# Patient Record
Sex: FEMALE | Race: BLACK OR AFRICAN AMERICAN | NOT HISPANIC OR LATINO | URBAN - METROPOLITAN AREA
[De-identification: names, ages, dates, MRNs, and addresses within clinical notes are randomized per-mention and may not be internally consistent; named-entity substitution may affect disease eponyms.]

---

## 2024-07-22 DIAGNOSIS — N63.10 BREAST MASS, RIGHT: Primary | ICD-10-CM

## 2024-08-08 ENCOUNTER — OFFICE VISIT (OUTPATIENT)
Dept: SURGERY | Facility: CLINIC | Age: 34
End: 2024-08-08
Payer: MEDICAID

## 2024-08-08 VITALS
WEIGHT: 242 LBS | HEART RATE: 86 BPM | HEIGHT: 65 IN | DIASTOLIC BLOOD PRESSURE: 86 MMHG | RESPIRATION RATE: 18 BRPM | BODY MASS INDEX: 40.32 KG/M2 | OXYGEN SATURATION: 98 % | SYSTOLIC BLOOD PRESSURE: 122 MMHG

## 2024-08-08 DIAGNOSIS — N63.10 BREAST MASS, RIGHT: Primary | ICD-10-CM

## 2024-08-08 DIAGNOSIS — C50.811 MALIGNANT NEOPLASM OF OVERLAPPING SITES OF RIGHT BREAST IN FEMALE, ESTROGEN RECEPTOR POSITIVE: ICD-10-CM

## 2024-08-08 DIAGNOSIS — Z17.0 MALIGNANT NEOPLASM OF OVERLAPPING SITES OF RIGHT BREAST IN FEMALE, ESTROGEN RECEPTOR POSITIVE: ICD-10-CM

## 2024-08-08 DIAGNOSIS — C77.3 MALIGNANT NEOPLASM METASTATIC TO LYMPH NODE OF AXILLA: ICD-10-CM

## 2024-08-08 DIAGNOSIS — C50.812 MALIGNANT NEOPLASM OF OVERLAPPING SITES OF LEFT BREAST IN FEMALE, ESTROGEN RECEPTOR POSITIVE: ICD-10-CM

## 2024-08-08 DIAGNOSIS — Z17.0 MALIGNANT NEOPLASM OF OVERLAPPING SITES OF LEFT BREAST IN FEMALE, ESTROGEN RECEPTOR POSITIVE: ICD-10-CM

## 2024-08-08 PROBLEM — C77.9 METASTASIS TO LYMPH NODES: Status: ACTIVE | Noted: 2024-08-08

## 2024-08-08 PROCEDURE — 99215 OFFICE O/P EST HI 40 MIN: CPT | Mod: PBBFAC

## 2024-08-08 RX ORDER — LOSARTAN POTASSIUM 100 MG/1
1 TABLET ORAL EVERY MORNING
COMMUNITY

## 2024-08-08 RX ORDER — CEFAZOLIN SODIUM 2 G/50ML
2 SOLUTION INTRAVENOUS
OUTPATIENT
Start: 2024-08-08

## 2024-08-08 RX ORDER — SODIUM CHLORIDE 9 MG/ML
INJECTION, SOLUTION INTRAVENOUS CONTINUOUS
OUTPATIENT
Start: 2024-08-08

## 2024-08-08 RX ORDER — HEPARIN SODIUM 5000 [USP'U]/ML
5000 INJECTION, SOLUTION INTRAVENOUS; SUBCUTANEOUS EVERY 8 HOURS
OUTPATIENT
Start: 2024-08-08

## 2024-08-09 NOTE — H&P (VIEW-ONLY)
[facility] BRE[facility]   Clinic Note       CC: b/l breast IDC       HPI: Jeannie Greene is a 34 y.o. female with PMHx of HTN, R breast ICD Grade 2, and L breast/axillary ICD Grade 3 presents to clinic today with L breast and arm pain. She first noticed the pain and swelling in L breast in May where she presented to the ED and received antibiotics. Pain did not resolve so Pt followed up with her OB/Gyn who ordered the imaging and biopsy and was subsequently referred to our clinic. Today, patient reports swelling and pain in left breast, axilla, and down her arm. No pain in right breast, but has had some milky discharge in the past from R nipple. She states her pain as a 6/10 that was at its worse in July and has improved some since. She takes ibuprofen 4x daily without relief.     Patient had first menarche at 11. One pregnancy with child and not gone through menopause. She was on OCP at 15yo, received Depo shot from [age]. No contraception from [age] when she got pregnant. Got Murina IUD after pregnancy at 30  that is still in place.  Pt has an extensive family history of breast and cervical cancer. Her mother and sister had cervical cancer. Her mother had a hysterectomy. She also believes her maternal aunt had breast cancer. She only takes losartan for HTN. No prior surgeries.    PMH:  Past Medical History:   Diagnosis Date    Hypertension          PSH:  No past surgical history on file.      Medications:  Current Outpatient Medications on File Prior to Visit   Medication Sig Dispense Refill    losartan (COZAAR) 100 MG tablet Take 1 tablet by mouth every morning.       No current facility-administered medications on file prior to visit.        Allergies:  Review of patient's allergies indicates:  No Known Allergies      Social Hx:  Social History     Tobacco Use   Smoking Status Never   Smokeless Tobacco Never      Social History     Substance and Sexual Activity   Alcohol Use None         Relevant Family Hx:  Pt  has an extensive family history of breast and cervical cancer. Her mother and sister had cervical cancer. Her mother had a hysterectomy. She also believes her maternal aunt had breast cancer.       Physical Exam:   Vitals:    24 1216   BP: 122/86   Pulse: 86   Resp: 18      Gen: NAD, AAOx3   Eye: EOMI   CV: RR  Pulm: NWOB on RA  Abd: soft, non-tender, non-distended  Ext:  Move all 4 extremities.   R Breast: 3cm mass 12 o'clock about 4 in above nipple. no skin changes, no nipple retraction or discharge. No palpable LN in R axilla.  L breast: swollen and hard. Large mass appreciated in 2 o'clock right above breast. 1 hard immobile LN in L axilla. Breast and axilla TTP.            Interval Imagin/17/24 US biopsy  Technically successful ultrasound-guided biopsy of     -Right breast mass at 12:00, 9 cm from the nipple     24 MMG  RIGHT BREAST:   Mass seen near the 12:00 position of the right breast at middle depth.     A mildly prominent right axillary lymph node is seen.     SONOGRAPHIC FINDINGS:   LEFT BREAST:     Left breast ultrasound performed.     -Irregular hypoechoic left breast mass is seen at 2:00, 10 cm nipple measuring 2 x 1.5 x 2.0 cm.     -Irregular hypoechoic mass with indistinct margins within the left breast mass at 1:00 is seen measuring approximately 1.9 x 1.6 cm.     -Irregular hypoechoic breast mass seen within the left breast at 1:00, 12 cm from the nipple measuring 2.3 x 1.6 cm.     -Otherwise, there is irregular appearing masslike tissue seen throughout the left breast, most prominent within the upper outer quadrant.     -Multiple enlarged irregular left axillary lymph nodes are seen including lymph node with spiculated margins within the axilla measuring 16 x 15 x 12 mm.       Interval Pathology:    24 US Biopsy  1. RIGHT BREAST, 12 O'CLOCK, 9 CM FN:   - INVASIVE DUCTAL CARCINOMA, GRADE 2.   Comment: Invasive carcinoma is present on 3 of the core biopsies,    comprising 60%  of the tissue and measures at least 0.7 cm.   2. RIGHT AXILLA:   - LYMPH NODE, NEGATIVE FOR METASTATIC CARCINOMA.     ER: POSITIVE, ME: POSITIVE, HER2**: NEGATIVE       1. LEFT BREAST, 2 O'CLOCK, 10 CM FN:   - INVASIVE DUCTAL CARCINOMA, GRADE 3.   Comment #1: Carcinoma is present on all three core biopsies, comprising    95% of the tissue and measuring at least 1.2 cm.   2. LEFT AXILLA:   - INVASIVE DUCTAL CARCINOMA, GRADE 3.   Comment #2: No lymph node is present. However, this could represent    metastatic carcinoma completely replacing a lymph node.   ER: POSITIVE, ME: NEGATIVE, HER2**: POSITIVE ____________________________________________________________       A/P: Jeannie Greene is a 34 y.o.  female with PMHx of HTN, R breast ICD Grade 2, and L breast/axillary ICD Grade 3 presents to clinic today with L breast and arm pain. Due to LN involvement and inflammatory nature, Pt will requires chemotherapy prior to surgical intervention.      - Urgent referral to Heme/Onc to establish care and treatment  - MRI ordered for better localization of mass  - Mediport placement scheduled for 8/14. Consent was signed after r/b/a to surgery were explained to the patinet and she expressed understanding    Guerrero Bowman  [facility] General Surgery, MS-3      PGY5 addendum  Patient seen and examined with student. Agree with history and physical as written. Plan for mediport placement next week with anticipation of neoadjuvant chemotherapy.  Katie Wilson

## 2024-08-13 ENCOUNTER — PATIENT MESSAGE (OUTPATIENT)
Dept: SURGERY | Facility: HOSPITAL | Age: 34
End: 2024-08-13
Payer: MEDICAID

## 2024-08-13 ENCOUNTER — ANESTHESIA EVENT (OUTPATIENT)
Dept: SURGERY | Facility: HOSPITAL | Age: 34
End: 2024-08-13
Payer: MEDICAID

## 2024-08-13 NOTE — ANESTHESIA PREPROCEDURE EVALUATION
"Jeannie Greene is a 34 y.o. female presenting for XIDIPMIDO-CSUP-U-CATH (Chest) with a history of   -R breast ICD Grade 2, and L breast/axillary ICD Grade 3     -CELLULITIS L BREAST 5/2024    Vitals:    08/14/24 0756 08/14/24 0804 08/14/24 0900   BP:  (!) 144/98 138/87   BP Location:   Left arm   Patient Position:   Lying   Pulse:  84 83   Resp:   20   Temp:  36.7 °C (98.1 °F) 36.4 °C (97.5 °F)   TempSrc:  Oral Tympanic   SpO2:  97% 98%   Weight: 108.1 kg (238 lb 6.4 oz)         -HTN  -obesity, BMI 40    BETA-BLOCKER: NONE    New Orders for Anesthesia: UPT NEG DOS;  CMP    Patient Active Problem List   Diagnosis    Malignant neoplasm of overlapping sites of left breast in female, estrogen receptor positive    Malignant neoplasm of overlapping sites of right breast in female, estrogen receptor positive    Metastasis to lymph nodes - Left axilla       History reviewed. No pertinent surgical history.    No results found for: "WBC", "HGB", "HCT", "PLT"    CMP  Creatinine   Date Value Ref Range Status   06/16/2020 0.65 0.57 - 1.25 mg/dL Final       No results found for: "PROTIME", "INR"    No results found for: "APTT"    No results found for this or any previous visit.      Current Outpatient Medications   Medication Instructions    losartan (COZAAR) 100 MG tablet 1 tablet, Oral, Every morning         Lab Results   Component Value Date    ALT 31 08/14/2024    AST 21 08/14/2024     08/14/2024    K 3.3 (L) 08/14/2024     08/14/2024    CREATININE 0.88 08/14/2024    BUN 18.1 08/14/2024    CO2 28 08/14/2024       Pre-op Assessment    I have reviewed the Patient Summary Reports.     I have reviewed the Nursing Notes. I have reviewed the NPO Status.   I have reviewed the Medications.     Review of Systems  Anesthesia Hx:  No problems with previous Anesthesia   History of prior surgery of interest to airway management or planning:          Denies Family Hx of Anesthesia complications.    Denies Personal Hx of " Anesthesia complications.                    Hematology/Oncology:  Hematology Normal   Oncology Normal                                   EENT/Dental:  EENT/Dental Normal           Cardiovascular:  Cardiovascular Normal                                            Pulmonary:  Pulmonary Normal                       Renal/:  Renal/ Normal                 Hepatic/GI:  Hepatic/GI Normal                 Musculoskeletal:  Musculoskeletal Normal                Neurological:  Neurology Normal                                      Endocrine:  Endocrine Normal            Dermatological:  Skin Normal    Psych:  Psychiatric Normal                    Physical Exam  General: Well nourished, Cooperative, Alert and Oriented    Airway:  Mallampati: I / I  Mouth Opening: Normal  TM Distance: Normal  Tongue: Normal  Neck ROM: Normal ROM    Dental:  Intact        Anesthesia Plan  Type of Anesthesia, risks & benefits discussed:    Anesthesia Type: Gen Supraglottic Airway  Intra-op Monitoring Plan: Standard ASA Monitors  Post Op Pain Control Plan: multimodal analgesia and IV/PO Opioids PRN  Induction:  IV  Airway Plan: Direct  Informed Consent: Informed consent signed with the Patient and all parties understand the risks and agree with anesthesia plan.  All questions answered. Patient consented to blood products? No  ASA Score: 2  Day of Surgery Review of History & Physical: H&P Update referred to the surgeon/provider.    Ready For Surgery From Anesthesia Perspective.     .

## 2024-08-14 ENCOUNTER — HOSPITAL ENCOUNTER (OUTPATIENT)
Facility: HOSPITAL | Age: 34
Discharge: HOME | End: 2024-08-14
Attending: SURGERY | Admitting: SURGERY
Payer: MEDICAID

## 2024-08-14 ENCOUNTER — ANESTHESIA (OUTPATIENT)
Dept: SURGERY | Facility: HOSPITAL | Age: 34
End: 2024-08-14
Payer: MEDICAID

## 2024-08-14 DIAGNOSIS — Z17.0 MALIGNANT NEOPLASM OF OVERLAPPING SITES OF LEFT BREAST IN FEMALE, ESTROGEN RECEPTOR POSITIVE: Primary | ICD-10-CM

## 2024-08-14 DIAGNOSIS — C50.812 MALIGNANT NEOPLASM OF OVERLAPPING SITES OF LEFT BREAST IN FEMALE, ESTROGEN RECEPTOR POSITIVE: Primary | ICD-10-CM

## 2024-08-14 DIAGNOSIS — N63.10 BREAST MASS, RIGHT: ICD-10-CM

## 2024-08-14 LAB
ALBUMIN SERPL-MCNC: 4 G/DL (ref 3.5–5)
ALBUMIN/GLOB SERPL: 1 RATIO (ref 1.1–2)
ALP SERPL-CCNC: 56 UNIT/L (ref 40–150)
ALT SERPL-CCNC: 31 UNIT/L (ref 0–55)
ANION GAP SERPL CALC-SCNC: 9 MEQ/L
AST SERPL-CCNC: 21 UNIT/L (ref 5–34)
BILIRUB SERPL-MCNC: 0.5 MG/DL
BUN SERPL-MCNC: 18.1 MG/DL (ref 7–18.7)
CALCIUM SERPL-MCNC: 10.2 MG/DL (ref 8.4–10.2)
CHLORIDE SERPL-SCNC: 103 MMOL/L (ref 98–107)
CO2 SERPL-SCNC: 28 MMOL/L (ref 22–29)
CREAT SERPL-MCNC: 0.88 MG/DL (ref 0.55–1.02)
CREAT/UREA NIT SERPL: 21
GFR SERPLBLD CREATININE-BSD FMLA CKD-EPI: >60 ML/MIN/1.73/M2
GLOBULIN SER-MCNC: 4.1 GM/DL (ref 2.4–3.5)
GLUCOSE SERPL-MCNC: 153 MG/DL (ref 74–100)
POTASSIUM SERPL-SCNC: 3.3 MMOL/L (ref 3.5–5.1)
PROT SERPL-MCNC: 8.1 GM/DL (ref 6.4–8.3)
SODIUM SERPL-SCNC: 140 MMOL/L (ref 136–145)

## 2024-08-14 PROCEDURE — 36000706: Performed by: SURGERY

## 2024-08-14 PROCEDURE — 63600175 PHARM REV CODE 636 W HCPCS: Mod: JZ,JG | Performed by: SURGERY

## 2024-08-14 PROCEDURE — C1788 PORT, INDWELLING, IMP: HCPCS | Performed by: SURGERY

## 2024-08-14 PROCEDURE — 71000016 HC POSTOP RECOV ADDL HR: Performed by: SURGERY

## 2024-08-14 PROCEDURE — 36000707: Performed by: SURGERY

## 2024-08-14 PROCEDURE — 25000003 PHARM REV CODE 250: Performed by: NURSE ANESTHETIST, CERTIFIED REGISTERED

## 2024-08-14 PROCEDURE — 37000008 HC ANESTHESIA 1ST 15 MINUTES: Performed by: SURGERY

## 2024-08-14 PROCEDURE — 63600175 PHARM REV CODE 636 W HCPCS: Performed by: SPECIALIST

## 2024-08-14 PROCEDURE — 71000015 HC POSTOP RECOV 1ST HR: Performed by: SURGERY

## 2024-08-14 PROCEDURE — 37000009 HC ANESTHESIA EA ADD 15 MINS: Performed by: SURGERY

## 2024-08-14 PROCEDURE — 80053 COMPREHEN METABOLIC PANEL: CPT | Performed by: NURSE PRACTITIONER

## 2024-08-14 PROCEDURE — 63600175 PHARM REV CODE 636 W HCPCS: Performed by: STUDENT IN AN ORGANIZED HEALTH CARE EDUCATION/TRAINING PROGRAM

## 2024-08-14 PROCEDURE — 63600175 PHARM REV CODE 636 W HCPCS: Performed by: NURSE ANESTHETIST, CERTIFIED REGISTERED

## 2024-08-14 PROCEDURE — 71000033 HC RECOVERY, INTIAL HOUR: Performed by: SURGERY

## 2024-08-14 DEVICE — PORT POWER CLEAR VIEW: Type: IMPLANTABLE DEVICE | Site: CHEST  WALL | Status: FUNCTIONAL

## 2024-08-14 RX ORDER — HEPARIN SODIUM 5000 [USP'U]/ML
5000 INJECTION, SOLUTION INTRAVENOUS; SUBCUTANEOUS ONCE
Status: COMPLETED | OUTPATIENT
Start: 2024-08-14 | End: 2024-08-14

## 2024-08-14 RX ORDER — DEXAMETHASONE SODIUM PHOSPHATE 4 MG/ML
INJECTION, SOLUTION INTRA-ARTICULAR; INTRALESIONAL; INTRAMUSCULAR; INTRAVENOUS; SOFT TISSUE
Status: DISCONTINUED | OUTPATIENT
Start: 2024-08-14 | End: 2024-08-14

## 2024-08-14 RX ORDER — HEPARIN SODIUM 5000 [USP'U]/ML
5000 INJECTION, SOLUTION INTRAVENOUS; SUBCUTANEOUS EVERY 8 HOURS
Status: DISCONTINUED | OUTPATIENT
Start: 2024-08-14 | End: 2024-08-14

## 2024-08-14 RX ORDER — SODIUM CHLORIDE 9 MG/ML
INJECTION, SOLUTION INTRAVENOUS CONTINUOUS
Status: DISCONTINUED | OUTPATIENT
Start: 2024-08-14 | End: 2024-08-14 | Stop reason: HOSPADM

## 2024-08-14 RX ORDER — HYDROCODONE BITARTRATE AND ACETAMINOPHEN 5; 325 MG/1; MG/1
1 TABLET ORAL EVERY 6 HOURS PRN
Qty: 5 TABLET | Refills: 0 | Status: SHIPPED | OUTPATIENT
Start: 2024-08-14

## 2024-08-14 RX ORDER — SODIUM CHLORIDE, SODIUM LACTATE, POTASSIUM CHLORIDE, CALCIUM CHLORIDE 600; 310; 30; 20 MG/100ML; MG/100ML; MG/100ML; MG/100ML
INJECTION, SOLUTION INTRAVENOUS CONTINUOUS
OUTPATIENT
Start: 2024-08-14

## 2024-08-14 RX ORDER — PROCHLORPERAZINE EDISYLATE 5 MG/ML
5 INJECTION INTRAMUSCULAR; INTRAVENOUS ONCE AS NEEDED
Status: COMPLETED | OUTPATIENT
Start: 2024-08-14 | End: 2024-08-14

## 2024-08-14 RX ORDER — GLUCAGON 1 MG
1 KIT INJECTION
Status: DISCONTINUED | OUTPATIENT
Start: 2024-08-14 | End: 2024-08-14 | Stop reason: HOSPADM

## 2024-08-14 RX ORDER — PROPOFOL 10 MG/ML
INJECTION, EMULSION INTRAVENOUS
Status: DISCONTINUED | OUTPATIENT
Start: 2024-08-14 | End: 2024-08-14

## 2024-08-14 RX ORDER — ONDANSETRON HYDROCHLORIDE 2 MG/ML
INJECTION, SOLUTION INTRAVENOUS
Status: DISCONTINUED | OUTPATIENT
Start: 2024-08-14 | End: 2024-08-14

## 2024-08-14 RX ORDER — PHENYLEPHRINE HYDROCHLORIDE 10 MG/ML
INJECTION INTRAVENOUS
Status: DISCONTINUED | OUTPATIENT
Start: 2024-08-14 | End: 2024-08-14

## 2024-08-14 RX ORDER — MEPERIDINE HYDROCHLORIDE 25 MG/ML
12.5 INJECTION INTRAMUSCULAR; INTRAVENOUS; SUBCUTANEOUS ONCE
Status: DISCONTINUED | OUTPATIENT
Start: 2024-08-14 | End: 2024-08-14 | Stop reason: HOSPADM

## 2024-08-14 RX ORDER — MIDAZOLAM HYDROCHLORIDE 1 MG/ML
2 INJECTION INTRAMUSCULAR; INTRAVENOUS ONCE
Status: COMPLETED | OUTPATIENT
Start: 2024-08-14 | End: 2024-08-14

## 2024-08-14 RX ORDER — LIDOCAINE HYDROCHLORIDE 20 MG/ML
INJECTION INTRAVENOUS
Status: DISCONTINUED | OUTPATIENT
Start: 2024-08-14 | End: 2024-08-14

## 2024-08-14 RX ORDER — IPRATROPIUM BROMIDE AND ALBUTEROL SULFATE 2.5; .5 MG/3ML; MG/3ML
3 SOLUTION RESPIRATORY (INHALATION) ONCE AS NEEDED
Status: DISCONTINUED | OUTPATIENT
Start: 2024-08-14 | End: 2024-08-14 | Stop reason: HOSPADM

## 2024-08-14 RX ORDER — CEFAZOLIN SODIUM 1 G/3ML
2 INJECTION, POWDER, FOR SOLUTION INTRAMUSCULAR; INTRAVENOUS
Status: COMPLETED | OUTPATIENT
Start: 2024-08-14 | End: 2024-08-14

## 2024-08-14 RX ORDER — HYDROMORPHONE HYDROCHLORIDE 1 MG/ML
0.2 INJECTION, SOLUTION INTRAMUSCULAR; INTRAVENOUS; SUBCUTANEOUS EVERY 5 MIN PRN
Status: DISCONTINUED | OUTPATIENT
Start: 2024-08-14 | End: 2024-08-14 | Stop reason: HOSPADM

## 2024-08-14 RX ORDER — ONDANSETRON 4 MG/1
4 TABLET, FILM COATED ORAL 2 TIMES DAILY
Qty: 10 TABLET | Refills: 1 | Status: SHIPPED | OUTPATIENT
Start: 2024-08-14

## 2024-08-14 RX ORDER — OXYCODONE AND ACETAMINOPHEN 5; 325 MG/1; MG/1
2 TABLET ORAL ONCE
Status: DISCONTINUED | OUTPATIENT
Start: 2024-08-14 | End: 2024-08-14 | Stop reason: HOSPADM

## 2024-08-14 RX ORDER — FENTANYL CITRATE 50 UG/ML
INJECTION, SOLUTION INTRAMUSCULAR; INTRAVENOUS
Status: DISCONTINUED | OUTPATIENT
Start: 2024-08-14 | End: 2024-08-14

## 2024-08-14 RX ORDER — BUPIVACAINE HYDROCHLORIDE 2.5 MG/ML
INJECTION, SOLUTION EPIDURAL; INFILTRATION; INTRACAUDAL
Status: DISCONTINUED | OUTPATIENT
Start: 2024-08-14 | End: 2024-08-14 | Stop reason: HOSPADM

## 2024-08-14 RX ORDER — KETOROLAC TROMETHAMINE 30 MG/ML
INJECTION, SOLUTION INTRAMUSCULAR; INTRAVENOUS
Status: DISCONTINUED | OUTPATIENT
Start: 2024-08-14 | End: 2024-08-14

## 2024-08-14 RX ORDER — ONDANSETRON HYDROCHLORIDE 2 MG/ML
4 INJECTION, SOLUTION INTRAVENOUS ONCE
Status: COMPLETED | OUTPATIENT
Start: 2024-08-14 | End: 2024-08-14

## 2024-08-14 RX ORDER — HYDROMORPHONE HYDROCHLORIDE 1 MG/ML
0.5 INJECTION, SOLUTION INTRAMUSCULAR; INTRAVENOUS; SUBCUTANEOUS EVERY 5 MIN PRN
Status: DISCONTINUED | OUTPATIENT
Start: 2024-08-14 | End: 2024-08-14 | Stop reason: HOSPADM

## 2024-08-14 RX ORDER — DIPHENHYDRAMINE HYDROCHLORIDE 50 MG/ML
25 INJECTION INTRAMUSCULAR; INTRAVENOUS ONCE AS NEEDED
Status: DISCONTINUED | OUTPATIENT
Start: 2024-08-14 | End: 2024-08-14 | Stop reason: HOSPADM

## 2024-08-14 RX ORDER — HEPARIN SOD,PORCINE/0.9 % NACL 1000/500ML
INTRAVENOUS SOLUTION INTRAVENOUS
Status: DISCONTINUED | OUTPATIENT
Start: 2024-08-14 | End: 2024-08-14 | Stop reason: HOSPADM

## 2024-08-14 RX ORDER — SODIUM CHLORIDE, SODIUM LACTATE, POTASSIUM CHLORIDE, CALCIUM CHLORIDE 600; 310; 30; 20 MG/100ML; MG/100ML; MG/100ML; MG/100ML
INJECTION, SOLUTION INTRAVENOUS CONTINUOUS
Status: DISCONTINUED | OUTPATIENT
Start: 2024-08-14 | End: 2024-08-14 | Stop reason: HOSPADM

## 2024-08-14 RX ORDER — MIDAZOLAM HYDROCHLORIDE 2 MG/2ML
2 INJECTION, SOLUTION INTRAMUSCULAR; INTRAVENOUS ONCE AS NEEDED
OUTPATIENT
Start: 2024-08-14 | End: 2036-01-11

## 2024-08-14 RX ORDER — HEPARIN 100 UNIT/ML
SYRINGE INTRAVENOUS
Status: DISCONTINUED | OUTPATIENT
Start: 2024-08-14 | End: 2024-08-14 | Stop reason: HOSPADM

## 2024-08-14 RX ADMIN — PROCHLORPERAZINE EDISYLATE 5 MG: 5 INJECTION INTRAMUSCULAR; INTRAVENOUS at 12:08

## 2024-08-14 RX ADMIN — HEPARIN SODIUM 5000 UNITS: 5000 INJECTION, SOLUTION INTRAVENOUS; SUBCUTANEOUS at 08:08

## 2024-08-14 RX ADMIN — PROPOFOL 150 MG: 10 INJECTION, EMULSION INTRAVENOUS at 10:08

## 2024-08-14 RX ADMIN — FENTANYL CITRATE 50 MCG: 50 INJECTION INTRAMUSCULAR; INTRAVENOUS at 10:08

## 2024-08-14 RX ADMIN — MIDAZOLAM HYDROCHLORIDE 2 MG: 1 INJECTION, SOLUTION INTRAMUSCULAR; INTRAVENOUS at 09:08

## 2024-08-14 RX ADMIN — DEXAMETHASONE SODIUM PHOSPHATE 8 MG: 4 INJECTION, SOLUTION INTRA-ARTICULAR; INTRALESIONAL; INTRAMUSCULAR; INTRAVENOUS; SOFT TISSUE at 11:08

## 2024-08-14 RX ADMIN — PHENYLEPHRINE HYDROCHLORIDE 100 MCG: 10 INJECTION INTRAVENOUS at 11:08

## 2024-08-14 RX ADMIN — LIDOCAINE HYDROCHLORIDE 50 MG: 20 INJECTION INTRAVENOUS at 10:08

## 2024-08-14 RX ADMIN — CEFAZOLIN 2 G: 330 INJECTION, POWDER, FOR SOLUTION INTRAMUSCULAR; INTRAVENOUS at 10:08

## 2024-08-14 RX ADMIN — ONDANSETRON 4 MG: 2 INJECTION INTRAMUSCULAR; INTRAVENOUS at 11:08

## 2024-08-14 RX ADMIN — SODIUM CHLORIDE, POTASSIUM CHLORIDE, SODIUM LACTATE AND CALCIUM CHLORIDE: 600; 310; 30; 20 INJECTION, SOLUTION INTRAVENOUS at 09:08

## 2024-08-14 RX ADMIN — KETOROLAC TROMETHAMINE 30 MG: 30 INJECTION, SOLUTION INTRAMUSCULAR at 11:08

## 2024-08-14 NOTE — ANESTHESIA POSTPROCEDURE EVALUATION
Anesthesia Post Evaluation    Patient: Jeannie Greene    Procedure(s) Performed: Procedure(s) (LRB):  [id number] (N/A)    Final Anesthesia Type: general      Patient location during evaluation: PACU  Patient participation: Yes- Able to Participate  Level of consciousness: awake and responds to stimulation  Post-procedure vital signs: reviewed and stable  Pain management: adequate  Airway patency: patent    PONV status at discharge: No PONV  Anesthetic complications: no      Cardiovascular status: blood pressure returned to baseline  Respiratory status: unassisted  Hydration status: euvolemic  Follow-up not needed.              Vitals Value Taken Time   BP 93/63 08/14/24 1431   Temp 36.4 °C (97.5 °F) 08/14/24 1415   Pulse 67 08/14/24 1440   Resp 18 08/14/24 1400   SpO2 97 % 08/14/24 1440   Vitals shown include unfiled device data.      Event Time   Out of Recovery 12:23:00         Pain/Génesis Score: Génesis Score: 7 (8/14/2024 12:26 PM)

## 2024-08-14 NOTE — TRANSFER OF CARE
Anesthesia Transfer of Care Note    Patient: Jeannie Greene    Procedure(s) Performed: Procedure(s) (LRB):  [id number] (N/A)    Patient location: PACU    Anesthesia Type: general    Transport from OR: Transported from OR on room air with adequate spontaneous ventilation    Post pain: adequate analgesia    Post assessment: no apparent anesthetic complications and tolerated procedure well    Post vital signs: stable    Level of consciousness: sedated    Nausea/Vomiting: no nausea/vomiting    Complications: none    Transfer of care protocol was followed      Last vitals: Visit Vitals  /87 (BP Location: Left arm, Patient Position: Lying)   Pulse 83   Temp 36.4 °C (97.5 °F) (Tympanic)   Resp 20   Wt 108.1 kg (238 lb 6.4 oz)   SpO2 98%   Breastfeeding No   BMI 39.67 kg/m²

## 2024-08-14 NOTE — ANESTHESIA PROCEDURE NOTES
Intubation    Date/Time: 8/14/2024 10:42 AM    Performed by: Alize Roth CRNA  Authorized by: Juany Hernandez MD    Intubation:     Induction:  Intravenous    Intubated:  Postinduction    Mask Ventilation:  Not attempted    Attempts:  1    Attempted By:  CRNA    Difficult Airway Encountered?: No      Complications:  None    Airway Device:  Supraglottic airway/LMA    Airway Device Size:  4.0    Style/Cuff Inflation:  Uncuffed    Placement Verified By:  Capnometry    Complicating Factors:  None    Findings Post-Intubation:  BS equal bilateral and atraumatic/condition of teeth unchanged

## 2024-08-14 NOTE — DISCHARGE INSTRUCTIONS
MEDIPORT:    · Keep all scheduled appointments. Your port may be used immediately. If you have any problems with the site (redness, swelling, drainage, increasing pain, site hot to touch, port not working) CALL THE SURGERY CLINIC at [phone].    · No heavy lifting or straining for 2 weeks.    · You may take a shower tomorrow. Wash gently at incision sites- do not scrub or peel skin glue.    · Do not soak your wound in water for two weeks. Do not take baths, swim, or use a hot tub until your doctor says it is okay.    · Alternate Tylenol and Ibuprofen as needed for pain.     · You may use an ice pack as needed for 20 minutes at a time over your incision site to minimize swelling and help relieve pain.    · Do not drink alcohol or drive today, or as long as you are on pain medication.    · Notify MD of any moderate to severe pain unrelieved by pain medicine, if your incision opens and/or bleeds, or for any signs of infection including fever above 100.4, excessive redness or swelling, yellow/green foul- smelling drainage, nausea or vomiting. Clinics number is [phone]. If it is after business hours or emergency call [phone] and state Im having post op complications and need to speak to the surgeon on call.    · Thanks for choosing [facility]! Have a smooth recovery!

## 2024-08-14 NOTE — DISCHARGE SUMMARY
[facility] - Periop Services  Short Stay  Discharge Summary    Admit Date: 8/14/2024    Discharge Date and Time: 8/14/2024  3:05 PM      Discharge Attending Physician: Jc Chauhan Jr.,*     Hospital Course (synopsis of major diagnoses, care, treatment, and services provided during the course of the hospital stay): 34 year old female with L IDC ER+, AK-, HER-2 + presenting for mediport placement. Taken to the OR and mediport was placed under fluoroscopy. Patient tolerated the procedure well without complications.     Discharge criteria had been met and patient was deemed stable enough for discharge.    Please see hospital and op notes for further detail regarding patient's admission.    Discharged Condition: stable    Disposition: Home or Self Care    Follow up/Patient Instructions:    Medications:  Reconciled Home Medications:      Medication List        START taking these medications      HYDROcodone-acetaminophen 5-325 mg per tablet  Commonly known as: NORCO  Take 1 tablet by mouth every 6 (six) hours as needed for Pain.     ondansetron 4 MG tablet  Commonly known as: ZOFRAN  Take 1 tablet (4 mg total) by mouth 2 (two) times daily.            CONTINUE taking these medications      losartan 100 MG tablet  Commonly known as: COZAAR  Take 1 tablet by mouth every morning.            Discharge Procedure Orders   Change dressing (specify)   Order Comments: Can shower when you get home. Do not submerge your incisions in water (not sitting in a bath tub or swimming) for two weeks. Surgical glue will come off on its own over time. If it has not come off in 10 days you can peel it off.     Notify your health care provider if you experience any of the following:  temperature >100.4     Notify your health care provider if you experience any of the following:  severe uncontrolled pain     Notify your health care provider if you experience any of the following:  redness, tenderness, or signs of infection (pain,  swelling, redness, odor or green/yellow discharge around incision site)     Evette Escobar MD   [employer] General Surgery PGY-1

## 2024-08-14 NOTE — INTERVAL H&P NOTE
The patient has been examined and the H&P has been reviewed:    I concur with the findings and no changes have occurred since H&P was written.    Surgery risks, benefits and alternative options discussed and understood by patient/family.    H&P Update    Patient seen and examined this morning. There are no changes to the documented H&P.    Patient has been NPO since midnight.     Patient has held home blood thinners for appropriate amount of time: N/A    Positioning: Supine    Pre-operative Heparin Ordered: Yes    Pre-operative Antibiotics Ordered: Yes: Ancef    Laterality Marked: N/A    All questions and concerns addressed. Patient confirms the procedure to be performed: [id number].     Eric Constantino MD  [facility] General Surgery, PGY-1  08/14/2024 8:27 AM        There are no hospital problems to display for this patient.

## 2024-08-14 NOTE — OP NOTE
[facility] - Periop Services  General Surgery  Operative Note    SUMMARY     Date of Procedure: 8/14/2024     Procedure: Procedure(s) (LRB):  [id number] (N/A)       Surgeons and Role:     * Jc Chauhan Jr., MD - Primary     * Julia Ruiz MD - Resident - Assisting     * Evette Escobar MD - Resident - Assisting    Pre-Operative Diagnosis: * No pre-op diagnosis entered *    Post-Operative Diagnosis: Post-Op Diagnosis Codes:     * Breast mass, right [N63.10]    Anesthesia: Choice    Operative Findings (including complications, if any): Mediport placement     Description of Technical Procedures:   After appropriate consents were obtained, the patient was taken to the operating room and placed in supine position and placed under general anesthesia. Patient's bilateral chest and neck was prepped and draped in sterile fashion. A proper timeout was performed confirming correct patient, site, procedure, and administration of preoperative antibiotic and anticoagulation. A large bore needle was used to penetrate the skin and venous access was obtained under ultrasound guidance.  A guidewire was inserted into the needle and confirmed to be in the superior vena cava by flouroscopy.  Next a 3 cm incision was made on the upper chest and deepened with electrocautery.  A 2 x 3 cm port pocket was created in the subcutaneous space overlying the pectoralis fascia inferior to the incision. The catheter was then tunneled through the subcutaneous fat to exit the skin at the wire.  A dilator and sheath were inserted over the guidewire into the central venous system under fluoroscopic guidance. The guidewire and dilator were removed from the sheath. The catheter was inserted into the sheath and then the sheath was torn away. The catheter was pulled back such that the tip was at the atriocaval junction. The catheter was attached to the port with a locking washer and the the port was fixed to the pectoralis  fascia with two 3-0 Vicryl stitches. A Williamson needle was used to aspirate then flush the port with heparinized saline.  The incision was then reapproximated with 3-0 Vicryl, 4-0 Monocryl, and Dermabond.  The patient tolerated the procedure without complications.  The needle and sponge count were accurate. The patient was transferred to recovery room in stable condition.     Dr. Gurrola was present for the critical portions of the case.     Findings: Properly placed catheter tip at the cavo-atrial junction visualized with fluoroscopy. Post-operative CXR confirmed placement.     Significant Surgical Tasks Conducted by the Assistant(s), if Applicable: N/A    Estimated Blood Loss (EBL): * No values recorded between 8/14/2024 10:55 AM and 8/14/2024 11:45 AM *           Implants:   Implant Name Type Inv. Item Serial No.  Lot No. LRB No. Used Action   PORT POWER CLEAR VIEW - [id number]  PORT POWER CLEAR VIEW  [employer] [id number] N/A 1 Implanted       Specimens:   Specimen (24h ago, onward)      None                Condition: Stable    Disposition: PACU - hemodynamically stable.    Evette Escobar MD   [facility] General Surgery PGY-1

## 2024-08-15 VITALS
WEIGHT: 238.38 LBS | HEART RATE: 75 BPM | RESPIRATION RATE: 18 BRPM | TEMPERATURE: 98 F | OXYGEN SATURATION: 95 % | DIASTOLIC BLOOD PRESSURE: 78 MMHG | BODY MASS INDEX: 39.67 KG/M2 | SYSTOLIC BLOOD PRESSURE: 108 MMHG

## 2024-08-15 NOTE — PROGRESS NOTES
"  Cancer Genetics  Hereditary/High Risk Clinic  Department of Hematology and Oncology  [facility]        Date of Service:  2024  Provider:  Tye Núñez MS, [facility]  Collaborating physician: Qiana Heller MD    Patient Information  Name:  Jeannie Greene  :  1990  MRN:  [id number]        Referring Provider: Jc Chauhan Jr., MD     Visit type: in-person.   Face-to-face time with patient via video: approximately 35 minutes.  Approximately 95 minutes in total were spent on this encounter, which includes face-to-face time and non-face-to-face time preparing to see the patient (e.g., review of tests), obtaining and/or reviewing separately obtained history, documenting clinical information in the electronic or other health record, independently interpreting results (not   reported) and communicating results to the patient/family/caregiver, or care coordination (not separately reported).      SUBJECTIVE:      Reason for Referral: Breast mass, right    History of Present Illness (HPI):  Jeannie Greene ("Jeannie"), 34 y.o., assigned female sex at birth is established to the [facility] but new to me.  She was referred by  Breast Surgery  for genetic cancer risk assessment given her family history of breast cancer. 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    Focused Medical History:   Germline cancer-genetic testing:  No  Cancer:  Yes  Malignant neoplasm of overlapping sites of left breast in female, estrogen receptor positive, metastasis to lymph nodes - Left axilla   Malignant neoplasm of overlapping sites of right breast in female, estrogen receptor positive  Colonoscopy: No  Mammogram: Yes (done at [facility])  Most recent mammo: 2024   Breast MRI:  No  Other benign " tumor/findings:  No  Pancreatitis:  No  Pancreatic cyst:  No   Reproductive organs intact     Focused Surgical History  N/A    Ancestry:  Ashkenazi Oriental orthodox ancestry:  No  Paternal:   Maternal:     Focused Family History:  Consanguinity in ancestors:  No  Germline cancer-genetic testing in blood relatives:  No  Cancer in family:  Yes; there are no known blood relatives affected with cancer other than those mentioned in the pedigree below    Family Cancer Pedigree: For clearer picture, please see pedigree in History tab.                      Review of Systems:  See HPI.      SUBJECTIVE:   Records Reviewed  Medical History  Problem List  Any pertinent, available Procedures and Pathology reports in both [facility] and [facility]    COUNSELING   Causes of cancer  Germline cancer genetic testing is the testing of genes associated with cancer, known as cancer susceptibility genes.  Just as these genes are inherited from parents, mutations in these genes can be inherited, as well.  A mutation in a cancer susceptibility gene adversely affects the gene's ability to prevent cancer; therefore, carriers of cancer susceptibility gene mutations may be at increased risk for certain cancers.     Only 5-10% cancers are caused by a cancer susceptibility gene mutation, meaning the cancer is genetic/hereditary; rather, most cancers are sporadic.  Causes of sporadic cancers may include environmental risk factors, lifestyle risk factors, and non-modifiable risk factors.  It is important to note that members of a family often share not only their genetics but also risk factors including environmental and lifestyle risk factors, so cancers can be familial.    Mutations in BRCA1 and BRCA2 are associated with an increased risk for breast and ovarian cancer, in addition to male breast cancer, pancreatic, melanoma, and prostate cancer. Mutations in other genes may increase the risk of breast and  prostate cancer, in addition to other cancers.    Potential results of genetic testing, and their implications  Potential results of genetic testing include positive, negative, and variant of unknown significance (VUS).    A positive result indicates the presence of at least one clinically significant mutation, and the patient's associated cancer risks vary depending upon the cancer susceptibility gene(s) in which there is/are a mutation(s).  With a positive result, in some cases, depending upon the specific result and the patient's clinical history, modified risk management may be recommended, including measures for risk reduction and/or surveillance; however, modified management is not always an option.    A negative result indicates that no clinically significant mutations were identified in the gene(s) tested.    A VUS indicates that there is not presently enough data for the laboratory to make a determination as to whether the variant is clinically significant.  VUSs are not typically acted upon clinically.       The ability to interpret the meaning of a negative genetic testing result in genes associated with cancer with which the patient has not personally been affected, when done prior to testing the appropriate affected relative(s), is significantly limited.  A negative result in the patient does not indicate that she cannot develop cancer, and, in fact, the patient may even be at increased risk for cancer based on shared risk factors with affected relatives.  The most informative candidates for initial genetic testing in a family are those who have been affected with cancer.     Modified management may also be recommended, even with a result of no or unknown significance, based upon risk assessment that incorporates the family history.  Sometimes, depending upon the genetic testing result and the cancer diagnosis, additional/modified treatments may be an option, though this is not guaranteed.     Genetic  mutation inheritance  If Jeannie tests positive for a mutation, her first-degree relatives would each have a 50% chance of having the same mutation, and other, more distantly related blood-relatives would also be at risk of having the same mutation.       Genetic discrimination  The Genetic Information Nondiscrimination Act (ROD) is [place] federal legislation that provides some protections against use of an individual's genetic information by their health insurer and by their employer.  Title I of ROD prohibits most health insurers from utilizing an individual's genetic information to make decisions regarding insurance eligibility or premium charges.  Title II of ROD prohibits covered entities, including employers, from requesting the genetic information of employees and applicants.  ROD does not protect individuals from genetic discrimination toward health insurance obtained through a job with the  or through the Federal Employees Health Benefits Plan; from genetic discrimination by employers with fewer than 15 employees or if employed by the ; or from genetic discrimination by any other type of policies/entities, including but not limited to life insurance, disability insurance, long-term care insurance,  benefits, and  Health Services benefits.     Genetic testing logistics  An outside laboratory would perform the testing after a blood sample is collected or a saliva sample is collected by the patient at home.  With genetic testing, there is a potential for the patient to incur out-of-pocket costs.  Results can take 2-3 weeks.  Post-test genetic counseling can be conducted once the genetic testing results are available.     Assessment/Plan  Based on the information provided by  Jeannie, she meets current criteria for genetic testing of hereditary breast cancer according to current clinical guidelines. Jeannie's clinical history, including personal history and/or family history, is  strongly suggestive of a hereditary cancer syndrome in the family given the personal history of bilateral breast cancer diagnosed before age 50.     Offered germline cancer-genetic testing at this time versus deferring testing at this time or declining testing altogether.  Various test panel options were discussed. Jeannie agreed to proceed with genetic testing through the [facility] BRCA1/BRCA2 Core Panel and the Multi Cancer Panel (AIP, ALK, APC, IMKE, AXIN2, BAP1, BARD1, BLM, BMPR1A, BRCA1, BRCA2, BRIP1, CDC73, CDH1, CDK4, CDKN1B, CDKN2A, CHEK2, CTNNA1, DICER1, EGFR, EPCAM, FH, FLCN, GREM1, HOXB13, KIT, LZTR1, MAX, MBD4, MEN1, MET, MITF, MLH1, MSH2, MSH3, MSH6, MUTYH, NF1, NF2, NTHL1, PALB2, PDGFRA, PMS2, POLD1, POLE, POT1, HXKWJ2I, PTCH1, PTEN, RAD51C, RAD51D, RB1, RET, SDHA, SDHAF2, SDHB, SDHC, SDHD, SMAD4, SMARCA4, SMARCB1, SMARCE1, STK11, SUFU, VKTE410, TP53, TSC1, TSC2, VHL).  Jeannie has provided her informed consent to proceed. A blood sample was collected in clinic today.     Questions were encouraged and answered to the patient's satisfaction, and she verbalized understanding of information and agreement with the plan.         Signed,    Tye Harden MS, [facility]  Licensed - Certified Genetic Counselor  [facility]    08/22/2024

## 2024-08-22 ENCOUNTER — OFFICE VISIT (OUTPATIENT)
Dept: SURGERY | Facility: CLINIC | Age: 34
End: 2024-08-22
Payer: MEDICAID

## 2024-08-22 DIAGNOSIS — Z80.3 FAMILY HISTORY OF BREAST CANCER: ICD-10-CM

## 2024-08-22 DIAGNOSIS — C77.3 MALIGNANT NEOPLASM METASTATIC TO LYMPH NODE OF AXILLA: ICD-10-CM

## 2024-08-22 DIAGNOSIS — Z17.0 MALIGNANT NEOPLASM OF OVERLAPPING SITES OF RIGHT BREAST IN FEMALE, ESTROGEN RECEPTOR POSITIVE: ICD-10-CM

## 2024-08-22 DIAGNOSIS — C50.811 MALIGNANT NEOPLASM OF OVERLAPPING SITES OF RIGHT BREAST IN FEMALE, ESTROGEN RECEPTOR POSITIVE: ICD-10-CM

## 2024-08-22 DIAGNOSIS — Z17.0 MALIGNANT NEOPLASM OF OVERLAPPING SITES OF LEFT BREAST IN FEMALE, ESTROGEN RECEPTOR POSITIVE: Primary | ICD-10-CM

## 2024-08-22 DIAGNOSIS — C50.812 MALIGNANT NEOPLASM OF OVERLAPPING SITES OF LEFT BREAST IN FEMALE, ESTROGEN RECEPTOR POSITIVE: Primary | ICD-10-CM

## 2024-08-22 DIAGNOSIS — N63.10 BREAST MASS, RIGHT: ICD-10-CM

## 2024-08-22 PROCEDURE — 99211 OFF/OP EST MAY X REQ PHY/QHP: CPT | Mod: PBBFAC

## 2024-08-22 PROCEDURE — 99999 PR PBB SHADOW E&M-EST. PATIENT-LVL I: CPT | Mod: PBBFAC,,,

## 2024-08-25 NOTE — PROGRESS NOTES
History:  Past Medical History:   Diagnosis Date    Hypertension      Past Surgical History:   Procedure Laterality Date    INSERTION OF TUNNELED CENTRAL VENOUS CATHETER (CVC) WITH SUBCUTANEOUS PORT N/A 08/14/2024    Procedure: [id number];  Surgeon: Jc Chauhan Jr., MD;  Location: [facility];  Service: General;  Laterality: N/A;      Social History     Socioeconomic History    Marital status: Other   Tobacco Use    Smoking status: Never     Passive exposure: Never    Smokeless tobacco: Never   Substance and Sexual Activity    Alcohol use: Never    Drug use: Never    Sexual activity: Yes     Partners: Male     Birth control/protection: I.U.D.     Social Determinants of Health     Financial Resource Strain: Low Risk  (11/2/2020)    [facility] Affili[facility]    Overall Financial Resource Strain (CARDIA)     Difficulty of Paying Living Expenses: Not hard at all   Food Insecurity: No Food Insecurity (11/2/2020)    [facility]    Hunger Vital Sign     Worried About Running Out of Food in the Last Year: Never true     Ran Out of Food in the Last Year: Never true   Transportation Needs: No Transportation Needs (11/2/2020)    [facility]    PRAPARE - Transportation     Lack of Transportation (Medical): No     Lack of Transportation (Non-Medical): No   Physical Activity: Inactive (11/2/2020)    Received from [facility]    Exercise Vital Sign     Days of Exercise per Week: 0 days     Minutes of Exercise per Session: 0 min   Stress: No Stress Concern Present (11/2/2020)    Received from [facility]    [facility]  of Occupational Health - Occupational Stress Questionnaire     Feeling of Stress : Not at all      Family History   Problem Relation Name Age of Onset    Cervical cancer Mother Pao Zacarias 50    Hypertension Mother Pao Zacarias     Diabetes Father Robinson Bland     Hypertension Father Robinson Bland     Colon cancer Maternal Grandfather Robert Chang Jr 70    Cancer Maternal Grandfather Robert Chang Jr     Hypertension Paternal Grandmother      Autism spectrum disorder Son Gilmar 3    Cervical cancer Other Conxavia 25    Kidney failure Other Robinson Bills     Cervical cancer Maternal Aunt Pao 50    Cervical cancer Maternal Aunt Yaima 44    Kidney failure Maternal Aunt Ghada 45    Cervical cancer Other      Stomach cancer Other          recurrence    Liver cancer Other Pedro Pablo 54    Hypertension Other [place]     Heart attack Paternal Uncle      Breast cancer Paternal Cousin  40    Breast cancer Paternal Cousin  42        BL mastect    Breast cancer Other      Hypertension Maternal Aunt Yaima Brown     Hypertension Maternal Aunt Mirta Brown     Hypertension Sister Evelio Brink       Reason for Follow-up:  Reason for consultation:   -IDC right breast:  IDC, grade 2, lymph node biopsy negative, ER positive, VT positive, HER2 negative; S/P core biopsy 07/17/2024  cT1c cN0 MX, AJCC anatomic stage at least IA, clinical prognostic stage IA  -inflammatory carcinoma left breast, grade 3, lymph node biopsy positive, ER positive, VT negative, HER2 positive; cT4d cN2 MX, AJCC anatomic stage at least IIIB, clinical prognostic stage IIIB; S/P core biopsy 07/16/2024  -premenopausal   -family history of breast cancer and cervical cancer     History of Present Illness:   Infiltrating ductal carcinoma, grade 2      Oncologic/Hematologic History:  Oncology History   Malignant neoplasm of overlapping sites of left breast in female, estrogen receptor positive   8/8/2024 Initial Diagnosis    Malignant neoplasm of overlapping sites  of left breast in female, estrogen receptor positive     8/8/2024 Cancer Staged    Staging form: Breast, AJCC 8th Edition  - Clinical stage from 8/8/2024: Stage IIIB (cT4d, cN1(f), cM0, G3, ER+, NH-, HER2+)     Malignant neoplasm of overlapping sites of right breast in female, estrogen receptor positive   8/8/2024 Initial Diagnosis    Malignant neoplasm of overlapping sites of right breast in female, estrogen receptor positive     8/8/2024 Cancer Staged    Staging form: Breast, AJCC 8th Edition  - Clinical stage from 8/8/2024: Stage IIA (cT2, cN0(f), cM0, G2, ER+, NH-, HER2-)     Invasive ductal carcinoma of breast, right   7/17/2024 Cancer Staged    Staging form: Breast, AJCC 8th Edition  - Clinical stage from 7/17/2024: Stage IA (cT1c, cN0, cM0, G2, ER+, NH+, HER2-)     8/29/2024 Initial Diagnosis    Invasive ductal carcinoma of breast, right     Invasive ductal carcinoma of left breast   7/16/2024 Cancer Staged    Staging form: Breast, AJCC 8th Edition  - Clinical stage from 7/16/2024: Stage IIIB (cT4d, cN2, cM0, G3, ER+, NH-, HER2+)     8/29/2024 Initial Diagnosis    Invasive ductal carcinoma of left breast     Past medical history: Hypertension  Surgical/procedure history:  MediPort placement 08/14/2024    Social history: .  Lives in [place].  Has a 3-year-old son.  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  Denies history of tobacco, alcohol, or illicit drug abuse.  Family history:   -Maternal great aunt # 1: Breast cancer, in her 50s   -sister: Ovarian cancer, age 50  -sister: Cervical cancer, age 25  -mother: Ovarian cancer, age 50  -Maternal aunt: Cervical cancer, age 50  -maternal grandfather: Stomach cancer, age 70 (smoker)  -maternal great aunt # 2: Colon cancer, age 60  Health maintenance: PCP [facility].  No screening colonoscopy ever.  Menstrual/Ob gyn history: Menarche, age 11; 1 pregnancy, 1 child; gave birth to her child at age 30; no abortions or miscarriages  premenopausal; OCP at age 16, received Depo shots from age [age]; no contraception from age [age] when she became pregnant; Mirena IUD after pregnancy at age 30; experienced hot flashes.  No menstrual cycles after Mirena was placed.  ================================      34-year-old lady, referred from [facility] surgery, with invasive ductal carcinoma of breast.      08/09/2024:  [facility] surgery Office note:   CC: b/l breast IDC     HPI: Jeannie Greene is a 34 y.o. female with PMHx of HTN, R breast IDC Grade 2, and L breast/axillary ICD Grade 3 presents to clinic today with L breast and arm pain.   She first noticed the pain and swelling in L breast in May where she presented to the ED and received antibiotics. Pain did not resolve so patient followed up with her OB/Gyn who ordered the imaging and biopsy and was subsequently referred to our clinic. Today, patient reports swelling and pain in left breast, axilla, and down her arm. No pain in right breast, but has had some milky discharge in the past from R nipple. She states her pain as a 6/10 that was at its worse in July and has improved some since. She takes ibuprofen 4x daily without relief.    Patient had first menarche at 11. One pregnancy with child and not gone through menopause. She was on OCP at 17yo, received Depo shot from [age]. No contraception from [age] when she got pregnant. Got Murina IUD after pregnancy at 30  that is still in place.  Pt has an extensive family history of breast and cervical cancer. Her mother and sister had cervical cancer. Her mother had a hysterectomy. She also believes her maternal aunt had breast cancer. She only takes losartan for HTN. No prior surgeries.  Physical exam:   # right breast: 3 cm mass 12 o'clock, 4 in above nipple, no skin changes, no nipple retraction or discharge, no palpable right axillary lymphadenopathy  # left breast: Swollen and hard; large mass appreciated two o'clock position right above breast, 1 hard  immobile lymph node in left axilla, breast and axilla tender to palpation (inflammatory carcinoma left breast)      Investigations/clinical events reviewed:  -presentation:  Pain and swelling left breast 05/2024, did not respond to antibiotics; swelling and pain in left breast, axilla, and down left arm by 08/2024; some milky discharge from right nipple in the past  -05/06/2024: Ultrasound chest/mediastinum (left breast redness, tenderness):   1.  Ill-defined irregular hypoechoic breast tissue 1.88 x 1.2 x 0.9 cm, at the area of concern in the left breast 12:00 position concerning for infection with developing phlegmon given provided history of left breast erythema and pain. No discrete organized drainable fluid collection is identified.   2.  Overlying skin thickening and regional edema is likely secondary to to infectious process.   -07/01/2024:  Bilateral diagnostic mammogram and diagnostic bilateral breast ultrasound (comparison: Left breast mammogram 05/28/2024, breast ultrasound 05/28/2024):  # findings concerning for left breast inflammatory cancer with metastatic disease to left axillary lymph nodes; multiple irregular masses left breast (2 x 1.5 x 2.0 cm; 1.9 x 1.6 cm; 2.3 x 1.6 cm; irregular-appearing masslike tissue seen throughout the left breast, most prominent within the upper outer quadrant; multiple enlarged irregular left axillary lymph node seen including lymph node with spiculated margins within the axilla 16 x 15 x 12 mm), encompassed by a large focal asymmetry predominantly involving the upper outer quadrant of the left breast extending towards the nipple; left breast skin thickening present; irregular left axillary lymph node present: BI-RADS category 5, highly suspicious  # right breast mass (16 x 11 x 13 mm) at 12 o'clock, 9 cm from nipple: BI-RADS category 4, suspicious   # mildly prominent right axillary lymph node with cortex measurement of 4 mm:  BI-RADS category 4,  suspicious  -07/16/2024:    1. Left breast, 2 o'clock, 10 cm from nipple: IDC, grade 3; at least 1.2 cm   2. Left axilla:  IDC, grade 3 (no lymph node is present; however, this could represent metastatic carcinoma completely replacing lymph node)  -07/17/2024:  1. Right breast, 12 o'clock, 9 cm from nipple:  IDC, grade 2, at least 0.7 cm   2. Right axilla: Lymph node, negative for metastatic carcinoma  -ER 97.8%; PA 9.6%; HER2 negative (0); Ki-67 unfavorable (69.7%); HER2 by FISH, group 5 (negative/nonamplified  -ER 59%; PA 0% (negative); HER2 positive (3+); Ki-67 unfavorable ) 66.4%); HER2 by FISH analysis, group 1 (HER2 gene amplification)  -08/14/2024: MediPort placed  -08/22/2024:  Genetic testing    08/29/2024:   Pleasant lady who presents for initial medical oncology consultation.  In no acute discomfort.  Hot flashes since May 2024.  Left breast is painful; gets sharp pains intermittently, 9/10 severity; also, pain in left arm but no swelling.  Pain is intermittent.  Left breast tumor is getting bigger.  No ulceration.  Since yesterday, 08/28/2024, has a experienced painless diarrhea with blood on toilet wipes as well as in toilet bowl; no weakness or dizziness; 5 loose stools yesterday, 2 loose stools today; no nausea, vomiting, hematemesis, or melena.  Never experienced GI bleeding before.  No unusual headaches, focal neurological symptoms, chest pain, cough, dyspnea, abdominal pain, nausea or vomiting.  No urinary problems.  No bone pains.  Appetite is okay.  No unintentional weight loss.      Interval History:  [No matching plan found]   [No matching plan found]       Medications:  Current Outpatient Medications on File Prior to Visit   Medication Sig Dispense Refill    losartan (COZAAR) 100 MG tablet Take 1 tablet by mouth every morning.      [DISCONTINUED] HYDROcodone-acetaminophen (NORCO) 5-325 mg per tablet Take 1 tablet by mouth every 6 (six) hours as needed for Pain. 5 tablet 0    ondansetron  (ZOFRAN) 4 MG tablet Take 1 tablet (4 mg total) by mouth 2 (two) times daily. (Patient not taking: Reported on 8/29/2024) 10 tablet 1     No current facility-administered medications on file prior to visit.       Review of Systems:   All systems reviewed and found to be negative except for the symptoms detailed above    Physical Examination:   VITAL SIGNS:   Vitals:    08/29/24 1017   BP: 127/89   Pulse: 90   Resp: 18   Temp: 98.1 °F (36.7 °C)     GENERAL:  In no apparent distress.    HEAD:  No signs of head trauma.  EYES:  Pupils are equal.  Extraocular motions intact.    EARS:  Hearing grossly intact.  MOUTH:  Oropharynx is normal.   NECK:  No adenopathy, no JVD.     CHEST:  Chest with clear breath sounds bilaterally.  No wheezes, rales, rhonchi.    CARDIAC:  Regular rate and rhythm.  S1 and S2, without murmurs, gallops, rubs.  VASCULAR:  No Edema.  Peripheral pulses normal and equal in all extremities.  ABDOMEN:  Soft, without detectable tenderness.  No sign of distention.  No   rebound or guarding, and no masses palpated.   Bowel Sounds normal.  MUSCULOSKELETAL:  Good range of motion of all major joints. Extremities without clubbing, cyanosis or edema.    NEUROLOGIC EXAM:  Alert and oriented x 3.  No focal sensory or strength deficits.   Speech normal.  Follows commands.  PSYCHIATRIC:  Mood normal.  08/29/2024:  Breast examination was performed with a verbal consent, in the presence of Zulma Woo LPN:  # right breast:  About 4 cm nontender mobile tumor palpable at 11:00 a.m. position in the right breast, several cm from nipple, without overlying skin ulceration/satellite nodules/fixation; no nipple discharge; no palpable regional lymphadenopathy   # left breast: Entire left breast is involved with the hard, nontender, freely mobile tumor; orange peel appearance of skin is noted with faint erythema; hard, immobile lymph node is palpable in the left axilla.  No swelling of left upper extremity.    No results  found for this or any previous visit.  Results for orders placed or performed during the hospital encounter of 08/14/24   Comprehensive Metabolic Panel   Result Value Ref Range    Sodium 140 136 - 145 mmol/L    Potassium 3.3 (L) 3.5 - 5.1 mmol/L    Chloride 103 98 - 107 mmol/L    CO2 28 22 - 29 mmol/L    Glucose 153 (H) 74 - 100 mg/dL    Blood Urea Nitrogen 18.1 7.0 - 18.7 mg/dL    Creatinine 0.88 0.55 - 1.02 mg/dL    Calcium 10.2 8.4 - 10.2 mg/dL    Protein Total 8.1 6.4 - 8.3 gm/dL    Albumin 4.0 3.5 - 5.0 g/dL    Globulin 4.1 (H) 2.4 - 3.5 gm/dL    Albumin/Globulin Ratio 1.0 (L) 1.1 - 2.0 ratio    Bilirubin Total 0.5 <=1.5 mg/dL    ALP 56 40 - 150 unit/L    ALT 31 0 - 55 unit/L    AST 21 5 - 34 unit/L    eGFR >60 mL/min/1.73/m2    Anion Gap 9.0 mEq/L    BUN/Creatinine Ratio 21        Assessment:  Problem List Items Addressed This Visit          Renal/    Premenopausal patient       Oncology    Invasive ductal carcinoma of breast, right - Primary    Relevant Orders    Ambulatory Referral/Consult to Oncology Social Work    Ambulatory Referral/Consult to Hematology Oncology Behavioral Health    Invasive ductal carcinoma of left breast    Secondary malignant neoplasm of axillary lymph nodes    Family history of breast cancer    Family history of cervical cancer    Family history of colon cancer    Family history of ovarian cancer    Family history of stomach cancer       GI    Hematochezia     Other Visit Diagnoses       Breast mass, right              IDC both breasts; left breast inflammatory breast carcinoma:   -presentation:  Pain and swelling left breast 05/2024  -Mirena IUD in place  -extensive family history of breast cancer and cervical cancer  -physical exam: Right breast: 3 cm mass 12 o'clock position, 4 in above nipple  -physical exam: Left breast: Swollen, hard, large mass to o'clock position, 1 hard immobile lymph node in left axilla, breast and axilla tender to palpation (inflammatory carcinoma of  left breast)  -mammogram and ultrasound 07/01/2024  # right breast:  16 x 11 x 13 mm mass, BI-RADS 4; right axillary lymph node, mildly prominent, BI-RADS 4  # left breast: Inflammatory breast carcinoma; metastasis to left axillary lymph nodes; multiple irregular left breast masses, BI-RADS 5;  -core biopsy left breast 07/16/2024:  IDC, grade 3; lymph node biopsy positive as well; ER 59%; SC 0%; HER2 positive (3+); Ki-67 unfavorable (66.4%); HER2 by FISH analysis, group 1 (HER2 gene amplification)  -right breast core biopsy 07/17/2024:  IDC, grade 2; lymph node biopsy negative; ER 97.8%; SC 9.6%; HER2 negative (score 0); Ki-67 unfavorable (69.7%); HER2 by FISH negative/nonamplified  -MediPort placed 08/14/2024   -genetic testing 08/22/2024  To summarize:  -IDC right breast:  IDC, grade 2, lymph node biopsy negative, ER positive, SC positive, HER2 negative;   cT1c cN0 MX, AJCC anatomic stage at least IA, clinical prognostic stage IA  -inflammatory carcinoma left breast, grade 3, lymph node biopsy positive, ER positive, SC negative, HER2 positive; cT4d cN2 MX, AJCC anatomic stage at least IIIB, clinical prognostic stage IIIB  (By virtue of palpable, fixed lymph node in the left axilla, cN2)      Hematochezia:   Since yesterday, 08/28/2024, has a experienced painless diarrhea with blood on toilet wipes as well as in toilet bowl; no weakness or dizziness; 5 loose stools yesterday, 2 loose stools today; no nausea, vomiting, hematemesis, or melena.  Never experienced GI bleeding before.      Family history of breast cancer, ovarian cancer, cervical cancer, stomach cancer, colon cancer:  -Maternal great aunt # 1: Breast cancer, in her 50s   -sister: Ovarian cancer, age 50  -sister: Cervical cancer, age 25  -mother: Ovarian cancer, age 50  -Maternal aunt: Cervical cancer, age 50  -maternal grandfather: Stomach cancer, age 70 (smoker)  -maternal great aunt # 2: Colon cancer, age 60      Plan:  Check CBC and CMP   Bilateral  breast MRI with contrast ASAP   Genetic testing ASAP   Stage with contrast-enhanced CT scans of C/A/P, whole-body nuclear medicine bone scan,  FDG PET-CT asap  Echocardiogram ASAP  Refer to gyn for consultation regarding nonhormonal methods of contraception   She has Mirena IUD in place; it needs to be removed  No hormonal therapy or contraception  DEXA scan  -08/29/2024: Left breast pain; start Norco 5 mg every 6 hours prn  -check serum FSH and estradiol level  -urgent referral to GI for hematochezia which she has been experiencing since 08/28/2024  Follow-up visit with me in 2 weeks  -------------------------------------------------      -check CBC and CMP  -bilateral breast MRI with and without contrast  -breast cancer in this lady at age <50; needs genetic testing and counseling  -premenopausal patient; fertility and birth control issues (see below)  -contrast-enhanced CT scans of C/A/P and whole-body nuclear medicine bone scan  -FDG PET-CT as well    -inflammatory carcinoma left breast; ER positive, CO negative, HER2 positive; needs neoadjuvant chemotherapy  -response assessment: Serial physical examination +imaging studies (diagnostic mammogram and/or breast ultrasound and/or breast MRI) (MRI is more accurate than mammography for assessing tumor response to preoperative therapy)    -Assess response with imaging studies after 3 cycles of neoadjuvant chemotherapy (2 months after starting chemotherapy), then after completion of neoadjuvant chemotherapy     -if response to preoperative systemic therapy, then:  Total mastectomy +level 1/2 axillary dissection + radiotherapy to chest wall and comprehensive RNI with inclusion of any portion of the undetected axilla at risk +/- delayed breast reconstruction    Hematochezia since 08/28/2024, with loose stools  >>>   -08/29/2024: We will send an urgent referral to GI for evaluation    Neoadjuvant chemotherapy regimen:  TCHP q21 days x6 cycles  Cycle 1: A cycle is 21  days:  Pertuzumab   (Perjeta)  840 mg flat dose IV once on day 1 of cycle 1  Trastuzumab-xxxx  8 mg/kg IV once on day 1 of cycle 1  Docetaxel   (Taxotere)  75 mg/m² IV once on day 1 of cycle 1  Carboplatin  AUC=6 IV once on day 1 of cycle 1  >>>  Cycles 2 through 6: A cycle is every 21 days:  Pertuzumab   (Perjeta)  420 mg flat dose IV once on day 1 of cycles 2 through 6  Trastuzumab-xxxx  6 mg/kg IV once on day 1 of cycles 2 through 6  Docetaxel   (Taxotere)  75 mg/m² IV once on day 1 of cycles 2 through 6  Carboplatin  AUC=6 IV once on day 1 of cycles 2 through 6     TCHP:  -all cycles should be administered with myeloid growth factor support  -CBC prior to each cycle; CMP prior to each cycle  -Docetaxel dose reduction to 60 mg/m2 may be needed for patients who develop significant alterations in transaminases and alkaline phosphatase during therapy  -Hold both trastuzumab and pertuzumab during the neoadjuvant portion of this protocol if the LVEF drops to <50% with a 10% or greater absolute decrease below pretreatment baseline  -For severe or cumulative cutaneous reactions (erythema and desquamation), grade 3 or 4 stomatitis, or moderate neurosensory signs and/or symptoms, reduce docetaxel dose to 60 mg/m2.[2] Discontinue if toxicity persists.    Evaluate LVEF prior to and during treatment, every 3 months  Assess response with imaging studies after 3 cycles of neoadjuvant chemotherapy (2 months after starting chemotherapy), then after completion of neoadjuvant chemotherapy     Adjuvant systemic therapy:  -will become postmenopausal by virtue of ovarian function suppression; therefore, we will benefit from adjuvant bisphosphonate therapy for risk reduction of distant metastases x3-5 years by virtue of high-risk node positive disease; we will need dental evaluation and preventive Dentistry prior, in order to prevent/minimize the likelihood of osteonecrosis of the jaw with bisphosphonate  -if no residual disease,  then:  Complete up to 1 year of HER2 targeted therapy with the trastuzumab +pertuzumab  -if residual disease, then: Ado-trastuzumab emtansine alone for 14 cycles  -adjuvant endocrine therapy: See below    Fertility and birth control issues:  Discussion about fertility and contraception:  -Informed her about the potential impact of chemotherapy on fertility  -Made her aware of the option of referral to fertility specialist before chemotherapy and/or endocrine therapy to discuss options in case she desires future pregnancies.  Fertility preservation options include oocyte and embryo cryopreservation, etc.  -Amenorrhea frequently occurs during or after chemotherapy.  The majority of women younger than 35 years to resume menses within 2 years of finishing adjuvant chemotherapy  -Informed that menses and fertility are not necessarily linked. Absence of regular menses does not necessarily imply lack of fertility.  Conversely, the presence of menses does not guarantee fertility.  -There are limited data regarding continued fertility after chemotherapy.  -Cautioned her to avoid becoming pregnant during treatment with radiation therapy, chemotherapy, or endocrine therapy  -Hormone-based birth control is discouraged regardless of the harmless of the hormone receptor status of the patient's cancer  -Alternative methods of birth control, including IUD, barrier method, tubal ligation, vasectomy for the partner, etc.   >>>  -she has Mirena IUD in place; this needs to be removed  -fertility specialist consultation if she so desires  -cautioned her not to become pregnant during treatment  -refer to gyn for consultation regarding nonhormonal methods of contraception    Adjuvant endocrine therapy:  -premenopausal patient; poorly-differentiated, G3 IDC; inflammatory breast cancer; lymph node positive; thus, high-risk  -will benefit from ovarian suppression therapy in adjuvant setting  -we will order DEXA scan to establish BMD as  baseline  -safety data support administration of GnRH agonist before or with chemotherapy; they can also be initiated after chemotherapy in patients who remain premenopausal  -sequential evaluation of hormonal status is recommended to consider an alternative endocrine agent (prior to next dose of GnRH agonist)  -adjuvant endocrine therapy options in this patient:  1. Tamoxifen for 5 years (category 1) + ovarian suppression; if becomes postmenopausal, then, aromatase inhibitor for 5 years (category 1) or consider tamoxifen for an additional 5 years to complete 10 years; if remains premenopausal, then consider tamoxifen for additional 5 years to complete 10 years or no further endocrine therapy  2. Aromatase inhibitor for 5 years +ovarian suppression; consider aromatase inhibitor for an additional 3-5 years for extended aromatase inhibitor therapy because of lymph node involvement  >>>  -will order DEXA scan with the time to establish BMD as a baseline  -in due course, will treat with Lupron every 3 months +tamoxifen 20 mg daily for 5 years  -assess ovarian function with serum FSH and estradiol level every 3 months (empirically) prior to next dose of Lupron  -duration of OFS:  5 years optimal according to SOFT and TEXT trial  -if remains premenopausal after 5 years of Lupron/tamoxifen, then, OFS should be stopped and tamoxifen continued for an additional 5 years to complete 10 years  -if becomes postmenopausal, then, aromatase inhibitor for 5 years (category 1) or consider tamoxifen for additional 5 years to complete 10 years  -start Lupron concurrent with neoadjuvant chemotherapy (per [facility] guidelines)    Follow-up visit with me in 2 weeks    Above discussed at length with the patient.  All questions answered.    Discussed labs, scans, pathology report, and breast cancer staging, and gave her copies of relevant records.    Plan of management discussed in detail.  Guarded prognosis discussed.  She understands and  agrees with this plan.    Follow-up:  No follow-ups on file.      Answers submitted by the patient for this visit:  Review of Systems Questionnaire (Submitted on 8/22/2024)  appetite change : No  unexpected weight change: No  mouth sores: No  visual disturbance: No  cough: No  shortness of breath: No  chest pain: No  abdominal pain: No  diarrhea: Yes  frequency: No  back pain: Yes  rash: No  headaches: No  adenopathy: Yes  nervous/ anxious: No

## 2024-08-28 ENCOUNTER — TELEPHONE (OUTPATIENT)
Dept: HEMATOLOGY/ONCOLOGY | Facility: CLINIC | Age: 34
End: 2024-08-28
Payer: MEDICAID

## 2024-08-29 ENCOUNTER — OFFICE VISIT (OUTPATIENT)
Dept: HEMATOLOGY/ONCOLOGY | Facility: CLINIC | Age: 34
End: 2024-08-29
Attending: INTERNAL MEDICINE
Payer: MEDICAID

## 2024-08-29 VITALS
OXYGEN SATURATION: 96 % | SYSTOLIC BLOOD PRESSURE: 127 MMHG | DIASTOLIC BLOOD PRESSURE: 89 MMHG | TEMPERATURE: 98 F | RESPIRATION RATE: 18 BRPM | HEART RATE: 90 BPM | WEIGHT: 240.63 LBS | BODY MASS INDEX: 40.09 KG/M2 | HEIGHT: 65 IN

## 2024-08-29 DIAGNOSIS — C50.911 INVASIVE DUCTAL CARCINOMA OF BREAST, RIGHT: Primary | ICD-10-CM

## 2024-08-29 DIAGNOSIS — Z80.0 FAMILY HISTORY OF STOMACH CANCER: ICD-10-CM

## 2024-08-29 DIAGNOSIS — Z80.41 FAMILY HISTORY OF OVARIAN CANCER: ICD-10-CM

## 2024-08-29 DIAGNOSIS — Z80.49 FAMILY HISTORY OF CERVICAL CANCER: ICD-10-CM

## 2024-08-29 DIAGNOSIS — Z80.0 FAMILY HISTORY OF COLON CANCER: ICD-10-CM

## 2024-08-29 DIAGNOSIS — K92.1 HEMATOCHEZIA: ICD-10-CM

## 2024-08-29 DIAGNOSIS — N95.9 PREMENOPAUSAL PATIENT: ICD-10-CM

## 2024-08-29 DIAGNOSIS — C50.912 INVASIVE DUCTAL CARCINOMA OF LEFT BREAST: ICD-10-CM

## 2024-08-29 DIAGNOSIS — Z80.3 FAMILY HISTORY OF BREAST CANCER: ICD-10-CM

## 2024-08-29 DIAGNOSIS — N63.10 BREAST MASS, RIGHT: ICD-10-CM

## 2024-08-29 DIAGNOSIS — C77.3 SECONDARY MALIGNANT NEOPLASM OF AXILLARY LYMPH NODES: ICD-10-CM

## 2024-08-29 LAB
ALBUMIN SERPL-MCNC: 3.9 G/DL (ref 3.5–5)
ALBUMIN/GLOB SERPL: 0.9 RATIO (ref 1.1–2)
ALP SERPL-CCNC: 57 UNIT/L (ref 40–150)
ALT SERPL-CCNC: 29 UNIT/L (ref 0–55)
ANION GAP SERPL CALC-SCNC: 9 MEQ/L
AST SERPL-CCNC: 22 UNIT/L (ref 5–34)
BASOPHILS # BLD AUTO: 0.05 X10(3)/MCL
BASOPHILS NFR BLD AUTO: 0.6 %
BILIRUB SERPL-MCNC: 0.6 MG/DL
BUN SERPL-MCNC: 19.8 MG/DL (ref 7–18.7)
CALCIUM SERPL-MCNC: 10.4 MG/DL (ref 8.4–10.2)
CHLORIDE SERPL-SCNC: 103 MMOL/L (ref 98–107)
CO2 SERPL-SCNC: 24 MMOL/L (ref 22–29)
CREAT SERPL-MCNC: 0.78 MG/DL (ref 0.55–1.02)
CREAT/UREA NIT SERPL: 25
EOSINOPHIL # BLD AUTO: 0.98 X10(3)/MCL (ref 0–0.9)
EOSINOPHIL NFR BLD AUTO: 10.9 %
ERYTHROCYTE [DISTWIDTH] IN BLOOD BY AUTOMATED COUNT: 12.2 % (ref 11.5–17)
ESTRADIOL SERPL HS-MCNC: 40 PG/ML
FSH SERPL-ACNC: 7.25 MIU/ML
GFR SERPLBLD CREATININE-BSD FMLA CKD-EPI: >60 ML/MIN/1.73/M2
GLOBULIN SER-MCNC: 4.2 GM/DL (ref 2.4–3.5)
GLUCOSE SERPL-MCNC: 142 MG/DL (ref 74–100)
HCT VFR BLD AUTO: 38.9 % (ref 37–47)
HGB BLD-MCNC: 12.9 G/DL (ref 12–16)
IMM GRANULOCYTES # BLD AUTO: 0.04 X10(3)/MCL (ref 0–0.04)
IMM GRANULOCYTES NFR BLD AUTO: 0.4 %
LYMPHOCYTES # BLD AUTO: 2.78 X10(3)/MCL (ref 0.6–4.6)
LYMPHOCYTES NFR BLD AUTO: 31 %
MCH RBC QN AUTO: 31.4 PG (ref 27–31)
MCHC RBC AUTO-ENTMCNC: 33.2 G/DL (ref 33–36)
MCV RBC AUTO: 94.6 FL (ref 80–94)
MONOCYTES # BLD AUTO: 0.68 X10(3)/MCL (ref 0.1–1.3)
MONOCYTES NFR BLD AUTO: 7.6 %
NEUTROPHILS # BLD AUTO: 4.45 X10(3)/MCL (ref 2.1–9.2)
NEUTROPHILS NFR BLD AUTO: 49.5 %
NRBC BLD AUTO-RTO: 0 %
PLATELET # BLD AUTO: 323 X10(3)/MCL (ref 130–400)
PMV BLD AUTO: 10 FL (ref 7.4–10.4)
POTASSIUM SERPL-SCNC: 3.5 MMOL/L (ref 3.5–5.1)
PROT SERPL-MCNC: 8.1 GM/DL (ref 6.4–8.3)
RBC # BLD AUTO: 4.11 X10(6)/MCL (ref 4.2–5.4)
SODIUM SERPL-SCNC: 136 MMOL/L (ref 136–145)
WBC # BLD AUTO: 8.98 X10(3)/MCL (ref 4.5–11.5)

## 2024-08-29 PROCEDURE — 82670 ASSAY OF TOTAL ESTRADIOL: CPT | Performed by: INTERNAL MEDICINE

## 2024-08-29 PROCEDURE — 36415 COLL VENOUS BLD VENIPUNCTURE: CPT | Performed by: INTERNAL MEDICINE

## 2024-08-29 PROCEDURE — 3079F DIAST BP 80-89 MM HG: CPT | Mod: CPTII,,, | Performed by: INTERNAL MEDICINE

## 2024-08-29 PROCEDURE — 99205 OFFICE O/P NEW HI 60 MIN: CPT | Mod: S$PBB,,, | Performed by: INTERNAL MEDICINE

## 2024-08-29 PROCEDURE — 4010F ACE/ARB THERAPY RXD/TAKEN: CPT | Mod: CPTII,,, | Performed by: INTERNAL MEDICINE

## 2024-08-29 PROCEDURE — 80053 COMPREHEN METABOLIC PANEL: CPT | Performed by: INTERNAL MEDICINE

## 2024-08-29 PROCEDURE — 83001 ASSAY OF GONADOTROPIN (FSH): CPT | Performed by: INTERNAL MEDICINE

## 2024-08-29 PROCEDURE — 85025 COMPLETE CBC W/AUTO DIFF WBC: CPT | Performed by: INTERNAL MEDICINE

## 2024-08-29 PROCEDURE — 99215 OFFICE O/P EST HI 40 MIN: CPT | Mod: PBBFAC | Performed by: INTERNAL MEDICINE

## 2024-08-29 PROCEDURE — 1160F RVW MEDS BY RX/DR IN RCRD: CPT | Mod: CPTII,,, | Performed by: INTERNAL MEDICINE

## 2024-08-29 PROCEDURE — 3008F BODY MASS INDEX DOCD: CPT | Mod: CPTII,,, | Performed by: INTERNAL MEDICINE

## 2024-08-29 PROCEDURE — 1159F MED LIST DOCD IN RCRD: CPT | Mod: CPTII,,, | Performed by: INTERNAL MEDICINE

## 2024-08-29 PROCEDURE — 3074F SYST BP LT 130 MM HG: CPT | Mod: CPTII,,, | Performed by: INTERNAL MEDICINE

## 2024-08-29 RX ORDER — HYDROCODONE BITARTRATE AND ACETAMINOPHEN 5; 325 MG/1; MG/1
1 TABLET ORAL EVERY 6 HOURS PRN
Qty: 56 TABLET | Refills: 0 | Status: SHIPPED | OUTPATIENT
Start: 2024-08-29

## 2024-08-29 NOTE — Clinical Note
Check CBC and CMP  Bilateral breast MRI with contrast ASAP  Genetic testing ASAP  Stage with contrast-enhanced CT scans of C/A/P, whole-body nuclear medicine bone scan, FDG PET-CT asap Echocardiogram ASAP Refer to gyn for consultation regarding nonhormonal methods of contraception  She has Mirena IUD in place; it needs to be removed No hormonal therapy or contraception DEXA scan Follow-up visit with me in 2 weeks

## 2024-08-29 NOTE — Clinical Note
-08/29/2024: Left breast pain; start Norco 5 mg every 6 hours prn -check serum FSH and estradiol level

## 2024-09-05 ENCOUNTER — HOSPITAL ENCOUNTER (OUTPATIENT)
Dept: CARDIOLOGY | Facility: HOSPITAL | Age: 34
Discharge: HOME | End: 2024-09-05
Attending: INTERNAL MEDICINE
Payer: MEDICAID

## 2024-09-05 DIAGNOSIS — C50.912 INVASIVE DUCTAL CARCINOMA OF LEFT BREAST: ICD-10-CM

## 2024-09-05 DIAGNOSIS — C50.911 INVASIVE DUCTAL CARCINOMA OF BREAST, RIGHT: ICD-10-CM

## 2024-09-05 LAB
APICAL FOUR CHAMBER EJECTION FRACTION: 69 %
APICAL TWO CHAMBER EJECTION FRACTION: 65 %
AV INDEX (PROSTH): 0.78
AV MEAN GRADIENT: 5 MMHG
AV PEAK GRADIENT: 9 MMHG
AV VELOCITY RATIO: 0.76
CV ECHO LV RWT: 0.76 CM
DOP CALC AO PEAK VEL: 1.53 M/S
DOP CALC AO VTI: 26.8 CM
DOP CALC LVOT PEAK VEL: 1.17 M/S
DOP CALCLVOT PEAK VEL VTI: 21 CM
E WAVE DECELERATION TIME: 217 MSEC
E/A RATIO: 0.9
E/E' RATIO: 8.14 M/S
ECHO LV POSTERIOR WALL: 1.2 CM (ref 0.6–1.1)
FRACTIONAL SHORTENING: 38 % (ref 28–44)
INTERVENTRICULAR SEPTUM: 1.48 CM (ref 0.6–1.1)
LEFT ATRIUM AREA SYSTOLIC (APICAL 2 CHAMBER): 10.2 CM2
LEFT ATRIUM AREA SYSTOLIC (APICAL 4 CHAMBER): 13.7 CM2
LEFT ATRIUM SIZE: 3.2 CM
LEFT ATRIUM VOLUME MOD: 27.4 CM3
LEFT INTERNAL DIMENSION IN SYSTOLE: 1.96 CM (ref 2.1–4)
LEFT VENTRICLE DIASTOLIC VOLUME: 39.7 ML
LEFT VENTRICLE END DIASTOLIC VOLUME APICAL 2 CHAMBER: 65.2 ML
LEFT VENTRICLE END DIASTOLIC VOLUME APICAL 4 CHAMBER: 53.5 ML
LEFT VENTRICLE END SYSTOLIC VOLUME APICAL 2 CHAMBER: 20.7 ML
LEFT VENTRICLE END SYSTOLIC VOLUME APICAL 4 CHAMBER: 34.6 ML
LEFT VENTRICLE SYSTOLIC VOLUME: 12.1 ML
LEFT VENTRICULAR INTERNAL DIMENSION IN DIASTOLE: 3.16 CM (ref 3.5–6)
LEFT VENTRICULAR MASS: 140.06 G
LV LATERAL E/E' RATIO: 6.33 M/S
LV SEPTAL E/E' RATIO: 11.4 M/S
LVED V (TEICH): 39.7 ML
LVES V (TEICH): 12.1 ML
LVOT MG: 3 MMHG
LVOT MV: 0.79 CM/S
MV PEAK A VEL: 0.63 M/S
MV PEAK E VEL: 0.57 M/S
OHS LV EJECTION FRACTION SIMPSONS BIPLANE MOD: 67 %
PV PEAK GRADIENT: 5 MMHG
PV PEAK VELOCITY: 1.12 M/S
RA PRESSURE ESTIMATED: 8 MMHG
TDI LATERAL: 0.09 M/S
TDI SEPTAL: 0.05 M/S
TDI: 0.07 M/S
TRICUSPID ANNULAR PLANE SYSTOLIC EXCURSION: 1.92 CM

## 2024-09-05 PROCEDURE — 93306 TTE W/DOPPLER COMPLETE: CPT

## 2024-09-05 PROCEDURE — 93306 TTE W/DOPPLER COMPLETE: CPT | Mod: 26,,, | Performed by: INTERNAL MEDICINE

## 2024-09-13 ENCOUNTER — TELEPHONE (OUTPATIENT)
Dept: SURGERY | Facility: CLINIC | Age: 34
End: 2024-09-13
Payer: MEDICAID

## 2024-09-13 NOTE — TELEPHONE ENCOUNTER
Contacted Ms. Greene to discuss her positive genetic test results: BRCA1 gene mutation. We briefly discussed the meaning of her results, the associated cancer risk, and the recommended screenings/surgeries. We also discussed the risk for her son and other relatives. A follow-up appointment will be scheduled on 09/25/2024 per her request.     Questions were encouraged and answered. She was encouraged to also contact us if she has questions or need to reschedule.

## 2024-09-16 ENCOUNTER — HOSPITAL ENCOUNTER (OUTPATIENT)
Dept: RADIOLOGY | Facility: HOSPITAL | Age: 34
Discharge: HOME | End: 2024-09-16
Attending: INTERNAL MEDICINE
Payer: MEDICAID

## 2024-09-16 DIAGNOSIS — C50.911 INVASIVE DUCTAL CARCINOMA OF BREAST, RIGHT: ICD-10-CM

## 2024-09-16 DIAGNOSIS — C77.3 SECONDARY MALIGNANT NEOPLASM OF AXILLARY LYMPH NODES: ICD-10-CM

## 2024-09-16 DIAGNOSIS — C50.912 INVASIVE DUCTAL CARCINOMA OF LEFT BREAST: ICD-10-CM

## 2024-09-16 DIAGNOSIS — C50.911 INVASIVE DUCTAL CARCINOMA OF BREAST, RIGHT: Primary | ICD-10-CM

## 2024-09-16 DIAGNOSIS — C77.3 MALIGNANT NEOPLASM METASTATIC TO LYMPH NODE OF AXILLA: ICD-10-CM

## 2024-09-16 DIAGNOSIS — C77.3 MALIGNANT NEOPLASM METASTATIC TO LYMPH NODE OF AXILLA: Primary | ICD-10-CM

## 2024-09-16 PROCEDURE — 25500020 PHARM REV CODE 255

## 2024-09-16 PROCEDURE — 78306 BONE IMAGING WHOLE BODY: CPT | Mod: TC

## 2024-09-16 PROCEDURE — 71260 CT THORAX DX C+: CPT | Mod: TC

## 2024-09-16 PROCEDURE — A9503 TC99M MEDRONATE: HCPCS | Performed by: INTERNAL MEDICINE

## 2024-09-16 PROCEDURE — 77080 DXA BONE DENSITY AXIAL: CPT | Mod: TC

## 2024-09-16 PROCEDURE — 74177 CT ABD & PELVIS W/CONTRAST: CPT | Mod: TC

## 2024-09-16 RX ORDER — TC 99M MEDRONATE 20 MG/10ML
25 INJECTION, POWDER, LYOPHILIZED, FOR SOLUTION INTRAVENOUS
Status: COMPLETED | OUTPATIENT
Start: 2024-09-16 | End: 2024-09-16

## 2024-09-16 RX ADMIN — IOHEXOL 100 ML: 350 INJECTION, SOLUTION INTRAVENOUS at 08:09

## 2024-09-16 RX ADMIN — TECHNETIUM TC 99M MEDRONATE 25.6 MILLICURIE: 20 INJECTION, POWDER, LYOPHILIZED, FOR SOLUTION INTRAVENOUS at 07:09

## 2024-09-16 NOTE — PROGRESS NOTES
Scans reviewed.    Patient has metastatic disease.      FDG PET-CT ASAP.    Brain MRI with and without contrast ASAP.    I need to see the patient for follow-up early next week.

## 2024-09-18 ENCOUNTER — TELEPHONE (OUTPATIENT)
Dept: HEMATOLOGY/ONCOLOGY | Facility: CLINIC | Age: 34
End: 2024-09-18
Payer: MEDICAID

## 2024-09-18 PROBLEM — C79.51: Status: ACTIVE | Noted: 2024-09-18

## 2024-09-18 PROBLEM — Z15.09 MONOALLELIC MUTATION OF BRCA1 GENE: Status: ACTIVE | Noted: 2024-09-18

## 2024-09-18 PROBLEM — Z15.01 MONOALLELIC MUTATION OF BRCA1 GENE: Status: ACTIVE | Noted: 2024-09-18

## 2024-09-18 PROBLEM — C79.51 SECONDARY MALIGNANCY OF LUMBAR VERTEBRAL COLUMN: Status: ACTIVE | Noted: 2024-09-18

## 2024-09-18 PROBLEM — C79.51 SECONDARY MALIGNANCY OF THORACIC VERTEBRAL COLUMN: Status: ACTIVE | Noted: 2024-09-18

## 2024-09-18 PROBLEM — C50.912 INFLAMMATORY CARCINOMA OF LEFT BREAST: Status: ACTIVE | Noted: 2024-09-18

## 2024-09-19 ENCOUNTER — OFFICE VISIT (OUTPATIENT)
Dept: HEMATOLOGY/ONCOLOGY | Facility: CLINIC | Age: 34
End: 2024-09-19
Attending: INTERNAL MEDICINE
Payer: MEDICAID

## 2024-09-19 ENCOUNTER — DOCUMENTATION ONLY (OUTPATIENT)
Dept: HEMATOLOGY/ONCOLOGY | Facility: CLINIC | Age: 34
End: 2024-09-19
Payer: MEDICAID

## 2024-09-19 VITALS
BODY MASS INDEX: 39.99 KG/M2 | WEIGHT: 240 LBS | OXYGEN SATURATION: 98 % | HEIGHT: 65 IN | HEART RATE: 81 BPM | TEMPERATURE: 98 F | SYSTOLIC BLOOD PRESSURE: 132 MMHG | DIASTOLIC BLOOD PRESSURE: 89 MMHG | RESPIRATION RATE: 18 BRPM

## 2024-09-19 DIAGNOSIS — C50.912 INFLAMMATORY CARCINOMA OF LEFT BREAST: ICD-10-CM

## 2024-09-19 DIAGNOSIS — K92.1 HEMATOCHEZIA: Primary | ICD-10-CM

## 2024-09-19 DIAGNOSIS — M54.9 BACK PAIN, UNSPECIFIED BACK LOCATION, UNSPECIFIED BACK PAIN LATERALITY, UNSPECIFIED CHRONICITY: ICD-10-CM

## 2024-09-19 DIAGNOSIS — C50.911 INVASIVE DUCTAL CARCINOMA OF BREAST, RIGHT: ICD-10-CM

## 2024-09-19 DIAGNOSIS — Z80.41 FAMILY HISTORY OF OVARIAN CANCER: ICD-10-CM

## 2024-09-19 DIAGNOSIS — Z80.49 FAMILY HISTORY OF CERVICAL CANCER: ICD-10-CM

## 2024-09-19 DIAGNOSIS — Z80.0 FAMILY HISTORY OF STOMACH CANCER: ICD-10-CM

## 2024-09-19 DIAGNOSIS — C79.51 SECONDARY MALIGNANCY OF SACRUM: ICD-10-CM

## 2024-09-19 DIAGNOSIS — C79.51 SECONDARY MALIGNANCY OF THORACIC VERTEBRAL COLUMN: ICD-10-CM

## 2024-09-19 DIAGNOSIS — N95.9 PREMENOPAUSAL PATIENT: Primary | ICD-10-CM

## 2024-09-19 DIAGNOSIS — C77.3 MALIGNANT NEOPLASM METASTATIC TO LYMPH NODE OF AXILLA: ICD-10-CM

## 2024-09-19 DIAGNOSIS — Z15.01 MONOALLELIC MUTATION OF BRCA1 GENE: ICD-10-CM

## 2024-09-19 DIAGNOSIS — K92.1 HEMATOCHEZIA: ICD-10-CM

## 2024-09-19 DIAGNOSIS — Z80.3 FAMILY HISTORY OF BREAST CANCER: ICD-10-CM

## 2024-09-19 DIAGNOSIS — C79.51 SECONDARY MALIGNANCY OF LUMBAR VERTEBRAL COLUMN: ICD-10-CM

## 2024-09-19 DIAGNOSIS — Z15.09 MONOALLELIC MUTATION OF BRCA1 GENE: ICD-10-CM

## 2024-09-19 DIAGNOSIS — C50.912 INVASIVE DUCTAL CARCINOMA OF LEFT BREAST: ICD-10-CM

## 2024-09-19 LAB — CEA SERPL-MCNC: 19.74 NG/ML (ref 0–3)

## 2024-09-19 PROCEDURE — 3075F SYST BP GE 130 - 139MM HG: CPT | Mod: CPTII,,, | Performed by: INTERNAL MEDICINE

## 2024-09-19 PROCEDURE — 3008F BODY MASS INDEX DOCD: CPT | Mod: CPTII,,, | Performed by: INTERNAL MEDICINE

## 2024-09-19 PROCEDURE — 86300 IMMUNOASSAY TUMOR CA 15-3: CPT | Performed by: INTERNAL MEDICINE

## 2024-09-19 PROCEDURE — 1159F MED LIST DOCD IN RCRD: CPT | Mod: CPTII,,, | Performed by: INTERNAL MEDICINE

## 2024-09-19 PROCEDURE — 3079F DIAST BP 80-89 MM HG: CPT | Mod: CPTII,,, | Performed by: INTERNAL MEDICINE

## 2024-09-19 PROCEDURE — 99213 OFFICE O/P EST LOW 20 MIN: CPT | Mod: PBBFAC | Performed by: INTERNAL MEDICINE

## 2024-09-19 PROCEDURE — 4010F ACE/ARB THERAPY RXD/TAKEN: CPT | Mod: CPTII,,, | Performed by: INTERNAL MEDICINE

## 2024-09-19 PROCEDURE — 36415 COLL VENOUS BLD VENIPUNCTURE: CPT | Performed by: INTERNAL MEDICINE

## 2024-09-19 PROCEDURE — 1160F RVW MEDS BY RX/DR IN RCRD: CPT | Mod: CPTII,,, | Performed by: INTERNAL MEDICINE

## 2024-09-19 PROCEDURE — 82378 CARCINOEMBRYONIC ANTIGEN: CPT | Performed by: INTERNAL MEDICINE

## 2024-09-19 PROCEDURE — 99215 OFFICE O/P EST HI 40 MIN: CPT | Mod: S$PBB,,, | Performed by: INTERNAL MEDICINE

## 2024-09-19 RX ORDER — TAMOXIFEN CITRATE 20 MG/1
20 TABLET ORAL DAILY
Qty: 30 TABLET | Refills: 3 | Status: CANCELLED | OUTPATIENT
Start: 2024-09-19 | End: 2025-09-19

## 2024-09-19 NOTE — PROGRESS NOTES
History:  Past Medical History:   Diagnosis Date    BRCA1 gene mutation positive 09/09/2024    BRCA1 [id number] ([id number]) - [facility] BRCA1 and BRCA2 gene sequence and deletion/duplication and 85-gene CustomNext-Cancer Panel (85 genes), VUS in NF1    Hypertension      Past Surgical History:   Procedure Laterality Date    INSERTION OF TUNNELED CENTRAL VENOUS CATHETER (CVC) WITH SUBCUTANEOUS PORT N/A 08/14/2024    Procedure: [id number];  Surgeon: Jc Chauhan Jr., MD;  Location: [facility];  Service: General;  Laterality: N/A;      Social History     Socioeconomic History    Marital status: Other   Tobacco Use    Smoking status: Never     Passive exposure: Never    Smokeless tobacco: Never   Substance and Sexual Activity    Alcohol use: Never    Drug use: Never    Sexual activity: Yes     Partners: Male     Birth control/protection: I.U.D.     Social Determinants of Health     Financial Resource Strain: Low Risk  (11/2/2020)    Received from [facility] Subsidiaries and Affiliates    Overall Financial Resource Strain (CARDIA)     Difficulty of Paying Living Expenses: Not hard at all   Food Insecurity: No Food Insecurity (11/2/2020)    [facility] Subsidiaries and Affiliates    Hunger Vital Sign     Worried About Running Out of Food in the Last Year: Never true     Ran Out of Food in the Last Year: Never true   Transportation Needs: No Transportation Needs (11/2/2020)    Received from [facility] Subsidiaries and Affiliates    PRAPARE - Transportation     Lack of Transportation (Medical): No     Lack of Transportation (Non-Medical): No   Physical Activity: Inactive (11/2/2020)    Received from [facility] Subsidiaries and Affiliates    Exercise Vital Sign     Days of Exercise per Week: 0 days      Minutes of Exercise per Session: 0 min   Stress: No Stress Concern Present (11/2/2020)    Received from [facility]sidiaries and Affiliates    [employer] [facility] [facility] [employer] - Occupational Stress Questionnaire     Feeling of Stress : Not at all      Family History   Problem Relation Name Age of Onset    Cervical cancer Mother Pao Zacarias 50    Hypertension Mother Pao Zacarias     Diabetes Father Robinson Bland     Hypertension Father Robinson Bland     Colon cancer Maternal Grandfather Robert Chang Jr 70    Cancer Maternal Grandfather Robert Chang Jr     Hypertension Paternal Grandmother      Autism spectrum disorder Son Gilmar 3    Cervical cancer Other Conxavia 25    Kidney failure Other Robinson Jr     Cervical cancer Maternal Aunt Pao 50    Cervical cancer Maternal Aunt Yaima 44    Kidney failure Maternal Aunt Ghada 45    Cervical cancer Other      Stomach cancer Other          recurrence    Liver cancer Other Pedro Pablo 54    Hypertension Other [place]     Heart attack Paternal Uncle      Breast cancer Paternal Cousin  40    Breast cancer Paternal Cousin  42        BL mastect    Breast cancer Other      Hypertension Maternal Aunt Yaima Brown     Hypertension Maternal Aunt Mirta Brown     Hypertension Sister Evelio Brink       Reason for Follow-up:  -IDC right breast:  IDC, grade 2, lymph node biopsy negative, ER positive, HI positive, HER2 negative; S/P core biopsy 07/17/2024; cT1c cN0 MX, AJCC anatomic stage at least IA, clinical prognostic stage IA  -inflammatory carcinoma left breast, grade 3, lymph node biopsy positive, ER positive, HI negative, HER2 positive;   cT4d cN2 M1, stage IV; S/P core biopsy 07/16/2024  -Thoracolumbar vertebrae and sacrum metastatic involvement.   -Monoallelic mutation of BRCA1 gene   -premenopausal   -family history of breast cancer, ovarian cancer, cervical cancer, stomach cancer, colon cancer    History of Present  Illness:   Invasive ductal carcinoma of breast, right      Oncologic/Hematologic History:  Oncology History   Malignant neoplasm of overlapping sites of left breast in female, estrogen receptor positive   8/8/2024 Initial Diagnosis    Malignant neoplasm of overlapping sites of left breast in female, estrogen receptor positive     8/8/2024 Cancer Staged    Staging form: Breast, AJCC 8th Edition  - Clinical stage from 8/8/2024: Stage IIIB (cT4d, cN1(f), cM0, G3, ER+, MD-, HER2+)     Malignant neoplasm of overlapping sites of right breast in female, estrogen receptor positive   8/8/2024 Initial Diagnosis    Malignant neoplasm of overlapping sites of right breast in female, estrogen receptor positive     8/8/2024 Cancer Staged    Staging form: Breast, AJCC 8th Edition  - Clinical stage from 8/8/2024: Stage IIA (cT2, cN0(f), cM0, G2, ER+, MD-, HER2-)     Invasive ductal carcinoma of breast, right   7/17/2024 Cancer Staged    Staging form: Breast, AJCC 8th Edition  - Clinical stage from 7/17/2024: Stage IA (cT1c, cN0, cM0, G2, ER+, MD+, HER2-)     8/29/2024 Initial Diagnosis    Invasive ductal carcinoma of breast, right     Invasive ductal carcinoma of left breast   8/29/2024 Initial Diagnosis    Invasive ductal carcinoma of left breast     9/16/2024 Cancer Staged    Staging form: Breast, AJCC 8th Edition  - Clinical stage from 9/16/2024: Stage IV (cT4d, cN2, pM1, G3, ER+, MD-, HER2+)     9/30/2024 -  Chemotherapy    Treatment Summary   Plan Name: OP BREAST TRASTUZUMAB Q3W  Treatment Goal: Palliative  Status: Active  Start Date: 9/30/2024 (Planned)  End Date: 9/1/2025 (Planned)  Provider: Kevon Subramanian MD  Chemotherapy: TRASTUZUMAB-ANNS CHEMO INFUSION, 8 mg/kg, Intravenous, Clinic/HOD 1 time, 0 of 17 cycles     Past medical history: Hypertension  Surgical/procedure history:  MediPort placement 08/14/2024    Social history: .  Lives in [place].  Has a 3-year-old son.  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  Denies history of tobacco, alcohol, or illicit drug abuse.  Family history:   -Maternal great aunt # 1: Breast cancer, in her 50s   -sister: Ovarian cancer, age 50  -sister: Cervical cancer, age 25  -mother: Ovarian cancer, age 50  -Maternal aunt: Cervical cancer, age 50  -maternal grandfather: Stomach cancer, age 70 (smoker)  -maternal great aunt # 2: Colon cancer, age 60  Health maintenance: PCP at [facility].  No screening colonoscopy ever.  Menstrual/Ob gyn history: Menarche, age 11; 1 pregnancy, 1 child; gave birth to her child at age 30; no abortions or miscarriages premenopausal; OCP at age 16, received Depo shots from age [age]; no contraception from age [age] when she became pregnant; Mirena IUD after pregnancy at age 30; experienced hot flashes.  No menstrual cycles after Mirena was placed.  ================================      34-year-old lady, referred from [facility] surgery, with invasive ductal carcinoma of breast.      08/09/2024:  [facility] surgery Office note:   CC: b/l breast IDC     HPI: Jeannie Greene is a 34 y.o. female with PMHx of HTN, R breast IDC Grade 2, and L breast/axillary ICD Grade 3 presents to clinic today with L breast and arm pain.   She first noticed the pain and swelling in L breast in May where she presented to the ED and received antibiotics. Pain did not resolve so patient followed up with her OB/Gyn who ordered the imaging and biopsy and was subsequently referred to our clinic. Today, patient reports swelling and pain in left breast, axilla, and down her arm. No pain in right breast, but has had some milky discharge in the past from R nipple. She states her pain as a 6/10 that was at its worse in July and has improved some since. She takes ibuprofen 4x daily without relief.    Patient had first menarche at 11. One pregnancy with child and not gone through menopause. She was on OCP at 17yo, received Depo shot from [age]. No contraception from [age] when she got  pregnant. Got Murina IUD after pregnancy at 30  that is still in place.  Pt has an extensive family history of breast and cervical cancer. Her mother and sister had cervical cancer. Her mother had a hysterectomy. She also believes her maternal aunt had breast cancer. She only takes losartan for HTN. No prior surgeries.  Physical exam:   # right breast: 3 cm mass 12 o'clock, 4 in above nipple, no skin changes, no nipple retraction or discharge, no palpable right axillary lymphadenopathy  # left breast: Swollen and hard; large mass appreciated two o'clock position right above breast, 1 hard immobile lymph node in left axilla, breast and axilla tender to palpation (inflammatory carcinoma left breast)      Investigations/clinical events reviewed:  -presentation:  Pain and swelling left breast 05/2024, did not respond to antibiotics; swelling and pain in left breast, axilla, and down left arm by 08/2024; some milky discharge from right nipple in the past  -05/06/2024: Ultrasound chest/mediastinum (left breast redness, tenderness):   1.  Ill-defined irregular hypoechoic breast tissue 1.88 x 1.2 x 0.9 cm, at the area of concern in the left breast 12:00 position concerning for infection with developing phlegmon given provided history of left breast erythema and pain. No discrete organized drainable fluid collection is identified.   2.  Overlying skin thickening and regional edema is likely secondary to to infectious process.   -07/01/2024:  Bilateral diagnostic mammogram and diagnostic bilateral breast ultrasound (comparison: Left breast mammogram 05/28/2024, breast ultrasound 05/28/2024):  # findings concerning for left breast inflammatory cancer with metastatic disease to left axillary lymph nodes; multiple irregular masses left breast (2 x 1.5 x 2.0 cm; 1.9 x 1.6 cm; 2.3 x 1.6 cm; irregular-appearing masslike tissue seen throughout the left breast, most prominent within the upper outer quadrant; multiple enlarged  irregular left axillary lymph node seen including lymph node with spiculated margins within the axilla 16 x 15 x 12 mm), encompassed by a large focal asymmetry predominantly involving the upper outer quadrant of the left breast extending towards the nipple; left breast skin thickening present; irregular left axillary lymph node present: BI-RADS category 5, highly suspicious  # right breast mass (16 x 11 x 13 mm) at 12 o'clock, 9 cm from nipple: BI-RADS category 4, suspicious   # mildly prominent right axillary lymph node with cortex measurement of 4 mm:  BI-RADS category 4, suspicious  -07/16/2024:    1. Left breast, 2 o'clock, 10 cm from nipple: IDC, grade 3; at least 1.2 cm   2. Left axilla:  IDC, grade 3 (no lymph node is present; however, this could represent metastatic carcinoma completely replacing lymph node)  -07/17/2024:  1. Right breast, 12 o'clock, 9 cm from nipple:  IDC, grade 2, at least 0.7 cm   2. Right axilla: Lymph node, negative for metastatic carcinoma  -ER 97.8%; DC 9.6%; HER2 negative (0); Ki-67 unfavorable (69.7%); HER2 by FISH, group 5 (negative/nonamplified  -ER 59%; DC 0% (negative); HER2 positive (3+); Ki-67 unfavorable ) 66.4%); HER2 by FISH analysis, group 1 (HER2 gene amplification)  -08/14/2024: MediPort placed  -08/22/2024:  Genetic testing    08/29/2024:   Pleasant lady who presents for initial medical oncology consultation.  In no acute discomfort.  Hot flashes since May 2024.  Left breast is painful; gets sharp pains intermittently, 9/10 severity; also, pain in left arm but no swelling.  Pain is intermittent.  Left breast tumor is getting bigger.  No ulceration.  Since yesterday, 08/28/2024, has a experienced painless diarrhea with blood on toilet wipes as well as in toilet bowl; no weakness or dizziness; 5 loose stools yesterday, 2 loose stools today; no nausea, vomiting, hematemesis, or melena.  Never experienced GI bleeding before.  No unusual headaches, focal neurological  symptoms, chest pain, cough, dyspnea, abdominal pain, nausea or vomiting.  No urinary problems.  No bone pains.  Appetite is okay.  No unintentional weight loss.    Interval History:  [No matching plan found]   OP BREAST TRASTUZUMAB Q3W     2024:   -2024:  [facility] multi cancer panel and BRCA1/BRCA2 panel testin pathogenic variant identified in BRCA1, associated with autosomal dominant hereditary breast and ovarian cancer syndrome (heterozygous BRCA1); additional variants of uncertain significance identified as well (NF1, heterozygous)  -2024:  CBC unremarkable; calcium 10.4, albumin 3.9 (mild hypercalcemia)  -2024: FSH 7.25 mIU /ml (premenopausal); estradiol 40 pg/mL (premenopausal)  -2024:  TTE: LVEF 60-65%  -2024: DEXA scan: Normal BMD of lumbar vertebrae end of femoral necks  -2024: Staging CTs C/A/P with contrast:   1.  Left breast large irregular defined asymmetric density and skin thickening consistent with history of invasive ductal carcinoma of the breast.   2.  Right breast smaller asymmetric density again consistent with history of invasive ductal carcinoma of the breast.   3.  Left axillary several enlarged lymph nodes with heterogeneous and surrounding inflammations is concerning to be related to metastatic involvement.   4.  Multiple thoracolumbar vertebrae and the sacrum lytic lesions reflect metastatic involvement.  (lytic lesions; T1, T2, T3, T6, T7, T8, T10, T11, minimally L1 and midportion of sacrum; within thoracic spine, largest metastatic involvement is T6 vertebral body, 1.6 cm)  -2024:  Staging whole-body nuclear medicine bone scan: Thoracolumbar vertebrae and sacrum metastatic involvement.  -2024: Scans reviewed; patient has metastatic disease; FDG PET-CT ordered; brain MRI scan with and without contrast ordered  -2024: Patient referred to IR for biopsy of suspicious bone lesions  Presents for a follow-up.  We discussed that  scans show metastatic disease.  She complains of mid back pain for last 1 week; no radiation; at worse, pain is 9/10 severity; no weakness or numbness in lower extremities; no loss of bowel bladder control; pain is controlled with narcotics.  She is able to continue working.  She has to sit long periods of time. 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  Has a 3-year-old son.  Great appetite.  ECOG 0.  No unusual headaches, focal neurological symptoms, chest pain, cough, dyspnea, any new lumps or lymphadenopathy, abdominal pain, nausea, vomiting, GI bleeding, etc..  Some left-sided breast pain; controlled with narcotics.      Medications:  Current Outpatient Medications on File Prior to Visit   Medication Sig Dispense Refill    HYDROcodone-acetaminophen (NORCO) 5-325 mg per tablet Take 1 tablet by mouth every 6 (six) hours as needed for Pain. 56 tablet 0    losartan (COZAAR) 100 MG tablet Take 1 tablet by mouth every morning.      ondansetron (ZOFRAN) 4 MG tablet Take 1 tablet (4 mg total) by mouth 2 (two) times daily. (Patient not taking: Reported on 8/29/2024) 10 tablet 1     No current facility-administered medications on file prior to visit.       Review of Systems:   All systems reviewed and found to be negative except for the symptoms detailed above    Physical Examination:   VITAL SIGNS:   Vitals:    09/19/24 0938   BP: 132/89   Pulse: 81   Resp: 18   Temp: 98.3 °F (36.8 °C)       GENERAL:  In no apparent distress.    HEAD:  No signs of head trauma.  EYES:  Pupils are equal.  Extraocular motions intact.    EARS:  Hearing grossly intact.  MOUTH:  Oropharynx is normal.   NECK:  No adenopathy, no JVD.     CHEST:  Chest with clear breath sounds bilaterally.  No wheezes, rales, rhonchi.    CARDIAC:  Regular rate and rhythm.  S1 and S2, without murmurs, gallops, rubs.  VASCULAR:  No Edema.  Peripheral pulses normal and equal in all extremities.  ABDOMEN:  Soft, without detectable tenderness.  No sign of  distention.  No   rebound or guarding, and no masses palpated.   Bowel Sounds normal.  MUSCULOSKELETAL:  Good range of motion of all major joints. Extremities without clubbing, cyanosis or edema.    NEUROLOGIC EXAM:  Alert and oriented x 3.  No focal sensory or strength deficits.   Speech normal.  Follows commands.  PSYCHIATRIC:  Mood normal.  08/29/2024:  Breast examination was performed with a verbal consent, in the presence of Zulma Woo LPN:  # right breast:  About 4 cm nontender mobile tumor palpable at 11:00 a.m. position in the right breast, several cm from nipple, without overlying skin ulceration/satellite nodules/fixation; no nipple discharge; no palpable regional lymphadenopathy   # left breast: Entire left breast is involved with the hard, nontender, freely mobile tumor; orange peel appearance of skin is noted with faint erythema; hard, immobile lymph node is palpable in the left axilla.  No swelling of left upper extremity.    No results found for this or any previous visit.  Results for orders placed or performed during the hospital encounter of 08/14/24   Comprehensive Metabolic Panel   Result Value Ref Range    Sodium 140 136 - 145 mmol/L    Potassium 3.3 (L) 3.5 - 5.1 mmol/L    Chloride 103 98 - 107 mmol/L    CO2 28 22 - 29 mmol/L    Glucose 153 (H) 74 - 100 mg/dL    Blood Urea Nitrogen 18.1 7.0 - 18.7 mg/dL    Creatinine 0.88 0.55 - 1.02 mg/dL    Calcium 10.2 8.4 - 10.2 mg/dL    Protein Total 8.1 6.4 - 8.3 gm/dL    Albumin 4.0 3.5 - 5.0 g/dL    Globulin 4.1 (H) 2.4 - 3.5 gm/dL    Albumin/Globulin Ratio 1.0 (L) 1.1 - 2.0 ratio    Bilirubin Total 0.5 <=1.5 mg/dL    ALP 56 40 - 150 unit/L    ALT 31 0 - 55 unit/L    AST 21 5 - 34 unit/L    eGFR >60 mL/min/1.73/m2    Anion Gap 9.0 mEq/L    BUN/Creatinine Ratio 21        Assessment:  Problem List Items Addressed This Visit          Renal/    Premenopausal patient - Primary       Oncology    Metastasis to lymph nodes - Left axilla    Overview     1.  LEFT BREAST, 2 O'CLOCK, 10 CM FN:   - INVASIVE DUCTAL CARCINOMA, GRADE 3.   Comment #1: Carcinoma is present on all three core biopsies, comprising    95% of the tissue and measuring at least 1.2 cm.   2. LEFT AXILLA:   - INVASIVE DUCTAL CARCINOMA, GRADE 3.   Comment #2: No lymph node is present. However, this could represent    metastatic carcinoma completely replacing a lymph node.   ER: POSITIVE, ID: NEGATIVE, HER2**: POSITIVE          Invasive ductal carcinoma of breast, right    Invasive ductal carcinoma of left breast    [Secondary malignant neoplasm of axillary lymph nodes]    Secondary malignancy of lumbar vertebral column    Secondary malignancy of sacrum    Family history of breast cancer    Family history of cervical cancer    Family history of ovarian cancer    Family history of stomach cancer    Inflammatory carcinoma of left breast    Secondary malignancy of thoracic vertebral column    Monoallelic mutation of BRCA1 gene       GI    Hematochezia       Orthopedic    Back pain       09/19/2024:   FDG PET-CT asap   Refer to IR for biopsy of suspicious bone lesions for confirmation of bone metastases, ASAP  Refer to gyn for consultation regarding nonhormonal methods of contraception  Mirena IUD needs to be removed  Refer to GI for evaluation of hematochezia  Brain MRI with and without contrast rule out brain metastases   On biopsy of suspicious bone lesions, order NGS testing  Today, please order liquid testing as well   Dental evaluation and preventive Dentistry before we can start the patient on treatment for bone metastases   Treatment plan: Tamoxifen +trastuzumab   Chemo teaching with nursing staff within a week  Tamoxifen 20 mg daily   Echocardiogram every 3 months   Three months after starting treatment, re-stage with contrast-enhanced CT scans of C/A/P and whole-body nuclear medicine bone scan  Today, check baseline tumor markers including CEA, CA 27.29, CA 15-3 levels  Follow-up with NP for chemo  teaching within a week   Follow-up visit with me in 2 weeks  Do not start tamoxifen and Herceptin until okayed by me.    Above discussed with the patient.  All questions answered.    Discussed labs and scans and gave her copies of relevant results.  Guarded prognosis discussed.  Discussed the following: That she has stage IV, incurable disease; palliation with systemic therapy can be attempted but response can not be guaranteed; prognosis guarded.  Potential side effects of tamoxifen and Herceptin discussed;: Gave her educational materials from [employer].  She understands and agrees with this plan.  =================================        IDC both breasts; left breast inflammatory breast carcinoma:   -presentation:  Pain and swelling left breast 05/2024  -Mirena IUD in place  -extensive family history of breast cancer and cervical cancer  -physical exam: Right breast: 3 cm mass 12 o'clock position, 4 in above nipple  -physical exam: Left breast: Swollen, hard, large mass to o'clock position, 1 hard immobile lymph node in left axilla, breast and axilla tender to palpation (inflammatory carcinoma of left breast)  -mammogram and ultrasound 07/01/2024  # right breast:  16 x 11 x 13 mm mass, BI-RADS 4; right axillary lymph node, mildly prominent, BI-RADS 4  # left breast: Inflammatory breast carcinoma; metastasis to left axillary lymph nodes; multiple irregular left breast masses, BI-RADS 5;  -core biopsy left breast 07/16/2024:  IDC, grade 3; lymph node biopsy positive as well; ER 59%; SC 0%; HER2 positive (3+); Ki-67 unfavorable (66.4%); HER2 by FISH analysis, group 1 (HER2 gene amplification)  -right breast core biopsy 07/17/2024:  IDC, grade 2; lymph node biopsy negative; ER 97.8%; SC 9.6%; HER2 negative (score 0); Ki-67 unfavorable (69.7%); HER2 by FISH negative/nonamplified  -MediPort placed 08/14/2024   -genetic testing 08/22/2024  To summarize:  -IDC right breast:  IDC, grade 2, lymph node biopsy negative, ER  positive, FL positive, HER2 negative;   cT1c cN0 MX, AJCC anatomic stage at least IA, clinical prognostic stage IA  -07/17/2024: S/P needle biopsy right breast and right axilla  -inflammatory carcinoma left breast, grade 3, lymph node biopsy positive, ER positive, FL negative, HER2 positive; cT4d cN2 M1, stage IV  (By virtue of palpable, fixed lymph node in the left axilla, cN2)  -07/16/2024:  S/P needle biopsy left breast and left axilla  -genetic testing 08/22/2024:  BRCA1 mutation positive, heterozygous  -premenopausal per labs 08/29/2024  -baseline TTE 09/05/2024:  LVEF 60-65%   -DEXA scan 09/16/2024:  Normal BMD of lumbar vertebrae end of femoral necks  -baseline staging CTs C/A/P 0 09/16/2024:  Multiple thoracolumbar vertebrae and sacral lytic metastases   -baseline staging whole-body nuclear medicine bone scan 09/16/2024:  Thoracolumbar vertebrae and sacrum metastases  -09/16/2024: Scans reviewed; patient has metastatic disease; FDG PET-CT ordered; brain MRI scan with and without contrast ordered  -09/16/2024: Patient referred to IR for biopsy of suspicious bone lesions  >>>  Plan:  -unfortunately, scans show thoracolumbar vertebral and sacral lytic metastases; therefore, stage IV disease  -FDG PET-CT is pending  -have referred to IR for biopsy of suspicious bone lesions for confirmation  -premenopausal patient   -BRCA1 mutation positive, heterozygous  -baseline DEXA scan 09/16/2024: Normal BMD  -left breast tumor is ER positive, FL negative, HER2 positive  -have referred to gyn for consultation regarding nonhormonal methods of contraception  -has Mirena IUD in place; it needs to be removed  -no hormonal therapy or contraception, given the diagnosis of breast cancer  -08/29/2024:  Urgent referral to GI for evaluation of hematochezia which she has been experiencing since 08/28/2024  -08/29/2024: Left breast pain; started on Norco 5 mg every 6 hours prn  -because of metastatic disease, now, intent of treatment  is palliation  -we will order brain MRI with and without contrast to rule out brain metastases, given metastatic disease on imaging studies  -on biopsy of bone lesions, we will order comprehensive somatic profiling testing (NGS testing) to identify targets for palliative therapies  -we will also order liquid testing  -dental evaluation and preventive Dentistry before patient can be started on zoledronic acid or denosumab to prevent skeletal events from bone metastases  -routine surgical resection of primary breast tumor is not indicated in the management of de Joaquina stage IV disease; although there is no survival benefit, it may be considered for local control of the primary tumor (deferred to surgery)  -will treat with tamoxifen + trastuzumab  -monitor LVEF with TTE every 3 months during treatment  -re-stage with contrast-enhanced CT scans of C/A/P and whole-body nuclear medicine bone scan 3 months after starting tamoxifen +trastuzumab  -patient can be treated with up to 3 lines of endocrine therapy + HER2 targeted therapy  -BRCA1 mutation positive; therefore, a candidate for palliative systemic therapy with PARPi (olaparib, talazoparib), as well (lower level evidence for HER2 positive tumors, therefore, category 2A in this setting)  -09/19/2024:  Today, check baseline tumor markers including CEA, CA 27.29, CA 15-3 levels    If, at anytime, develops visceral crisis or (endocrine refractory, then, treatment options:  # first-line:  (maintenance trastuzumab/pertuzumab after response, with concurrent endocrine therapy if ER positive and HER2 positive metastatic breast cancer)   Pertuzumab +trastuzumab +docetaxel (category 1, Preferred);   pertuzumab +trastuzumab +paclitaxel (Preferred)  # second-line:  -Fam trastuzumab deruxtecan (category 1, Preferred)  -tucatinib +trastuzumab +capecitabine is preferred in patients with both systemic and CNS progression in the 3rd line setting and beyond; it may also be given in the  second-line setting, etc.    Palliative systemic therapy options:  -systemic therapy +HER2 targeted therapy; per   -endocrine therapy +/-HER2 targeted therapy +ovarian suppression in premenopausal patient  -for premenopausal patients, tamoxifen alone (without ovarian ablation/suppression) + HER2 targeted therapy is also an option  >>>  -patient has bone only metastases on CTs and bone scan; no visceral metastases  -will treat with tamoxifen + trastuzumab    Regimen:  -tamoxifen 20 mg daily  -trastuzumab 8 mg/kg IV day 1 cycle 1; followed by 6 mg/kg IV day 1 beginning with cycle 2    Herceptin:  -watch for cardiomyopathy, infusion reactions and pulmonary toxicity  -adverse reactions:> 10%:  Decreased LVEF, skin rash, abdominal pain, anorexia, infusion related reactions, asthenia, chills, back pain, dyspnea, etc.  -warnings/precautions:  Cardiomyopathy; infusion reactions; pulmonary toxicity; renal toxicity  -monitoring: Assess left ventricular ejection fraction (by ECG or MUGA scan) at baseline (immediately prior to trastuzumab initiation), every 3 months during trastuzumab therapy, every 4 weeks if trastuzumab is withheld for significant left ventricular cardiac dysfunction, and every 6 months for at least 2 years following completion of adjuvant trastuzumab therapy. Monitor vital signs during infusion; monitor for hypersensitivity or infusion reaction; if a reaction occurs, monitor carefully until symptoms resolve completely. Monitor for signs/symptoms of cardiac dysfunction or pulmonary toxicity. If pregnancy inadvertently occurs during treatment, monitor amniotic fluid volume.    Fertility and birth control issues:  Discussion about fertility and contraception:  -Informed her about the potential impact of chemotherapy on fertility  -Made her aware of the option of referral to fertility specialist before chemotherapy and/or endocrine therapy to discuss options in case she desires future pregnancies.  Fertility  preservation options include oocyte and embryo cryopreservation, etc.  -Amenorrhea frequently occurs during or after chemotherapy.  The majority of women younger than 35 years to resume menses within 2 years of finishing adjuvant chemotherapy  -Informed that menses and fertility are not necessarily linked. Absence of regular menses does not necessarily imply lack of fertility.  Conversely, the presence of menses does not guarantee fertility.  -There are limited data regarding continued fertility after chemotherapy.  -Cautioned her to avoid becoming pregnant during treatment with radiation therapy, chemotherapy, or endocrine therapy  -Hormone-based birth control is discouraged regardless of the harmless of the hormone receptor status of the patient's cancer  -Alternative methods of birth control, including IUD, barrier method, tubal ligation, vasectomy for the partner, etc.   >>>  -she has Mirena IUD in place; this needs to be removed  -fertility specialist consultation if she so desires  -cautioned her not to become pregnant during treatment  -referred to gyn for consultation regarding nonhormonal methods of contraception      Hematochezia:   Since yesterday, 08/28/2024, has a experienced painless diarrhea with blood on toilet wipes as well as in toilet bowl; no weakness or dizziness; 5 loose stools yesterday, 2 loose stools today; no nausea, vomiting, hematemesis, or melena.  Never experienced GI bleeding before.  >>>   -08/29/2024: sent an urgent referral to GI for evaluation      Family history of breast cancer, ovarian cancer, cervical cancer, stomach cancer, colon cancer:  -Maternal great aunt # 1: Breast cancer, in her 50s   -sister: Ovarian cancer, age 50  -sister: Cervical cancer, age 25  -mother: Ovarian cancer, age 50  -Maternal aunt: Cervical cancer, age 50  -maternal grandfather: Stomach cancer, age 70 (smoker)  -maternal great aunt # 2: Colon cancer, age 60  >>>  -genetic testing 08/22/2024:  BRCA1  mutation positive, heterozygous      Follow-up:  No follow-ups on file.    Answers submitted by the patient for this visit:  Review of Systems Questionnaire (Submitted on 9/12/2024)  appetite change : No  unexpected weight change: No  mouth sores: No  visual disturbance: No  cough: No  shortness of breath: No  chest pain: No  abdominal pain: No  diarrhea: No  frequency: Yes  back pain: Yes  rash: No  headaches: No  adenopathy: No  nervous/ anxious: No

## 2024-09-19 NOTE — Clinical Note
FDG PET-CT asap  Refer to IR for biopsy of suspicious bone lesions for confirmation of bone metastases, ASAP Refer to gyn for consultation regarding nonhormonal methods of contraception Mirena IUD needs to be removed Refer to GI for evaluation of hematochezia Brain MRI with and without contrast rule out brain metastases  On biopsy of suspicious bone lesions, order NGS testing Today, please order liquid testing as well  Dental evaluation and preventive Dentistry before we can start the patient on treatment for bone metastases  Treatment plan: Tamoxifen +trastuzumab  Chemo teaching with nursing staff within a week Tamoxifen 20 mg daily  Echocardiogram every 3 months  Three months after starting treatment, re-stage with contrast-enhanced CT scans of C/A/P and whole-body nuclear medicine bone scan Follow-up with NP for chemo teaching within a week  Follow-up visit with me in 2 weeks Do not start tamoxifen and Herceptin until okayed by me.

## 2024-09-19 NOTE — NURSING
"Met with patient today after her follow up appointment with Dr. Subramanian. Patient attended appointment alone. Provided patient with RN Lopez contact and explained role in patient's care. NCCN Distress Screen completed with a reported distress score of 2. Patient indicates her distress is related to being worried about treatment. Support provided. Social support reported as good. Provided information on in-house and community support/counseling services. Provided education on Pneumonia Vaccine.  Answered all patient questions regarding treatment and expectations. Provided cancer center packet with clinic team, contact numbers, and information on cancer treatment. Patient verbalized understanding and agrees to contact RASHAD Marroquin if any needs or concerns arise. Patient states she would like to receive the pneumonia vaccine. RASHAD Marroquin notified [facility]. Escorted patient to pharmacy. Received confirmation that patient received PCV 20.    Oncology Navigation   Intake  Cancer Type: Breast  Appointment Date: 24     Treatment  Current Status: Staging work-up       Medical Oncologist: Kevon Subramanian MD  Consult Date: 24  Chemotherapy: Planned  Chemotherapy Regimen: TCHP q21 days x6 cycles       Procedures: CT; MRI; PET scan; Echo  Echo Schedule Date: 24  MRI Schedule Date: 24  PET Scan Schedule Date: 24       ER: Positive  [place]: Negative  Her2: Postive       Support Systems: Spouse/significant other; Family members; Friends / neighbors  Barriers of Care: Barriers to Care "Assessment completed-no barriers noted"     Acuity  Stage: 2  Systemic Treatment - predicted or initiated: Chemotherapy Regimen with Multiple drugs (+1)  Treatment Tolerability: Has not started treatment yet/treatment fully completed and side effects resolved  ECO  Comorbidities in Medical History: 0  Hospitalization Within the Past Month: 0   Needed: 0  Support: 0  Verbalizes Financial Concerns: 0  Transportation: " 0  Mental Health: PHQ Score: 0  History of noncompliance/frequent no shows and cancellations: 0  Verbalizes the need for more education: 0  Navigation Acuity: 2     Follow Up  No follow-ups on file.

## 2024-09-20 LAB — CANCER AG15-3 SERPL IA-ACNC: 25 U/ML

## 2024-09-23 ENCOUNTER — HOSPITAL ENCOUNTER (OUTPATIENT)
Dept: RADIOLOGY | Facility: HOSPITAL | Age: 34
Discharge: HOME | End: 2024-09-23
Attending: INTERNAL MEDICINE
Payer: MEDICAID

## 2024-09-23 DIAGNOSIS — C50.912 INVASIVE DUCTAL CARCINOMA OF LEFT BREAST: ICD-10-CM

## 2024-09-23 DIAGNOSIS — C50.911 INVASIVE DUCTAL CARCINOMA OF BREAST, RIGHT: ICD-10-CM

## 2024-09-23 DIAGNOSIS — C77.3 MALIGNANT NEOPLASM METASTATIC TO LYMPH NODE OF AXILLA: ICD-10-CM

## 2024-09-23 LAB — CANCER AG27-29 SERPL-ACNC: 27 U/ML

## 2024-09-23 PROCEDURE — A9577 INJ MULTIHANCE: HCPCS

## 2024-09-23 PROCEDURE — 70553 MRI BRAIN STEM W/O & W/DYE: CPT | Mod: TC

## 2024-09-23 PROCEDURE — 25500020 PHARM REV CODE 255

## 2024-09-23 RX ADMIN — GADOBENATE DIMEGLUMINE 20 ML: 529 INJECTION, SOLUTION INTRAVENOUS at 11:09

## 2024-09-25 ENCOUNTER — OFFICE VISIT (OUTPATIENT)
Dept: SURGERY | Facility: CLINIC | Age: 34
End: 2024-09-25
Payer: MEDICAID

## 2024-09-25 DIAGNOSIS — C50.811 MALIGNANT NEOPLASM OF OVERLAPPING SITES OF RIGHT BREAST IN FEMALE, ESTROGEN RECEPTOR POSITIVE: ICD-10-CM

## 2024-09-25 DIAGNOSIS — Z15.01 MONOALLELIC MUTATION OF BRCA1 GENE: ICD-10-CM

## 2024-09-25 DIAGNOSIS — C50.911 INVASIVE DUCTAL CARCINOMA OF BREAST, RIGHT: ICD-10-CM

## 2024-09-25 DIAGNOSIS — Z17.0 MALIGNANT NEOPLASM OF OVERLAPPING SITES OF LEFT BREAST IN FEMALE, ESTROGEN RECEPTOR POSITIVE: Primary | ICD-10-CM

## 2024-09-25 DIAGNOSIS — K92.1 HEMATOCHEZIA: Primary | ICD-10-CM

## 2024-09-25 DIAGNOSIS — C50.812 MALIGNANT NEOPLASM OF OVERLAPPING SITES OF LEFT BREAST IN FEMALE, ESTROGEN RECEPTOR POSITIVE: Primary | ICD-10-CM

## 2024-09-25 DIAGNOSIS — Z17.0 MALIGNANT NEOPLASM OF OVERLAPPING SITES OF RIGHT BREAST IN FEMALE, ESTROGEN RECEPTOR POSITIVE: ICD-10-CM

## 2024-09-25 DIAGNOSIS — Z15.09 MONOALLELIC MUTATION OF BRCA1 GENE: ICD-10-CM

## 2024-09-25 DIAGNOSIS — C50.911 INVASIVE DUCTAL CARCINOMA OF BREAST, RIGHT: Primary | ICD-10-CM

## 2024-09-25 DIAGNOSIS — C77.3 MALIGNANT NEOPLASM METASTATIC TO LYMPH NODE OF AXILLA: ICD-10-CM

## 2024-09-25 DIAGNOSIS — C50.912 INFLAMMATORY CARCINOMA OF LEFT BREAST: ICD-10-CM

## 2024-09-25 DIAGNOSIS — C79.51 SECONDARY MALIGNANCY OF THORACIC VERTEBRAL COLUMN: ICD-10-CM

## 2024-09-25 DIAGNOSIS — C79.51 SECONDARY MALIGNANCY OF THORACIC VERTEBRAL COLUMN: Primary | ICD-10-CM

## 2024-09-25 NOTE — PROGRESS NOTES
"  Cancer Genetics  [facility] [place] - Breast Surgery  [facility]          Date of Service:  2024  Provider:  Tye Núñez MS, [facility]  Collaborating physician: Qiana Clement MD    Patient Information  Name:  Jeannie Greene  :  1990  MRN:  [id number]        Referring Provider: Jc Chauhan Jr., MD     Televisit Information  The patient location is: [place], [place].  The chief complaint leading to consultation is: as below.  Visit type: audiovisual.  Face-to-face time with patient: approximately 29 minutes.  Approximately 84 minutes in total were spent on this encounter, which includes face-to-face time and non-face-to-face time preparing to see the patient (e.g., review of tests), obtaining and/or reviewing separately obtained history, documenting clinical information in the electronic or other health record, independently interpreting results (not separately reported) and communicating results to the patient/family/caregiver, or care coordination (not separately reported).  Each patient to whom he or she provides medical services by telemedicine is:  (1) informed of the relationship between the physician and patient and the respective role of any other health care provider with respect to management of the patient; and (2) notified that he or she may decline to receive medical services by telemedicine and may withdraw from such care at any time.      SUBJECTIVE:      Reason for Appointment: Post-Test Genetic COunseling    History of Present Illness (HPI):  Jeannie Greene ("Jeannie"), 34 y.o., assigned female sex at birth is returning for post-test genetic counseling.  She initially presented on 2024 after being referred by Breast Surgery  for genetic cancer risk assessment given her family history of breast cancer. 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 Recent CT and whole body scans revealed thoracolumbar vertebrae and sacrum metastatic involvement. She recently underwent genetic testing, which revealed a pathogenic germline BRCA1 mutation.  We met today to discuss the results.    Focused Medical History:   Germline cancer-genetic testing:  Yes - [facility] BRCA1 and BRCA2 gene sequence and deletion/duplication and 85-gene CustomNext-Cancer Panel (85 genes)  BRCA1 [id number] (p.[id number]*14)  VUS: NF1 c.6868C>G (p.[id number])  Cancer:  Yes  Malignant neoplasm of overlapping sites of left breast in female, estrogen receptor positive, metastasis to lymph nodes - Left axilla   Malignant neoplasm of overlapping sites of right breast in female, estrogen receptor positive  Secondary malignancy of thoracic vertebral column  Secondary malignancy of lumbar vertebral column  Secondary malignancy of sacrum  Colonoscopy: No  Mammogram: Yes (done at [facility])  Most recent mammo: 07/01/2024   Breast MRI:  No  Other benign tumor/findings:  No  Pancreatitis:  No  Pancreatic cyst:  No   Reproductive organs intact     Focused Surgical History  N/A     Ancestry:  Ashkenazi Adventism ancestry:  No  Paternal:   Maternal:      Focused Family History:  Consanguinity in ancestors:  No  Germline cancer-genetic testing in blood relatives:  No  Cancer in family:  Yes; there are no known blood relatives affected with cancer other than those mentioned in the pedigree below    Family Cancer Pedigree: For clearer picture, see pedigree in Family history tab or Media.        Genetic Test Report:        Review of Systems:  See HPI.      SUBJECTIVE:   Records Reviewed  Medical History  Problem List  Any pertinent, available Procedures and Pathology reports in both [facility] and [facility]    COUNSELING   BRCA1  Background information  BRCA1 is a gene that, when functioning normally, helps protect  against cancer. However, variants (differences) in the gene can prevent it from working properly, leading to a higher risk of certain types of cancer.    Cancer Risks  An individual's risk may vary based on personal or family history of cancer as well as other personal risk factors.     Estimated lifetime risks:   Cancer Type General Population BRCA1+   Breast (Female)* 13% >60% and up to 40% chance in the other breast after an initial diagnosis   Breast (Male)* 0.1% 0.2-1.2%   Ovarian 1% 39-58%   Pancreatic 1% Up to 5%   Prostate 12% Up to 26%   *The terms male and female refer to sex assigned at birth.    There are limited data that there may be a slightly increased risk of serous uterine cancer. Further evaluation is ongoing. Studies are also ongoing about the possibility of an increased risk for other cancers (including gastric or biliary tract) for individuals with a mutation in BRCA1.    Management Recommendation  Individuals with a BRCA1 mutation who are diagnosed with a related cancer may be eligible for treatment specific to their mutation. Treatment options as well as benefits and limitations should be discussed with an oncologist.     Breast (Female)  Breast awareness beginning at age 18. Individuals should be familiar with their breasts and promptly report changes to their health care provider. Periodic, consistent breast self-examination (BSE) may facilitate breast awareness.  Clinical breast exams every 6-12 months starting at age 25.  Yearly mammogram starting at age 30.*  Yearly breast MRI with and without contrast starting at age 25 or earlier on an individual basis if there is family history of breast cancer before age 30.*   Consider risk reduction agents, including discussion of risks and benefits.  Consider risk reducing mastectomy (surgery to remove the breast tissue) after discussion of degree of protection, reconstruction options, risks, and psychological and quality-of-life  aspects.  *Screening should be individualized after age 75.    Breast (Male)  Breast self-exam training and education beginning at age 35.  Clinical breast exams every 6-12 months starting at age 35.  Consider yearly mammogram starting at age 50 or 10 years earlier than the youngest diagnosis of male breast cancer in the family.    Ovarian  Risk reducing salpingo-oophorectomy (RRSO, surgery to remove the ovaries and fallopian tubes) between age 35-40 after discussion of reproductive options, risk reduction, management of menopausal symptoms, bone health, cardiovascular health, neurologic health, sexual health, and psychosocial and quality-of-life aspects. CA-125 and pelvic ultrasound are recommended for preoperative planning.    Oophorectomy also likely reduces the risk of breast cancer, but it is currently unclear how much.   Hormone replacement therapy (HRT) is generally not contraindicated so should be discussed with individuals who are premenopausal without a personal history of breast cancer.  Risks and benefits of concurrent hysterectomy should be discussed, including risk of pelvic floor dysfunction or urinary incontinence and possibility of estrogen-alone HRT, which is associated with a lower risk of breast cancer compared to combined estrogen and progesterone.  Individuals who are not ready for oophorectomy may consider salpingectomy with delayed completion oophorectomy. Clinical trials of this option are ongoing so participation in a surgical choice study should be strongly considered.  Combination contraception to stop ovulation can also be considered until an individual is ready for surgical risk reduction.     Pancreatic  Individuals who have a first- or second- degree relative with pancreatic cancer:   Consider pancreatic cancer screening (yearly contrast-enhanced MRI/magnetic resonance cholangiopancreatography and/or endoscopic ultrasound) beginning at age 50 (or 10 years earlier than the youngest  diagnosis of pancreatic cancer). National Comprehensive Cancer Network Guidelines recommend that screening be performed in an experienced high-volume center only after an in-depth discussion about the potential limitations of screening.     Prostate  Consider shared decision-making about prostate cancer screening (PSA with or without digital rectal exam) at age 40 and consider yearly screening rather than every other year.    Risk to Relatives   Individuals typically have two copies of the BRCA1 gene - one copy from each biological parent. If a parent has a mutation in the BRCA1 gene, there is a 50% chance each child will inherit the mutation. It is likely that this mutation was inherited from one parent (although we cannot tell from testing which parent it was inherited from). As such, the siblings and children of someone with a BRCA1 mutation are each expected to have a 50% chance of having the same BRCA1 mutation. More distant relatives are also at risk of having the familial BRCA1 mutation.  Relatives of someone with a BRCA1 mutation should consider meeting with a genetic specialist to discuss testing for the familial mutation.     It is rare but possible that an individual has a new (de radha) mutation not inherited from either parent. If a mutation is de radha, the chance that siblings and more distant relatives would have the familial mutation is much lower.     Reproductive Information  Fanconi Anemia  Typically, having mutations in both copies of the BRCA1 gene is considered lethal. However, recent studies have identified that, rarely, individuals who have mutations in both copies of the BRCA1 gene may survive and have a condition called Fanconi Anemia (FA, specifically group S). This rare condition can happen if both parents have a mutation in one copy of the BRCA1 gene. If both parents do, then the chance to have a child with FA is 25%. If someone has a mutation in BRCA1, their partner can be tested to  determine if there is a chance their child could have FA. This testing is sometimes called carrier screening.    FA can cause problems with development before birth leading to abnormalities of the bones, heart, kidneys, lungs, or digestive tract. After birth, people with FA can also have developmental delay, short stature, and abnormal skin coloring. FA also causes a high risk for cancer.     Pre-Implantation Genetic Testing  Some people who have a mutation in BRCA1 want to reduce the chance of passing on that mutation to a child. If an individual uses in-vitro fertilization to get pregnant, genetic testing can be conducted on embryos to determine if they have the familial mutation(s). Embryos that do not can then be selected for use, reducing the chance of having a child who is affected. To get more information about this process, individuals with a BRCA1 mutation can speak with a reproductive genetic counselor.    Of note, a genetic change called a variant of uncertain significance (VUS) was identified in the NF1 gene c.6868C>G (p.[id number])  . Genetic changes, or variants, are termed VUS when there is insufficient information to know whether or not the change increases the risk of cancer. While pathogenic mutations in this gene may increase the risk of cancer, a VUS is not currently known to do so. We do not recommend changing medical management or cancer screening because of a VUS. The goal of genetic testing labs is to reclassify all VUS results as either a benign normal variant or a pathogenic mutation. Should the classification of this variant change, an updated report would be issued from the genetic testing lab (_[facility]). Over time, the majority of VUS results are downgraded to benign, normal genetic variants.      REFERENCES   National Comprehensive Cancer Network (NCCN). (2024). Genetic/familial high-risk assessment: Breast, ovarian, and pancreatic. NCCN Clinical Practice Guidelines in Oncology (NCCN  Guidelines), Version 3.2024.  National Comprehensive Cancer Network (NCCN). (2024). Prostate Cancer Early Detection. NCCN Clinical Practice Guidelines in Oncology (NCCN Guidelines), Version 2.2024.     Assessment/Plan  Begin treatment plan for palliative chemotherapy given metastatic disease.  Share genetic test result with relatives, including parents and siblings.  Free family testing via [facility] until February 6, 2025.  Compare genetic test results with those of her paternal first-cousins to confirm whether it is a shared familial mutation.     Questions were encouraged and answered to the patient's satisfaction, and she verbalized understanding of information and agreement with the plan.         Signed,    Tye Harden MS, [facility] [place]  Licensed - Certified Genetic Counselor  [facility] - Breast Surgery    09/25/2024

## 2024-09-30 ENCOUNTER — HOSPITAL ENCOUNTER (OUTPATIENT)
Dept: RADIOLOGY | Facility: HOSPITAL | Age: 34
Discharge: HOME | End: 2024-09-30
Attending: INTERNAL MEDICINE
Payer: MEDICAID

## 2024-09-30 DIAGNOSIS — C79.51 SECONDARY MALIGNANCY OF THORACIC VERTEBRAL COLUMN: ICD-10-CM

## 2024-09-30 DIAGNOSIS — C50.911 INVASIVE DUCTAL CARCINOMA OF BREAST, RIGHT: ICD-10-CM

## 2024-09-30 DIAGNOSIS — C77.3 MALIGNANT NEOPLASM METASTATIC TO LYMPH NODE OF AXILLA: ICD-10-CM

## 2024-09-30 DIAGNOSIS — K92.1 HEMATOCHEZIA: ICD-10-CM

## 2024-09-30 PROCEDURE — A9552 F18 FDG: HCPCS | Performed by: INTERNAL MEDICINE

## 2024-09-30 PROCEDURE — 78815 PET IMAGE W/CT SKULL-THIGH: CPT | Mod: TC

## 2024-09-30 RX ORDER — FLUDEOXYGLUCOSE F18 500 MCI/ML
10 INJECTION INTRAVENOUS
Status: COMPLETED | OUTPATIENT
Start: 2024-09-30 | End: 2024-09-30

## 2024-09-30 RX ADMIN — FLUDEOXYGLUCOSE F-18 11 MILLICURIE: 500 INJECTION INTRAVENOUS at 11:09

## 2024-10-03 ENCOUNTER — OFFICE VISIT (OUTPATIENT)
Dept: HEMATOLOGY/ONCOLOGY | Facility: CLINIC | Age: 34
End: 2024-10-03
Payer: MEDICAID

## 2024-10-03 DIAGNOSIS — C50.911 INVASIVE DUCTAL CARCINOMA OF BREAST, RIGHT: Primary | ICD-10-CM

## 2024-10-03 DIAGNOSIS — C50.912 INVASIVE DUCTAL CARCINOMA OF LEFT BREAST: ICD-10-CM

## 2024-10-03 PROCEDURE — 99215 OFFICE O/P EST HI 40 MIN: CPT | Mod: S$PBB,,,

## 2024-10-03 PROCEDURE — 4010F ACE/ARB THERAPY RXD/TAKEN: CPT | Mod: CPTII,,,

## 2024-10-03 PROCEDURE — 1159F MED LIST DOCD IN RCRD: CPT | Mod: CPTII,,,

## 2024-10-03 PROCEDURE — 99212 OFFICE O/P EST SF 10 MIN: CPT | Mod: PBBFAC

## 2024-10-03 RX ORDER — PROCHLORPERAZINE MALEATE 5 MG
10 TABLET ORAL EVERY 6 HOURS PRN
Qty: 40 TABLET | Refills: 11 | Status: SHIPPED | OUTPATIENT
Start: 2024-10-03

## 2024-10-03 NOTE — PROGRESS NOTES
THERAPY EDUCATION: TRASTUZUMAB     Chief Complaint; Therapy Education    Diagnosis: Invasive ductal carcinoma of breast, right     Current Treatment: Trastuzumab Q3W to start on 10/8/24.    Interval History     10/3/24: Mrs. Greene presents to the clinic by herself for therapy education. Patient reports no major complaints at today's visit. Denies fever, chills, SOB, N/V/D, constipation, recent infection, chest pain or unexplained bleeding or bruising.      Answers submitted by the patient for this visit:  Review of Systems Questionnaire (Submitted on 9/26/2024)  appetite change : No  unexpected weight change: No  mouth sores: No  visual disturbance: No  cough: No  shortness of breath: No  chest pain: No  abdominal pain: No  diarrhea: No  frequency: Yes  back pain: Yes  rash: No  headaches: No  adenopathy: No  nervous/ anxious: No      PLAN   -Baseline ECHO completed on 9/5/24. ECHO Q3M with next scheduled for 12/19/24  -Therapy education completed on 10/3/24.  -Plans to start Trastuzumab Q3W on 10/8/24.   -Mediport intact. Lidocaine cream given with instructions to apply on port 30 minutes before treatment.   -Compazine PRN prescribed for at home with instructions to be taken by mouth every 6 hours as needed for nausea.   -OTC Imodium AD recommended for diarrhea (4-5 BMs a day). Take 2 tablets after the first loose bowel movement, and 1 tablet after each loose bowel movement after the first dose has been taken. No more than 4 tablets should be taken in any 24-hour period. If not working, Lomotil can be prescribed as 2nd choice.    -Emphasized adequate hand-hygiene and limited contact with people who are sick.   -Monitor and notify any bleeding in urine, stool, or sputum.  As well as unusual bleeding or bruising and stomach pain.   - Emphasized hydration with 4 16 oz bottles of water a day.    -Call clinic if fever >100.4, shakes or chills, shortness of breath, chest pain, uncontrolled vomiting or diarrhea, pain  and tingling in the chest or arm, or just not feeling well.    - RTC on 10/16/24 for same day labs and toxicity check with NP.    DISCUSSION:    1.  A total of 60 minutes were spent in counseling today, in which 100% were face-to-face.  At today's therapy teaching session, we discussed the patient's cancer diagnosis as well as planned therapy regimen, protocol, side effects and toxicities.  A handout of each therapeutic agent in the regimen was provided and reviewed in detail.    2.  The following side effects were discussed but not limited to:    Trastuzumab Side Effects:  Allergic Reactions  Bone Pain  Breathing Problems  Chest Pain  Cough  Diarrhea  Dizziness  Feeling Faint  Fever  Heart Failure  Itching  Joint Pain  Nausea  Pain  Rash  Swelling  Vomiting  Weight Loss                a.  Discussed the risk of infection while on therapy related to pancytopenia, specifically a decrease in their white blood cell count.  Instructed to contact our office for temperature >100.4 F, chills, sudden onset cough or shortness of breath, symptoms of a urinary tract infection.                b.  Discussed the risk of anemia. Instructed to contact our office for dizziness, heart palpitations, or extreme or sudden changes in weakness.                c.  Discussed the risk of thrombocytopenia, which increases the risk of bruising or bleeding.  Instructed the patient to contact our office for spontaneous signs of bleeding, including nose bleeds, bleeding from the gums or mouth, blood in sputum, urine or stool and unusual or excessive bruising or rash.                d.  Discussed GI side effects including weight changes, changes in appetite, altered sense of taste, stomatitis, nausea, vomiting, diarrhea, constipation, and heartburn.                e.  Discussed  side effects including painful urination, changes in the amount of urination, possible urine color changes.  Discussed fertility issues and to prevent  pregnancy if of  child bearing age.                f.  Discussed neurological side effects including the risk of peripheral neuropathy, either temporary or permanent.                g.  Discussed the potential for skin, hair, and nail changes.       3.  Instructed to contact our office for discussion of medication changes, the addition of vitamin and/or herbal supplementation as they may interact with some chemotherapy agents.    4.  Discussed dietary modifications and the need to maintain adequate caloric intake and proper oral hydration.  Recommended 64 ounces of fluid per day.    5.  Discussed anti-emetic protocol and bowel regimen protocol.    6.  Office contact information given including after hours number.  Discussed there is an oncologist on call 24/7, 365 days including weekends.  Provided primary nurse's information .    7.  In summary, the patient is in agreement with the plan of care.  Questions appeared to be answered to their satisfaction. Consented the patient to the treatment plan and the patient was educated on the planned duration of the treatment and schedule of the treatment administration. Copy to be scanned into the chart.    All questions answered to the satisfaction of the patient and family.     Follow up appointments given to patient.     ANIYA DAWSON  PATIENT EDUCATOR  [facility] [place] [facility]

## 2024-10-04 ENCOUNTER — TELEPHONE (OUTPATIENT)
Dept: HEMATOLOGY/ONCOLOGY | Facility: CLINIC | Age: 34
End: 2024-10-04
Payer: MEDICAID

## 2024-10-06 PROBLEM — C50.919 BREAST CANCER METASTASIZED TO BONE: Status: ACTIVE | Noted: 2024-10-06

## 2024-10-06 PROBLEM — C78.7 ADENOCARCINOMA OF BREAST METASTATIC TO LIVER: Status: ACTIVE | Noted: 2024-10-06

## 2024-10-06 PROBLEM — C50.919 ADENOCARCINOMA OF BREAST METASTATIC TO LIVER: Status: ACTIVE | Noted: 2024-10-06

## 2024-10-06 PROBLEM — C79.51 BREAST CANCER METASTASIZED TO BONE: Status: ACTIVE | Noted: 2024-10-06

## 2024-10-06 RX ORDER — EPINEPHRINE 0.3 MG/.3ML
0.3 INJECTION SUBCUTANEOUS ONCE AS NEEDED
Status: CANCELLED | OUTPATIENT
Start: 2024-10-06

## 2024-10-06 RX ORDER — EPINEPHRINE 0.3 MG/.3ML
0.3 INJECTION SUBCUTANEOUS ONCE AS NEEDED
OUTPATIENT
Start: 2024-10-25

## 2024-10-06 RX ORDER — DIPHENHYDRAMINE HYDROCHLORIDE 50 MG/ML
50 INJECTION INTRAMUSCULAR; INTRAVENOUS ONCE AS NEEDED
OUTPATIENT
Start: 2024-10-25

## 2024-10-06 RX ORDER — HEPARIN 100 UNIT/ML
500 SYRINGE INTRAVENOUS
Status: CANCELLED | OUTPATIENT
Start: 2024-10-06

## 2024-10-06 RX ORDER — SODIUM CHLORIDE 0.9 % (FLUSH) 0.9 %
10 SYRINGE (ML) INJECTION
OUTPATIENT
Start: 2024-10-25

## 2024-10-06 RX ORDER — PROCHLORPERAZINE EDISYLATE 5 MG/ML
10 INJECTION INTRAMUSCULAR; INTRAVENOUS ONCE AS NEEDED
Start: 2024-10-25

## 2024-10-06 RX ORDER — SODIUM CHLORIDE 0.9 % (FLUSH) 0.9 %
10 SYRINGE (ML) INJECTION
Status: CANCELLED | OUTPATIENT
Start: 2024-10-06

## 2024-10-06 RX ORDER — PROCHLORPERAZINE EDISYLATE 5 MG/ML
10 INJECTION INTRAMUSCULAR; INTRAVENOUS ONCE AS NEEDED
Status: CANCELLED
Start: 2024-10-06

## 2024-10-06 RX ORDER — DIPHENHYDRAMINE HYDROCHLORIDE 50 MG/ML
50 INJECTION INTRAMUSCULAR; INTRAVENOUS ONCE AS NEEDED
Status: CANCELLED | OUTPATIENT
Start: 2024-10-06

## 2024-10-06 RX ORDER — HEPARIN 100 UNIT/ML
500 SYRINGE INTRAVENOUS
OUTPATIENT
Start: 2024-10-25

## 2024-10-07 ENCOUNTER — OFFICE VISIT (OUTPATIENT)
Dept: HEMATOLOGY/ONCOLOGY | Facility: CLINIC | Age: 34
End: 2024-10-07
Attending: INTERNAL MEDICINE
Payer: MEDICAID

## 2024-10-07 VITALS
HEART RATE: 82 BPM | HEIGHT: 65 IN | OXYGEN SATURATION: 100 % | SYSTOLIC BLOOD PRESSURE: 132 MMHG | BODY MASS INDEX: 39.42 KG/M2 | DIASTOLIC BLOOD PRESSURE: 92 MMHG | RESPIRATION RATE: 20 BRPM | WEIGHT: 236.63 LBS | TEMPERATURE: 98 F

## 2024-10-07 DIAGNOSIS — Z80.0 FAMILY HISTORY OF COLON CANCER: ICD-10-CM

## 2024-10-07 DIAGNOSIS — K92.1 HEMATOCHEZIA: ICD-10-CM

## 2024-10-07 DIAGNOSIS — C77.3 MALIGNANT NEOPLASM METASTATIC TO LYMPH NODE OF AXILLA: ICD-10-CM

## 2024-10-07 DIAGNOSIS — C79.51 SECONDARY MALIGNANCY OF THORACIC VERTEBRAL COLUMN: ICD-10-CM

## 2024-10-07 DIAGNOSIS — C50.919 ADENOCARCINOMA OF BREAST METASTATIC TO LIVER, UNSPECIFIED LATERALITY: ICD-10-CM

## 2024-10-07 DIAGNOSIS — C50.919 CARCINOMA OF BREAST METASTATIC TO BONE, UNSPECIFIED LATERALITY: ICD-10-CM

## 2024-10-07 DIAGNOSIS — C77.3 SECONDARY MALIGNANT NEOPLASM OF AXILLARY LYMPH NODES: ICD-10-CM

## 2024-10-07 DIAGNOSIS — C78.7 ADENOCARCINOMA OF BREAST METASTATIC TO LIVER, UNSPECIFIED LATERALITY: ICD-10-CM

## 2024-10-07 DIAGNOSIS — C50.912 INVASIVE DUCTAL CARCINOMA OF LEFT BREAST: ICD-10-CM

## 2024-10-07 DIAGNOSIS — Z15.09 MONOALLELIC MUTATION OF BRCA1 GENE: ICD-10-CM

## 2024-10-07 DIAGNOSIS — Z80.49 FAMILY HISTORY OF CERVICAL CANCER: ICD-10-CM

## 2024-10-07 DIAGNOSIS — Z80.3 FAMILY HISTORY OF BREAST CANCER: ICD-10-CM

## 2024-10-07 DIAGNOSIS — Z80.0 FAMILY HISTORY OF STOMACH CANCER: ICD-10-CM

## 2024-10-07 DIAGNOSIS — C50.912 INFLAMMATORY CARCINOMA OF LEFT BREAST: Primary | ICD-10-CM

## 2024-10-07 DIAGNOSIS — C50.912 INFLAMMATORY CARCINOMA OF LEFT BREAST: ICD-10-CM

## 2024-10-07 DIAGNOSIS — C79.51 SECONDARY MALIGNANCY OF LUMBAR VERTEBRAL COLUMN: ICD-10-CM

## 2024-10-07 DIAGNOSIS — C79.51 CARCINOMA OF BREAST METASTATIC TO BONE, UNSPECIFIED LATERALITY: ICD-10-CM

## 2024-10-07 DIAGNOSIS — C79.51 SECONDARY MALIGNANCY OF SACRUM: ICD-10-CM

## 2024-10-07 DIAGNOSIS — C50.911 INVASIVE DUCTAL CARCINOMA OF BREAST, RIGHT: ICD-10-CM

## 2024-10-07 DIAGNOSIS — Z15.01 MONOALLELIC MUTATION OF BRCA1 GENE: ICD-10-CM

## 2024-10-07 DIAGNOSIS — Z80.41 FAMILY HISTORY OF OVARIAN CANCER: ICD-10-CM

## 2024-10-07 DIAGNOSIS — N95.9 PREMENOPAUSAL PATIENT: Primary | ICD-10-CM

## 2024-10-07 PROCEDURE — 99215 OFFICE O/P EST HI 40 MIN: CPT | Mod: S$PBB,,, | Performed by: INTERNAL MEDICINE

## 2024-10-07 PROCEDURE — 3075F SYST BP GE 130 - 139MM HG: CPT | Mod: CPTII,,, | Performed by: INTERNAL MEDICINE

## 2024-10-07 PROCEDURE — 3080F DIAST BP >= 90 MM HG: CPT | Mod: CPTII,,, | Performed by: INTERNAL MEDICINE

## 2024-10-07 PROCEDURE — 4010F ACE/ARB THERAPY RXD/TAKEN: CPT | Mod: CPTII,,, | Performed by: INTERNAL MEDICINE

## 2024-10-07 PROCEDURE — 99214 OFFICE O/P EST MOD 30 MIN: CPT | Mod: PBBFAC | Performed by: INTERNAL MEDICINE

## 2024-10-07 PROCEDURE — 3008F BODY MASS INDEX DOCD: CPT | Mod: CPTII,,, | Performed by: INTERNAL MEDICINE

## 2024-10-07 RX ORDER — TAMOXIFEN CITRATE 20 MG/1
20 TABLET ORAL DAILY
Qty: 30 TABLET | Refills: 5 | Status: SHIPPED | OUTPATIENT
Start: 2024-10-07 | End: 2025-10-07

## 2024-10-07 NOTE — PROGRESS NOTES
History:  Past Medical History:   Diagnosis Date    BRCA1 gene mutation positive 09/09/2024    BRCA1 [id number] ([id number]) - [facility] BRCA1 and BRCA2 gene sequence and deletion/duplication and 85-gene CustomNext-Cancer Panel (85 genes), VUS in NF1    Hypertension      Past Surgical History:   Procedure Laterality Date    INSERTION OF TUNNELED CENTRAL VENOUS CATHETER (CVC) WITH SUBCUTANEOUS PORT N/A 08/14/2024    Procedure: [id number];  Surgeon: Jc Chauhan Jr., MD;  Location: [facility];  Service: General;  Laterality: N/A;      Social History     Socioeconomic History    Marital status: Other   Tobacco Use    Smoking status: Never     Passive exposure: Never    Smokeless tobacco: Never   Substance and Sexual Activity    Alcohol use: Never    Drug use: Never    Sexual activity: Yes     Partners: Male     Birth control/protection: I.U.D.     Social Drivers of Health     Financial Resource Strain: Low Risk  (11/2/2020)    Received from [facility] Subsidiaries and Affiliates    Overall Financial Resource Strain (CARDIA)     Difficulty of Paying Living Expenses: Not hard at all   Food Insecurity: No Food Insecurity (11/2/2020)    Received from [facility] and Its Subsidiaries and Affiliates    Hunger Vital Sign     Worried About Running Out of Food in the Last Year: Never true     Ran Out of Food in the Last Year: Never true   Transportation Needs: No Transportation Needs (11/2/2020)    Received from [facility] and [facility]    PRAPARE - Transportation     Lack of Transportation (Medical): No     Lack of Transportation (Non-Medical): No   Physical Activity: Inactive (11/2/2020)    Received from [facility]sidi[facility] and [facility]    Exercise Vital Sign     Days of Exercise per Week: 0 days     Minutes of  Exercise per Session: 0 min   Stress: No Stress Concern Present (11/2/2020)    Received from [facility]sidiaries and Affili[facility]    [employer] [place] - Occupational Stress Questionnaire     Feeling of Stress : Not at all      Family History   Problem Relation Name Age of Onset    Cervical cancer Mother Pao Zacarias 50    Hypertension Mother Pao Zacarias     Diabetes Father Robinson Bland     Hypertension Father Robinson Bland     Colon cancer Maternal Grandfather Robert Chang Jr 70    Cancer Maternal Grandfather Robert Chang Jr     Hypertension Paternal Grandmother      Autism spectrum disorder Son Gilmar 3    Cervical cancer Other Conxavia 25    Kidney failure Other Robinson Jr     Cervical cancer Maternal Aunt Pao 50    Cervical cancer Maternal Aunt Yaima 44    Kidney failure Maternal Aunt Ghada 45    Cervical cancer Other      Stomach cancer Other          recurrence    Liver cancer Other Pedro Pablo 54    Hypertension Other [place]     Heart attack Paternal Uncle      BRCA1 Positive Paternal Cousin      Breast cancer Paternal Cousin  40    BRCA1 Positive Paternal Cousin      Breast cancer Paternal Cousin  42        BL mastect    Breast cancer Other      Hypertension Maternal Aunt Yaima Brown     Hypertension Maternal Aunt Mirta Brown     Hypertension Sister Evelio Brink       Reason for Follow-up:  -IDC right breast:  IDC, grade 2, lymph node biopsy negative, ER positive, WA positive, HER2 negative; S/P core biopsy 07/17/2024; cT1c cN0 MX, AJCC anatomic stage at least IA, clinical prognostic stage IA  -inflammatory carcinoma left breast, grade 3, lymph node biopsy positive, ER positive, WA negative, HER2 positive;   cT4d cN2 M1, stage IV; S/P core biopsy 07/16/2024  -Thoracolumbar vertebrae and sacrum metastatic involvement.   -FDG PET-CT for staging 09/30/2024: Sites of disease:  Multifocal, left breast; left axillary and subpectoral  lymphadenopathy; small focus upper right breast; bilobar liver metastases; portal caval lymph node; multifocal bone metastases  -Monoallelic mutation of BRCA1 gene   -premenopausal   -family history of breast cancer, ovarian cancer, cervical cancer, stomach cancer, colon cancer    History of Present Illness:   Follow-up (Follow up)      Oncologic/Hematologic History:  Oncology History   Malignant neoplasm of overlapping sites of left breast in female, estrogen receptor positive   8/8/2024 Initial Diagnosis    Malignant neoplasm of overlapping sites of left breast in female, estrogen receptor positive     8/8/2024 Cancer Staged    Staging form: Breast, AJCC 8th Edition  - Clinical stage from 8/8/2024: Stage IIIB (cT4d, cN1(f), cM0, G3, ER+, IN-, HER2+)     Malignant neoplasm of overlapping sites of right breast in female, estrogen receptor positive   8/8/2024 Initial Diagnosis    Malignant neoplasm of overlapping sites of right breast in female, estrogen receptor positive     8/8/2024 Cancer Staged    Staging form: Breast, AJCC 8th Edition  - Clinical stage from 8/8/2024: Stage IIA (cT2, cN0(f), cM0, G2, ER+, IN-, HER2-)     Invasive ductal carcinoma of breast, right   7/17/2024 Cancer Staged    Staging form: Breast, AJCC 8th Edition  - Clinical stage from 7/17/2024: Stage IA (cT1c, cN0, cM0, G2, ER+, IN+, HER2-)     8/29/2024 Initial Diagnosis    Invasive ductal carcinoma of breast, right     Invasive ductal carcinoma of left breast   8/29/2024 Initial Diagnosis    Invasive ductal carcinoma of left breast     9/16/2024 Cancer Staged    Staging form: Breast, AJCC 8th Edition  - Clinical stage from 9/16/2024: Stage IV (cT4d, cN2, pM1, G3, ER+, IN-, HER2+)     10/4/2024 -  Chemotherapy    Treatment Summary   Plan Name: OP BREAST TRASTUZUMAB Q3W  Treatment Goal: Palliative  Status: Active  Start Date: 10/4/2024 (Planned)  End Date: 9/5/2025 (Planned)  Provider: Kevon Subramanian MD  Chemotherapy: trastuzumab-bo  (KANJINTI) 871 mg in 0.9% NaCl 250 mL chemo infusion, 8 mg/kg = 871 mg, Intravenous, Clinic/HOD 1 time, 0 of 17 cycles     Past medical history: Hypertension  Surgical/procedure history:  MediPort placement 08/14/2024    Social history: .  Lives in [place].  Has a 3-year-old son.  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  Denies history of tobacco, alcohol, or illicit drug abuse.  Family history:   -Maternal great aunt # 1: Breast cancer, in her 50s   -sister: Ovarian cancer, age 50  -sister: Cervical cancer, age 25  -mother: Ovarian cancer, age 50  -Maternal aunt: Cervical cancer, age 50  -maternal grandfather: Stomach cancer, age 70 (smoker)  -maternal great aunt # 2: Colon cancer, age 60  Health maintenance: PCP at [facility].  No screening colonoscopy ever.  Menstrual/Ob gyn history: Menarche, age 11; 1 pregnancy, 1 child; gave birth to her child at age 30; no abortions or miscarriages premenopausal; OCP at age 16, received Depo shots from age [age]; no contraception from age [age] when she became pregnant; Mirena IUD after pregnancy at age 30; experienced hot flashes.  No menstrual cycles after Mirena was placed.  ================================      34-year-old lady, referred from [facility] surgery, with invasive ductal carcinoma of breast.      08/09/2024:  [facility] surgery Office note:   CC: b/l breast IDC     HPI: Jeannie Greene is a 34 y.o. female with PMHx of HTN, R breast IDC Grade 2, and L breast/axillary ICD Grade 3 presents to clinic today with L breast and arm pain.   She first noticed the pain and swelling in L breast in May where she presented to the ED and received antibiotics. Pain did not resolve so patient followed up with her OB/Gyn who ordered the imaging and biopsy and was subsequently referred to our clinic. Today, patient reports swelling and pain in left breast, axilla, and down her arm. No pain in right breast, but has had some milky discharge in the past from R  nipple. She states her pain as a 6/10 that was at its worse in July and has improved some since. She takes ibuprofen 4x daily without relief.    Patient had first menarche at 11. One pregnancy with child and not gone through menopause. She was on OCP at 15yo, received Depo shot from [age]. No contraception from [age] when she got pregnant. Got Murina IUD after pregnancy at 30  that is still in place.  Pt has an extensive family history of breast and cervical cancer. Her mother and sister had cervical cancer. Her mother had a hysterectomy. She also believes her maternal aunt had breast cancer. She only takes losartan for HTN. No prior surgeries.  Physical exam:   # right breast: 3 cm mass 12 o'clock, 4 in above nipple, no skin changes, no nipple retraction or discharge, no palpable right axillary lymphadenopathy  # left breast: Swollen and hard; large mass appreciated two o'clock position right above breast, 1 hard immobile lymph node in left axilla, breast and axilla tender to palpation (inflammatory carcinoma left breast)      Investigations/clinical events reviewed:  -presentation:  Pain and swelling left breast 05/2024, did not respond to antibiotics; swelling and pain in left breast, axilla, and down left arm by 08/2024; some milky discharge from right nipple in the past  -05/06/2024: Ultrasound chest/mediastinum (left breast redness, tenderness):   1.  Ill-defined irregular hypoechoic breast tissue 1.88 x 1.2 x 0.9 cm, at the area of concern in the left breast 12:00 position concerning for infection with developing phlegmon given provided history of left breast erythema and pain. No discrete organized drainable fluid collection is identified.   2.  Overlying skin thickening and regional edema is likely secondary to to infectious process.   -07/01/2024:  Bilateral diagnostic mammogram and diagnostic bilateral breast ultrasound (comparison: Left breast mammogram 05/28/2024, breast ultrasound 05/28/2024):  #  findings concerning for left breast inflammatory cancer with metastatic disease to left axillary lymph nodes; multiple irregular masses left breast (2 x 1.5 x 2.0 cm; 1.9 x 1.6 cm; 2.3 x 1.6 cm; irregular-appearing masslike tissue seen throughout the left breast, most prominent within the upper outer quadrant; multiple enlarged irregular left axillary lymph node seen including lymph node with spiculated margins within the axilla 16 x 15 x 12 mm), encompassed by a large focal asymmetry predominantly involving the upper outer quadrant of the left breast extending towards the nipple; left breast skin thickening present; irregular left axillary lymph node present: BI-RADS category 5, highly suspicious  # right breast mass (16 x 11 x 13 mm) at 12 o'clock, 9 cm from nipple: BI-RADS category 4, suspicious   # mildly prominent right axillary lymph node with cortex measurement of 4 mm:  BI-RADS category 4, suspicious  -07/16/2024:    1. Left breast, 2 o'clock, 10 cm from nipple: IDC, grade 3; at least 1.2 cm   2. Left axilla:  IDC, grade 3 (no lymph node is present; however, this could represent metastatic carcinoma completely replacing lymph node)  -07/17/2024:  1. Right breast, 12 o'clock, 9 cm from nipple:  IDC, grade 2, at least 0.7 cm   2. Right axilla: Lymph node, negative for metastatic carcinoma  -ER 97.8%; ND 9.6%; HER2 negative (0); Ki-67 unfavorable (69.7%); HER2 by FISH, group 5 (negative/nonamplified  -ER 59%; ND 0% (negative); HER2 positive (3+); Ki-67 unfavorable ) 66.4%); HER2 by FISH analysis, group 1 (HER2 gene amplification)  -08/14/2024: MediPort placed  -08/22/2024:  Genetic testing    08/29/2024:   Pleasant lady who presents for initial medical oncology consultation.  In no acute discomfort.  Hot flashes since May 2024.  Left breast is painful; gets sharp pains intermittently, 9/10 severity; also, pain in left arm but no swelling.  Pain is intermittent.  Left breast tumor is getting bigger.  No  ulceration.  Since yesterday, 2024, has a experienced painless diarrhea with blood on toilet wipes as well as in toilet bowl; no weakness or dizziness; 5 loose stools yesterday, 2 loose stools today; no nausea, vomiting, hematemesis, or melena.  Never experienced GI bleeding before.  No unusual headaches, focal neurological symptoms, chest pain, cough, dyspnea, abdominal pain, nausea or vomiting.  No urinary problems.  No bone pains.  Appetite is okay.  No unintentional weight loss.    Interval History:  [No matching plan found]   OP BREAST TRASTUZUMAB Q3W     2024:   -2024:  [facility] multi cancer panel and BRCA1/BRCA2 panel testin pathogenic variant identified in BRCA1, associated with autosomal dominant hereditary breast and ovarian cancer syndrome (heterozygous BRCA1); additional variants of uncertain significance identified as well (NF1, heterozygous)  -2024:  CBC unremarkable; calcium 10.4, albumin 3.9 (mild hypercalcemia)  -2024: FSH 7.25 mIU /ml (premenopausal); estradiol 40 pg/mL (premenopausal)  -2024:  TTE: LVEF 60-65%  -2024: DEXA scan: Normal BMD of lumbar vertebrae end of femoral necks  -2024: Staging CTs C/A/P with contrast:   1.  Left breast large irregular defined asymmetric density and skin thickening consistent with history of invasive ductal carcinoma of the breast.   2.  Right breast smaller asymmetric density again consistent with history of invasive ductal carcinoma of the breast.   3.  Left axillary several enlarged lymph nodes with heterogeneous and surrounding inflammations is concerning to be related to metastatic involvement.   4.  Multiple thoracolumbar vertebrae and the sacrum lytic lesions reflect metastatic involvement.  (lytic lesions; T1, T2, T3, T6, T7, T8, T10, T11, minimally L1 and midportion of sacrum; within thoracic spine, largest metastatic involvement is T6 vertebral body, 1.6 cm)  -2024:  Staging whole-body nuclear  medicine bone scan: Thoracolumbar vertebrae and sacrum metastatic involvement.  -09/16/2024: Scans reviewed; patient has metastatic disease; FDG PET-CT ordered; brain MRI scan with and without contrast ordered  -09/16/2024: Patient referred to IR for biopsy of suspicious bone lesions  Presents for a follow-up.  We discussed that scans show metastatic disease.  She complains of mid back pain for last 1 week; no radiation; at worse, pain is 9/10 severity; no weakness or numbness in lower extremities; no loss of bowel bladder control; pain is controlled with narcotics.  She is able to continue working.  She has to sit long periods of time. 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  Has a 3-year-old son.  Great appetite.  ECOG 0.  No unusual headaches, focal neurological symptoms, chest pain, cough, dyspnea, any new lumps or lymphadenopathy, abdominal pain, nausea, vomiting, GI bleeding, etc..  Some left-sided breast pain; controlled with narcotics.    10/07/2024:  -liquid biopsy:  BRCA1 positive; HER2 positive;TMB could not be calculated due to insufficient circulating tumor DNA; MSI-high not detected  -09/19/2024:  Baseline tumor markers: CEA 19.74, elevated; CA 27.29 normal; CA 15-3 level normal  -09/23/2024: Staging brain MRI with and without contrast:  No metastatic disease  -09/30/2024: FDG PET-CT (comparison: MRI brain 09/23/2024, CT C/A/P 0 09/16/2024):  1. Multifocal hypermetabolic disease at the left breast with hypermetabolic left axillary and subpectoral lymphadenopathy.  2. Small focus of intense uptake at the upper right breast.  3. Bilobar hypermetabolic hepatic lesions (reference lesion left lobe maximum SUV 8.9; portal caval lymph node maximum SUV 4.6)  4. Multifocal hypermetabolic osseous lesions (lytic lesions axial and appendicular skeleton; reference lesion T2 maximum SUV 13; lesion at left sacral ala maximum SUV 11)  Presents for follow-up visit.  Overall, stable.  Beginning to experience some  low back pain.  Pain does not radiate.  No lower extremity weakness, numbness, radiation of pain, saddle anesthesia, or bowel or bladder incontinence.  Appetite is great.  Some hot flashes.  No unusual headaches, focal neurological symptoms, chest pain, cough, dyspnea, abdominal pain, nausea, vomiting, etc..  At this time, back pain is mild; there is no indication to start narcotics all palliative radiotherapy.  We will start tamoxifen and Herceptin ASAP.  We do not want to delay the treatment for biopsy of bone or liver lesions to happen.  She completely agrees with this.      Medications:  Current Outpatient Medications on File Prior to Visit   Medication Sig Dispense Refill    HYDROcodone-acetaminophen (NORCO) 5-325 mg per tablet Take 1 tablet by mouth every 6 (six) hours as needed for Pain. 56 tablet 0    losartan (COZAAR) 100 MG tablet Take 1 tablet by mouth every morning.      ondansetron (ZOFRAN) 4 MG tablet Take 1 tablet (4 mg total) by mouth 2 (two) times daily. 10 tablet 1    prochlorperazine (COMPAZINE) 5 MG tablet Take 2 tablets (10 mg total) by mouth every 6 (six) hours as needed (nausea). 40 tablet 11     No current facility-administered medications on file prior to visit.       Review of Systems:   All systems reviewed and found to be negative except for the symptoms detailed above    Physical Examination:   VITAL SIGNS:   Vitals:    10/07/24 0847   BP: (!) 132/92   Pulse: 82   Resp: 20   Temp: 98.1 °F (36.7 °C)         GENERAL:  In no apparent distress.    HEAD:  No signs of head trauma.  EYES:  Pupils are equal.  Extraocular motions intact.    EARS:  Hearing grossly intact.  MOUTH:  Oropharynx is normal.   NECK:  No adenopathy, no JVD.     CHEST:  Chest with clear breath sounds bilaterally.  No wheezes, rales, rhonchi.    CARDIAC:  Regular rate and rhythm.  S1 and S2, without murmurs, gallops, rubs.  VASCULAR:  No Edema.  Peripheral pulses normal and equal in all extremities.  ABDOMEN:  Soft,  without detectable tenderness.  No sign of distention.  No   rebound or guarding, and no masses palpated.   Bowel Sounds normal.  MUSCULOSKELETAL:  Good range of motion of all major joints. Extremities without clubbing, cyanosis or edema.    NEUROLOGIC EXAM:  Alert and oriented x 3.  No focal sensory or strength deficits.   Speech normal.  Follows commands.  PSYCHIATRIC:  Mood normal.  08/29/2024:  Breast examination was performed with a verbal consent, in the presence of Zulma Woo LPN:  # right breast:  About 4 cm nontender mobile tumor palpable at 11:00 a.m. position in the right breast, several cm from nipple, without overlying skin ulceration/satellite nodules/fixation; no nipple discharge; no palpable regional lymphadenopathy   # left breast: Entire left breast is involved with the hard, nontender, freely mobile tumor; orange peel appearance of skin is noted with faint erythema; hard, immobile lymph node is palpable in the left axilla.  No swelling of left upper extremity.    No results found for this or any previous visit.  Results for orders placed or performed during the hospital encounter of 08/14/24   Comprehensive Metabolic Panel   Result Value Ref Range    Sodium 140 136 - 145 mmol/L    Potassium 3.3 (L) 3.5 - 5.1 mmol/L    Chloride 103 98 - 107 mmol/L    CO2 28 22 - 29 mmol/L    Glucose 153 (H) 74 - 100 mg/dL    Blood Urea Nitrogen 18.1 7.0 - 18.7 mg/dL    Creatinine 0.88 0.55 - 1.02 mg/dL    Calcium 10.2 8.4 - 10.2 mg/dL    Protein Total 8.1 6.4 - 8.3 gm/dL    Albumin 4.0 3.5 - 5.0 g/dL    Globulin 4.1 (H) 2.4 - 3.5 gm/dL    Albumin/Globulin Ratio 1.0 (L) 1.1 - 2.0 ratio    Bilirubin Total 0.5 <=1.5 mg/dL    ALP 56 40 - 150 unit/L    ALT 31 0 - 55 unit/L    AST 21 5 - 34 unit/L    eGFR >60 mL/min/1.73/m2    Anion Gap 9.0 mEq/L    BUN/Creatinine Ratio 21        Assessment:  Problem List Items Addressed This Visit          Renal/    Premenopausal patient - Primary       Oncology    Metastasis to  lymph nodes - Left axilla    Overview     1. LEFT BREAST, 2 O'CLOCK, 10 CM FN:   - INVASIVE DUCTAL CARCINOMA, GRADE 3.   Comment #1: Carcinoma is present on all three core biopsies, comprising    95% of the tissue and measuring at least 1.2 cm.   2. LEFT AXILLA:   - INVASIVE DUCTAL CARCINOMA, GRADE 3.   Comment #2: No lymph node is present. However, this could represent    metastatic carcinoma completely replacing a lymph node.   ER: POSITIVE, IA: NEGATIVE, HER2**: POSITIVE          Invasive ductal carcinoma of breast, right    Invasive ductal carcinoma of left breast    Secondary malignant neoplasm of axillary lymph nodes    Secondary malignancy of lumbar vertebral column    Secondary malignancy of sacrum    Family history of breast cancer    Family history of cervical cancer    Family history of colon cancer    Family history of ovarian cancer    Family history of stomach cancer    Inflammatory carcinoma of left breast    Secondary malignancy of thoracic vertebral column    Monoallelic mutation of BRCA1 gene    Breast cancer metastasized to bone    Adenocarcinoma of breast metastatic to liver       GI    Hematochezia         Orders for 10/07/2024:   Check on the status of IR referral for biopsy of suspicious bone lesions  Refer to gyn for consultation regarding nonhormonal methods of contraception   She has Mirena IUD in place; it needs to be removed  Refer to GI once again for evaluation of hematochezia which she has been experiencing since 08/28/2024  On left breast biopsy, please order PD-L1 and NGS testing  Dental evaluation and preventive Dentistry   Start tamoxifen +Herceptin   Echocardiogram every 3 months  3 months after starting treatment, re-stage with contrast-enhanced CT scans of C/A/P and whole-body nuclear medicine bone scan  Check CEA level every month to assess response  Tamoxifen 20 mg daily   Herceptin every 3 weeks  Follow-up with NP in 3 weeks    Above discussed with the patient.  All questions  answered.    Discussed labs and scans and gave her copies of relevant results.  Guarded prognosis discussed.  Discussed the following: That she has stage IV, incurable disease; palliation with systemic therapy can be attempted but response can not be guaranteed; prognosis guarded.  Potential side effects of tamoxifen and Herceptin discussed;: Gave her educational materials from [employer].  She understands and agrees with this plan.  =================================      IDC both breasts; left breast inflammatory breast carcinoma:   -presentation:  Pain and swelling left breast 05/2024  -Mirena IUD in place  -extensive family history of breast cancer and cervical cancer  -physical exam: Right breast: 3 cm mass 12 o'clock position, 4 in above nipple  -physical exam: Left breast: Swollen, hard, large mass to o'clock position, 1 hard immobile lymph node in left axilla, breast and axilla tender to palpation (inflammatory carcinoma of left breast)  -mammogram and ultrasound 07/01/2024  # right breast:  16 x 11 x 13 mm mass, BI-RADS 4; right axillary lymph node, mildly prominent, BI-RADS 4  # left breast: Inflammatory breast carcinoma; metastasis to left axillary lymph nodes; multiple irregular left breast masses, BI-RADS 5;  -core biopsy left breast 07/16/2024:  IDC, grade 3; lymph node biopsy positive as well; ER 59%; AZ 0%; HER2 positive (3+); Ki-67 unfavorable (66.4%); HER2 by FISH analysis, group 1 (HER2 gene amplification)  -right breast core biopsy 07/17/2024:  IDC, grade 2; lymph node biopsy negative; ER 97.8%; AZ 9.6%; HER2 negative (score 0); Ki-67 unfavorable (69.7%); HER2 by FISH negative/nonamplified  -MediPort placed 08/14/2024   -genetic testing 08/22/2024  To summarize:  -IDC right breast:  IDC, grade 2, lymph node biopsy negative, ER positive, AZ positive, HER2 negative;   cT1c cN0 MX, AJCC anatomic stage at least IA, clinical prognostic stage IA  -07/17/2024: S/P needle biopsy right breast and right  axilla  -inflammatory carcinoma left breast, grade 3, lymph node biopsy positive, ER positive, AK negative, HER2 positive; cT4d cN2 M1, stage IV  (By virtue of palpable, fixed lymph node in the left axilla, cN2)  -07/16/2024:  S/P needle biopsy left breast and left axilla  -genetic testing 08/22/2024:  BRCA1 mutation positive, heterozygous  -premenopausal per labs 08/29/2024  -baseline TTE 09/05/2024:  LVEF 60-65%   -DEXA scan 09/16/2024:  Normal BMD of lumbar vertebrae end of femoral necks  -baseline staging CTs C/A/P 0 09/16/2024:  Multiple thoracolumbar vertebrae and sacral lytic metastases   -baseline staging whole-body nuclear medicine bone scan 09/16/2024:  Thoracolumbar vertebrae and sacrum metastases  -09/16/2024: Scans reviewed; patient has metastatic disease; FDG PET-CT ordered; brain MRI scan with and without contrast ordered  -09/16/2024: Patient referred to IR for biopsy of suspicious bone lesions  -liquid biopsy 09/19/2024:  BRCA1 positive; HER2 positive;TMB could not be calculated due to insufficient circulating tumor DNA; MSI-high not detected  -09/19/2024: Baseline tumor markers: CEA 19.74, elevated; CA 27.29, CA 15-3 levels normal  -brain MRI for staging 09/23/2024: No intracranial metastases  -FDG PET-CT for staging 09/30/2024: Sites of disease:  Multifocal, left breast; left axillary and subpectoral lymphadenopathy; small focus upper right breast; bilobar liver metastases; portal caval lymph node; multifocal bone metastases  >>>  Plan:  -unfortunately, scans show thoracolumbar vertebral and sacral lytic metastases; therefore, stage IV disease  -have referred to IR for biopsy of suspicious bone lesions for confirmation  -premenopausal patient   -BRCA1 mutation positive, heterozygous  -baseline DEXA scan 09/16/2024: Normal BMD  -left breast tumor is ER positive, AK negative, HER2 positive  -have referred to gyn for consultation regarding nonhormonal methods of contraception  -has Mirena IUD in  place; it needs to be removed  -no hormonal therapy or contraception, given the diagnosis of breast cancer  -08/29/2024:  Urgent referral to GI for evaluation of hematochezia which she has been experiencing since 08/28/2024  -08/29/2024: Left breast pain; started on Norco 5 mg every 6 hours prn  -because of metastatic disease, now, intent of treatment is palliation  -on biopsy of bone lesions, we will order comprehensive somatic profiling testing (NGS testing) to identify targets for palliative therapies  -will order PD-L1 and NGS testing on left breast biopsy  -dental evaluation and preventive Dentistry before patient can be started on zoledronic acid or denosumab to prevent skeletal events from bone metastases  -routine surgical resection of primary breast tumor is not indicated in the management of de Joaquina stage IV disease; although there is no survival benefit, it may be considered for local control of the primary tumor (deferred to surgery)  -will treat with tamoxifen + trastuzumab  -monitor LVEF with TTE every 3 months during treatment  -re-stage with contrast-enhanced CT scans of C/A/P and whole-body nuclear medicine bone scan 3 months after starting tamoxifen +trastuzumab  -patient can be treated with up to 3 lines of endocrine therapy + HER2 targeted therapy  -BRCA1 mutation positive; therefore, a candidate for palliative systemic therapy with PARPi (olaparib, talazoparib), as well (lower level evidence for HER2 positive tumors, therefore, category 2A in this setting)  -baseline CEA level was elevated; baseline CA 15-3 and CA 27.29 levels were normal; follow CEA level every month to assess response    If, at anytime, develops visceral crisis or (endocrine refractory, then, treatment options:  # first-line:    Pertuzumab +trastuzumab +docetaxel (category 1, Preferred);   pertuzumab +trastuzumab +paclitaxel (Preferred)  (maintenance trastuzumab/pertuzumab after response, with concurrent endocrine therapy)  #  second-line:  -Fam trastuzumab deruxtecan (category 1, Preferred)  -tucatinib +trastuzumab +capecitabine is preferred in patients with both systemic and CNS progression in the 3rd line setting and beyond; it may also be given in the second-line setting, etc.    Palliative systemic therapy options:  -systemic therapy +HER2 targeted therapy; or  -endocrine therapy +/-HER2 targeted therapy +ovarian suppression in premenopausal patient  -for premenopausal patients, tamoxifen alone (without ovarian ablation/suppression) + HER2 targeted therapy is also an option  >>>  -patient has bone only metastases on CTs and bone scan; no visceral metastases  -will treat with tamoxifen + trastuzumab    Regimen:  -tamoxifen 20 mg daily  -trastuzumab 8 mg/kg IV day 1 cycle 1; followed by 6 mg/kg IV day 1 beginning with cycle 2    Herceptin:  -watch for cardiomyopathy, infusion reactions and pulmonary toxicity  -adverse reactions:> 10%:  Decreased LVEF, skin rash, abdominal pain, anorexia, infusion related reactions, asthenia, chills, back pain, dyspnea, etc.  -warnings/precautions:  Cardiomyopathy; infusion reactions; pulmonary toxicity; renal toxicity  -monitoring: Assess left ventricular ejection fraction (by ECG or MUGA scan) at baseline (immediately prior to trastuzumab initiation), every 3 months during trastuzumab therapy, every 4 weeks if trastuzumab is withheld for significant left ventricular cardiac dysfunction, and every 6 months for at least 2 years following completion of adjuvant trastuzumab therapy. Monitor vital signs during infusion; monitor for hypersensitivity or infusion reaction; if a reaction occurs, monitor carefully until symptoms resolve completely. Monitor for signs/symptoms of cardiac dysfunction or pulmonary toxicity. If pregnancy inadvertently occurs during treatment, monitor amniotic fluid volume.    Fertility and birth control issues:  Discussion about fertility and contraception:  -Informed her about the  potential impact of chemotherapy on fertility  -Made her aware of the option of referral to fertility specialist before chemotherapy and/or endocrine therapy to discuss options in case she desires future pregnancies.  Fertility preservation options include oocyte and embryo cryopreservation, etc.  -Amenorrhea frequently occurs during or after chemotherapy.  The majority of women younger than 35 years to resume menses within 2 years of finishing adjuvant chemotherapy  -Informed that menses and fertility are not necessarily linked. Absence of regular menses does not necessarily imply lack of fertility.  Conversely, the presence of menses does not guarantee fertility.  -There are limited data regarding continued fertility after chemotherapy.  -Cautioned her to avoid becoming pregnant during treatment with radiation therapy, chemotherapy, or endocrine therapy  -Hormone-based birth control is discouraged regardless of the harmless of the hormone receptor status of the patient's cancer  -Alternative methods of birth control, including IUD, barrier method, tubal ligation, vasectomy for the partner, etc.   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, HER2 positive, cT4d cN2 M1, stage IV  -FDG PET-CT for staging 09/30/2024: Sites of disease:  Multifocal, left breast; left axillary and subpectoral lymphadenopathy; small focus upper right breast; bilobar liver metastases; portal caval lymph node; multifocal bone metastases      Molecular markers:  -liquid biopsy:  BRCA1 positive; HER2 positive;TMB could not be calculated due to insufficient circulating tumor DNA;  MSI-high not detected      Hematochezia:   Since yesterday, 08/28/2024, has a experienced painless diarrhea with blood on toilet wipes as well as in toilet bowl; no weakness or dizziness; 5 loose stools yesterday, 2 loose stools today; no nausea, vomiting, hematemesis, or melena.  Never experienced GI bleeding before.  >>>   -08/29/2024: sent an urgent referral to GI for evaluation      Family history of breast cancer, ovarian cancer, cervical cancer, stomach cancer, colon cancer:  -Maternal great aunt # 1: Breast cancer, in her 50s   -sister: Ovarian cancer, age 50  -sister: Cervical cancer, age 25  -mother: Ovarian cancer, age 50  -Maternal aunt: Cervical cancer, age 50  -maternal grandfather: Stomach cancer, age 70 (smoker)  -maternal great aunt # 2: Colon cancer, age 60  >>>  -genetic testing 08/22/2024:  BRCA1 mutation positive, heterozygous      Follow-up:  No follow-ups on file.  Answers submitted by the patient for this visit:  Review of Systems Questionnaire (Submitted on 9/30/2024)  appetite change : No  unexpected weight change: No  mouth sores: No  visual disturbance: No  cough: No  shortness of breath: No  chest pain: No  abdominal pain: No  diarrhea: No  frequency: Yes  back pain: Yes  rash: No  headaches: No  adenopathy: No  nervous/ anxious: No

## 2024-10-07 NOTE — H&P (VIEW-ONLY)
History:  Past Medical History:   Diagnosis Date    BRCA1 gene mutation positive 09/09/2024    BRCA1 [id number] ([id number]) - [facility] BRCA1 and BRCA2 gene sequence and deletion/duplication and 85-gene CustomNext-Cancer Panel (85 genes), VUS in NF1    Hypertension      Past Surgical History:   Procedure Laterality Date    INSERTION OF TUNNELED CENTRAL VENOUS CATHETER (CVC) WITH SUBCUTANEOUS PORT N/A 08/14/2024    Procedure: [id number];  Surgeon: Jc Chauhan Jr., MD;  Location: [facility];  Service: General;  Laterality: N/A;      Social History     Socioeconomic History    Marital status: Other   Tobacco Use    Smoking status: Never     Passive exposure: Never    Smokeless tobacco: Never   Substance and Sexual Activity    Alcohol use: Never    Drug use: Never    Sexual activity: Yes     Partners: Male     Birth control/protection: I.U.D.     Social Drivers of Health     Financial Resource Strain: Low Risk  (11/2/2020)    Received from [facility] Subsidiaries and Affiliates    Overall Financial Resource Strain (CARDIA)     Difficulty of Paying Living Expenses: Not hard at all   Food Insecurity: No Food Insecurity (11/2/2020)    [facility]    Hunger Vital Sign     Worried About Running Out of Food in the Last Year: Never true     Ran Out of Food in the Last Year: Never true   Transportation Needs: No Transportation Needs (11/2/2020)    Received from [facility]    PRAPARE - Transportation     Lack of Transportation (Medical): No     Lack of Transportation (Non-Medical): No   Physical Activity: Inactive (11/2/2020)    Received from [facility]    Exercise Vital Sign     Days of Exercise per Week: 0 days     Minutes of  Exercise per Session: 0 min   Stress: No Stress Concern Present (11/2/2020)    Received from [facility]sidi[facility] and Affiliates    [employer] - Occupational Stress Questionnaire     Feeling of Stress : Not at all      Family History   Problem Relation Name Age of Onset    Cervical cancer Mother Pao Zacarias 50    Hypertension Mother Pao Zacarias     Diabetes Father Robinson Bland     Hypertension Father Robinson Bland     Colon cancer Maternal Grandfather Robert Chang Jr 70    Cancer Maternal Grandfather Robert Chang Jr     Hypertension Paternal Grandmother      Autism spectrum disorder Son Gilmar 3    Cervical cancer Other Conxavia 25    Kidney failure Other Robinson Jr     Cervical cancer Maternal Aunt Pao 50    Cervical cancer Maternal Aunt Yaima 44    Kidney failure Maternal Aunt Ghada 45    Cervical cancer Other      Stomach cancer Other          recurrence    Liver cancer Other Pedro Pablo 54    Hypertension Other [place]     Heart attack Paternal Uncle      BRCA1 Positive Paternal Cousin      Breast cancer Paternal Cousin  40    BRCA1 Positive Paternal Cousin      Breast cancer Paternal Cousin  42        BL mastect    Breast cancer Other      Hypertension Maternal Aunt Yaima Brown     Hypertension Maternal Aunt Mirta Brown     Hypertension Sister Evelio Brink       Reason for Follow-up:  -IDC right breast:  IDC, grade 2, lymph node biopsy negative, ER positive, ND positive, HER2 negative; S/P core biopsy 07/17/2024; cT1c cN0 MX, AJCC anatomic stage at least IA, clinical prognostic stage IA  -inflammatory carcinoma left breast, grade 3, lymph node biopsy positive, ER positive, ND negative, HER2 positive;   cT4d cN2 M1, stage IV; S/P core biopsy 07/16/2024  -Thoracolumbar vertebrae and sacrum metastatic involvement.   -FDG PET-CT for staging 09/30/2024: Sites of disease:  Multifocal, left breast; left axillary and subpectoral  lymphadenopathy; small focus upper right breast; bilobar liver metastases; portal caval lymph node; multifocal bone metastases  -Monoallelic mutation of BRCA1 gene   -premenopausal   -family history of breast cancer, ovarian cancer, cervical cancer, stomach cancer, colon cancer    History of Present Illness:   Follow-up (Follow up)      Oncologic/Hematologic History:  Oncology History   Malignant neoplasm of overlapping sites of left breast in female, estrogen receptor positive   8/8/2024 Initial Diagnosis    Malignant neoplasm of overlapping sites of left breast in female, estrogen receptor positive     8/8/2024 Cancer Staged    Staging form: Breast, AJCC 8th Edition  - Clinical stage from 8/8/2024: Stage IIIB (cT4d, cN1(f), cM0, G3, ER+, WV-, HER2+)     Malignant neoplasm of overlapping sites of right breast in female, estrogen receptor positive   8/8/2024 Initial Diagnosis    Malignant neoplasm of overlapping sites of right breast in female, estrogen receptor positive     8/8/2024 Cancer Staged    Staging form: Breast, AJCC 8th Edition  - Clinical stage from 8/8/2024: Stage IIA (cT2, cN0(f), cM0, G2, ER+, WV-, HER2-)     Invasive ductal carcinoma of breast, right   7/17/2024 Cancer Staged    Staging form: Breast, AJCC 8th Edition  - Clinical stage from 7/17/2024: Stage IA (cT1c, cN0, cM0, G2, ER+, WV+, HER2-)     8/29/2024 Initial Diagnosis    Invasive ductal carcinoma of breast, right     Invasive ductal carcinoma of left breast   8/29/2024 Initial Diagnosis    Invasive ductal carcinoma of left breast     9/16/2024 Cancer Staged    Staging form: Breast, AJCC 8th Edition  - Clinical stage from 9/16/2024: Stage IV (cT4d, cN2, pM1, G3, ER+, WV-, HER2+)     10/4/2024 -  Chemotherapy    Treatment Summary   Plan Name: OP BREAST TRASTUZUMAB Q3W  Treatment Goal: Palliative  Status: Active  Start Date: 10/4/2024 (Planned)  End Date: 9/5/2025 (Planned)  Provider: Kevon Subramanian MD  Chemotherapy: trastuzumab-bo  (KANJINTI) 871 mg in 0.9% NaCl 250 mL chemo infusion, 8 mg/kg = 871 mg, Intravenous, Clinic/HOD 1 time, 0 of 17 cycles     Past medical history: Hypertension  Surgical/procedure history:  MediPort placement 08/14/2024    Social history: .  Lives in [place].  Has a 3-year-old son.  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  Denies history of tobacco, alcohol, or illicit drug abuse.  Family history:   -Maternal great aunt # 1: Breast cancer, in her 50s   -sister: Ovarian cancer, age 50  -sister: Cervical cancer, age 25  -mother: Ovarian cancer, age 50  -Maternal aunt: Cervical cancer, age 50  -maternal grandfather: Stomach cancer, age 70 (smoker)  -maternal great aunt # 2: Colon cancer, age 60  Health maintenance: PCP at [facility].  No screening colonoscopy ever.  Menstrual/Ob gyn history: Menarche, age 11; 1 pregnancy, 1 child; gave birth to her child at age 30; no abortions or miscarriages premenopausal; OCP at age 16, received Depo shots from age [age]; no contraception from age [age] when she became pregnant; Mirena IUD after pregnancy at age 30; experienced hot flashes.  No menstrual cycles after Mirena was placed.  ================================      34-year-old lady, referred from [facility] surgery, with invasive ductal carcinoma of breast.      08/09/2024:  [facility] surgery Office note:   CC: b/l breast IDC     HPI: Jeannie Greene is a 34 y.o. female with PMHx of HTN, R breast IDC Grade 2, and L breast/axillary ICD Grade 3 presents to clinic today with L breast and arm pain.   She first noticed the pain and swelling in L breast in May where she presented to the ED and received antibiotics. Pain did not resolve so patient followed up with her OB/Gyn who ordered the imaging and biopsy and was subsequently referred to our clinic. Today, patient reports swelling and pain in left breast, axilla, and down her arm. No pain in right breast, but has had some milky discharge in the past from R  nipple. She states her pain as a 6/10 that was at its worse in July and has improved some since. She takes ibuprofen 4x daily without relief.    Patient had first menarche at 11. One pregnancy with child and not gone through menopause. She was on OCP at 15yo, received Depo shot from [age]. No contraception from [age] when she got pregnant. Got Murina IUD after pregnancy at 30  that is still in place.  Pt has an extensive family history of breast and cervical cancer. Her mother and sister had cervical cancer. Her mother had a hysterectomy. She also believes her maternal aunt had breast cancer. She only takes losartan for HTN. No prior surgeries.  Physical exam:   # right breast: 3 cm mass 12 o'clock, 4 in above nipple, no skin changes, no nipple retraction or discharge, no palpable right axillary lymphadenopathy  # left breast: Swollen and hard; large mass appreciated two o'clock position right above breast, 1 hard immobile lymph node in left axilla, breast and axilla tender to palpation (inflammatory carcinoma left breast)      Investigations/clinical events reviewed:  -presentation:  Pain and swelling left breast 05/2024, did not respond to antibiotics; swelling and pain in left breast, axilla, and down left arm by 08/2024; some milky discharge from right nipple in the past  -05/06/2024: Ultrasound chest/mediastinum (left breast redness, tenderness):   1.  Ill-defined irregular hypoechoic breast tissue 1.88 x 1.2 x 0.9 cm, at the area of concern in the left breast 12:00 position concerning for infection with developing phlegmon given provided history of left breast erythema and pain. No discrete organized drainable fluid collection is identified.   2.  Overlying skin thickening and regional edema is likely secondary to to infectious process.   -07/01/2024:  Bilateral diagnostic mammogram and diagnostic bilateral breast ultrasound (comparison: Left breast mammogram 05/28/2024, breast ultrasound 05/28/2024):  #  findings concerning for left breast inflammatory cancer with metastatic disease to left axillary lymph nodes; multiple irregular masses left breast (2 x 1.5 x 2.0 cm; 1.9 x 1.6 cm; 2.3 x 1.6 cm; irregular-appearing masslike tissue seen throughout the left breast, most prominent within the upper outer quadrant; multiple enlarged irregular left axillary lymph node seen including lymph node with spiculated margins within the axilla 16 x 15 x 12 mm), encompassed by a large focal asymmetry predominantly involving the upper outer quadrant of the left breast extending towards the nipple; left breast skin thickening present; irregular left axillary lymph node present: BI-RADS category 5, highly suspicious  # right breast mass (16 x 11 x 13 mm) at 12 o'clock, 9 cm from nipple: BI-RADS category 4, suspicious   # mildly prominent right axillary lymph node with cortex measurement of 4 mm:  BI-RADS category 4, suspicious  -07/16/2024:    1. Left breast, 2 o'clock, 10 cm from nipple: IDC, grade 3; at least 1.2 cm   2. Left axilla:  IDC, grade 3 (no lymph node is present; however, this could represent metastatic carcinoma completely replacing lymph node)  -07/17/2024:  1. Right breast, 12 o'clock, 9 cm from nipple:  IDC, grade 2, at least 0.7 cm   2. Right axilla: Lymph node, negative for metastatic carcinoma  -ER 97.8%; RI 9.6%; HER2 negative (0); Ki-67 unfavorable (69.7%); HER2 by FISH, group 5 (negative/nonamplified  -ER 59%; RI 0% (negative); HER2 positive (3+); Ki-67 unfavorable ) 66.4%); HER2 by FISH analysis, group 1 (HER2 gene amplification)  -08/14/2024: MediPort placed  -08/22/2024:  Genetic testing    08/29/2024:   Pleasant lady who presents for initial medical oncology consultation.  In no acute discomfort.  Hot flashes since May 2024.  Left breast is painful; gets sharp pains intermittently, 9/10 severity; also, pain in left arm but no swelling.  Pain is intermittent.  Left breast tumor is getting bigger.  No  ulceration.  Since yesterday, 2024, has a experienced painless diarrhea with blood on toilet wipes as well as in toilet bowl; no weakness or dizziness; 5 loose stools yesterday, 2 loose stools today; no nausea, vomiting, hematemesis, or melena.  Never experienced GI bleeding before.  No unusual headaches, focal neurological symptoms, chest pain, cough, dyspnea, abdominal pain, nausea or vomiting.  No urinary problems.  No bone pains.  Appetite is okay.  No unintentional weight loss.    Interval History:  [No matching plan found]   OP BREAST TRASTUZUMAB Q3W     2024:   -2024:  [facility] multi cancer panel and BRCA1/BRCA2 panel testin pathogenic variant identified in BRCA1, associated with autosomal dominant hereditary breast and ovarian cancer syndrome (heterozygous BRCA1); additional variants of uncertain significance identified as well (NF1, heterozygous)  -2024:  CBC unremarkable; calcium 10.4, albumin 3.9 (mild hypercalcemia)  -2024: FSH 7.25 mIU /ml (premenopausal); estradiol 40 pg/mL (premenopausal)  -2024:  TTE: LVEF 60-65%  -2024: DEXA scan: Normal BMD of lumbar vertebrae end of femoral necks  -2024: Staging CTs C/A/P with contrast:   1.  Left breast large irregular defined asymmetric density and skin thickening consistent with history of invasive ductal carcinoma of the breast.   2.  Right breast smaller asymmetric density again consistent with history of invasive ductal carcinoma of the breast.   3.  Left axillary several enlarged lymph nodes with heterogeneous and surrounding inflammations is concerning to be related to metastatic involvement.   4.  Multiple thoracolumbar vertebrae and the sacrum lytic lesions reflect metastatic involvement.  (lytic lesions; T1, T2, T3, T6, T7, T8, T10, T11, minimally L1 and midportion of sacrum; within thoracic spine, largest metastatic involvement is T6 vertebral body, 1.6 cm)  -2024:  Staging whole-body nuclear  medicine bone scan: Thoracolumbar vertebrae and sacrum metastatic involvement.  -09/16/2024: Scans reviewed; patient has metastatic disease; FDG PET-CT ordered; brain MRI scan with and without contrast ordered  -09/16/2024: Patient referred to IR for biopsy of suspicious bone lesions  Presents for a follow-up.  We discussed that scans show metastatic disease.  She complains of mid back pain for last 1 week; no radiation; at worse, pain is 9/10 severity; no weakness or numbness in lower extremities; no loss of bowel bladder control; pain is controlled with narcotics.  She is able to continue working.  She has to sit long periods of time. 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  Has a 3-year-old son.  Great appetite.  ECOG 0.  No unusual headaches, focal neurological symptoms, chest pain, cough, dyspnea, any new lumps or lymphadenopathy, abdominal pain, nausea, vomiting, GI bleeding, etc..  Some left-sided breast pain; controlled with narcotics.    10/07/2024:  -liquid biopsy:  BRCA1 positive; HER2 positive;TMB could not be calculated due to insufficient circulating tumor DNA; MSI-high not detected  -09/19/2024:  Baseline tumor markers: CEA 19.74, elevated; CA 27.29 normal; CA 15-3 level normal  -09/23/2024: Staging brain MRI with and without contrast:  No metastatic disease  -09/30/2024: FDG PET-CT (comparison: MRI brain 09/23/2024, CT C/A/P 0 09/16/2024):  1. Multifocal hypermetabolic disease at the left breast with hypermetabolic left axillary and subpectoral lymphadenopathy.  2. Small focus of intense uptake at the upper right breast.  3. Bilobar hypermetabolic hepatic lesions (reference lesion left lobe maximum SUV 8.9; portal caval lymph node maximum SUV 4.6)  4. Multifocal hypermetabolic osseous lesions (lytic lesions axial and appendicular skeleton; reference lesion T2 maximum SUV 13; lesion at left sacral ala maximum SUV 11)  Presents for follow-up visit.  Overall, stable.  Beginning to experience some  low back pain.  Pain does not radiate.  No lower extremity weakness, numbness, radiation of pain, saddle anesthesia, or bowel or bladder incontinence.  Appetite is great.  Some hot flashes.  No unusual headaches, focal neurological symptoms, chest pain, cough, dyspnea, abdominal pain, nausea, vomiting, etc..  At this time, back pain is mild; there is no indication to start narcotics all palliative radiotherapy.  We will start tamoxifen and Herceptin ASAP.  We do not want to delay the treatment for biopsy of bone or liver lesions to happen.  She completely agrees with this.      Medications:  Current Outpatient Medications on File Prior to Visit   Medication Sig Dispense Refill    HYDROcodone-acetaminophen (NORCO) 5-325 mg per tablet Take 1 tablet by mouth every 6 (six) hours as needed for Pain. 56 tablet 0    losartan (COZAAR) 100 MG tablet Take 1 tablet by mouth every morning.      ondansetron (ZOFRAN) 4 MG tablet Take 1 tablet (4 mg total) by mouth 2 (two) times daily. 10 tablet 1    prochlorperazine (COMPAZINE) 5 MG tablet Take 2 tablets (10 mg total) by mouth every 6 (six) hours as needed (nausea). 40 tablet 11     No current facility-administered medications on file prior to visit.       Review of Systems:   All systems reviewed and found to be negative except for the symptoms detailed above    Physical Examination:   VITAL SIGNS:   Vitals:    10/07/24 0847   BP: (!) 132/92   Pulse: 82   Resp: 20   Temp: 98.1 °F (36.7 °C)         GENERAL:  In no apparent distress.    HEAD:  No signs of head trauma.  EYES:  Pupils are equal.  Extraocular motions intact.    EARS:  Hearing grossly intact.  MOUTH:  Oropharynx is normal.   NECK:  No adenopathy, no JVD.     CHEST:  Chest with clear breath sounds bilaterally.  No wheezes, rales, rhonchi.    CARDIAC:  Regular rate and rhythm.  S1 and S2, without murmurs, gallops, rubs.  VASCULAR:  No Edema.  Peripheral pulses normal and equal in all extremities.  ABDOMEN:  Soft,  without detectable tenderness.  No sign of distention.  No   rebound or guarding, and no masses palpated.   Bowel Sounds normal.  MUSCULOSKELETAL:  Good range of motion of all major joints. Extremities without clubbing, cyanosis or edema.    NEUROLOGIC EXAM:  Alert and oriented x 3.  No focal sensory or strength deficits.   Speech normal.  Follows commands.  PSYCHIATRIC:  Mood normal.  08/29/2024:  Breast examination was performed with a verbal consent, in the presence of Zulma Woo LPN:  # right breast:  About 4 cm nontender mobile tumor palpable at 11:00 a.m. position in the right breast, several cm from nipple, without overlying skin ulceration/satellite nodules/fixation; no nipple discharge; no palpable regional lymphadenopathy   # left breast: Entire left breast is involved with the hard, nontender, freely mobile tumor; orange peel appearance of skin is noted with faint erythema; hard, immobile lymph node is palpable in the left axilla.  No swelling of left upper extremity.    No results found for this or any previous visit.  Results for orders placed or performed during the hospital encounter of 08/14/24   Comprehensive Metabolic Panel   Result Value Ref Range    Sodium 140 136 - 145 mmol/L    Potassium 3.3 (L) 3.5 - 5.1 mmol/L    Chloride 103 98 - 107 mmol/L    CO2 28 22 - 29 mmol/L    Glucose 153 (H) 74 - 100 mg/dL    Blood Urea Nitrogen 18.1 7.0 - 18.7 mg/dL    Creatinine 0.88 0.55 - 1.02 mg/dL    Calcium 10.2 8.4 - 10.2 mg/dL    Protein Total 8.1 6.4 - 8.3 gm/dL    Albumin 4.0 3.5 - 5.0 g/dL    Globulin 4.1 (H) 2.4 - 3.5 gm/dL    Albumin/Globulin Ratio 1.0 (L) 1.1 - 2.0 ratio    Bilirubin Total 0.5 <=1.5 mg/dL    ALP 56 40 - 150 unit/L    ALT 31 0 - 55 unit/L    AST 21 5 - 34 unit/L    eGFR >60 mL/min/1.73/m2    Anion Gap 9.0 mEq/L    BUN/Creatinine Ratio 21        Assessment:  Problem List Items Addressed This Visit          Renal/    Premenopausal patient - Primary       Oncology    Metastasis to  lymph nodes - Left axilla    Overview     1. LEFT BREAST, 2 O'CLOCK, 10 CM FN:   - INVASIVE DUCTAL CARCINOMA, GRADE 3.   Comment #1: Carcinoma is present on all three core biopsies, comprising    95% of the tissue and measuring at least 1.2 cm.   2. LEFT AXILLA:   - INVASIVE DUCTAL CARCINOMA, GRADE 3.   Comment #2: No lymph node is present. However, this could represent    metastatic carcinoma completely replacing a lymph node.   ER: POSITIVE, OK: NEGATIVE, HER2**: POSITIVE          Invasive ductal carcinoma of breast, right    Invasive ductal carcinoma of left breast    Secondary malignant neoplasm of axillary lymph nodes    Secondary malignancy of lumbar vertebral column    Secondary malignancy of sacrum    Family history of breast cancer    Family history of cervical cancer    Family history of colon cancer    Family history of ovarian cancer    Family history of stomach cancer    Inflammatory carcinoma of left breast    Secondary malignancy of thoracic vertebral column    Monoallelic mutation of BRCA1 gene    Breast cancer metastasized to bone    Adenocarcinoma of breast metastatic to liver       GI    Hematochezia         Orders for 10/07/2024:   Check on the status of IR referral for biopsy of suspicious bone lesions  Refer to gyn for consultation regarding nonhormonal methods of contraception   She has Mirena IUD in place; it needs to be removed  Refer to GI once again for evaluation of hematochezia which she has been experiencing since 08/28/2024  On left breast biopsy, please order PD-L1 and NGS testing  Dental evaluation and preventive Dentistry   Start tamoxifen +Herceptin   Echocardiogram every 3 months  3 months after starting treatment, re-stage with contrast-enhanced CT scans of C/A/P and whole-body nuclear medicine bone scan  Check CEA level every month to assess response  Tamoxifen 20 mg daily   Herceptin every 3 weeks  Follow-up with NP in 3 weeks    Above discussed with the patient.  All questions  answered.    Discussed labs and scans and gave her copies of relevant results.  Guarded prognosis discussed.  Discussed the following: That she has stage IV, incurable disease; palliation with systemic therapy can be attempted but response can not be guaranteed; prognosis guarded.  Potential side effects of tamoxifen and Herceptin discussed;: Gave her educational materials from [employer].  She understands and agrees with this plan.  =================================      IDC both breasts; left breast inflammatory breast carcinoma:   -presentation:  Pain and swelling left breast 05/2024  -Mirena IUD in place  -extensive family history of breast cancer and cervical cancer  -physical exam: Right breast: 3 cm mass 12 o'clock position, 4 in above nipple  -physical exam: Left breast: Swollen, hard, large mass to o'clock position, 1 hard immobile lymph node in left axilla, breast and axilla tender to palpation (inflammatory carcinoma of left breast)  -mammogram and ultrasound 07/01/2024  # right breast:  16 x 11 x 13 mm mass, BI-RADS 4; right axillary lymph node, mildly prominent, BI-RADS 4  # left breast: Inflammatory breast carcinoma; metastasis to left axillary lymph nodes; multiple irregular left breast masses, BI-RADS 5;  -core biopsy left breast 07/16/2024:  IDC, grade 3; lymph node biopsy positive as well; ER 59%; KY 0%; HER2 positive (3+); Ki-67 unfavorable (66.4%); HER2 by FISH analysis, group 1 (HER2 gene amplification)  -right breast core biopsy 07/17/2024:  IDC, grade 2; lymph node biopsy negative; ER 97.8%; KY 9.6%; HER2 negative (score 0); Ki-67 unfavorable (69.7%); HER2 by FISH negative/nonamplified  -MediPort placed 08/14/2024   -genetic testing 08/22/2024  To summarize:  -IDC right breast:  IDC, grade 2, lymph node biopsy negative, ER positive, KY positive, HER2 negative;   cT1c cN0 MX, AJCC anatomic stage at least IA, clinical prognostic stage IA  -07/17/2024: S/P needle biopsy right breast and right  axilla  -inflammatory carcinoma left breast, grade 3, lymph node biopsy positive, ER positive, WI negative, HER2 positive; cT4d cN2 M1, stage IV  (By virtue of palpable, fixed lymph node in the left axilla, cN2)  -07/16/2024:  S/P needle biopsy left breast and left axilla  -genetic testing 08/22/2024:  BRCA1 mutation positive, heterozygous  -premenopausal per labs 08/29/2024  -baseline TTE 09/05/2024:  LVEF 60-65%   -DEXA scan 09/16/2024:  Normal BMD of lumbar vertebrae end of femoral necks  -baseline staging CTs C/A/P 0 09/16/2024:  Multiple thoracolumbar vertebrae and sacral lytic metastases   -baseline staging whole-body nuclear medicine bone scan 09/16/2024:  Thoracolumbar vertebrae and sacrum metastases  -09/16/2024: Scans reviewed; patient has metastatic disease; FDG PET-CT ordered; brain MRI scan with and without contrast ordered  -09/16/2024: Patient referred to IR for biopsy of suspicious bone lesions  -liquid biopsy 09/19/2024:  BRCA1 positive; HER2 positive;TMB could not be calculated due to insufficient circulating tumor DNA; MSI-high not detected  -09/19/2024: Baseline tumor markers: CEA 19.74, elevated; CA 27.29, CA 15-3 levels normal  -brain MRI for staging 09/23/2024: No intracranial metastases  -FDG PET-CT for staging 09/30/2024: Sites of disease:  Multifocal, left breast; left axillary and subpectoral lymphadenopathy; small focus upper right breast; bilobar liver metastases; portal caval lymph node; multifocal bone metastases  >>>  Plan:  -unfortunately, scans show thoracolumbar vertebral and sacral lytic metastases; therefore, stage IV disease  -have referred to IR for biopsy of suspicious bone lesions for confirmation  -premenopausal patient   -BRCA1 mutation positive, heterozygous  -baseline DEXA scan 09/16/2024: Normal BMD  -left breast tumor is ER positive, WI negative, HER2 positive  -have referred to gyn for consultation regarding nonhormonal methods of contraception  -has Mirena IUD in  place; it needs to be removed  -no hormonal therapy or contraception, given the diagnosis of breast cancer  -08/29/2024:  Urgent referral to GI for evaluation of hematochezia which she has been experiencing since 08/28/2024  -08/29/2024: Left breast pain; started on Norco 5 mg every 6 hours prn  -because of metastatic disease, now, intent of treatment is palliation  -on biopsy of bone lesions, we will order comprehensive somatic profiling testing (NGS testing) to identify targets for palliative therapies  -will order PD-L1 and NGS testing on left breast biopsy  -dental evaluation and preventive Dentistry before patient can be started on zoledronic acid or denosumab to prevent skeletal events from bone metastases  -routine surgical resection of primary breast tumor is not indicated in the management of de Joaquina stage IV disease; although there is no survival benefit, it may be considered for local control of the primary tumor (deferred to surgery)  -will treat with tamoxifen + trastuzumab  -monitor LVEF with TTE every 3 months during treatment  -re-stage with contrast-enhanced CT scans of C/A/P and whole-body nuclear medicine bone scan 3 months after starting tamoxifen +trastuzumab  -patient can be treated with up to 3 lines of endocrine therapy + HER2 targeted therapy  -BRCA1 mutation positive; therefore, a candidate for palliative systemic therapy with PARPi (olaparib, talazoparib), as well (lower level evidence for HER2 positive tumors, therefore, category 2A in this setting)  -baseline CEA level was elevated; baseline CA 15-3 and CA 27.29 levels were normal; follow CEA level every month to assess response    If, at anytime, develops visceral crisis or (endocrine refractory, then, treatment options:  # first-line:    Pertuzumab +trastuzumab +docetaxel (category 1, Preferred);   pertuzumab +trastuzumab +paclitaxel (Preferred)  (maintenance trastuzumab/pertuzumab after response, with concurrent endocrine therapy)  #  second-line:  -Fam trastuzumab deruxtecan (category 1, Preferred)  -tucatinib +trastuzumab +capecitabine is preferred in patients with both systemic and CNS progression in the 3rd line setting and beyond; it may also be given in the second-line setting, etc.    Palliative systemic therapy options:  -systemic therapy +HER2 targeted therapy; or  -endocrine therapy +/-HER2 targeted therapy +ovarian suppression in premenopausal patient  -for premenopausal patients, tamoxifen alone (without ovarian ablation/suppression) + HER2 targeted therapy is also an option  >>>  -patient has bone only metastases on CTs and bone scan; no visceral metastases  -will treat with tamoxifen + trastuzumab    Regimen:  -tamoxifen 20 mg daily  -trastuzumab 8 mg/kg IV day 1 cycle 1; followed by 6 mg/kg IV day 1 beginning with cycle 2    Herceptin:  -watch for cardiomyopathy, infusion reactions and pulmonary toxicity  -adverse reactions:> 10%:  Decreased LVEF, skin rash, abdominal pain, anorexia, infusion related reactions, asthenia, chills, back pain, dyspnea, etc.  -warnings/precautions:  Cardiomyopathy; infusion reactions; pulmonary toxicity; renal toxicity  -monitoring: Assess left ventricular ejection fraction (by ECG or MUGA scan) at baseline (immediately prior to trastuzumab initiation), every 3 months during trastuzumab therapy, every 4 weeks if trastuzumab is withheld for significant left ventricular cardiac dysfunction, and every 6 months for at least 2 years following completion of adjuvant trastuzumab therapy. Monitor vital signs during infusion; monitor for hypersensitivity or infusion reaction; if a reaction occurs, monitor carefully until symptoms resolve completely. Monitor for signs/symptoms of cardiac dysfunction or pulmonary toxicity. If pregnancy inadvertently occurs during treatment, monitor amniotic fluid volume.    Fertility and birth control issues:  Discussion about fertility and contraception:  -Informed her about the  potential impact of chemotherapy on fertility  -Made her aware of the option of referral to fertility specialist before chemotherapy and/or endocrine therapy to discuss options in case she desires future pregnancies.  Fertility preservation options include oocyte and embryo cryopreservation, etc.  -Amenorrhea frequently occurs during or after chemotherapy.  The majority of women younger than 35 years to resume menses within 2 years of finishing adjuvant chemotherapy  -Informed that menses and fertility are not necessarily linked. Absence of regular menses does not necessarily imply lack of fertility.  Conversely, the presence of menses does not guarantee fertility.  -There are limited data regarding continued fertility after chemotherapy.  -Cautioned her to avoid becoming pregnant during treatment with radiation therapy, chemotherapy, or endocrine therapy  -Hormone-based birth control is discouraged regardless of the harmless of the hormone receptor status of the patient's cancer  -Alternative methods of birth control, including IUD, barrier method, tubal ligation, vasectomy for the partner, etc.   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, HER2 positive, cT4d cN2 M1, stage IV  -FDG PET-CT for staging 09/30/2024: Sites of disease:  Multifocal, left breast; left axillary and subpectoral lymphadenopathy; small focus upper right breast; bilobar liver metastases; portal caval lymph node; multifocal bone metastases      Molecular markers:  -liquid biopsy:  BRCA1 positive; HER2 positive;TMB could not be calculated due to insufficient circulating tumor DNA;  MSI-high not detected      Hematochezia:   Since yesterday, 08/28/2024, has a experienced painless diarrhea with blood on toilet wipes as well as in toilet bowl; no weakness or dizziness; 5 loose stools yesterday, 2 loose stools today; no nausea, vomiting, hematemesis, or melena.  Never experienced GI bleeding before.  >>>   -08/29/2024: sent an urgent referral to GI for evaluation      Family history of breast cancer, ovarian cancer, cervical cancer, stomach cancer, colon cancer:  -Maternal great aunt # 1: Breast cancer, in her 50s   -sister: Ovarian cancer, age 50  -sister: Cervical cancer, age 25  -mother: Ovarian cancer, age 50  -Maternal aunt: Cervical cancer, age 50  -maternal grandfather: Stomach cancer, age 70 (smoker)  -maternal great aunt # 2: Colon cancer, age 60  >>>  -genetic testing 08/22/2024:  BRCA1 mutation positive, heterozygous      Follow-up:  No follow-ups on file.  Answers submitted by the patient for this visit:  Review of Systems Questionnaire (Submitted on 9/30/2024)  appetite change : No  unexpected weight change: No  mouth sores: No  visual disturbance: No  cough: No  shortness of breath: No  chest pain: No  abdominal pain: No  diarrhea: No  frequency: Yes  back pain: Yes  rash: No  headaches: No  adenopathy: No  nervous/ anxious: No

## 2024-10-07 NOTE — Clinical Note
Check on the status of IR referral for biopsy of suspicious bone lesions Refer to gyn for consultation regarding nonhormonal methods of contraception  She has Mirena IUD in place; it needs to be removed Refer to GI once again for evaluation of hematochezia which she has been experiencing since 08/28/2024 On left breast biopsy, please order PD-L1 and NGS testing Dental evaluation and preventive Dentistry  Start tamoxifen +Herceptin  Echocardiogram every 3 months 3 months after starting treatment, re-stage with contrast-enhanced CT scans of C/A/P and whole-body nuclear medicine bone scan Check CEA level every month to assess response Tamoxifen 20 mg daily  Herceptin every 3 weeks Follow-up with NP in 3 weeks

## 2024-10-08 ENCOUNTER — CLINICAL SUPPORT (OUTPATIENT)
Dept: HEMATOLOGY/ONCOLOGY | Facility: CLINIC | Age: 34
End: 2024-10-08
Payer: MEDICAID

## 2024-10-08 ENCOUNTER — INFUSION (OUTPATIENT)
Dept: INFUSION THERAPY | Facility: HOSPITAL | Age: 34
End: 2024-10-08
Attending: INTERNAL MEDICINE
Payer: MEDICAID

## 2024-10-08 ENCOUNTER — DOCUMENTATION ONLY (OUTPATIENT)
Dept: HEMATOLOGY/ONCOLOGY | Facility: CLINIC | Age: 34
End: 2024-10-08
Payer: MEDICAID

## 2024-10-08 VITALS
DIASTOLIC BLOOD PRESSURE: 94 MMHG | OXYGEN SATURATION: 99 % | WEIGHT: 240.06 LBS | HEART RATE: 95 BPM | HEIGHT: 65 IN | TEMPERATURE: 98 F | BODY MASS INDEX: 40 KG/M2 | SYSTOLIC BLOOD PRESSURE: 132 MMHG | RESPIRATION RATE: 18 BRPM

## 2024-10-08 DIAGNOSIS — C50.912 INVASIVE DUCTAL CARCINOMA OF LEFT BREAST: Primary | ICD-10-CM

## 2024-10-08 DIAGNOSIS — C50.912 INFLAMMATORY CARCINOMA OF LEFT BREAST: Primary | ICD-10-CM

## 2024-10-08 LAB
B-HCG UR QL: NEGATIVE
CTP QC/QA: YES

## 2024-10-08 PROCEDURE — 81025 URINE PREGNANCY TEST: CPT

## 2024-10-08 PROCEDURE — 63600175 PHARM REV CODE 636 W HCPCS: Performed by: INTERNAL MEDICINE

## 2024-10-08 PROCEDURE — 99211 OFF/OP EST MAY X REQ PHY/QHP: CPT | Mod: PBBFAC,25

## 2024-10-08 PROCEDURE — 25000003 PHARM REV CODE 250: Performed by: INTERNAL MEDICINE

## 2024-10-08 PROCEDURE — 96413 CHEMO IV INFUSION 1 HR: CPT

## 2024-10-08 PROCEDURE — 96415 CHEMO IV INFUSION ADDL HR: CPT

## 2024-10-08 RX ORDER — DIPHENHYDRAMINE HYDROCHLORIDE 50 MG/ML
50 INJECTION INTRAMUSCULAR; INTRAVENOUS ONCE AS NEEDED
Status: DISCONTINUED | OUTPATIENT
Start: 2024-10-08 | End: 2024-10-08 | Stop reason: HOSPADM

## 2024-10-08 RX ORDER — PROCHLORPERAZINE EDISYLATE 5 MG/ML
10 INJECTION INTRAMUSCULAR; INTRAVENOUS ONCE AS NEEDED
Status: DISCONTINUED | OUTPATIENT
Start: 2024-10-08 | End: 2024-10-08 | Stop reason: HOSPADM

## 2024-10-08 RX ORDER — EPINEPHRINE 0.3 MG/.3ML
0.3 INJECTION SUBCUTANEOUS ONCE AS NEEDED
Status: DISCONTINUED | OUTPATIENT
Start: 2024-10-08 | End: 2024-10-08 | Stop reason: HOSPADM

## 2024-10-08 RX ORDER — SODIUM CHLORIDE 0.9 % (FLUSH) 0.9 %
10 SYRINGE (ML) INJECTION
Status: DISCONTINUED | OUTPATIENT
Start: 2024-10-08 | End: 2024-10-08 | Stop reason: HOSPADM

## 2024-10-08 RX ORDER — HEPARIN 100 UNIT/ML
500 SYRINGE INTRAVENOUS
Status: DISCONTINUED | OUTPATIENT
Start: 2024-10-08 | End: 2024-10-08 | Stop reason: HOSPADM

## 2024-10-08 RX ADMIN — HEPARIN 500 UNITS: 100 SYRINGE at 11:10

## 2024-10-08 RX ADMIN — SODIUM CHLORIDE 871 MG: 9 INJECTION, SOLUTION INTRAVENOUS at 09:10

## 2024-10-08 RX ADMIN — SODIUM CHLORIDE: 9 INJECTION, SOLUTION INTRAVENOUS at 08:10

## 2024-10-08 NOTE — NURSING
"RASHAD Marroquin met with patient for in Infusion Clinic for Patient-centered Resource Education. Reviewed RN Lopez contact information and role in patient's care.    Patient reported her level of distress is better now that she has started treatment. Reports she has resources of employment and health insurance. Social support reported as good. Reviewed information on support/counseling services. Resource folder with written information of community and cancer resources, disability benefits, nutritional hints during cancer treatment, and chemo side effect guide given to patient. Spent additional time on signs of infection, infection prevention through good hand hygiene and wearing mask, adequate nutrition/hydration, skin care along with sunscreen, and oral care. Discussed communication process for some common scenarios in which patient should call provider for guidance vs. immediately report to the emergency room. Informed of [facility] and [employer] referral that she may need to utilize during the course of her treatment. Patient verbalized understanding. Reports she has already been referred to [facility]. Patient agrees to contact RASHAD Marroquin if any needs or concerns arise.     Oncology Navigation   Intake  Cancer Type: Breast  Appointment Date: 08/29/24  Start of Treatment: 10/08/24     Treatment  Current Status: Active       Medical Oncologist: Kevon Subramanian MD  Consult Date: 08/29/24  Chemotherapy: Planned  Chemotherapy Regimen: TCHP q21 days x6 cycles       Procedures: CT; MRI; PET scan; Echo  Echo Schedule Date: 09/05/24  MRI Schedule Date: 09/23/24  PET Scan Schedule Date: 09/30/24    General Referrals: [employer]; [facility]    ER: Positive  RI: Negative  Her2: Postive       Support Systems: Spouse/significant other; Family members; Friends / neighbors  Barriers of Care: Barriers to Care "Assessment completed-no barriers noted"     Acuity  Stage: 2  Systemic Treatment - " predicted or initiated: Chemotherapy Regimen with Multiple drugs (+1)  Treatment Tolerability: Has not started treatment yet/treatment fully completed and side effects resolved  ECO  Comorbidities in Medical History: 0  Hospitalization Within the Past Month: 0   Needed: 0  Support: 0  Verbalizes Financial Concerns: 0  Transportation: 0  Mental Health: PHQ Score: 0  History of noncompliance/frequent no shows and cancellations: 0  Verbalizes the need for more education: 0  Navigation Acuity: 2     Follow Up  No follow-ups on file.

## 2024-10-08 NOTE — PROGRESS NOTES
Initial Nutrition Assessment    Jeannie Greene is a 34 y.o. female.    DATE: 10/08/2024     Referral from: Oncology    Diagnosis: IDC both breast, left breast inflammatory breast carcinoma    PAST MEDICAL HISTORY  Past Medical History:   Diagnosis Date    BRCA1 gene mutation positive 09/09/2024    BRCA1 [id number] ([id number]) - [facility] BRCA1 and BRCA2 gene sequence and deletion/duplication and 85-gene CustomNext-Cancer Panel (85 genes), VUS in NF1    Hypertension      Past Surgical History:   Procedure Laterality Date    INSERTION OF TUNNELED CENTRAL VENOUS CATHETER (CVC) WITH SUBCUTANEOUS PORT N/A 08/14/2024    Procedure: [id number];  Surgeon: Jc Chauhan Jr., MD;  Location: [facility];  Service: General;  Laterality: N/A;     Family History   Problem Relation Name Age of Onset    Cervical cancer Mother Pao Zacarias 50    Hypertension Mother Pao Zacarias     Diabetes Father Robinson Bland     Hypertension Father Robinson Bland     Colon cancer Maternal Grandfather Robert Chang Jr 70    Cancer Maternal Grandfather Robert Chang Jr     Hypertension Paternal Grandmother      Autism spectrum disorder Son Gilmar 3    Cervical cancer Other [place] 25    Kidney failure Other Robinson Bills     Cervical cancer Maternal Aunt Pao 50    Cervical cancer Maternal Aunt Yaima 44    Kidney failure Maternal Aunt Ghada 45    Cervical cancer Other      Stomach cancer Other          recurrence    Liver cancer Other Pedro Pablo 54    Hypertension Other [place]     Heart attack Paternal Uncle      BRCA1 Positive Paternal Cousin      Breast cancer Paternal Cousin  40    BRCA1 Positive Paternal Cousin      Breast cancer Paternal Cousin  42        BL mastect    Breast cancer Other      Hypertension Maternal Aunt Yaima Brown     Hypertension Maternal Aunt Mirta Brown     Hypertension Sister Evelio Brink  "     Social History     Tobacco Use    Smoking status: Never     Passive exposure: Never    Smokeless tobacco: Never   Substance and Sexual Activity    Alcohol use: Never    Drug use: Never    Sexual activity: Yes     Partners: Male     Birth control/protection: I.U.D.       TREATMENT PLAN:  Chemo: [x] Yes, OP BREAST TRASTUZUMAB Q3W       [] No  Radiation: [] Yes      [x] No    Review of patient's allergies indicates:  No Known Allergies    ANTHROPOMETRICS:  Height: 65"  Weight:   Wt Readings from Last 10 Encounters:   10/08/24 108.9 kg (240 lb 1.3 oz)   10/07/24 107.3 kg (236 lb 9.6 oz)   09/19/24 108.9 kg (240 lb)   08/29/24 109.1 kg (240 lb 9.6 oz)   08/14/24 108.1 kg (238 lb 6.4 oz)   08/08/24 109.8 kg (242 lb)     Weight Changes: has been stable  BMI: 39.9 (obese)    INTAKE:  Current Diet: regular diet  Diet Texture: Regular  % PO consumed at meals: %  Dietary Patterns: Patient eats []0  []1  [x]2  []3  [] 4 meals/day with snacks  Cooks [] some meals  [x] most meals  Eats out [] Frequently  [x] occasionally  Drinks Mostly water and juice    Current ONS Intake: []  Yes [x] No    Enteral Nutrition     Patient not receiving enteral nutrition at this time.    Food Security  Is patient able to sufficiently able to prepare meals at home? [x] Yes  [] No []N/A  If no, does patient have help cooking, preparing, and serving meals at home? [] Yes  [] No [x] N/A    SYMPTOMS/COMPLAINTS:  none identified      Current Outpatient Medications:     HYDROcodone-acetaminophen (NORCO) 5-325 mg per tablet, Take 1 tablet by mouth every 6 (six) hours as needed for Pain., Disp: 56 tablet,     losartan (COZAAR) 100 MG tablet, Take 1 tablet by mouth every morning., Disp: , Rfl:     ondansetron (ZOFRAN) 4 MG tablet, Take 1 tablet (4 mg total) by mouth 2 (two) times daily., Disp: 10 tablet, Rfl: 1    prochlorperazine (COMPAZINE) 5 MG tablet, Take 2 tablets (10 mg total) by mouth every 6 (six) hours as needed (nausea)., Disp: 40 " "tablet    tamoxifen (NOLVADEX) 20 MG Tab, Take 1 tablet (20 mg total) by mouth once daily., Disp: 30 tablet      Current labs reviewed:  10/7/24 -- H/H 13.3/40, Glu 134 H, Na 140, K 3.5, BUN 18.7, Cr 0.84    RD Note:  10/8/24 -- Pt accompanied by mother beginning chemo for breast cancer; pt reports good "normal" appetite eating 2 meals with snacks daily; reports now drinking more juice & water & decreased soda intake; pt able to shop & cook for self without difficulties; Reports good family support; denies n/v, reports loose stool -- referral to GI per MD noted, pt reports taking imodium, pt reports no further hematochezia; most recent labs reviewed; no weight loss reporting  lb; Encouraged & educated pt on general healthy eating & availability of services/ONS offered with [facility] as needed    Education Provided:  Nutrition during and after cancer treatment  Teaching Method: explanation and printed materials  Comprehension: verbalizes understanding  Barriers to Learning: none evident  Expected Compliance: good  Comments: All questions were answered and dietitian's contact information was provided.         RD Plan/Goals:   [x] Weight stable [] Weight gain  [] Weight Loss [] Increase Kcal/protein [] Biochemical data improved  [] Adjust Tube-feeding Rx  [] Tolerate Tube Feedings   [] Increase tube feedings to goal   [] Tolerate Supplements   [] Symptoms Improved  [] Understand nutrition Education  [] offer supportive visits [] other, please specify maintain adequate po intake to meet nutrition needs        Interventions:  General Healthy diet  Monitor Weight Weekly     Follow up:   []  Will continue to closely monitor throughout chemotherapy treatment regimen.  []  Will continue to monitor with MD follow up appointments or as needed per patient  [x]  Consult RD prn    Darline Salas RDN, LDN   "

## 2024-10-08 NOTE — NURSING
Infusion Note:  Pt has an appointment today for: Infusion    Treatment Regimen: Kanjinti Cycle: 1 Day: 1 every Q3 weeks;      Given via Right Mediport    UPT done: Yes and Result negative    Nursing note:  Confirmed pt did chemo education and Trastuzumab consent signed. Did labs & saw Dr. Subramanian yesterday. Reports diarrhea x1 yesterday. Mediport accessed, CXR placement confirmation. MP did not give blood return today. Pt started taking daily Tamoxifen. States she picked her her PRN compazine as well. She did not have any questions regarding treatment. Medication education completed. Nurse navigator and dietician also saw pt today. Tolerated initial Kanjinti given in 90 mins.    Future appts scheduled:  Labs & AKemar PATELP in 1 week 10/16. 10/29 labs & C2 Kanjinti

## 2024-10-14 ENCOUNTER — LAB VISIT (OUTPATIENT)
Dept: LAB | Facility: HOSPITAL | Age: 34
End: 2024-10-14
Attending: RADIOLOGY
Payer: MEDICAID

## 2024-10-14 DIAGNOSIS — C79.51 SECONDARY MALIGNANCY OF THORACIC VERTEBRAL COLUMN: ICD-10-CM

## 2024-10-14 LAB
ALBUMIN SERPL-MCNC: 3.8 G/DL (ref 3.5–5)
ALBUMIN/GLOB SERPL: 0.9 RATIO (ref 1.1–2)
ALP SERPL-CCNC: 79 UNIT/L (ref 40–150)
ALT SERPL-CCNC: 41 UNIT/L (ref 0–55)
ANION GAP SERPL CALC-SCNC: 10 MEQ/L
AST SERPL-CCNC: 29 UNIT/L (ref 5–34)
BASOPHILS # BLD AUTO: 0.04 X10(3)/MCL
BASOPHILS NFR BLD AUTO: 0.5 %
BILIRUB SERPL-MCNC: 0.3 MG/DL
BUN SERPL-MCNC: 18.7 MG/DL (ref 7–18.7)
CALCIUM SERPL-MCNC: 10.2 MG/DL (ref 8.4–10.2)
CHLORIDE SERPL-SCNC: 102 MMOL/L (ref 98–107)
CO2 SERPL-SCNC: 28 MMOL/L (ref 22–29)
CREAT SERPL-MCNC: 0.8 MG/DL (ref 0.55–1.02)
CREAT/UREA NIT SERPL: 23
EOSINOPHIL # BLD AUTO: 1.5 X10(3)/MCL (ref 0–0.9)
EOSINOPHIL NFR BLD AUTO: 18.8 %
ERYTHROCYTE [DISTWIDTH] IN BLOOD BY AUTOMATED COUNT: 12.4 % (ref 11.5–17)
GFR SERPLBLD CREATININE-BSD FMLA CKD-EPI: >60 ML/MIN/1.73/M2
GLOBULIN SER-MCNC: 4.2 GM/DL (ref 2.4–3.5)
GLUCOSE SERPL-MCNC: 141 MG/DL (ref 74–100)
HCT VFR BLD AUTO: 39.1 % (ref 37–47)
HGB BLD-MCNC: 12.5 G/DL (ref 12–16)
IMM GRANULOCYTES # BLD AUTO: 0.02 X10(3)/MCL (ref 0–0.04)
IMM GRANULOCYTES NFR BLD AUTO: 0.3 %
INR PPP: 1
LYMPHOCYTES # BLD AUTO: 2.6 X10(3)/MCL (ref 0.6–4.6)
LYMPHOCYTES NFR BLD AUTO: 32.6 %
MCH RBC QN AUTO: 31.3 PG (ref 27–31)
MCHC RBC AUTO-ENTMCNC: 32 G/DL (ref 33–36)
MCV RBC AUTO: 97.8 FL (ref 80–94)
MONOCYTES # BLD AUTO: 0.68 X10(3)/MCL (ref 0.1–1.3)
MONOCYTES NFR BLD AUTO: 8.5 %
NEUTROPHILS # BLD AUTO: 3.14 X10(3)/MCL (ref 2.1–9.2)
NEUTROPHILS NFR BLD AUTO: 39.3 %
NRBC BLD AUTO-RTO: 0 %
PLATELET # BLD AUTO: 288 X10(3)/MCL (ref 130–400)
PMV BLD AUTO: 9.9 FL (ref 7.4–10.4)
POTASSIUM SERPL-SCNC: 3.7 MMOL/L (ref 3.5–5.1)
PROT SERPL-MCNC: 8 GM/DL (ref 6.4–8.3)
PROTHROMBIN TIME: 13 SECONDS (ref 11.4–14)
RBC # BLD AUTO: 4 X10(6)/MCL (ref 4.2–5.4)
SODIUM SERPL-SCNC: 140 MMOL/L (ref 136–145)
WBC # BLD AUTO: 7.98 X10(3)/MCL (ref 4.5–11.5)

## 2024-10-14 PROCEDURE — 85610 PROTHROMBIN TIME: CPT

## 2024-10-14 PROCEDURE — 85025 COMPLETE CBC W/AUTO DIFF WBC: CPT

## 2024-10-14 PROCEDURE — 36415 COLL VENOUS BLD VENIPUNCTURE: CPT

## 2024-10-14 PROCEDURE — 80053 COMPREHEN METABOLIC PANEL: CPT

## 2024-10-15 ENCOUNTER — TELEPHONE (OUTPATIENT)
Dept: HEMATOLOGY/ONCOLOGY | Facility: CLINIC | Age: 34
End: 2024-10-15
Payer: MEDICAID

## 2024-10-15 DIAGNOSIS — C50.912 INFLAMMATORY CARCINOMA OF LEFT BREAST: Primary | ICD-10-CM

## 2024-10-16 ENCOUNTER — OFFICE VISIT (OUTPATIENT)
Dept: HEMATOLOGY/ONCOLOGY | Facility: CLINIC | Age: 34
End: 2024-10-16
Payer: MEDICAID

## 2024-10-16 ENCOUNTER — APPOINTMENT (OUTPATIENT)
Dept: HEMATOLOGY/ONCOLOGY | Facility: CLINIC | Age: 34
End: 2024-10-16
Payer: MEDICAID

## 2024-10-16 VITALS
DIASTOLIC BLOOD PRESSURE: 90 MMHG | SYSTOLIC BLOOD PRESSURE: 131 MMHG | HEART RATE: 85 BPM | OXYGEN SATURATION: 96 % | WEIGHT: 236.63 LBS | BODY MASS INDEX: 39.42 KG/M2 | TEMPERATURE: 99 F | HEIGHT: 65 IN

## 2024-10-16 DIAGNOSIS — Z15.01 MONOALLELIC MUTATION OF BRCA1 GENE: ICD-10-CM

## 2024-10-16 DIAGNOSIS — C79.51 SECONDARY MALIGNANCY OF SACRUM: ICD-10-CM

## 2024-10-16 DIAGNOSIS — K92.1 HEMATOCHEZIA: ICD-10-CM

## 2024-10-16 DIAGNOSIS — N95.9 PREMENOPAUSAL PATIENT: ICD-10-CM

## 2024-10-16 DIAGNOSIS — C50.912 INFLAMMATORY CARCINOMA OF LEFT BREAST: Primary | ICD-10-CM

## 2024-10-16 DIAGNOSIS — C79.51 SECONDARY MALIGNANCY OF LUMBAR VERTEBRAL COLUMN: ICD-10-CM

## 2024-10-16 DIAGNOSIS — Z15.09 MONOALLELIC MUTATION OF BRCA1 GENE: ICD-10-CM

## 2024-10-16 DIAGNOSIS — C77.3 MALIGNANT NEOPLASM METASTATIC TO LYMPH NODE OF AXILLA: ICD-10-CM

## 2024-10-16 DIAGNOSIS — Z80.3 FAMILY HISTORY OF BREAST CANCER: ICD-10-CM

## 2024-10-16 DIAGNOSIS — C50.912 INFLAMMATORY CARCINOMA OF LEFT BREAST: ICD-10-CM

## 2024-10-16 LAB
ALBUMIN SERPL-MCNC: 3.9 G/DL (ref 3.5–5)
ALBUMIN/GLOB SERPL: 0.9 RATIO (ref 1.1–2)
ALP SERPL-CCNC: 87 UNIT/L (ref 40–150)
ALT SERPL-CCNC: 42 UNIT/L (ref 0–55)
ANION GAP SERPL CALC-SCNC: 9 MEQ/L
AST SERPL-CCNC: 23 UNIT/L (ref 5–34)
BASOPHILS # BLD AUTO: 0.05 X10(3)/MCL
BASOPHILS NFR BLD AUTO: 0.6 %
BILIRUB SERPL-MCNC: 0.7 MG/DL
BUN SERPL-MCNC: 21.3 MG/DL (ref 7–18.7)
CALCIUM SERPL-MCNC: 10.5 MG/DL (ref 8.4–10.2)
CHLORIDE SERPL-SCNC: 103 MMOL/L (ref 98–107)
CO2 SERPL-SCNC: 28 MMOL/L (ref 22–29)
CREAT SERPL-MCNC: 0.87 MG/DL (ref 0.55–1.02)
CREAT/UREA NIT SERPL: 24
EOSINOPHIL # BLD AUTO: 1.03 X10(3)/MCL (ref 0–0.9)
EOSINOPHIL NFR BLD AUTO: 11.5 %
ERYTHROCYTE [DISTWIDTH] IN BLOOD BY AUTOMATED COUNT: 12.4 % (ref 11.5–17)
GFR SERPLBLD CREATININE-BSD FMLA CKD-EPI: >60 ML/MIN/1.73/M2
GLOBULIN SER-MCNC: 4.3 GM/DL (ref 2.4–3.5)
GLUCOSE SERPL-MCNC: 122 MG/DL (ref 74–100)
HCT VFR BLD AUTO: 38.3 % (ref 37–47)
HGB BLD-MCNC: 12.7 G/DL (ref 12–16)
IMM GRANULOCYTES # BLD AUTO: 0.02 X10(3)/MCL (ref 0–0.04)
IMM GRANULOCYTES NFR BLD AUTO: 0.2 %
LYMPHOCYTES # BLD AUTO: 2.81 X10(3)/MCL (ref 0.6–4.6)
LYMPHOCYTES NFR BLD AUTO: 31.3 %
MAGNESIUM SERPL-MCNC: 2.2 MG/DL (ref 1.6–2.6)
MCH RBC QN AUTO: 31.8 PG (ref 27–31)
MCHC RBC AUTO-ENTMCNC: 33.2 G/DL (ref 33–36)
MCV RBC AUTO: 96 FL (ref 80–94)
MONOCYTES # BLD AUTO: 0.8 X10(3)/MCL (ref 0.1–1.3)
MONOCYTES NFR BLD AUTO: 8.9 %
NEUTROPHILS # BLD AUTO: 4.27 X10(3)/MCL (ref 2.1–9.2)
NEUTROPHILS NFR BLD AUTO: 47.5 %
NRBC BLD AUTO-RTO: 0 %
PLATELET # BLD AUTO: 303 X10(3)/MCL (ref 130–400)
PMV BLD AUTO: 9.5 FL (ref 7.4–10.4)
POTASSIUM SERPL-SCNC: 3.8 MMOL/L (ref 3.5–5.1)
PROT SERPL-MCNC: 8.2 GM/DL (ref 6.4–8.3)
RBC # BLD AUTO: 3.99 X10(6)/MCL (ref 4.2–5.4)
SODIUM SERPL-SCNC: 140 MMOL/L (ref 136–145)
WBC # BLD AUTO: 8.98 X10(3)/MCL (ref 4.5–11.5)

## 2024-10-16 PROCEDURE — 4010F ACE/ARB THERAPY RXD/TAKEN: CPT | Mod: CPTII,,, | Performed by: NURSE PRACTITIONER

## 2024-10-16 PROCEDURE — 99213 OFFICE O/P EST LOW 20 MIN: CPT | Mod: PBBFAC | Performed by: NURSE PRACTITIONER

## 2024-10-16 PROCEDURE — 3008F BODY MASS INDEX DOCD: CPT | Mod: CPTII,,, | Performed by: NURSE PRACTITIONER

## 2024-10-16 PROCEDURE — 85025 COMPLETE CBC W/AUTO DIFF WBC: CPT

## 2024-10-16 PROCEDURE — 80053 COMPREHEN METABOLIC PANEL: CPT

## 2024-10-16 PROCEDURE — 3075F SYST BP GE 130 - 139MM HG: CPT | Mod: CPTII,,, | Performed by: NURSE PRACTITIONER

## 2024-10-16 PROCEDURE — 3080F DIAST BP >= 90 MM HG: CPT | Mod: CPTII,,, | Performed by: NURSE PRACTITIONER

## 2024-10-16 PROCEDURE — 99215 OFFICE O/P EST HI 40 MIN: CPT | Mod: S$PBB,,, | Performed by: NURSE PRACTITIONER

## 2024-10-16 PROCEDURE — 36415 COLL VENOUS BLD VENIPUNCTURE: CPT

## 2024-10-16 PROCEDURE — 83735 ASSAY OF MAGNESIUM: CPT

## 2024-10-16 RX ORDER — HYDROCODONE BITARTRATE AND ACETAMINOPHEN 10; 325 MG/1; MG/1
1 TABLET ORAL EVERY 6 HOURS PRN
Qty: 90 TABLET | Refills: 0 | Status: SHIPPED | OUTPATIENT
Start: 2024-10-16

## 2024-10-16 NOTE — Clinical Note
infusion with lab work on 10/29/24 prior to cycle 2 Herceptin mehdi w/ first week of November with biopsy results

## 2024-10-16 NOTE — PROGRESS NOTES
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   -FDG PET-CT for staging 09/30/2024: Sites of disease:  Multifocal, left breast; left axillary and subpectoral lymphadenopathy; small focus upper right breast; bilobar liver metastases; portal caval lymph node; multifocal bone metastases  -Monoallelic mutation of BRCA1 gene   -premenopausal   -family history of breast cancer, ovarian cancer, cervical cancer, stomach cancer, colon cancer    Current Treatment:  -tamoxifen 20 mg daily  -trastuzumab 8 mg/kg IV day 1 cycle 1; followed by 6 mg/kg IV day 1 beginning with cycle 2  start 10/8/24    Treatment History:  -MediPort placed 08/14/2024   -Mirena removed on 9/18/24    Past medical history: Hypertension  Surgical/procedure history:  MediPort placement 08/14/2024    Social history: .  Lives in [place].  Has a 3-year-old son.  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  Denies history of tobacco, alcohol, or illicit drug abuse.  Family history:   -Maternal great aunt # 1: Breast cancer, in her 50s   -sister: Ovarian cancer, age 50  -sister: Cervical cancer, age 25  -mother: Ovarian cancer, age 50  -Maternal aunt: Cervical cancer, age 50  -maternal grandfather: Stomach cancer, age 70 (smoker)  -maternal great aunt # 2: Colon cancer, age 60  Health maintenance: PCP at [facility].  No screening colonoscopy ever.  Menstrual/Ob gyn history: Menarche, age 11; 1 pregnancy, 1 child; gave birth to her child at age 30; no abortions or miscarriages premenopausal; OCP at age 16, received Depo shots from age  [age]; no contraception from age [age] when she became pregnant; Mirena IUD after pregnancy at age 30; experienced hot flashes.  No menstrual cycles after Mirena was placed.  History of Present Illness:   Oncologic/Hematologic History:  Oncology History   Malignant neoplasm of overlapping sites of left breast in female, estrogen receptor positive   8/8/2024 Initial Diagnosis    Malignant neoplasm of overlapping sites of left breast in female, estrogen receptor positive     8/8/2024 Cancer Staged    Staging form: Breast, AJCC 8th Edition  - Clinical stage from 8/8/2024: Stage IIIB (cT4d, cN1(f), cM0, G3, ER+, IL-, HER2+)     Malignant neoplasm of overlapping sites of right breast in female, estrogen receptor positive   8/8/2024 Initial Diagnosis    Malignant neoplasm of overlapping sites of right breast in female, estrogen receptor positive     8/8/2024 Cancer Staged    Staging form: Breast, AJCC 8th Edition  - Clinical stage from 8/8/2024: Stage IIA (cT2, cN0(f), cM0, G2, ER+, IL-, HER2-)     Invasive ductal carcinoma of breast, right   7/17/2024 Cancer Staged    Staging form: Breast, AJCC 8th Edition  - Clinical stage from 7/17/2024: Stage IA (cT1c, cN0, cM0, G2, ER+, IL+, HER2-)     8/29/2024 Initial Diagnosis    Invasive ductal carcinoma of breast, right     Invasive ductal carcinoma of left breast   8/29/2024 Initial Diagnosis    Invasive ductal carcinoma of left breast     9/16/2024 Cancer Staged    Staging form: Breast, AJCC 8th Edition  - Clinical stage from 9/16/2024: Stage IV (cT4d, cN2, pM1, G3, ER+, IL-, HER2+)     10/8/2024 -  Chemotherapy    Treatment Summary   Plan Name: OP BREAST TRASTUZUMAB Q3W  Treatment Goal: Palliative  Status: Active  Start Date: 10/8/2024  End Date: 9/5/2025 (Planned)  Provider: Kevon Subramanian MD  Chemotherapy: trastuzumab-anns (KANJINTI) 871 mg in 0.9% NaCl 326 mL chemo infusion, 8 mg/kg = 871 mg, Intravenous, Clinic/HOD 1 time, 1 of 17 cycles  Administration: 871 mg  (10/8/2024)     34-year-old lady, referred from [facility] surgery, with invasive ductal carcinoma of breast.      08/09/2024:  [facility] surgery Office note:   CC: b/l breast IDC     HPI: Jeannie Greene is a 34 y.o. female with PMHx of HTN, R breast IDC Grade 2, and L breast/axillary ICD Grade 3 presents to clinic today with L breast and arm pain.   She first noticed the pain and swelling in L breast in May where she presented to the ED and received antibiotics. Pain did not resolve so patient followed up with her OB/Gyn who ordered the imaging and biopsy and was subsequently referred to our clinic. Today, patient reports swelling and pain in left breast, axilla, and down her arm. No pain in right breast, but has had some milky discharge in the past from R nipple. She states her pain as a 6/10 that was at its worse in July and has improved some since. She takes ibuprofen 4x daily without relief.    Patient had first menarche at 11. One pregnancy with child and not gone through menopause. She was on OCP at 15yo, received Depo shot from [age]. No contraception from [age] when she got pregnant. Got Murina IUD after pregnancy at 30  that is still in place.  Pt has an extensive family history of breast and cervical cancer. Her mother and sister had cervical cancer. Her mother had a hysterectomy. She also believes her maternal aunt had breast cancer. She only takes losartan for HTN. No prior surgeries.  Physical exam:   # right breast: 3 cm mass 12 o'clock, 4 in above nipple, no skin changes, no nipple retraction or discharge, no palpable right axillary lymphadenopathy  # left breast: Swollen and hard; large mass appreciated two o'clock position right above breast, 1 hard immobile lymph node in left axilla, breast and axilla tender to palpation (inflammatory carcinoma left breast)    Interval History 10/16/24: Patient presents to clinic alone for scheduled follow up and toxicity check. Patient received started Palliative  Herceptin and Tamoxifen on 10/8/24. Patient reports tolerating first treatment well. Patient does admit to hot flashes and occassional diarrhea. She denies any blood in stool since last reported. Patient reports fair appetite and energy level. Patient also admits to lower back pain and says she does not believe Norco 5/325mg is effective in managing bone pain. Patient reports wearing rx grade glasses and says she will follow up with opthalmalogy soon. Patient also reports regular follow up with GYN for pap smear and annual pelvic exams. Last visit was 9/18 of which Mirena was removed. Patient says she is sexually active and taking precaution however understands that she need to discuss nonhormonal contraception methods with patient. Reinerated the risk of pregnancy with patient. Patient denies any chest pain, new or worsening of breast mass, lymphadenopathy, palpitations, dypsnea, focal neurological changes, confusion, dizziness, abdominal pain, N/V, unusual headache, blood in urine/stool. Lab work reviewed with patient, stable. Dental clearance pending still patient having difficulty finding a dentist. Understands we cannot start treatment for bone mets without dental clearance. Genetic counseling completed patient aware of BRACA 1 positive results. We discussed the plan of care and follow up.    Review of Systems   Respiratory:  Negative for cough and shortness of breath.    Gastrointestinal:  Positive for diarrhea (occassional).   Musculoskeletal:  Positive for back pain.   Endo/Heme/Allergies:         Hot flashes   Psychiatric/Behavioral:  The patient is not nervous/anxious.    All other systems reviewed and are negative.      Physical Exam  Constitutional:       Appearance: Normal appearance.   HENT:      Head: Normocephalic.      Mouth/Throat:      Mouth: Mucous membranes are moist.   Eyes:      Pupils: Pupils are equal, round, and reactive to light.   Cardiovascular:      Rate and Rhythm: Normal rate and  regular rhythm.      Pulses: Normal pulses.      Heart sounds: Normal heart sounds.   Pulmonary:      Effort: Pulmonary effort is normal.      Breath sounds: Normal breath sounds.   Abdominal:      General: Bowel sounds are normal.      Palpations: Abdomen is soft.   Musculoskeletal:         General: Normal range of motion.      Cervical back: Normal range of motion.   Skin:     General: Skin is warm and dry.      Capillary Refill: Capillary refill takes less than 2 seconds.   Neurological:      General: No focal deficit present.      Mental Status: She is alert and oriented to person, place, and time.   Psychiatric:         Attention and Perception: Attention normal.         Mood and Affect: Affect normal.         Speech: Speech normal.         Behavior: Behavior normal.         Thought Content: Thought content normal.     08/29/2024:  Breast examination was performed with a verbal consent, in the presence of Zulma Woo LPN:  # right breast:  About 4 cm nontender mobile tumor palpable at 11:00 a.m. position in the right breast, several cm from nipple, without overlying skin ulceration/satellite nodules/fixation; no nipple discharge; no palpable regional lymphadenopathy   # left breast: Entire left breast is involved with the hard, nontender, freely mobile tumor; orange peel appearance of skin is noted with faint erythema; hard, immobile lymph node is palpable in the left axilla.  No swelling of left upper extremity.    VITAL SIGNS:   Vitals:    10/16/24 1051   BP: (!) 131/90   Pulse: 85   Temp: 98.9 °F (37.2 °C)     Lab Results   Component Value Date    WBC 8.98 10/16/2024    RBC 3.99 (L) 10/16/2024    HGB 12.7 10/16/2024    HCT 38.3 10/16/2024    MCV 96.0 (H) 10/16/2024    MCH 31.8 (H) 10/16/2024    MCHC 33.2 10/16/2024    RDW 12.4 10/16/2024     10/16/2024    MPV 9.5 10/16/2024     CMP  Sodium   Date Value Ref Range Status   10/16/2024 140 136 - 145 mmol/L Final     Potassium   Date Value Ref Range  Status   10/16/2024 3.8 3.5 - 5.1 mmol/L Final     Chloride   Date Value Ref Range Status   10/16/2024 103 98 - 107 mmol/L Final     CO2   Date Value Ref Range Status   10/16/2024 28 22 - 29 mmol/L Final     Blood Urea Nitrogen   Date Value Ref Range Status   10/16/2024 21.3 (H) 7.0 - 18.7 mg/dL Final     Creatinine   Date Value Ref Range Status   10/16/2024 0.87 0.55 - 1.02 mg/dL Final   06/16/2020 0.65 0.57 - 1.25 mg/dL Final     Calcium   Date Value Ref Range Status   10/16/2024 10.5 (H) 8.4 - 10.2 mg/dL Final     Albumin   Date Value Ref Range Status   10/16/2024 3.9 3.5 - 5.0 g/dL Final     Bilirubin Total   Date Value Ref Range Status   10/16/2024 0.7 <=1.5 mg/dL Final     ALP   Date Value Ref Range Status   10/16/2024 87 40 - 150 unit/L Final     AST   Date Value Ref Range Status   10/16/2024 23 5 - 34 unit/L Final     ALT   Date Value Ref Range Status   10/16/2024 42 0 - 55 unit/L Final     eGFR   Date Value Ref Range Status   10/16/2024 >60 mL/min/1.73/m2 Final     Assessment:  IDC both breasts; left breast inflammatory breast carcinoma:   -presentation:  Pain and swelling left breast 05/2024  -Mirena IUD in place  -extensive family history of breast cancer and cervical cancer  -physical exam: Right breast: 3 cm mass 12 o'clock position, 4 in above nipple  -physical exam: Left breast: Swollen, hard, large mass to o'clock position, 1 hard immobile lymph node in left axilla, breast and axilla tender to palpation (inflammatory carcinoma of left breast)  -mammogram and ultrasound 07/01/2024  # right breast:  16 x 11 x 13 mm mass, BI-RADS 4; right axillary lymph node, mildly prominent, BI-RADS 4  # left breast: Inflammatory breast carcinoma; metastasis to left axillary lymph nodes; multiple irregular left breast masses, BI-RADS 5;  -core biopsy left breast 07/16/2024:  IDC, grade 3; lymph node biopsy positive as well; ER 59%; IL 0%; HER2 positive (3+); Ki-67 unfavorable (66.4%); HER2 by FISH analysis, group 1  (HER2 gene amplification)  -right breast core biopsy 07/17/2024:  IDC, grade 2; lymph node biopsy negative; ER 97.8%; MI 9.6%; HER2 negative (score 0); Ki-67 unfavorable (69.7%); HER2 by FISH negative/nonamplified  -MediPort placed 08/14/2024   -genetic testing 08/22/2024  To summarize:  -IDC right breast:  IDC, grade 2, lymph node biopsy negative, ER positive, MI positive, HER2 negative;   cT1c cN0 MX, AJCC anatomic stage at least IA, clinical prognostic stage IA  -07/17/2024: S/P needle biopsy right breast and right axilla  -inflammatory carcinoma left breast, grade 3, lymph node biopsy positive, ER positive, MI negative, HER2 positive; cT4d cN2 M1, stage IV  (By virtue of palpable, fixed lymph node in the left axilla, cN2)  -07/16/2024:  S/P needle biopsy left breast and left axilla  -genetic testing 08/22/2024:  BRCA1 mutation positive, heterozygous  -premenopausal per labs 08/29/2024  -baseline TTE 09/05/2024:  LVEF 60-65%   -DEXA scan 09/16/2024:  Normal BMD of lumbar vertebrae end of femoral necks  -baseline staging CTs C/A/P 0 09/16/2024:  Multiple thoracolumbar vertebrae and sacral lytic metastases   -baseline staging whole-body nuclear medicine bone scan 09/16/2024:  Thoracolumbar vertebrae and sacrum metastases  -09/16/2024: Scans reviewed; patient has metastatic disease; FDG PET-CT ordered; brain MRI scan with and without contrast ordered  -09/16/2024: Patient referred to IR for biopsy of suspicious bone lesions  -liquid biopsy 09/19/2024:  BRCA1 positive; HER2 positive;TMB could not be calculated due to insufficient circulating tumor DNA; MSI-high not detected  -09/19/2024: Baseline tumor markers: CEA 19.74, elevated; CA 27.29, CA 15-3 levels normal  -brain MRI for staging 09/23/2024: No intracranial metastases  -FDG PET-CT for staging 09/30/2024: Sites of disease:  Multifocal, left breast; left axillary and subpectoral lymphadenopathy; small focus upper right breast; bilobar liver metastases; portal  caval lymph node; multifocal bone metastases      Sites of disease:   -IDC right breast, ER positive, FL positive, HER2 negative, cT1c cN0 MX  -inflammatory carcinoma left breast, ER positive, FL negative, HER2 positive, cT4d cN2 M1, stage IV  -FDG PET-CT for staging 09/30/2024: Sites of disease:  Multifocal, left breast; left axillary and subpectoral lymphadenopathy; small focus upper right breast; bilobar liver metastases; portal caval lymph node; multifocal bone metastases      Molecular markers:  -liquid biopsy:  BRCA1 positive; HER2 positive;TMB could not be calculated due to insufficient circulating tumor DNA; MSI-high not detected    Plan:  IDC both breasts; left breast inflammatory breast carcinoma:  and  IDC right breast:  IDC, grade 2, lymph node biopsy negative, ER positive, FL positive, HER2 negative;   -BRCA1 mutation positive, heterozygous  -routine surgical resection of primary breast tumor is not indicated in the management of de Joaquina stage IV disease; although there is no survival benefit, it may be considered for local control of the primary tumor (deferred to surgery)  -patient can be treated with up to 3 lines of endocrine therapy + HER2 targeted therapy  -BRCA1 mutation positive; therefore, a candidate for palliative systemic therapy with PARPi (olaparib, talazoparib), as well (lower level evidence for HER2 positive tumors, therefore, category 2A in this setting)  -unfortunately, scans show thoracolumbar vertebral and sacral lytic metastases; therefore, stage IV disease  -because of metastatic disease, now, intent of treatment is palliation    -will treat with tamoxifen + trastuzumab    -baseline CEA level was elevated; baseline CA 15-3 and CA 27.29 levels were normal; follow CEA level every month to assess response  -On left breast biopsy, please order PD-L1 and NGS testing  -Dental evaluation and preventive Dentistry   -on biopsy of bone lesions, we will order comprehensive somatic profiling  testing (NGS testing) to identify targets for palliative therapies      -Follow up with  in 2 weeks (8/29/24) with biopsy results and lab work (cbc,cmp,mg, cea) prior to Herceptin  -Continue Tamoxifen 20mg po daily  -Echocardiogram every 3 months scheduled 12/19/24   -3 months after starting treatment (1/2025), re-stage with contrast-enhanced CT scans of C/A/P and whole-body nuclear medicine bone scan  -Check CEA level every month to assess response  -IR biopsy for suspicious bone lesions scheduled 10/17/24  -dental evaluation and preventive Dentistry before patient can be started on zoledronic acid or denosumab to prevent skeletal events from bone metastases-pending    Left Breast Pain  -08/29/2024: Left breast pain; started on Norco 5 mg every 6 hours prn  10/16/24: Patient reports [place] 5/325 infective in managing pain  Increase Norco 10/325mg po every 6 hours prn for pain    If, at anytime, develops visceral crisis or (endocrine refractory, then, treatment options:  # first-line:    Pertuzumab +trastuzumab +docetaxel (category 1, Preferred);   pertuzumab +trastuzumab +paclitaxel (Preferred)  (maintenance trastuzumab/pertuzumab after response, with concurrent endocrine therapy)  # second-line:  -Fam trastuzumab deruxtecan (category 1, Preferred)  -tucatinib +trastuzumab +capecitabine is preferred in patients with both systemic and CNS progression in the 3rd line setting and beyond; it may also be given in the second-line setting, etc.    Palliative systemic therapy options:  -systemic therapy +HER2 targeted therapy; or  -endocrine therapy +/-HER2 targeted therapy +ovarian suppression in premenopausal patient  -for premenopausal patients, tamoxifen alone (without ovarian ablation/suppression) + HER2 targeted therapy is also an option  >>>  -patient has bone only metastases on CTs and bone scan; no visceral metastases  -will treat with tamoxifen + trastuzumab    Regimen:  -tamoxifen 20 mg  daily  -trastuzumab 8 mg/kg IV day 1 cycle 1; followed by 6 mg/kg IV day 1 beginning with cycle 2    Herceptin:  -watch for cardiomyopathy, infusion reactions and pulmonary toxicity  -adverse reactions:> 10%:  Decreased LVEF, skin rash, abdominal pain, anorexia, infusion related reactions, asthenia, chills, back pain, dyspnea, etc.  -warnings/precautions:  Cardiomyopathy; infusion reactions; pulmonary toxicity; renal toxicity  -monitoring: Assess left ventricular ejection fraction (by ECG or MUGA scan) at baseline (immediately prior to trastuzumab initiation), every 3 months during trastuzumab therapy, every 4 weeks if trastuzumab is withheld for significant left ventricular cardiac dysfunction, and every 6 months for at least 2 years following completion of adjuvant trastuzumab therapy. Monitor vital signs during infusion; monitor for hypersensitivity or infusion reaction; if a reaction occurs, monitor carefully until symptoms resolve completely. Monitor for signs/symptoms of cardiac dysfunction or pulmonary toxicity. If pregnancy inadvertently occurs during treatment, monitor amniotic fluid volume.    Fertility and birth control issues:  Discussion about fertility and contraception:  -Informed her about the potential impact of chemotherapy on fertility  -Made her aware of the option of referral to fertility specialist before chemotherapy and/or endocrine therapy to discuss options in case she desires future pregnancies.  Fertility preservation options include oocyte and embryo cryopreservation, etc.  -Amenorrhea frequently occurs during or after chemotherapy.  The majority of women younger than 35 years to resume menses within 2 years of finishing adjuvant chemotherapy  -Informed that menses and fertility are not necessarily linked. Absence of regular menses does not necessarily imply lack of fertility.  Conversely, the presence of menses does not guarantee fertility.  -There are limited data regarding continued  fertility after chemotherapy.  -Cautioned her to avoid becoming pregnant during treatment with radiation therapy, chemotherapy, or endocrine therapy  -Hormone-based birth control is discouraged regardless of the harmless of the hormone receptor status of the patient's cancer  -Alternative methods of birth control, including IUD, barrier method, tubal ligation, vasectomy for the partner, etc.   -fertility specialist consultation if she so desires  -cautioned her not to become pregnant during treatment  -referred to gyn for consultation regarding nonhormonal methods of contraception      Hematochezia:   Since yesterday, 08/28/2024, has a experienced painless diarrhea with blood on toilet wipes as well as in toilet bowl; no weakness or dizziness; 5 loose stools yesterday, 2 loose stools today; no nausea, vomiting, hematemesis, or melena.  Never experienced GI bleeding before.  -08/29/2024:Urgent referral to GI for evaluation which she has been experiencing since 08/28/2024  10/16/24: Has not experienced any further Hematachezia  Continue to monitor  Will proceed with referral to GI-Scheduled 6/9/25    Family history of breast cancer, ovarian cancer, cervical cancer, stomach cancer, colon cancer:  -Maternal great aunt # 1: Breast cancer, in her 50s   -sister: Ovarian cancer, age 50  -sister: Cervical cancer, age 25  -mother: Ovarian cancer, age 50  -Maternal aunt: Cervical cancer, age 50  -maternal grandfather: Stomach cancer, age 70 (smoker)  -maternal great aunt # 2: Colon cancer, age 60  -genetic testing 08/22/2024:  BRCA1 mutation positive, heterozygous    Above discussed with the patient.  All questions answered.    Guarded prognosis discussed.  Discussed the following: That she has stage IV, incurable disease; palliation with systemic therapy can be attempted but response can not be guaranteed; prognosis guarded.  Potential side effects of tamoxifen and Herceptin discussed;: She understands and agrees with this  plan.

## 2024-10-17 ENCOUNTER — HOSPITAL ENCOUNTER (OUTPATIENT)
Dept: INTERVENTIONAL RADIOLOGY/VASCULAR | Facility: HOSPITAL | Age: 34
Discharge: HOME | End: 2024-10-17
Attending: INTERNAL MEDICINE
Payer: MEDICAID

## 2024-10-17 VITALS
HEART RATE: 83 BPM | DIASTOLIC BLOOD PRESSURE: 68 MMHG | WEIGHT: 232 LBS | RESPIRATION RATE: 20 BRPM | BODY MASS INDEX: 38.61 KG/M2 | SYSTOLIC BLOOD PRESSURE: 116 MMHG | TEMPERATURE: 97 F | OXYGEN SATURATION: 98 %

## 2024-10-17 DIAGNOSIS — C50.911 INVASIVE DUCTAL CARCINOMA OF BREAST, RIGHT: ICD-10-CM

## 2024-10-17 DIAGNOSIS — C50.912 MALIGNANT NEOPLASM OF LEFT BREAST: ICD-10-CM

## 2024-10-17 DIAGNOSIS — C77.3 MALIGNANT NEOPLASM METASTATIC TO LYMPH NODE OF AXILLA: ICD-10-CM

## 2024-10-17 DIAGNOSIS — C77.3 SECONDARY MALIGNANT NEOPLASM OF AXILLARY LYMPH NODES: ICD-10-CM

## 2024-10-17 LAB — B-HCG SERPL QL: NEGATIVE

## 2024-10-17 PROCEDURE — 20220 BONE BIOPSY TROCAR/NDL SUPFC: CPT

## 2024-10-17 PROCEDURE — 84703 CHORIONIC GONADOTROPIN ASSAY: CPT | Performed by: INTERNAL MEDICINE

## 2024-10-17 PROCEDURE — 63600175 PHARM REV CODE 636 W HCPCS: Performed by: RADIOLOGY

## 2024-10-17 RX ORDER — FENTANYL CITRATE 50 UG/ML
INJECTION, SOLUTION INTRAMUSCULAR; INTRAVENOUS
Status: COMPLETED | OUTPATIENT
Start: 2024-10-17 | End: 2024-10-17

## 2024-10-17 RX ORDER — SODIUM CHLORIDE 0.9 % (FLUSH) 0.9 %
10 SYRINGE (ML) INJECTION
Status: DISCONTINUED | OUTPATIENT
Start: 2024-10-17 | End: 2024-10-18 | Stop reason: HOSPADM

## 2024-10-17 RX ORDER — MIDAZOLAM HYDROCHLORIDE 2 MG/2ML
INJECTION, SOLUTION INTRAMUSCULAR; INTRAVENOUS
Status: COMPLETED | OUTPATIENT
Start: 2024-10-17 | End: 2024-10-17

## 2024-10-17 RX ADMIN — MIDAZOLAM HYDROCHLORIDE 1 MG: 1 INJECTION, SOLUTION INTRAMUSCULAR; INTRAVENOUS at 11:10

## 2024-10-17 RX ADMIN — FENTANYL CITRATE 25 MCG: 50 INJECTION INTRAMUSCULAR; INTRAVENOUS at 11:10

## 2024-10-17 NOTE — DISCHARGE SUMMARY
Radiology Post-Procedure Note    Pre Op Diagnosis: Breast Cancer with Diffuse Bone lesions    Post Op Diagnosis: Same    Secondary Diagnoses:   Problem List Items Addressed This Visit       Metastasis to lymph nodes - Left axilla    Relevant Orders    IR Biopsy Bone Superficial w/CT Guidance (XPD)    Invasive ductal carcinoma of breast, right    Relevant Orders    IR Biopsy Bone Superficial w/CT Guidance (XPD)    Secondary malignant neoplasm of axillary lymph nodes    Relevant Orders    IR Biopsy Bone Superficial w/CT Guidance (XPD)     Other Visit Diagnoses       Malignant neoplasm of left breast                 Procedure: CT Guided Lytic Left Iliac Lesion Biopsy    Procedure performed by: Jose Juan Fitch MD    Assistant: None    Written Informed Consent Obtained: Yes    Specimen Removed: Core and Aspirate    Estimated Blood Loss: <10 cc    Condition: Stable    Outcome: The patient tolerated the procedure well and was without complications.    For further details please see the imaging report associated.    Disposition: Home or Self Care    Follow Up: With primary care provider    Discharge Instructions:  No discharge procedures on file.       Time Spent On Discharge: 2 minutes

## 2024-10-17 NOTE — INTERVAL H&P NOTE
The patient has been examined and the H&P has been reviewed:    I concur with the findings and no changes have occurred since H&P was written. 33 yo M with BRCA Breast cancer and multiple bone lesion PET avid presents for bone biopsy.  Will Target Left Posterior Iliac Lesion.        There are no hospital problems to display for this patient.

## 2024-10-17 NOTE — DISCHARGE INSTRUCTIONS
`Take it easy for the next 24 hours since you have had  sedation.  Resume home medications unless otherwise instructed by your doctor.     ` Be sure to stay hydrated.      `No bending, straining or or heavy lifting over 10-15 pounds for 1 week.      `You may remove your bandaid in 24 hours and shower then.      `Notify MD if you are running fever over 100.4, see any reddness or foul smelling discharge from biopsy area.      `Go to your nearest ER  if you  notice swelling, drainage of pus, temp above 100.4 or bleeding in your back Thank you for choosing [facility] for your care and it was my pleasure taking care of you.  [phone]

## 2024-10-18 ENCOUNTER — TELEPHONE (OUTPATIENT)
Dept: SURGERY | Facility: HOSPITAL | Age: 34
End: 2024-10-18
Payer: MEDICAID

## 2024-10-18 LAB
ESTROGEN SERPL-MCNC: NORMAL PG/ML
INSULIN SERPL-ACNC: NORMAL U[IU]/ML
LAB AP CLINICAL INFORMATION: NORMAL
LAB AP GROSS DESCRIPTION: NORMAL
LAB AP INTRA OP: NORMAL
LAB AP REPORT FOOTNOTES: NORMAL

## 2024-10-21 ENCOUNTER — TELEPHONE (OUTPATIENT)
Dept: HEMATOLOGY/ONCOLOGY | Facility: CLINIC | Age: 34
End: 2024-10-21
Payer: MEDICAID

## 2024-10-21 NOTE — TELEPHONE ENCOUNTER
Called patient to confirm virtual appointment with Yun at 3:50 pm. Patient stated she met with Tye Harden at [facility] and got genetic results. Notified Julienne Malcolm patient no longer needed appointment.

## 2024-10-29 ENCOUNTER — TELEPHONE (OUTPATIENT)
Dept: HEMATOLOGY/ONCOLOGY | Facility: CLINIC | Age: 34
End: 2024-10-29
Payer: MEDICAID

## 2024-10-29 ENCOUNTER — INFUSION (OUTPATIENT)
Dept: INFUSION THERAPY | Facility: HOSPITAL | Age: 34
End: 2024-10-29
Attending: INTERNAL MEDICINE
Payer: MEDICAID

## 2024-10-29 ENCOUNTER — LAB VISIT (OUTPATIENT)
Dept: HEMATOLOGY/ONCOLOGY | Facility: CLINIC | Age: 34
End: 2024-10-29
Payer: MEDICAID

## 2024-10-29 VITALS
HEIGHT: 65 IN | TEMPERATURE: 98 F | WEIGHT: 233.94 LBS | BODY MASS INDEX: 38.98 KG/M2 | SYSTOLIC BLOOD PRESSURE: 126 MMHG | HEART RATE: 83 BPM | OXYGEN SATURATION: 97 % | RESPIRATION RATE: 20 BRPM | DIASTOLIC BLOOD PRESSURE: 89 MMHG

## 2024-10-29 DIAGNOSIS — C50.912 INVASIVE DUCTAL CARCINOMA OF LEFT BREAST: Primary | ICD-10-CM

## 2024-10-29 DIAGNOSIS — C77.3 MALIGNANT NEOPLASM METASTATIC TO LYMPH NODE OF AXILLA: ICD-10-CM

## 2024-10-29 DIAGNOSIS — C50.912 INFLAMMATORY CARCINOMA OF LEFT BREAST: ICD-10-CM

## 2024-10-29 LAB
ALBUMIN SERPL-MCNC: 3.7 G/DL (ref 3.5–5)
ALBUMIN/GLOB SERPL: 0.9 RATIO (ref 1.1–2)
ALP SERPL-CCNC: 108 UNIT/L (ref 40–150)
ALT SERPL-CCNC: 41 UNIT/L (ref 0–55)
ANION GAP SERPL CALC-SCNC: 7 MEQ/L
AST SERPL-CCNC: 31 UNIT/L (ref 5–34)
B-HCG UR QL: NEGATIVE
BASOPHILS # BLD AUTO: 0.06 X10(3)/MCL
BASOPHILS NFR BLD AUTO: 0.8 %
BILIRUB SERPL-MCNC: 0.6 MG/DL
BUN SERPL-MCNC: 18.5 MG/DL (ref 7–18.7)
CALCIUM SERPL-MCNC: 9.4 MG/DL (ref 8.4–10.2)
CEA SERPL-MCNC: 12.6 NG/ML (ref 0–3)
CHLORIDE SERPL-SCNC: 106 MMOL/L (ref 98–107)
CO2 SERPL-SCNC: 27 MMOL/L (ref 22–29)
CREAT SERPL-MCNC: 0.88 MG/DL (ref 0.55–1.02)
CREAT/UREA NIT SERPL: 21
CTP QC/QA: YES
EOSINOPHIL # BLD AUTO: 1.49 X10(3)/MCL (ref 0–0.9)
EOSINOPHIL NFR BLD AUTO: 20.6 %
ERYTHROCYTE [DISTWIDTH] IN BLOOD BY AUTOMATED COUNT: 12.3 % (ref 11.5–17)
GFR SERPLBLD CREATININE-BSD FMLA CKD-EPI: >60 ML/MIN/1.73/M2
GLOBULIN SER-MCNC: 4 GM/DL (ref 2.4–3.5)
GLUCOSE SERPL-MCNC: 139 MG/DL (ref 74–100)
HCT VFR BLD AUTO: 37 % (ref 37–47)
HGB BLD-MCNC: 12.2 G/DL (ref 12–16)
IMM GRANULOCYTES # BLD AUTO: 0.02 X10(3)/MCL (ref 0–0.04)
IMM GRANULOCYTES NFR BLD AUTO: 0.3 %
LYMPHOCYTES # BLD AUTO: 2.42 X10(3)/MCL (ref 0.6–4.6)
LYMPHOCYTES NFR BLD AUTO: 33.5 %
MAGNESIUM SERPL-MCNC: 2.1 MG/DL (ref 1.6–2.6)
MCH RBC QN AUTO: 31.8 PG (ref 27–31)
MCHC RBC AUTO-ENTMCNC: 33 G/DL (ref 33–36)
MCV RBC AUTO: 96.4 FL (ref 80–94)
MONOCYTES # BLD AUTO: 0.51 X10(3)/MCL (ref 0.1–1.3)
MONOCYTES NFR BLD AUTO: 7.1 %
NEUTROPHILS # BLD AUTO: 2.72 X10(3)/MCL (ref 2.1–9.2)
NEUTROPHILS NFR BLD AUTO: 37.7 %
NRBC BLD AUTO-RTO: 0 %
PLATELET # BLD AUTO: 326 X10(3)/MCL (ref 130–400)
PMV BLD AUTO: 9.9 FL (ref 7.4–10.4)
POTASSIUM SERPL-SCNC: 3.6 MMOL/L (ref 3.5–5.1)
PROT SERPL-MCNC: 7.7 GM/DL (ref 6.4–8.3)
RBC # BLD AUTO: 3.84 X10(6)/MCL (ref 4.2–5.4)
SODIUM SERPL-SCNC: 140 MMOL/L (ref 136–145)
WBC # BLD AUTO: 7.22 X10(3)/MCL (ref 4.5–11.5)

## 2024-10-29 PROCEDURE — 63600175 PHARM REV CODE 636 W HCPCS: Performed by: INTERNAL MEDICINE

## 2024-10-29 PROCEDURE — 80053 COMPREHEN METABOLIC PANEL: CPT

## 2024-10-29 PROCEDURE — 36415 COLL VENOUS BLD VENIPUNCTURE: CPT

## 2024-10-29 PROCEDURE — 83735 ASSAY OF MAGNESIUM: CPT

## 2024-10-29 PROCEDURE — 85025 COMPLETE CBC W/AUTO DIFF WBC: CPT

## 2024-10-29 PROCEDURE — 25000003 PHARM REV CODE 250: Performed by: INTERNAL MEDICINE

## 2024-10-29 PROCEDURE — 96368 THER/DIAG CONCURRENT INF: CPT

## 2024-10-29 PROCEDURE — 81025 URINE PREGNANCY TEST: CPT

## 2024-10-29 PROCEDURE — 82378 CARCINOEMBRYONIC ANTIGEN: CPT

## 2024-10-29 RX ORDER — HEPARIN 100 UNIT/ML
500 SYRINGE INTRAVENOUS
Status: DISCONTINUED | OUTPATIENT
Start: 2024-10-29 | End: 2024-10-29 | Stop reason: HOSPADM

## 2024-10-29 RX ORDER — PROCHLORPERAZINE EDISYLATE 5 MG/ML
10 INJECTION INTRAMUSCULAR; INTRAVENOUS ONCE AS NEEDED
Status: DISCONTINUED | OUTPATIENT
Start: 2024-10-29 | End: 2024-10-29 | Stop reason: HOSPADM

## 2024-10-29 RX ORDER — DIPHENHYDRAMINE HYDROCHLORIDE 50 MG/ML
50 INJECTION INTRAMUSCULAR; INTRAVENOUS ONCE AS NEEDED
Status: DISCONTINUED | OUTPATIENT
Start: 2024-10-29 | End: 2024-10-29 | Stop reason: HOSPADM

## 2024-10-29 RX ORDER — EPINEPHRINE 1 MG/ML
0.3 INJECTION INTRAMUSCULAR; INTRAVENOUS; SUBCUTANEOUS ONCE AS NEEDED
Status: DISCONTINUED | OUTPATIENT
Start: 2024-10-29 | End: 2024-10-29 | Stop reason: HOSPADM

## 2024-10-29 RX ORDER — SODIUM CHLORIDE 0.9 % (FLUSH) 0.9 %
10 SYRINGE (ML) INJECTION
Status: DISCONTINUED | OUTPATIENT
Start: 2024-10-29 | End: 2024-10-29 | Stop reason: HOSPADM

## 2024-10-29 RX ADMIN — TRASTUZUMAB-ANNS 636 MG: 420 INJECTION, POWDER, LYOPHILIZED, FOR SOLUTION INTRAVENOUS at 11:10

## 2024-10-29 RX ADMIN — HEPARIN 500 UNITS: 100 SYRINGE at 12:10

## 2024-10-29 RX ADMIN — SODIUM CHLORIDE: 9 INJECTION, SOLUTION INTRAVENOUS at 11:10

## 2024-11-04 ENCOUNTER — TELEPHONE (OUTPATIENT)
Dept: HEMATOLOGY/ONCOLOGY | Facility: CLINIC | Age: 34
End: 2024-11-04
Payer: MEDICAID

## 2024-11-04 RX ORDER — EPINEPHRINE 0.3 MG/.3ML
0.3 INJECTION SUBCUTANEOUS ONCE AS NEEDED
OUTPATIENT
Start: 2024-11-15

## 2024-11-04 RX ORDER — PROCHLORPERAZINE EDISYLATE 5 MG/ML
10 INJECTION INTRAMUSCULAR; INTRAVENOUS ONCE AS NEEDED
Start: 2024-12-06

## 2024-11-04 RX ORDER — SODIUM CHLORIDE 0.9 % (FLUSH) 0.9 %
10 SYRINGE (ML) INJECTION
OUTPATIENT
Start: 2024-11-15

## 2024-11-04 RX ORDER — HEPARIN 100 UNIT/ML
500 SYRINGE INTRAVENOUS
OUTPATIENT
Start: 2024-11-15

## 2024-11-04 RX ORDER — DIPHENHYDRAMINE HYDROCHLORIDE 50 MG/ML
50 INJECTION INTRAMUSCULAR; INTRAVENOUS ONCE AS NEEDED
OUTPATIENT
Start: 2024-11-15

## 2024-11-04 RX ORDER — DIPHENHYDRAMINE HYDROCHLORIDE 50 MG/ML
50 INJECTION INTRAMUSCULAR; INTRAVENOUS ONCE AS NEEDED
OUTPATIENT
Start: 2024-12-06

## 2024-11-04 RX ORDER — EPINEPHRINE 0.3 MG/.3ML
0.3 INJECTION SUBCUTANEOUS ONCE AS NEEDED
OUTPATIENT
Start: 2024-12-06

## 2024-11-04 RX ORDER — PROCHLORPERAZINE EDISYLATE 5 MG/ML
10 INJECTION INTRAMUSCULAR; INTRAVENOUS ONCE AS NEEDED
Start: 2024-11-15

## 2024-11-04 RX ORDER — SODIUM CHLORIDE 0.9 % (FLUSH) 0.9 %
10 SYRINGE (ML) INJECTION
OUTPATIENT
Start: 2024-12-06

## 2024-11-04 RX ORDER — HEPARIN 100 UNIT/ML
500 SYRINGE INTRAVENOUS
OUTPATIENT
Start: 2024-12-06

## 2024-11-05 ENCOUNTER — OFFICE VISIT (OUTPATIENT)
Dept: HEMATOLOGY/ONCOLOGY | Facility: CLINIC | Age: 34
End: 2024-11-05
Attending: INTERNAL MEDICINE
Payer: MEDICAID

## 2024-11-05 ENCOUNTER — APPOINTMENT (OUTPATIENT)
Dept: HEMATOLOGY/ONCOLOGY | Facility: CLINIC | Age: 34
End: 2024-11-05
Payer: MEDICAID

## 2024-11-05 VITALS
WEIGHT: 232.38 LBS | HEART RATE: 77 BPM | SYSTOLIC BLOOD PRESSURE: 120 MMHG | HEIGHT: 65 IN | TEMPERATURE: 98 F | RESPIRATION RATE: 18 BRPM | BODY MASS INDEX: 38.72 KG/M2 | OXYGEN SATURATION: 98 % | DIASTOLIC BLOOD PRESSURE: 85 MMHG

## 2024-11-05 DIAGNOSIS — C50.912 INFLAMMATORY CARCINOMA OF LEFT BREAST: ICD-10-CM

## 2024-11-05 DIAGNOSIS — C50.919 CARCINOMA OF BREAST METASTATIC TO BONE, UNSPECIFIED LATERALITY: ICD-10-CM

## 2024-11-05 DIAGNOSIS — Z80.3 FAMILY HISTORY OF BREAST CANCER: ICD-10-CM

## 2024-11-05 DIAGNOSIS — C79.51 CARCINOMA OF BREAST METASTATIC TO BONE, UNSPECIFIED LATERALITY: ICD-10-CM

## 2024-11-05 DIAGNOSIS — Z80.0 FAMILY HISTORY OF STOMACH CANCER: ICD-10-CM

## 2024-11-05 DIAGNOSIS — C78.7 ADENOCARCINOMA OF BREAST METASTATIC TO LIVER, UNSPECIFIED LATERALITY: ICD-10-CM

## 2024-11-05 DIAGNOSIS — C50.912 INVASIVE DUCTAL CARCINOMA OF LEFT BREAST: ICD-10-CM

## 2024-11-05 DIAGNOSIS — C79.51 SECONDARY MALIGNANCY OF LUMBAR VERTEBRAL COLUMN: ICD-10-CM

## 2024-11-05 DIAGNOSIS — C79.51 SECONDARY MALIGNANCY OF THORACIC VERTEBRAL COLUMN: ICD-10-CM

## 2024-11-05 DIAGNOSIS — Z15.01 MONOALLELIC MUTATION OF BRCA1 GENE: ICD-10-CM

## 2024-11-05 DIAGNOSIS — C50.911 INVASIVE DUCTAL CARCINOMA OF BREAST, RIGHT: ICD-10-CM

## 2024-11-05 DIAGNOSIS — C50.919 ADENOCARCINOMA OF BREAST METASTATIC TO LIVER, UNSPECIFIED LATERALITY: ICD-10-CM

## 2024-11-05 DIAGNOSIS — Z80.0 FAMILY HISTORY OF COLON CANCER: ICD-10-CM

## 2024-11-05 DIAGNOSIS — Z80.41 FAMILY HISTORY OF OVARIAN CANCER: ICD-10-CM

## 2024-11-05 DIAGNOSIS — C77.3 MALIGNANT NEOPLASM METASTATIC TO LYMPH NODE OF AXILLA: ICD-10-CM

## 2024-11-05 DIAGNOSIS — K92.1 HEMATOCHEZIA: ICD-10-CM

## 2024-11-05 DIAGNOSIS — C77.3 SECONDARY MALIGNANT NEOPLASM OF AXILLARY LYMPH NODES: ICD-10-CM

## 2024-11-05 DIAGNOSIS — N95.9 PREMENOPAUSAL PATIENT: Primary | ICD-10-CM

## 2024-11-05 DIAGNOSIS — C50.912 INFLAMMATORY CARCINOMA OF LEFT BREAST: Primary | ICD-10-CM

## 2024-11-05 DIAGNOSIS — Z51.11 CHEMOTHERAPY MANAGEMENT, ENCOUNTER FOR: ICD-10-CM

## 2024-11-05 DIAGNOSIS — C79.51 SECONDARY MALIGNANCY OF SACRUM: ICD-10-CM

## 2024-11-05 DIAGNOSIS — Z15.09 MONOALLELIC MUTATION OF BRCA1 GENE: ICD-10-CM

## 2024-11-05 DIAGNOSIS — Z80.49 FAMILY HISTORY OF CERVICAL CANCER: ICD-10-CM

## 2024-11-05 DIAGNOSIS — M54.9 BACK PAIN, UNSPECIFIED BACK LOCATION, UNSPECIFIED BACK PAIN LATERALITY, UNSPECIFIED CHRONICITY: ICD-10-CM

## 2024-11-05 LAB
ABS NEUT CALC (OHS): 3.42 X10(3)/MCL (ref 2.1–9.2)
ALBUMIN SERPL-MCNC: 3.6 G/DL (ref 3.5–5)
ALBUMIN/GLOB SERPL: 0.9 RATIO (ref 1.1–2)
ALP SERPL-CCNC: 92 UNIT/L (ref 40–150)
ALT SERPL-CCNC: 26 UNIT/L (ref 0–55)
ANION GAP SERPL CALC-SCNC: 5 MEQ/L
AST SERPL-CCNC: 18 UNIT/L (ref 5–34)
BASOPHILS NFR BLD MANUAL: 0.07 X10(3)/MCL (ref 0–0.2)
BASOPHILS NFR BLD MANUAL: 1 % (ref 0–2)
BILIRUB SERPL-MCNC: 0.5 MG/DL
BUN SERPL-MCNC: 19.1 MG/DL (ref 7–18.7)
CALCIUM SERPL-MCNC: 9.4 MG/DL (ref 8.4–10.2)
CEA SERPL-MCNC: 11.04 NG/ML (ref 0–3)
CHLORIDE SERPL-SCNC: 103 MMOL/L (ref 98–107)
CO2 SERPL-SCNC: 31 MMOL/L (ref 22–29)
CREAT SERPL-MCNC: 0.87 MG/DL (ref 0.55–1.02)
CREAT/UREA NIT SERPL: 22
EOSINOPHIL NFR BLD MANUAL: 2.08 X10(3)/MCL (ref 0–0.9)
EOSINOPHIL NFR BLD MANUAL: 28 % (ref 0–8)
ERYTHROCYTE [DISTWIDTH] IN BLOOD BY AUTOMATED COUNT: 12.3 % (ref 11.5–17)
GFR SERPLBLD CREATININE-BSD FMLA CKD-EPI: >60 ML/MIN/1.73/M2
GLOBULIN SER-MCNC: 3.9 GM/DL (ref 2.4–3.5)
GLUCOSE SERPL-MCNC: 174 MG/DL (ref 74–100)
HCT VFR BLD AUTO: 38.9 % (ref 37–47)
HGB BLD-MCNC: 12.6 G/DL (ref 12–16)
LYMPH ABN # BLD MANUAL: 7 %
LYMPHOCYTES NFR BLD MANUAL: 1.34 X10(3)/MCL
LYMPHOCYTES NFR BLD MANUAL: 18 % (ref 13–40)
MAGNESIUM SERPL-MCNC: 2 MG/DL (ref 1.6–2.6)
MCH RBC QN AUTO: 31.2 PG (ref 27–31)
MCHC RBC AUTO-ENTMCNC: 32.4 G/DL (ref 33–36)
MCV RBC AUTO: 96.3 FL (ref 80–94)
NEUTROPHILS NFR BLD MANUAL: 46 % (ref 47–80)
NRBC BLD AUTO-RTO: 0 %
PLATELET # BLD AUTO: 294 X10(3)/MCL (ref 130–400)
PMV BLD AUTO: 9.8 FL (ref 7.4–10.4)
POTASSIUM SERPL-SCNC: 3.5 MMOL/L (ref 3.5–5.1)
PROT SERPL-MCNC: 7.5 GM/DL (ref 6.4–8.3)
RBC # BLD AUTO: 4.04 X10(6)/MCL (ref 4.2–5.4)
SODIUM SERPL-SCNC: 139 MMOL/L (ref 136–145)
WBC # BLD AUTO: 7.43 X10(3)/MCL (ref 4.5–11.5)

## 2024-11-05 PROCEDURE — 83735 ASSAY OF MAGNESIUM: CPT

## 2024-11-05 PROCEDURE — 80053 COMPREHEN METABOLIC PANEL: CPT

## 2024-11-05 PROCEDURE — 85025 COMPLETE CBC W/AUTO DIFF WBC: CPT

## 2024-11-05 PROCEDURE — 99214 OFFICE O/P EST MOD 30 MIN: CPT | Mod: PBBFAC | Performed by: INTERNAL MEDICINE

## 2024-11-05 PROCEDURE — 36415 COLL VENOUS BLD VENIPUNCTURE: CPT

## 2024-11-05 PROCEDURE — 82378 CARCINOEMBRYONIC ANTIGEN: CPT

## 2024-11-05 NOTE — Clinical Note
Orders for 11/05/2024:  On recent iliac crest biopsy, need ER and [place] testing; also need HER2 testing  Refer to gyn for consultation regarding nonhormonal methods contraception  Mirena IUD needs to be removed  Refer to GI once again for evaluation of hematochezia which she has been experiencing since August 2024  Continue narcotic for breast pain  Dental evaluation and preventive Dentistry before we can start the patient on Zometa or denosumab for bone metastases  Continue tamoxifen and Herceptin Echocardiogram 1st week of January  Re-stage with contrast-enhanced CT scans of C/A/P and whole-body nuclear medicine bone scan 1st week of January  Check CEA level every month to assess response  Follow-up with NP in 3 weeks

## 2024-11-05 NOTE — PROGRESS NOTES
History:  Past Medical History:   Diagnosis Date    BRCA1 gene mutation positive 09/09/2024    BRCA1 [id number] ([id number]) - [facility] BRCA1 and BRCA2 gene sequence and deletion/duplication and 85-gene CustomNext-Cancer Panel (85 genes), VUS in NF1    Hypertension      Past Surgical History:   Procedure Laterality Date    INSERTION OF TUNNELED CENTRAL VENOUS CATHETER (CVC) WITH SUBCUTANEOUS PORT N/A 08/14/2024    Procedure: [id number];  Surgeon: Jc Chauhan Jr., MD;  Location: [facility];  Service: General;  Laterality: N/A;      Social History     Socioeconomic History    Marital status: Other   Tobacco Use    Smoking status: Never     Passive exposure: Never    Smokeless tobacco: Never   Substance and Sexual Activity    Alcohol use: Never    Drug use: Never    Sexual activity: Yes     Partners: Male     Birth control/protection: I.U.D.     Social Drivers of Health     Financial Resource Strain: Low Risk  (11/2/2020)    Received from [facility] Subsidiaries and Affiliates    Overall Financial Resource Strain (CARDIA)     Difficulty of Paying Living Expenses: Not hard at all   Food Insecurity: No Food Insecurity (11/2/2020)    Received from [facility]sidi[facility] and [facility]    Hunger Vital Sign     Worried About Running Out of Food in the Last Year: Never true     Ran Out of Food in the Last Year: Never true   Transportation Needs: No Transportation Needs (11/2/2020)    Received from [facility]    PRAPARE - Transportation     Lack of Transportation (Medical): No     Lack of Transportation (Non-Medical): No   Physical Activity: Inactive (11/2/2020)    Received from [facility]    Exercise Vital Sign     Days of Exercise per Week: 0 days     Minutes of  Exercise per Session: 0 min   Stress: No Stress Concern Present (11/2/2020)    Received from [facility]idi[facility] and Aff[facility]    [employer] [place] [employer] - Occupational Stress Questionnaire     Feeling of Stress : Not at all      Family History   Problem Relation Name Age of Onset    Cervical cancer Mother Pao Zacarias 50    Hypertension Mother Pao Zacarias     Diabetes Father Robinson Bland     Hypertension Father Robinson Bland     Colon cancer Maternal Grandfather Robert Chang Jr 70    Cancer Maternal Grandfather Robert Chang Jr     Hypertension Paternal Grandmother      Autism spectrum disorder Son Gilmar 3    Cervical cancer Other Conxavia 25    Kidney failure Other Robinson Jr     Cervical cancer Maternal Aunt Pao 50    Cervical cancer Maternal Aunt Yaima 44    Kidney failure Maternal Aunt Ghada 45    Cervical cancer Other      Stomach cancer Other          recurrence    Liver cancer Other Pedro Pablo 54    Hypertension Other [place]     Heart attack Paternal Uncle      BRCA1 Positive Paternal Cousin      Breast cancer Paternal Cousin  40    BRCA1 Positive Paternal Cousin      Breast cancer Paternal Cousin  42        BL mastect    Breast cancer Other      Hypertension Maternal Aunt Yaima Brown     Hypertension Maternal Aunt Mirta Brown     Hypertension Sister Evelio Brink       Reason for Follow-up:  -IDC right breast:  IDC, grade 2, lymph node biopsy negative, ER positive, AR positive, HER2 negative; S/P core biopsy 07/17/2024; cT1c cN0 MX, AJCC anatomic stage at least IA, clinical prognostic stage IA  -inflammatory carcinoma left breast, grade 3, lymph node biopsy positive, ER positive, AR negative, HER2 positive;   cT4d cN2 M1, stage IV; S/P core biopsy 07/16/2024  -Thoracolumbar vertebrae and sacrum metastatic involvement.   -FDG PET-CT for staging 09/30/2024: Sites of disease:  Multifocal, left breast; left axillary and subpectoral  lymphadenopathy; small focus upper right breast; bilobar liver metastases; portal caval lymph node; multifocal bone metastases  -Monoallelic mutation of BRCA1 gene   -premenopausal   -family history of breast cancer, ovarian cancer, cervical cancer, stomach cancer, colon cancer    History of Present Illness:   Invasive ductal carcinoma of breast, right      Oncologic/Hematologic History:  Oncology History   Malignant neoplasm of overlapping sites of left breast in female, estrogen receptor positive   8/8/2024 Initial Diagnosis    Malignant neoplasm of overlapping sites of left breast in female, estrogen receptor positive     8/8/2024 Cancer Staged    Staging form: Breast, AJCC 8th Edition  - Clinical stage from 8/8/2024: Stage IIIB (cT4d, cN1(f), cM0, G3, ER+, PA-, HER2+)     Malignant neoplasm of overlapping sites of right breast in female, estrogen receptor positive   8/8/2024 Initial Diagnosis    Malignant neoplasm of overlapping sites of right breast in female, estrogen receptor positive     8/8/2024 Cancer Staged    Staging form: Breast, AJCC 8th Edition  - Clinical stage from 8/8/2024: Stage IIA (cT2, cN0(f), cM0, G2, ER+, PA-, HER2-)     Invasive ductal carcinoma of breast, right   7/17/2024 Cancer Staged    Staging form: Breast, AJCC 8th Edition  - Clinical stage from 7/17/2024: Stage IA (cT1c, cN0, cM0, G2, ER+, PA+, HER2-)     8/29/2024 Initial Diagnosis    Invasive ductal carcinoma of breast, right     Invasive ductal carcinoma of left breast   8/29/2024 Initial Diagnosis    Invasive ductal carcinoma of left breast     9/16/2024 Cancer Staged    Staging form: Breast, AJCC 8th Edition  - Clinical stage from 9/16/2024: Stage IV (cT4d, cN2, pM1, G3, ER+, PA-, HER2+)     10/8/2024 -  Chemotherapy    Treatment Summary   Plan Name: OP BREAST TRASTUZUMAB Q3W  Treatment Goal: Palliative  Status: Active  Start Date: 10/8/2024  End Date: 9/5/2025 (Planned)  Provider: Kevon Subramanian MD  Chemotherapy:  trastuzumab-anns (KANJINTI) 871 mg in 0.9% NaCl 326 mL chemo infusion, 8 mg/kg = 871 mg, Intravenous, Clinic/HOD 1 time, 2 of 17 cycles  Administration: 871 mg (10/8/2024), 636 mg (10/29/2024)     Past medical history: Hypertension  Surgical/procedure history:  MediPort placement 08/14/2024    Social history: .  Lives in [place].  Has a 3-year-old son.  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  Denies history of tobacco, alcohol, or illicit drug abuse.  Family history:   -Maternal great aunt # 1: Breast cancer, in her 50s   -sister: Ovarian cancer, age 50  -sister: Cervical cancer, age 25  -mother: Ovarian cancer, age 50  -Maternal aunt: Cervical cancer, age 50  -maternal grandfather: Stomach cancer, age 70 (smoker)  -maternal great aunt # 2: Colon cancer, age 60  Health maintenance: PCP at [facility].  No screening colonoscopy ever.  Menstrual/Ob gyn history: Menarche, age 11; 1 pregnancy, 1 child; gave birth to her child at age 30; no abortions or miscarriages premenopausal; OCP at age 16, received Depo shots from age [age]; no contraception from age [age] when she became pregnant; Mirena IUD after pregnancy at age 30; experienced hot flashes.  No menstrual cycles after Mirena was placed.  ================================      34-year-old lady, referred from [facility] surgery, with invasive ductal carcinoma of breast.      08/09/2024:  [facility] surgery Office note:   CC: b/l breast IDC     HPI: Jeannie Greene is a 34 y.o. female with PMHx of HTN, R breast IDC Grade 2, and L breast/axillary ICD Grade 3 presents to clinic today with L breast and arm pain.   She first noticed the pain and swelling in L breast in May where she presented to the ED and received antibiotics. Pain did not resolve so patient followed up with her OB/Gyn who ordered the imaging and biopsy and was subsequently referred to our clinic. Today, patient reports swelling and pain in left breast, axilla, and down her arm. No pain  in right breast, but has had some milky discharge in the past from R nipple. She states her pain as a 6/10 that was at its worse in July and has improved some since. She takes ibuprofen 4x daily without relief.    Patient had first menarche at 11. One pregnancy with child and not gone through menopause. She was on OCP at 15yo, received Depo shot from [age]. No contraception from [age] when she got pregnant. Got Murina IUD after pregnancy at 30  that is still in place.  Pt has an extensive family history of breast and cervical cancer. Her mother and sister had cervical cancer. Her mother had a hysterectomy. She also believes her maternal aunt had breast cancer. She only takes losartan for HTN. No prior surgeries.  Physical exam:   # right breast: 3 cm mass 12 o'clock, 4 in above nipple, no skin changes, no nipple retraction or discharge, no palpable right axillary lymphadenopathy  # left breast: Swollen and hard; large mass appreciated two o'clock position right above breast, 1 hard immobile lymph node in left axilla, breast and axilla tender to palpation (inflammatory carcinoma left breast)      Investigations/clinical events reviewed:  -presentation:  Pain and swelling left breast 05/2024, did not respond to antibiotics; swelling and pain in left breast, axilla, and down left arm by 08/2024; some milky discharge from right nipple in the past  -05/06/2024: Ultrasound chest/mediastinum (left breast redness, tenderness):   1.  Ill-defined irregular hypoechoic breast tissue 1.88 x 1.2 x 0.9 cm, at the area of concern in the left breast 12:00 position concerning for infection with developing phlegmon given provided history of left breast erythema and pain. No discrete organized drainable fluid collection is identified.   2.  Overlying skin thickening and regional edema is likely secondary to to infectious process.   -07/01/2024:  Bilateral diagnostic mammogram and diagnostic bilateral breast ultrasound (comparison: Left  breast mammogram 05/28/2024, breast ultrasound 05/28/2024):  # findings concerning for left breast inflammatory cancer with metastatic disease to left axillary lymph nodes; multiple irregular masses left breast (2 x 1.5 x 2.0 cm; 1.9 x 1.6 cm; 2.3 x 1.6 cm; irregular-appearing masslike tissue seen throughout the left breast, most prominent within the upper outer quadrant; multiple enlarged irregular left axillary lymph node seen including lymph node with spiculated margins within the axilla 16 x 15 x 12 mm), encompassed by a large focal asymmetry predominantly involving the upper outer quadrant of the left breast extending towards the nipple; left breast skin thickening present; irregular left axillary lymph node present: BI-RADS category 5, highly suspicious  # right breast mass (16 x 11 x 13 mm) at 12 o'clock, 9 cm from nipple: BI-RADS category 4, suspicious   # mildly prominent right axillary lymph node with cortex measurement of 4 mm:  BI-RADS category 4, suspicious  -07/16/2024:    1. Left breast, 2 o'clock, 10 cm from nipple: IDC, grade 3; at least 1.2 cm   2. Left axilla:  IDC, grade 3 (no lymph node is present; however, this could represent metastatic carcinoma completely replacing lymph node)  -07/17/2024:  1. Right breast, 12 o'clock, 9 cm from nipple:  IDC, grade 2, at least 0.7 cm   2. Right axilla: Lymph node, negative for metastatic carcinoma  -ER 97.8%; HI 9.6%; HER2 negative (0); Ki-67 unfavorable (69.7%); HER2 by FISH, group 5 (negative/nonamplified  -ER 59%; HI 0% (negative); HER2 positive (3+); Ki-67 unfavorable ) 66.4%); HER2 by FISH analysis, group 1 (HER2 gene amplification)  -08/14/2024: MediPort placed  -08/22/2024:  Genetic testing    08/29/2024:   Pleasant lady who presents for initial medical oncology consultation.  In no acute discomfort.  Hot flashes since May 2024.  Left breast is painful; gets sharp pains intermittently, 9/10 severity; also, pain in left arm but no swelling.  Pain is  intermittent.  Left breast tumor is getting bigger.  No ulceration.  Since yesterday, 08/28/2024, has a experienced painless diarrhea with blood on toilet wipes as well as in toilet bowl; no weakness or dizziness; 5 loose stools yesterday, 2 loose stools today; no nausea, vomiting, hematemesis, or melena.  Never experienced GI bleeding before.  No unusual headaches, focal neurological symptoms, chest pain, cough, dyspnea, abdominal pain, nausea or vomiting.  No urinary problems.  No bone pains.  Appetite is okay.  No unintentional weight loss.    Interval History:  [No matching plan found]   OP BREAST TRASTUZUMAB Q3W     10/07/2024:  -liquid biopsy:  BRCA1 positive; HER2 positive;TMB could not be calculated due to insufficient circulating tumor DNA; MSI-high not detected  -09/19/2024:  Baseline tumor markers: CEA 19.74, elevated; CA 27.29 normal; CA 15-3 level normal  -09/23/2024: Staging brain MRI with and without contrast:  No metastatic disease  -09/30/2024: FDG PET-CT (comparison: MRI brain 09/23/2024, CT C/A/P 0 09/16/2024):  1. Multifocal hypermetabolic disease at the left breast with hypermetabolic left axillary and subpectoral lymphadenopathy.  2. Small focus of intense uptake at the upper right breast.  3. Bilobar hypermetabolic hepatic lesions (reference lesion left lobe maximum SUV 8.9; portal caval lymph node maximum SUV 4.6)  4. Multifocal hypermetabolic osseous lesions (lytic lesions axial and appendicular skeleton; reference lesion T2 maximum SUV 13; lesion at left sacral ala maximum SUV 11)  Presents for follow-up visit.  Overall, stable.  Beginning to experience some low back pain.  Pain does not radiate.  No lower extremity weakness, numbness, radiation of pain, saddle anesthesia, or bowel or bladder incontinence.  Appetite is great.  Some hot flashes.  No unusual headaches, focal neurological symptoms, chest pain, cough, dyspnea, abdominal pain, nausea, vomiting, etc..  At this time, back pain is  mild; there is no indication to start narcotics all palliative radiotherapy.  We will start tamoxifen and Herceptin ASAP.  We do not want to delay the treatment for biopsy of bone or liver lesions to happen.  She completely agrees with this.    11/05/2024:   -tamoxifen plus trastuzumab started 10/08/2024  -10/17/2024:  CT-guided biopsy posterior left iliac crest colon carcinoma, compatible with breast primary  -germline testing: BRCA1 mutation positive  -tissue NGS testing (left breast, collected 07/16/2024):  BRCA1 mutation positive; HER2 positive; MSI stable; TMB 2.1 m/MB (low); fusion negative; PD-L1 negative (PD-L1 CPS 2; PD-L1 TPS 1%); PIK3CA negative; ESR1 negative  -cycle 2 of trastuzumab on 10/29/2024  Presents for a follow-up visit.  Doing well.  Says that she thinks that she is improving with chemotherapy.  Back pain has improved.  Narcotic requirements have improved.  Appetite is okay.  ECOG 0.  No unusual headaches, focal neurological symptoms, vision impairment, gait impairment, seizures, chest pain, cough, dyspnea, abdominal pain, nausea, vomiting, GI bleeding, any urinary problems, etc..  No allergic reactions with Herceptin.  No pneumonitis.  Compliant with tamoxifen.  Plan of management discussed in detail once again.      Medications:  Current Outpatient Medications on File Prior to Visit   Medication Sig Dispense Refill    HYDROcodone-acetaminophen (NORCO)  mg per tablet Take 1 tablet by mouth every 6 (six) hours as needed for Pain. 90 tablet 0    losartan (COZAAR) 100 MG tablet Take 1 tablet by mouth every morning.      ondansetron (ZOFRAN) 4 MG tablet Take 1 tablet (4 mg total) by mouth 2 (two) times daily. 10 tablet 1    prochlorperazine (COMPAZINE) 5 MG tablet Take 2 tablets (10 mg total) by mouth every 6 (six) hours as needed (nausea). 40 tablet 11    tamoxifen (NOLVADEX) 20 MG Tab Take 1 tablet (20 mg total) by mouth once daily. 30 tablet 5     No current facility-administered  medications on file prior to visit.       Review of Systems:   All systems reviewed and found to be negative except for the symptoms detailed above    Physical Examination:   VITAL SIGNS:   Vitals:    11/05/24 0959   BP: 120/85   Pulse: 77   Resp: 18   Temp: 98.2 °F (36.8 °C)           GENERAL:  In no apparent distress.    HEAD:  No signs of head trauma.  EYES:  Pupils are equal.  Extraocular motions intact.    EARS:  Hearing grossly intact.  MOUTH:  Oropharynx is normal.   NECK:  No adenopathy, no JVD.     CHEST:  Chest with clear breath sounds bilaterally.  No wheezes, rales, rhonchi.    CARDIAC:  Regular rate and rhythm.  S1 and S2, without murmurs, gallops, rubs.  VASCULAR:  No Edema.  Peripheral pulses normal and equal in all extremities.  ABDOMEN:  Soft, without detectable tenderness.  No sign of distention.  No   rebound or guarding, and no masses palpated.   Bowel Sounds normal.  MUSCULOSKELETAL:  Good range of motion of all major joints. Extremities without clubbing, cyanosis or edema.    NEUROLOGIC EXAM:  Alert and oriented x 3.  No focal sensory or strength deficits.   Speech normal.  Follows commands.  PSYCHIATRIC:  Mood normal.  08/29/2024:  Breast examination was performed with a verbal consent, in the presence of Zulma Woo LPN:  # right breast:  About 4 cm nontender mobile tumor palpable at 11:00 a.m. position in the right breast, several cm from nipple, without overlying skin ulceration/satellite nodules/fixation; no nipple discharge; no palpable regional lymphadenopathy   # left breast: Entire left breast is involved with the hard, nontender, freely mobile tumor; orange peel appearance of skin is noted with faint erythema; hard, immobile lymph node is palpable in the left axilla.  No swelling of left upper extremity.    No results found for this or any previous visit.  Results for orders placed or performed during the hospital encounter of 08/14/24   Comprehensive Metabolic Panel   Result Value  Ref Range    Sodium 140 136 - 145 mmol/L    Potassium 3.3 (L) 3.5 - 5.1 mmol/L    Chloride 103 98 - 107 mmol/L    CO2 28 22 - 29 mmol/L    Glucose 153 (H) 74 - 100 mg/dL    Blood Urea Nitrogen 18.1 7.0 - 18.7 mg/dL    Creatinine 0.88 0.55 - 1.02 mg/dL    Calcium 10.2 8.4 - 10.2 mg/dL    Protein Total 8.1 6.4 - 8.3 gm/dL    Albumin 4.0 3.5 - 5.0 g/dL    Globulin 4.1 (H) 2.4 - 3.5 gm/dL    Albumin/Globulin Ratio 1.0 (L) 1.1 - 2.0 ratio    Bilirubin Total 0.5 <=1.5 mg/dL    ALP 56 40 - 150 unit/L    ALT 31 0 - 55 unit/L    AST 21 5 - 34 unit/L    eGFR >60 mL/min/1.73/m2    Anion Gap 9.0 mEq/L    BUN/Creatinine Ratio 21        Assessment:  Problem List Items Addressed This Visit          Renal/    Premenopausal patient - Primary       Oncology    Invasive ductal carcinoma of breast, right    Invasive ductal carcinoma of left breast    Secondary malignant neoplasm of axillary lymph nodes    Secondary malignancy of lumbar vertebral column    Secondary malignancy of sacrum    Family history of breast cancer    Family history of cervical cancer    Family history of colon cancer    Family history of ovarian cancer    Family history of stomach cancer    Inflammatory carcinoma of left breast    Secondary malignancy of thoracic vertebral column    Monoallelic mutation of BRCA1 gene    Breast cancer metastasized to bone    Adenocarcinoma of breast metastatic to liver       GI    Hematochezia       Orthopedic    Back pain     Other Visit Diagnoses       Chemotherapy management, encounter for                    Orders for 11/05/2024:   On recent iliac crest biopsy, need ER and SC testing; also need HER2 testing   Refer to gyn for consultation regarding nonhormonal methods contraception   Mirena IUD needs to be removed   Refer to GI once again for evaluation of hematochezia which she has been experiencing since August 2024   Continue narcotic for breast pain   Dental evaluation and preventive Dentistry before we can start the  patient on Zometa or denosumab for bone metastases   Continue tamoxifen and Herceptin  Echocardiogram 1st week of January   Re-stage with contrast-enhanced CT scans of C/A/P and whole-body nuclear medicine bone scan 1st week of January   Check CEA level every month to assess response   Follow-up with NP in 3 weeks     Above discussed with the patient.  All questions answered.  Discussed labs and scans and gave her copies of relevant results.  She understands and agrees with this plan.  She also understands that she has stage IV, incurable disease, and that long-term prognosis is guarded.  ===============================================================================      IDC both breasts; left breast inflammatory breast carcinoma:   -presentation:  Pain and swelling left breast 05/2024  -Mirena IUD in place  -extensive family history of breast cancer and cervical cancer  -physical exam: Right breast: 3 cm mass 12 o'clock position, 4 in above nipple  -physical exam: Left breast: Swollen, hard, large mass to o'clock position, 1 hard immobile lymph node in left axilla, breast and axilla tender to palpation (inflammatory carcinoma of left breast)  -mammogram and ultrasound 07/01/2024  # right breast:  16 x 11 x 13 mm mass, BI-RADS 4; right axillary lymph node, mildly prominent, BI-RADS 4  # left breast: Inflammatory breast carcinoma; metastasis to left axillary lymph nodes; multiple irregular left breast masses, BI-RADS 5;  -core biopsy left breast 07/16/2024:  IDC, grade 3; lymph node biopsy positive as well; ER 59%; WA 0%; HER2 positive (3+); Ki-67 unfavorable (66.4%); HER2 by FISH analysis, group 1 (HER2 gene amplification)  -right breast core biopsy 07/17/2024:  IDC, grade 2; lymph node biopsy negative; ER 97.8%; WA 9.6%; HER2 negative (score 0); Ki-67 unfavorable (69.7%); HER2 by FISH negative/nonamplified  -MediPort placed 08/14/2024   -genetic testing 08/22/2024  To summarize:  -IDC right breast:  IDC, grade 2,  lymph node biopsy negative, ER positive, OH positive, HER2 negative;   cT1c cN0 MX, AJCC anatomic stage at least IA, clinical prognostic stage IA  -07/17/2024: S/P needle biopsy right breast and right axilla  -inflammatory carcinoma left breast, grade 3, lymph node biopsy positive, ER positive, OH negative, HER2 positive; cT4d cN2 M1, stage IV  (By virtue of palpable, fixed lymph node in the left axilla, cN2)  -07/16/2024:  S/P needle biopsy left breast and left axilla  -genetic testing 08/22/2024:  BRCA1 mutation positive, heterozygous  -premenopausal per labs 08/29/2024  -baseline TTE 09/05/2024:  LVEF 60-65%   -DEXA scan 09/16/2024:  Normal BMD of lumbar vertebrae end of femoral necks  -baseline staging CTs C/A/P 0 09/16/2024:  Multiple thoracolumbar vertebrae and sacral lytic metastases   -baseline staging whole-body nuclear medicine bone scan 09/16/2024:  Thoracolumbar vertebrae and sacrum metastases  -09/16/2024: Scans reviewed; patient has metastatic disease; FDG PET-CT ordered; brain MRI scan with and without contrast ordered  -09/16/2024: Patient referred to IR for biopsy of suspicious bone lesions  -liquid biopsy 09/19/2024:  BRCA1 positive; HER2 positive;TMB could not be calculated due to insufficient circulating tumor DNA; MSI-high not detected  -09/19/2024: Baseline tumor markers: CEA 19.74, elevated; CA 27.29, CA 15-3 levels normal  -brain MRI for staging 09/23/2024: No intracranial metastases  -FDG PET-CT for staging 09/30/2024: Sites of disease:  Multifocal, left breast; left axillary and subpectoral lymphadenopathy; small focus upper right breast; bilobar liver metastases; portal caval lymph node; multifocal bone metastases  -tamoxifen plus trastuzumab started 10/08/2024  -CT-guided biopsy left iliac crest 10/17/2024:  Metastases from breast carcinoma  -germline testing: BRCA1 mutation positive  -tissue NGS testing (left breast, collected 07/16/2024):  BRCA1 mutation positive; HER2 positive; MSI  stable; TMB 2.1 m/MB (low); fusion negative; PD-L1 negative (PD-L1 CPS 2; PD-L1 TPS 1%); PIK3CA negative; ESR1 negative  -cycle 2 of trastuzumab on 10/29/2024  >>>  Plan:  -unfortunately, scans show thoracolumbar vertebral and sacral lytic metastases; therefore, stage IV disease  -bone metastases confirmed with biopsy of left iliac crest 10/17/2024  -tissue NGS: HER2 positive  -germline BRCA1 mutation positive  -we will request ER and [place] testing on left iliac crest biopsy as well  -premenopausal patient   -BRCA1 mutation positive, heterozygous  -baseline DEXA scan 09/16/2024: Normal BMD  -left breast tumor is ER positive, AZ negative, HER2 positive  -have referred to gyn for consultation regarding nonhormonal methods of contraception  -has Mirena IUD in place; it needs to be removed  -no hormonal therapy or hormonal contraception, given the diagnosis of breast cancer  -08/29/2024:  Urgent referral to GI for evaluation of hematochezia which she has been experiencing since 08/28/2024  -08/29/2024: Left breast pain; started on Norco 5 mg every 6 hours prn  -because of metastatic disease, now, intent of treatment is palliation  -dental evaluation and preventive Dentistry before patient can be started on zoledronic acid or denosumab to prevent skeletal events from bone metastases  -routine surgical resection of primary breast tumor is not indicated in the management of de Joaquina stage IV disease; although there is no survival benefit, it may be considered for local control of the primary tumor (deferred to surgery)    -plan:  Palliative treatment with tamoxifen + trastuzumab  -started 10/08/2024  -monitor LVEF with TTE every 3 months during treatment; next, 1st week of January 2025  -re-stage with contrast-enhanced CT scans of C/A/P and whole-body nuclear medicine bone scan 3 months (1st week of January 2025) after starting tamoxifen +trastuzumab  -patient can be treated with up to 3 lines of endocrine therapy + HER2  targeted therapy  -BRCA1 mutation positive; therefore, a candidate for palliative systemic therapy with PARPi (olaparib, talazoparib), as well (lower level evidence for HER2 positive tumors, therefore, category 2A in this setting)  -baseline CEA level was elevated; baseline CA 15-3 and CA 27.29 levels were normal; follow CEA level every month to assess response    If, at anytime, develops visceral crisis or endocrine refractory, then, treatment options:  # first-line:    Pertuzumab +trastuzumab +docetaxel (category 1, Preferred);   pertuzumab +trastuzumab +paclitaxel (Preferred)  (after response, maintenance trastuzumab/pertuzumab + concurrent endocrine therapy)   # second-line:  -Fam trastuzumab deruxtecan (category 1, Preferred)  -tucatinib +trastuzumab +capecitabine is preferred in patients with both systemic and CNS progression in the 3rd line setting and beyond; it may also be given in the second-line setting, etc.    Palliative systemic therapy options:  -systemic therapy +HER2 targeted therapy; or  -endocrine therapy +/-HER2 targeted therapy +ovarian suppression in premenopausal patient  -for premenopausal patients, tamoxifen alone (without ovarian ablation/suppression) + HER2 targeted therapy is also an option  >>>  -patient has bone only metastases on CTs and bone scan; no visceral metastases  -have decided to treat with tamoxifen + trastuzumab    Regimen:  -tamoxifen 20 mg daily  -trastuzumab 8 mg/kg IV day 1 cycle 1; followed by 6 mg/kg IV day 1 beginning with cycle 2    Herceptin:  -watch for cardiomyopathy, infusion reactions and pulmonary toxicity  -adverse reactions:> 10%:  Decreased LVEF, skin rash, abdominal pain, anorexia, infusion related reactions, asthenia, chills, back pain, dyspnea, etc.  -warnings/precautions:  Cardiomyopathy; infusion reactions; pulmonary toxicity; renal toxicity  -monitoring: Assess left ventricular ejection fraction (by ECG or MUGA scan) at baseline (immediately prior to  trastuzumab initiation), every 3 months during trastuzumab therapy, every 4 weeks if trastuzumab is withheld for significant left ventricular cardiac dysfunction, and every 6 months for at least 2 years following completion of adjuvant trastuzumab therapy. Monitor vital signs during infusion; monitor for hypersensitivity or infusion reaction; if a reaction occurs, monitor carefully until symptoms resolve completely. Monitor for signs/symptoms of cardiac dysfunction or pulmonary toxicity. If pregnancy inadvertently occurs during treatment, monitor amniotic fluid volume.    Fertility and birth control issues:  Discussion about fertility and contraception:  -Informed her about the potential impact of chemotherapy on fertility  -Made her aware of the option of referral to fertility specialist before chemotherapy and/or endocrine therapy to discuss options in case she desires future pregnancies.  Fertility preservation options include oocyte and embryo cryopreservation, etc.  -Amenorrhea frequently occurs during or after chemotherapy.  The majority of women younger than 35 years to resume menses within 2 years of finishing adjuvant chemotherapy  -Informed that menses and fertility are not necessarily linked. Absence of regular menses does not necessarily imply lack of fertility.  Conversely, the presence of menses does not guarantee fertility.  -There are limited data regarding continued fertility after chemotherapy.  -Cautioned her to avoid becoming pregnant during treatment with radiation therapy, chemotherapy, or endocrine therapy  -Hormone-based birth control is discouraged regardless of the harmless of the hormone receptor status of the patient's cancer  -Alternative methods of birth control, including IUD, barrier method, tubal ligation, vasectomy for the partner, etc.   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, HER2 positive, cT4d cN2 M1, stage IV  -FDG PET-CT for staging 09/30/2024: Sites of disease:  Multifocal, left breast; left axillary and subpectoral lymphadenopathy; small focus upper right breast; bilobar liver metastases; portal caval lymph node; multifocal bone metastases      Molecular markers:  -liquid biopsy:  BRCA1 positive; HER2 positive;TMB could not be calculated due to insufficient circulating tumor DNA; MSI-high not detected  -CT-guided biopsy left iliac crest 10/17/2024:  Metastases from breast carcinoma  -germline testing: BRCA1 mutation positive  -tissue NGS testing (left breast, collected 07/16/2024):  BRCA1 mutation positive; HER2 positive; MSI stable; TMB 2.1 m/MB (low); fusion negative; PD-L1 negative (PD-L1 CPS 2; PD-L1 TPS 1%); PIK3CA negative; ESR1 negative  -genetic testing 08/22/2024:  BRCA1 mutation positive, heterozygous      Hematochezia:   Since yesterday, 08/28/2024, has a experienced painless diarrhea with blood on toilet wipes as well as in toilet bowl; no weakness or dizziness; 5 loose stools yesterday, 2 loose stools today; no nausea, vomiting, hematemesis, or melena.  Never experienced GI bleeding before.  >>>   -08/29/2024: sent an urgent referral to GI for evaluation      Family history of breast cancer, ovarian cancer, cervical cancer, stomach cancer, colon cancer:  -Maternal great aunt # 1: Breast cancer, in her 50s   -sister: Ovarian cancer, age 50  -sister: Cervical cancer, age 25  -mother: Ovarian cancer, age 50  -Maternal aunt: Cervical cancer, age 50  -maternal grandfather: Stomach cancer, age 70 (smoker)  -maternal great aunt # 2: Colon cancer, age 60  >>>  -genetic testing 08/22/2024:  BRCA1 mutation  positive, heterozygous      Follow-up:  No follow-ups on file.  Answers submitted by the patient for this visit:  Review of Systems Questionnaire (Submitted on 11/1/2024)  appetite change : No  unexpected weight change: No  mouth sores: No  visual disturbance: No  cough: No  shortness of breath: No  chest pain: No  abdominal pain: No  diarrhea: No  frequency: Yes  back pain: No  rash: No  headaches: No  adenopathy: No  nervous/ anxious: No

## 2024-11-18 ENCOUNTER — TELEPHONE (OUTPATIENT)
Dept: HEMATOLOGY/ONCOLOGY | Facility: CLINIC | Age: 34
End: 2024-11-18
Payer: MEDICAID

## 2024-11-18 DIAGNOSIS — C50.912 INFLAMMATORY CARCINOMA OF LEFT BREAST: ICD-10-CM

## 2024-11-19 ENCOUNTER — OFFICE VISIT (OUTPATIENT)
Dept: HEMATOLOGY/ONCOLOGY | Facility: CLINIC | Age: 34
End: 2024-11-19
Payer: MEDICAID

## 2024-11-19 ENCOUNTER — INFUSION (OUTPATIENT)
Dept: INFUSION THERAPY | Facility: HOSPITAL | Age: 34
End: 2024-11-19
Attending: INTERNAL MEDICINE
Payer: MEDICAID

## 2024-11-19 ENCOUNTER — OFFICE VISIT (OUTPATIENT)
Dept: HEMATOLOGY/ONCOLOGY | Facility: CLINIC | Age: 34
End: 2024-11-19
Attending: INTERNAL MEDICINE
Payer: MEDICAID

## 2024-11-19 ENCOUNTER — LAB VISIT (OUTPATIENT)
Dept: HEMATOLOGY/ONCOLOGY | Facility: CLINIC | Age: 34
End: 2024-11-19
Payer: MEDICAID

## 2024-11-19 VITALS
OXYGEN SATURATION: 97 % | BODY MASS INDEX: 38.65 KG/M2 | HEIGHT: 65 IN | RESPIRATION RATE: 17 BRPM | HEART RATE: 75 BPM | WEIGHT: 232 LBS | DIASTOLIC BLOOD PRESSURE: 82 MMHG | SYSTOLIC BLOOD PRESSURE: 122 MMHG | TEMPERATURE: 98 F

## 2024-11-19 VITALS
RESPIRATION RATE: 18 BRPM | HEART RATE: 74 BPM | DIASTOLIC BLOOD PRESSURE: 81 MMHG | TEMPERATURE: 98 F | OXYGEN SATURATION: 96 % | SYSTOLIC BLOOD PRESSURE: 121 MMHG

## 2024-11-19 DIAGNOSIS — C50.919 CARCINOMA OF BREAST METASTATIC TO BONE, UNSPECIFIED LATERALITY: ICD-10-CM

## 2024-11-19 DIAGNOSIS — N95.9 PREMENOPAUSAL PATIENT: ICD-10-CM

## 2024-11-19 DIAGNOSIS — C79.51 CARCINOMA OF BREAST METASTATIC TO BONE, UNSPECIFIED LATERALITY: ICD-10-CM

## 2024-11-19 DIAGNOSIS — C50.912 INVASIVE DUCTAL CARCINOMA OF LEFT BREAST: Primary | ICD-10-CM

## 2024-11-19 DIAGNOSIS — C50.911 INVASIVE DUCTAL CARCINOMA OF BREAST, RIGHT: Primary | ICD-10-CM

## 2024-11-19 DIAGNOSIS — K92.1 HEMATOCHEZIA: ICD-10-CM

## 2024-11-19 DIAGNOSIS — Z80.3 FAMILY HISTORY OF BREAST CANCER: ICD-10-CM

## 2024-11-19 DIAGNOSIS — Z80.41 FAMILY HISTORY OF OVARIAN CANCER: ICD-10-CM

## 2024-11-19 DIAGNOSIS — C50.911 INVASIVE DUCTAL CARCINOMA OF BREAST, RIGHT: ICD-10-CM

## 2024-11-19 DIAGNOSIS — Z15.01 MONOALLELIC MUTATION OF BRCA1 GENE: ICD-10-CM

## 2024-11-19 DIAGNOSIS — C50.912 INFLAMMATORY CARCINOMA OF LEFT BREAST: ICD-10-CM

## 2024-11-19 DIAGNOSIS — Z80.0 FAMILY HISTORY OF COLON CANCER: ICD-10-CM

## 2024-11-19 DIAGNOSIS — Z15.09 MONOALLELIC MUTATION OF BRCA1 GENE: ICD-10-CM

## 2024-11-19 LAB
ALBUMIN SERPL-MCNC: 3.6 G/DL (ref 3.5–5)
ALBUMIN/GLOB SERPL: 0.9 RATIO (ref 1.1–2)
ALP SERPL-CCNC: 70 UNIT/L (ref 40–150)
ALT SERPL-CCNC: 29 UNIT/L (ref 0–55)
ANION GAP SERPL CALC-SCNC: 6 MEQ/L
AST SERPL-CCNC: 22 UNIT/L (ref 5–34)
B-HCG UR QL: NEGATIVE
BASOPHILS # BLD AUTO: 0.04 X10(3)/MCL
BASOPHILS NFR BLD AUTO: 0.5 %
BILIRUB SERPL-MCNC: 0.6 MG/DL
BUN SERPL-MCNC: 21.4 MG/DL (ref 7–18.7)
CALCIUM SERPL-MCNC: 9.4 MG/DL (ref 8.4–10.2)
CHLORIDE SERPL-SCNC: 104 MMOL/L (ref 98–107)
CO2 SERPL-SCNC: 27 MMOL/L (ref 22–29)
CREAT SERPL-MCNC: 0.85 MG/DL (ref 0.55–1.02)
CREAT/UREA NIT SERPL: 25
CTP QC/QA: YES
EOSINOPHIL # BLD AUTO: 1.56 X10(3)/MCL (ref 0–0.9)
EOSINOPHIL NFR BLD AUTO: 17.9 %
ERYTHROCYTE [DISTWIDTH] IN BLOOD BY AUTOMATED COUNT: 12.5 % (ref 11.5–17)
GFR SERPLBLD CREATININE-BSD FMLA CKD-EPI: >60 ML/MIN/1.73/M2
GLOBULIN SER-MCNC: 3.8 GM/DL (ref 2.4–3.5)
GLUCOSE SERPL-MCNC: 167 MG/DL (ref 74–100)
HCT VFR BLD AUTO: 34.3 % (ref 37–47)
HGB BLD-MCNC: 11.5 G/DL (ref 12–16)
IMM GRANULOCYTES # BLD AUTO: 0.02 X10(3)/MCL (ref 0–0.04)
IMM GRANULOCYTES NFR BLD AUTO: 0.2 %
LYMPHOCYTES # BLD AUTO: 2.52 X10(3)/MCL (ref 0.6–4.6)
LYMPHOCYTES NFR BLD AUTO: 29 %
MAGNESIUM SERPL-MCNC: 1.9 MG/DL (ref 1.6–2.6)
MCH RBC QN AUTO: 32 PG (ref 27–31)
MCHC RBC AUTO-ENTMCNC: 33.5 G/DL (ref 33–36)
MCV RBC AUTO: 95.5 FL (ref 80–94)
MONOCYTES # BLD AUTO: 0.54 X10(3)/MCL (ref 0.1–1.3)
MONOCYTES NFR BLD AUTO: 6.2 %
NEUTROPHILS # BLD AUTO: 4.02 X10(3)/MCL (ref 2.1–9.2)
NEUTROPHILS NFR BLD AUTO: 46.2 %
NRBC BLD AUTO-RTO: 0 %
PLATELET # BLD AUTO: 270 X10(3)/MCL (ref 130–400)
PMV BLD AUTO: 9.6 FL (ref 7.4–10.4)
POTASSIUM SERPL-SCNC: 3.5 MMOL/L (ref 3.5–5.1)
PROT SERPL-MCNC: 7.4 GM/DL (ref 6.4–8.3)
RBC # BLD AUTO: 3.59 X10(6)/MCL (ref 4.2–5.4)
SODIUM SERPL-SCNC: 137 MMOL/L (ref 136–145)
WBC # BLD AUTO: 8.7 X10(3)/MCL (ref 4.5–11.5)

## 2024-11-19 PROCEDURE — 99499 UNLISTED E&M SERVICE: CPT | Mod: S$PBB,,, | Performed by: SOCIAL WORKER

## 2024-11-19 PROCEDURE — 1159F MED LIST DOCD IN RCRD: CPT | Mod: CPTII,,, | Performed by: NURSE PRACTITIONER

## 2024-11-19 PROCEDURE — 83735 ASSAY OF MAGNESIUM: CPT

## 2024-11-19 PROCEDURE — 99215 OFFICE O/P EST HI 40 MIN: CPT | Mod: S$PBB,,, | Performed by: NURSE PRACTITIONER

## 2024-11-19 PROCEDURE — 36415 COLL VENOUS BLD VENIPUNCTURE: CPT

## 2024-11-19 PROCEDURE — 3074F SYST BP LT 130 MM HG: CPT | Mod: CPTII,,, | Performed by: NURSE PRACTITIONER

## 2024-11-19 PROCEDURE — 85025 COMPLETE CBC W/AUTO DIFF WBC: CPT

## 2024-11-19 PROCEDURE — 99212 OFFICE O/P EST SF 10 MIN: CPT | Mod: PBBFAC | Performed by: SOCIAL WORKER

## 2024-11-19 PROCEDURE — 4010F ACE/ARB THERAPY RXD/TAKEN: CPT | Mod: CPTII,,, | Performed by: NURSE PRACTITIONER

## 2024-11-19 PROCEDURE — 3079F DIAST BP 80-89 MM HG: CPT | Mod: CPTII,,, | Performed by: NURSE PRACTITIONER

## 2024-11-19 PROCEDURE — 99214 OFFICE O/P EST MOD 30 MIN: CPT | Mod: PBBFAC,27 | Performed by: NURSE PRACTITIONER

## 2024-11-19 PROCEDURE — 3008F BODY MASS INDEX DOCD: CPT | Mod: CPTII,,, | Performed by: NURSE PRACTITIONER

## 2024-11-19 PROCEDURE — 80053 COMPREHEN METABOLIC PANEL: CPT

## 2024-11-19 PROCEDURE — 25000003 PHARM REV CODE 250: Performed by: INTERNAL MEDICINE

## 2024-11-19 PROCEDURE — 63600175 PHARM REV CODE 636 W HCPCS: Mod: JG | Performed by: INTERNAL MEDICINE

## 2024-11-19 PROCEDURE — 1160F RVW MEDS BY RX/DR IN RCRD: CPT | Mod: CPTII,,, | Performed by: NURSE PRACTITIONER

## 2024-11-19 PROCEDURE — 81025 URINE PREGNANCY TEST: CPT

## 2024-11-19 RX ORDER — HEPARIN 100 UNIT/ML
500 SYRINGE INTRAVENOUS
Status: DISCONTINUED | OUTPATIENT
Start: 2024-11-19 | End: 2024-11-19 | Stop reason: HOSPADM

## 2024-11-19 RX ORDER — SODIUM CHLORIDE 0.9 % (FLUSH) 0.9 %
10 SYRINGE (ML) INJECTION
Status: DISCONTINUED | OUTPATIENT
Start: 2024-11-19 | End: 2024-11-19 | Stop reason: HOSPADM

## 2024-11-19 RX ORDER — PROCHLORPERAZINE EDISYLATE 5 MG/ML
10 INJECTION INTRAMUSCULAR; INTRAVENOUS ONCE AS NEEDED
Status: DISCONTINUED | OUTPATIENT
Start: 2024-11-19 | End: 2024-11-19 | Stop reason: HOSPADM

## 2024-11-19 RX ORDER — EPINEPHRINE 1 MG/ML
0.3 INJECTION INTRAMUSCULAR; INTRAVENOUS; SUBCUTANEOUS ONCE AS NEEDED
Status: DISCONTINUED | OUTPATIENT
Start: 2024-11-19 | End: 2024-11-19 | Stop reason: HOSPADM

## 2024-11-19 RX ORDER — DIPHENHYDRAMINE HYDROCHLORIDE 50 MG/ML
50 INJECTION INTRAMUSCULAR; INTRAVENOUS ONCE AS NEEDED
Status: DISCONTINUED | OUTPATIENT
Start: 2024-11-19 | End: 2024-11-19 | Stop reason: HOSPADM

## 2024-11-19 RX ADMIN — HEPARIN 500 UNITS: 100 SYRINGE at 12:11

## 2024-11-19 RX ADMIN — SODIUM CHLORIDE: 9 INJECTION, SOLUTION INTRAVENOUS at 10:11

## 2024-11-19 RX ADMIN — TRASTUZUMAB-ANNS 653 MG: 420 INJECTION, POWDER, LYOPHILIZED, FOR SOLUTION INTRAVENOUS at 11:11

## 2024-11-19 NOTE — NURSING
Infusion Note:  Pt has an appointment today for: Labs, Yazmin Cates FNP, and Infusion    Treatment Regimen: Kanjinti   Cycle: 3 Day: 1   every Q3 weeks cycle;      Given via Right Mediport    UPT done: Yes and Result negative    Nursing note:  Denies pain, SOB, N/V/C/D, fever, cough, rash, or chills. Reports compliance to tamoxifen. States she is getting her dental eval on 11/26. Received communication from ANIYA GRANADO that pt's mediport has not given blood return last couple of treatments and she may need flouro study. Notified her good blood return today. NP states no need for fluoro study. EF 60-65% in Sept.    Future appts scheduled: Labs, Yazmin Cates FNP, and Infusion in 3 weeks 12/10

## 2024-11-19 NOTE — Clinical Note
Infusion today cycle 3  Follow up with NP in 3 weeks on 12/10/24 with lab work prior to cycle 4 trastuzumab

## 2024-11-19 NOTE — PROGRESS NOTES
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   -FDG PET-CT for staging 09/30/2024: Sites of disease:  Multifocal, left breast; left axillary and subpectoral lymphadenopathy; small focus upper right breast; bilobar liver metastases; portal caval lymph node; multifocal bone metastases  -Monoallelic mutation of BRCA1 gene   -premenopausal   -family history of breast cancer, ovarian cancer, cervical cancer, stomach cancer, colon cancer    Current Treatment:  -tamoxifen 20 mg daily  -trastuzumab 8 mg/kg IV day 1 cycle 1; followed by 6 mg/kg IV day 1 beginning with cycle 2  start 10/8/24    Treatment History:  -MediPort placed 08/14/2024   -Mirena removed on 9/18/24    Past medical history: Hypertension  Surgical/procedure history:  MediPort placement 08/14/2024    Social history: .  Lives in [place].  Has a 3-year-old son.  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  Denies history of tobacco, alcohol, or illicit drug abuse.  Family history:   -Maternal great aunt # 1: Breast cancer, in her 50s   -sister: Ovarian cancer, age 50  -sister: Cervical cancer, age 25  -mother: Ovarian cancer, age 50  -Maternal aunt: Cervical cancer, age 50  -maternal grandfather: Stomach cancer, age 70 (smoker)  -maternal great aunt # 2: Colon cancer, age 60  Health maintenance: PCP at [facility].  No screening colonoscopy ever.  Menstrual/Ob gyn history: Menarche, age 11; 1 pregnancy, 1 child; gave birth to her child at age 30; no abortions or miscarriages premenopausal; OCP at age 16, received Depo shots from age  [age]; no contraception from age [age] when she became pregnant; Mirena IUD after pregnancy at age 30; experienced hot flashes.  No menstrual cycles after Mirena was placed.  History of Present Illness:   Oncologic/Hematologic History:  Oncology History   Malignant neoplasm of overlapping sites of left breast in female, estrogen receptor positive   8/8/2024 Initial Diagnosis    Malignant neoplasm of overlapping sites of left breast in female, estrogen receptor positive     8/8/2024 Cancer Staged    Staging form: Breast, AJCC 8th Edition  - Clinical stage from 8/8/2024: Stage IIIB (cT4d, cN1(f), cM0, G3, ER+, RI-, HER2+)     Malignant neoplasm of overlapping sites of right breast in female, estrogen receptor positive   8/8/2024 Initial Diagnosis    Malignant neoplasm of overlapping sites of right breast in female, estrogen receptor positive     8/8/2024 Cancer Staged    Staging form: Breast, AJCC 8th Edition  - Clinical stage from 8/8/2024: Stage IIA (cT2, cN0(f), cM0, G2, ER+, RI-, HER2-)     Invasive ductal carcinoma of breast, right   7/17/2024 Cancer Staged    Staging form: Breast, AJCC 8th Edition  - Clinical stage from 7/17/2024: Stage IA (cT1c, cN0, cM0, G2, ER+, RI+, HER2-)     8/29/2024 Initial Diagnosis    Invasive ductal carcinoma of breast, right     Invasive ductal carcinoma of left breast   8/29/2024 Initial Diagnosis    Invasive ductal carcinoma of left breast     9/16/2024 Cancer Staged    Staging form: Breast, AJCC 8th Edition  - Clinical stage from 9/16/2024: Stage IV (cT4d, cN2, pM1, G3, ER+, RI-, HER2+)     10/8/2024 -  Chemotherapy    Treatment Summary   Plan Name: OP BREAST TRASTUZUMAB Q3W  Treatment Goal: Palliative  Status: Active  Start Date: 10/8/2024  End Date: 9/5/2025 (Planned)  Provider: Kevon Subramanian MD  Chemotherapy: trastuzumab-anns (KANJINTI) 871 mg in 0.9% NaCl 326 mL chemo infusion, 8 mg/kg = 871 mg, Intravenous, Clinic/HOD 1 time, 3 of 17 cycles  Administration: 871 mg  (10/8/2024), 636 mg (10/29/2024)     34-year-old lady, referred from [facility] surgery, with invasive ductal carcinoma of breast.      08/09/2024:  [facility] surgery Office note:   CC: b/l breast IDC     HPI: Jeannie Greene is a 34 y.o. female with PMHx of HTN, R breast IDC Grade 2, and L breast/axillary ICD Grade 3 presents to clinic today with L breast and arm pain.   She first noticed the pain and swelling in L breast in May where she presented to the ED and received antibiotics. Pain did not resolve so patient followed up with her OB/Gyn who ordered the imaging and biopsy and was subsequently referred to our clinic. Today, patient reports swelling and pain in left breast, axilla, and down her arm. No pain in right breast, but has had some milky discharge in the past from R nipple. She states her pain as a 6/10 that was at its worse in July and has improved some since. She takes ibuprofen 4x daily without relief.    Patient had first menarche at 11. One pregnancy with child and not gone through menopause. She was on OCP at 15yo, received Depo shot from [age]. No contraception from [age] when she got pregnant. Got Murina IUD after pregnancy at 30  that is still in place.  Pt has an extensive family history of breast and cervical cancer. Her mother and sister had cervical cancer. Her mother had a hysterectomy. She also believes her maternal aunt had breast cancer. She only takes losartan for HTN. No prior surgeries.  Physical exam:   # right breast: 3 cm mass 12 o'clock, 4 in above nipple, no skin changes, no nipple retraction or discharge, no palpable right axillary lymphadenopathy  # left breast: Swollen and hard; large mass appreciated two o'clock position right above breast, 1 hard immobile lymph node in left axilla, breast and axilla tender to palpation (inflammatory carcinoma left breast)      Review of Systems   Gastrointestinal:  Diarrhea: occassional.   Endo/Heme/Allergies:         Hot flashes   All other systems  reviewed and are negative.      Physical Exam  Constitutional:       Appearance: Normal appearance.   HENT:      Head: Normocephalic.      Mouth/Throat:      Mouth: Mucous membranes are moist.   Eyes:      Pupils: Pupils are equal, round, and reactive to light.   Cardiovascular:      Rate and Rhythm: Normal rate and regular rhythm.      Pulses: Normal pulses.      Heart sounds: Normal heart sounds.   Pulmonary:      Effort: Pulmonary effort is normal.      Breath sounds: Normal breath sounds.   Abdominal:      General: Bowel sounds are normal.      Palpations: Abdomen is soft.   Musculoskeletal:         General: Normal range of motion.      Cervical back: Normal range of motion.   Skin:     General: Skin is warm and dry.      Capillary Refill: Capillary refill takes less than 2 seconds.   Neurological:      General: No focal deficit present.      Mental Status: She is alert and oriented to person, place, and time.   Psychiatric:         Attention and Perception: Attention normal.         Mood and Affect: Affect normal.         Speech: Speech normal.         Behavior: Behavior normal.         Thought Content: Thought content normal.       Interval History 11/19/24:  Interval History 11/19/24: Patient presents to clinic alone for scheduled follow up and treatment visit.  Due to receive cycle 3 of Herceptin today.  Patient reports tolerating treatment well without any cardiac concerns.  Patient reports compliance with tamoxifen daily as ordered.  Patient denies any vision changes or abnormal during bleeding.  Patient says her current gynecologist informed her that there were no nonhormonal birth control methods.  Patient amenable to referral to gyn here at [facility].  Patient denies any new or worsening breast changes, dyspnea, chest pain, changes in bowel/bladder patterns or blood in urine/stool.  Lab work reviewed with the patient, stable. Patient says she has upcoming  visit with Dentist for dental clearance. We discussed  plan of care and follow up.    VITAL SIGNS:   Vitals:    11/19/24 0933   BP: 122/82   Pulse: 75   Resp: 17   Temp: 98.1 °F (36.7 °C)     Lab Results   Component Value Date    WBC 8.70 11/19/2024    RBC 3.59 (L) 11/19/2024    HGB 11.5 (L) 11/19/2024    HCT 34.3 (L) 11/19/2024    MCV 95.5 (H) 11/19/2024    MCH 32.0 (H) 11/19/2024    MCHC 33.5 11/19/2024    RDW 12.5 11/19/2024     11/19/2024    MPV 9.6 11/19/2024     CMP  Sodium   Date Value Ref Range Status   11/19/2024 137 136 - 145 mmol/L Final     Potassium   Date Value Ref Range Status   11/19/2024 3.5 3.5 - 5.1 mmol/L Final     Chloride   Date Value Ref Range Status   11/19/2024 104 98 - 107 mmol/L Final     CO2   Date Value Ref Range Status   11/19/2024 27 22 - 29 mmol/L Final     Blood Urea Nitrogen   Date Value Ref Range Status   11/19/2024 21.4 (H) 7.0 - 18.7 mg/dL Final     Creatinine   Date Value Ref Range Status   11/19/2024 0.85 0.55 - 1.02 mg/dL Final   06/16/2020 0.65 0.57 - 1.25 mg/dL Final     Calcium   Date Value Ref Range Status   11/19/2024 9.4 8.4 - 10.2 mg/dL Final     Albumin   Date Value Ref Range Status   11/19/2024 3.6 3.5 - 5.0 g/dL Final     Bilirubin Total   Date Value Ref Range Status   11/19/2024 0.6 <=1.5 mg/dL Final     ALP   Date Value Ref Range Status   11/19/2024 70 40 - 150 unit/L Final     AST   Date Value Ref Range Status   11/19/2024 22 5 - 34 unit/L Final     ALT   Date Value Ref Range Status   11/19/2024 29 0 - 55 unit/L Final     eGFR   Date Value Ref Range Status   11/19/2024 >60 mL/min/1.73/m2 Final       Assessment:  IDC both breasts; left breast inflammatory breast carcinoma:   -presentation:  Pain and swelling left breast 05/2024  -Mirena IUD in place  -extensive family history of breast cancer and cervical cancer  -physical exam: Right breast: 3 cm mass 12 o'clock position, 4 in above nipple  -physical exam: Left breast: Swollen, hard, large mass to o'clock position, 1 hard immobile lymph node in left axilla,  breast and axilla tender to palpation (inflammatory carcinoma of left breast)  -mammogram and ultrasound 07/01/2024  # right breast:  16 x 11 x 13 mm mass, BI-RADS 4; right axillary lymph node, mildly prominent, BI-RADS 4  # left breast: Inflammatory breast carcinoma; metastasis to left axillary lymph nodes; multiple irregular left breast masses, BI-RADS 5;  -core biopsy left breast 07/16/2024:  IDC, grade 3; lymph node biopsy positive as well; ER 59%; ND 0%; HER2 positive (3+); Ki-67 unfavorable (66.4%); HER2 by FISH analysis, group 1 (HER2 gene amplification)  -right breast core biopsy 07/17/2024:  IDC, grade 2; lymph node biopsy negative; ER 97.8%; ND 9.6%; HER2 negative (score 0); Ki-67 unfavorable (69.7%); HER2 by FISH negative/nonamplified  -MediPort placed 08/14/2024   -genetic testing 08/22/2024  To summarize:  -IDC right breast:  IDC, grade 2, lymph node biopsy negative, ER positive, ND positive, HER2 negative;   cT1c cN0 MX, AJCC anatomic stage at least IA, clinical prognostic stage IA  -07/17/2024: S/P needle biopsy right breast and right axilla  -inflammatory carcinoma left breast, grade 3, lymph node biopsy positive, ER positive, ND negative, HER2 positive; cT4d cN2 M1, stage IV  (By virtue of palpable, fixed lymph node in the left axilla, cN2)  -07/16/2024:  S/P needle biopsy left breast and left axilla  -genetic testing 08/22/2024:  BRCA1 mutation positive, heterozygous  -premenopausal per labs 08/29/2024  -baseline TTE 09/05/2024:  LVEF 60-65%   -DEXA scan 09/16/2024:  Normal BMD of lumbar vertebrae end of femoral necks  -baseline staging CTs C/A/P 0 09/16/2024:  Multiple thoracolumbar vertebrae and sacral lytic metastases   -baseline staging whole-body nuclear medicine bone scan 09/16/2024:  Thoracolumbar vertebrae and sacrum metastases  -09/16/2024: Scans reviewed; patient has metastatic disease; FDG PET-CT ordered; brain MRI scan with and without contrast ordered  -09/16/2024: Patient referred to  IR for biopsy of suspicious bone lesions  -liquid biopsy 09/19/2024:  BRCA1 positive; HER2 positive;TMB could not be calculated due to insufficient circulating tumor DNA; MSI-high not detected  -09/19/2024: Baseline tumor markers: CEA 19.74, elevated; CA 27.29, CA 15-3 levels normal  -brain MRI for staging 09/23/2024: No intracranial metastases  -FDG PET-CT for staging 09/30/2024: Sites of disease:  Multifocal, left breast; left axillary and subpectoral lymphadenopathy; small focus upper right breast; bilobar liver metastases; portal caval lymph node; multifocal bone metastases      Sites of disease:   -IDC right breast, ER positive, MO positive, HER2 negative, cT1c cN0 MX  -inflammatory carcinoma left breast, ER positive, MO negative, HER2 positive, cT4d cN2 M1, stage IV  -FDG PET-CT for staging 09/30/2024: Sites of disease:  Multifocal, left breast; left axillary and subpectoral lymphadenopathy; small focus upper right breast; bilobar liver metastases; portal caval lymph node; multifocal bone metastases      Molecular markers:  -liquid biopsy:  BRCA1 positive; HER2 positive;TMB could not be calculated due to insufficient circulating tumor DNA; MSI-high not detected    Plan:  IDC both breasts; left breast inflammatory breast carcinoma:  and  IDC right breast:  IDC, grade 2, lymph node biopsy negative, ER positive, MO positive, HER2 negative;   -BRCA1 mutation positive, heterozygous  -routine surgical resection of primary breast tumor is not indicated in the management of de Joaquina stage IV disease; although there is no survival benefit, it may be considered for local control of the primary tumor (deferred to surgery)  -patient can be treated with up to 3 lines of endocrine therapy + HER2 targeted therapy  -BRCA1 mutation positive; therefore, a candidate for palliative systemic therapy with PARPi (olaparib, talazoparib), as well (lower level evidence for HER2 positive tumors, therefore, category 2A in this  setting)  -unfortunately, scans show thoracolumbar vertebral and sacral lytic metastases; therefore, stage IV disease  -because of metastatic disease, now, intent of treatment is palliation    -will treat with tamoxifen + trastuzumab    -baseline CEA level was elevated; baseline CA 15-3 and CA 27.29 levels were normal; follow CEA level every month to assess response  -On left breast biopsy, please order PD-L1 and NGS testing  -Dental evaluation and preventive Dentistry   -on biopsy of bone lesions, we will order comprehensive somatic profiling testing (NGS testing) to identify targets for palliative therapies      -Follow up with  in 2 weeks (8/29/24) with biopsy results and lab work (cbc,cmp,mg, cea) prior to Herceptin  -Continue Tamoxifen 20mg po daily  -Echocardiogram every 3 months scheduled 12/19/24   -3 months after starting treatment (1/2025), re-stage with contrast-enhanced CT scans of C/A/P and whole-body nuclear medicine bone scan  -Check CEA level every month to assess response  -IR biopsy for suspicious bone lesions scheduled 10/17/24  -dental evaluation and preventive Dentistry before patient can be started on zoledronic acid or denosumab to prevent skeletal events from bone metastases-pending      Left Breast Pain  -08/29/2024: Left breast pain; started on Norco 5 mg every 6 hours prn  10/16/24: Patient reports [place] 5/325 infective in managing pain  Increase Norco 10/325mg po every 6 hours prn for pain  Continue Norco 10/325mg prn     If, at anytime, develops visceral crisis or (endocrine refractory, then, treatment options:  # first-line:    Pertuzumab +trastuzumab +docetaxel (category 1, Preferred);   pertuzumab +trastuzumab +paclitaxel (Preferred)  (maintenance trastuzumab/pertuzumab after response, with concurrent endocrine therapy)  # second-line:  -Fam trastuzumab deruxtecan (category 1, Preferred)  -tucatinib +trastuzumab +capecitabine is preferred in patients with both systemic and  CNS progression in the 3rd line setting and beyond; it may also be given in the second-line setting, etc.    Palliative systemic therapy options:  -systemic therapy +HER2 targeted therapy; or  -endocrine therapy +/-HER2 targeted therapy +ovarian suppression in premenopausal patient  -for premenopausal patients, tamoxifen alone (without ovarian ablation/suppression) + HER2 targeted therapy is also an option  >>>  -patient has bone only metastases on CTs and bone scan; no visceral metastases  -will treat with tamoxifen + trastuzumab    Regimen:  -tamoxifen 20 mg daily  -trastuzumab 8 mg/kg IV day 1 cycle 1; followed by 6 mg/kg IV day 1 beginning with cycle 2    Herceptin:  -watch for cardiomyopathy, infusion reactions and pulmonary toxicity  -adverse reactions:> 10%:  Decreased LVEF, skin rash, abdominal pain, anorexia, infusion related reactions, asthenia, chills, back pain, dyspnea, etc.  -warnings/precautions:  Cardiomyopathy; infusion reactions; pulmonary toxicity; renal toxicity  -monitoring: Assess left ventricular ejection fraction (by ECG or MUGA scan) at baseline (immediately prior to trastuzumab initiation), every 3 months during trastuzumab therapy, every 4 weeks if trastuzumab is withheld for significant left ventricular cardiac dysfunction, and every 6 months for at least 2 years following completion of adjuvant trastuzumab therapy. Monitor vital signs during infusion; monitor for hypersensitivity or infusion reaction; if a reaction occurs, monitor carefully until symptoms resolve completely. Monitor for signs/symptoms of cardiac dysfunction or pulmonary toxicity. If pregnancy inadvertently occurs during treatment, monitor amniotic fluid volume.    Fertility and birth control issues:  Discussion about fertility and contraception:  -Informed her about the potential impact of chemotherapy on fertility  -Made her aware of the option of referral to fertility specialist before chemotherapy and/or endocrine  therapy to discuss options in case she desires future pregnancies.  Fertility preservation options include oocyte and embryo cryopreservation, etc.  -Amenorrhea frequently occurs during or after chemotherapy.  The majority of women younger than 35 years to resume menses within 2 years of finishing adjuvant chemotherapy  -Informed that menses and fertility are not necessarily linked. Absence of regular menses does not necessarily imply lack of fertility.  Conversely, the presence of menses does not guarantee fertility.  -There are limited data regarding continued fertility after chemotherapy.  -Cautioned her to avoid becoming pregnant during treatment with radiation therapy, chemotherapy, or endocrine therapy  -Hormone-based birth control is discouraged regardless of the harmless of the hormone receptor status of the patient's cancer  -Alternative methods of birth control, including IUD, barrier method, tubal ligation, vasectomy for the partner, etc.   -fertility specialist consultation if she so desires  -cautioned her not to become pregnant during treatment  -referred to gyn for consultation regarding nonhormonal methods of contraception-check on status of referral      Hematochezia:   Since yesterday, 08/28/2024, has a experienced painless diarrhea with blood on toilet wipes as well as in toilet bowl; no weakness or dizziness; 5 loose stools yesterday, 2 loose stools today; no nausea, vomiting, hematemesis, or melena.  Never experienced GI bleeding before.  -08/29/2024:Urgent referral to GI for evaluation which she has been experiencing since 08/28/2024  10/16/24: Has not experienced any further Hematachezia  Continue to monitor  Will proceed with referral to GI-Scheduled 6/9/25    Family history of breast cancer, ovarian cancer, cervical cancer, stomach cancer, colon cancer:  -Maternal great aunt # 1: Breast cancer, in her 50s   -sister: Ovarian cancer, age 50  -sister: Cervical cancer, age 25  -mother: Ovarian  cancer, age 50  -Maternal aunt: Cervical cancer, age 50  -maternal grandfather: Stomach cancer, age 70 (smoker)  -maternal great aunt # 2: Colon cancer, age 60  -genetic testing 08/22/2024:  BRCA1 mutation positive, heterozygous    Above discussed with the patient.  All questions answered.    Guarded prognosis discussed.  Discussed the following: That she has stage IV, incurable disease; palliation with systemic therapy can be attempted but response can not be guaranteed; prognosis guarded.  Potential side effects of tamoxifen and Herceptin discussed;: She understands and agrees with this plan.

## 2024-11-19 NOTE — PROGRESS NOTES
I met with Jeannie briefly in the infusion room. I introduced myself, explained my role. She is interested in scheduling a therapy session. It is scheduled in two weeks.

## 2024-12-09 ENCOUNTER — TELEPHONE (OUTPATIENT)
Dept: HEMATOLOGY/ONCOLOGY | Facility: CLINIC | Age: 34
End: 2024-12-09
Payer: MEDICAID

## 2024-12-09 ENCOUNTER — OFFICE VISIT (OUTPATIENT)
Dept: GYNECOLOGY | Facility: CLINIC | Age: 34
End: 2024-12-09
Attending: INTERNAL MEDICINE
Payer: MEDICAID

## 2024-12-09 VITALS
RESPIRATION RATE: 20 BRPM | WEIGHT: 232 LBS | BODY MASS INDEX: 38.65 KG/M2 | HEART RATE: 86 BPM | TEMPERATURE: 98 F | SYSTOLIC BLOOD PRESSURE: 96 MMHG | HEIGHT: 65 IN | DIASTOLIC BLOOD PRESSURE: 51 MMHG | OXYGEN SATURATION: 100 %

## 2024-12-09 DIAGNOSIS — C78.7 ADENOCARCINOMA OF BREAST METASTATIC TO LIVER, UNSPECIFIED LATERALITY: ICD-10-CM

## 2024-12-09 DIAGNOSIS — Z15.01 BRCA1 GENE MUTATION POSITIVE: ICD-10-CM

## 2024-12-09 DIAGNOSIS — Z15.09 BRCA1 GENE MUTATION POSITIVE: ICD-10-CM

## 2024-12-09 DIAGNOSIS — C50.919 CARCINOMA OF BREAST METASTATIC TO BONE, UNSPECIFIED LATERALITY: ICD-10-CM

## 2024-12-09 DIAGNOSIS — C50.919 ADENOCARCINOMA OF BREAST METASTATIC TO LIVER, UNSPECIFIED LATERALITY: ICD-10-CM

## 2024-12-09 DIAGNOSIS — Z15.09 MONOALLELIC MUTATION OF BRCA1 GENE: ICD-10-CM

## 2024-12-09 DIAGNOSIS — Z15.01 MONOALLELIC MUTATION OF BRCA1 GENE: ICD-10-CM

## 2024-12-09 DIAGNOSIS — Z13.71 BRCA1 GENE MUTATION NEGATIVE: Primary | ICD-10-CM

## 2024-12-09 DIAGNOSIS — C50.912 INFLAMMATORY CARCINOMA OF LEFT BREAST: ICD-10-CM

## 2024-12-09 DIAGNOSIS — Z30.9 ENCOUNTER FOR CONTRACEPTIVE MANAGEMENT, UNSPECIFIED TYPE: ICD-10-CM

## 2024-12-09 DIAGNOSIS — C79.51 CARCINOMA OF BREAST METASTATIC TO BONE, UNSPECIFIED LATERALITY: ICD-10-CM

## 2024-12-09 PROCEDURE — 99213 OFFICE O/P EST LOW 20 MIN: CPT | Mod: PBBFAC

## 2024-12-09 RX ORDER — LOSARTAN POTASSIUM AND HYDROCHLOROTHIAZIDE 25; 100 MG/1; MG/1
TABLET ORAL
COMMUNITY

## 2024-12-09 NOTE — PROGRESS NOTES
[facility] OB/GYN CLINIC NOTE  [facility]  [address] [place] [address]  Phone: [phone]  Fax: [phone]    Subjective:     Jeannie Greene is a 34 y.o.  who presents for contraception counseling in setting of BRCA 1 gene positive, invasive ductal carcinoma 1 and inflammatory carcinoma stage 4  currently on tamoxifen, trastuzumab, and herceptin protoccol.    Patient diagnosed with breast cancer 2024. Mirena IUD in place at that time. Due to ER+ and AZ+ nature of cancer, Mirena IUD removed 2024. Hormonal contraception contraindicated at this time. Patient seen in [facility] for OBGYN care, however reports that her home office has no non-hormonal contraceptive options. Patient reports that her cycles last 5 days using 3ppd, minimal cramping throughout but most intense on first 2 days. Overall tolerable menses. She reports significant concern about increased bleeding and pain profile with Copper IUD. Currently sexually active with her  and using condoms and menstrual tracking at his time. Undesired future fertility.     Patient reports that she discovered her BRCA 1 positive status and started cascade for her family. Mother BRCA 1 positive with past history of ovarian cancer and newly diagnosed breast cancer.       MedHx:   Past Medical History:   Diagnosis Date    BRCA1 gene mutation positive 2024    BRCA1 [id number] ([id number]) - [facility] BRCA1 and BRCA2 gene sequence and deletion/duplication and 85-gene CustomNext-Cancer Panel (85 genes), VUS in NF1    Cancer     Hypertension        SurgHx:   Past Surgical History:   Procedure Laterality Date    INSERTION OF TUNNELED CENTRAL VENOUS CATHETER (CVC) WITH SUBCUTANEOUS PORT N/A 2024    Procedure: [id number];  Surgeon: Jc Chauhan Jr., MD;  Location: [facility];  Service: General;  Laterality: N/A;       Medications:     Current Outpatient Medications:     HYDROcodone-acetaminophen (NORCO)  mg per tablet,  Take 1 tablet by mouth every 6 (six) hours as needed for Pain., Disp: 90 tablet, Rfl: 0    losartan-hydrochlorothiazide 100-25 mg (HYZAAR) 100-25 mg per tablet, , Disp: , Rfl:     ondansetron (ZOFRAN) 4 MG tablet, Take 1 tablet (4 mg total) by mouth 2 (two) times daily., Disp: 10 tablet, Rfl: 1    prochlorperazine (COMPAZINE) 5 MG tablet, Take 2 tablets (10 mg total) by mouth every 6 (six) hours as needed (nausea)., Disp: 40 tablet, Rfl: 11    tamoxifen (NOLVADEX) 20 MG Tab, Take 1 tablet (20 mg total) by mouth once daily., Disp: 30 tablet, Rfl: 5    FM Hx:  Family History   Problem Relation Name Age of Onset    Cervical cancer Mother Pao Zacarias 50    Hypertension Mother Pao Zacarias     Diabetes Father Robinson Bland     Hypertension Father Robinson Bland     Colon cancer Maternal Grandfather Robert Chang Jr 70    Cancer Maternal Grandfather Robert Chang Jr     Hypertension Paternal Grandmother Libertad Zacarias     Autism spectrum disorder Son Gilmar 3    Cervical cancer Other Conxavia 25    Kidney failure Other Robinson Bills     Cervical cancer Maternal Aunt Pao 50    Cervical cancer Maternal Aunt Yaima 44    Kidney failure Maternal Aunt Ghada 45    Cervical cancer Other      Stomach cancer Other          recurrence    Liver cancer Other Pedro Pablo 54    Hypertension Other [place]     Heart attack Paternal Uncle      BRCA1 Positive Paternal Cousin      Breast cancer Paternal Cousin  40    BRCA1 Positive Paternal Cousin      Breast cancer Paternal Cousin  42        BL mastect    Breast cancer Other      Hypertension Maternal Aunt Yaima Brown     Thyroid disease Maternal Aunt Yaima Brown     Cervical cancer Maternal Aunt Yaima Brown     Hypertension Maternal Aunt Mirta Brown     Hypertension Sister Evelio Brink     Breast cancer Maternal Aunt Anupama Damon         Breast Cancer    Cervical cancer Sister Victorina Barajas        Social Hx: Denies tobacco, alcohol and illicit drug usage.  Social History      Socioeconomic History    Marital status: Other   Tobacco Use    Smoking status: Never     Passive exposure: Never    Smokeless tobacco: Never   Substance and Sexual Activity    Alcohol use: Never    Drug use: Never    Sexual activity: Yes     Partners: Male     Social Drivers of Health     Financial Resource Strain: Low Risk  (11/2/2020)    [facility]    Overall Financial Resource Strain (CARDIA)     Difficulty of Paying Living Expenses: Not hard at all   Food Insecurity: No Food Insecurity (11/2/2020)    [facility]    Hunger Vital Sign     Worried About Running Out of Food in the Last Year: Never true     Ran Out of Food in the Last Year: Never true   Transportation Needs: No Transportation Needs (11/2/2020)    [facility]    PRAPARE - Transportation     Lack of Transportation (Medical): No     Lack of Transportation (Non-Medical): No   Physical Activity: Inactive (11/2/2020)    [facility]    Exercise Vital Sign     Days of Exercise per Week: 0 days     Minutes of Exercise per Session: 0 min   Stress: No Stress Concern Present (11/2/2020)    [facility]    [employer] [place] [place] - Occupational Stress Questionnaire     Feeling of Stress : Not at all       Review of Systems  Denies fevers, chills, headache, blurry vision, nausea, vomiting, dizziness, or syncope.   Denies chest pain, shortness of breath, RUQ pain, or calf pain.    Objective:     Vitals:    12/09/24 0910   BP: (!) 96/51   BP Location: Right arm   Patient Position: Sitting   Pulse: 86   Resp: 20  "  Temp: 98 °F (36.7 °C)   SpO2: 100%   Weight: 105.2 kg (232 lb)   Height: 5' 5" (1.651 m)     Body mass index is 38.61 kg/m².    Physical Exam:     General: alert and oriented, in no acute distress  Lungs: no conversational dyspnea  Heart: regular rate   Abdomen: Soft, non-distended, non tender to palpation, no involuntary guarding, no rebound tenderness  Extremities: Normal, atraumatic, non-edematous, No cords or calf tenderness, No significant calf/ankle edema  Note: RN chaperone present for entirety of exam.     Imaging  No images are attached to the encounter.  Assessment/Plan:    Jeannie Greene is a 34 y.o.  who presents for contraception counseling in setting of BRCA 1 gene positive and hormone positive breast cancer on chemo.   Problem List Items Addressed This Visit          Renal/    Contraception management     Patient undergoing chemotherapy for ER+ and CA+ breast cancer   Hormonal contraception methods contraindicated   Mirena IUD removed 2024, pt using condoms and menstrual tracking  Counseled on the imperativeness of not getting pregnant during treatment do to the hormonal rise that would further progress her breast cancer and physiologic stress on body   Counseled on Copper IUD as best longacting non-hormonal anti-contraceptive option at this time, patient understood, however needs more time to come to decision surrounding copper IUD.   Discussed that in the setting of Mirena IUD removal and tamoxifen use, increased proliferation of endometrium could increase bleeding profile theoretically, however often times patient on chemotherapy can anticipate amenorrhea due to ovarian shutdown response to chemo, patient voiced understanding               Oncology    Inflammatory carcinoma of left breast    [Secondary malignancy of thoracic vertebral column]    Monoallelic mutation of BRCA1 gene    Breast cancer metastasized to bone    Adenocarcinoma of breast metastatic to liver    BRCA1 gene " mutation positive     BRCA1 positivity increases lifetime  risk of breast cancer to 80% and lifetime risk of ovarian cancer to 40%  Patients over the age of 35, need annual pelvic US and  for surveillance of ovaries until definitive risk reducing BSO at recommended age of between 35-40   In setting of active chemo and inflammatory cancer deferred CA-125 to avoid false-positive   Pelvic US ordered   After chemo regimen, will discuss rrBSO and hysterectomy, espescially if patient will need continued tamoxifen use further increasing risk for endometrial cancer in setting of Class 2 Obesity and HTN             Other Visit Diagnoses       BRCA1 gene mutation negative    -  Primary    Relevant Orders    US Pelvis Comp with Transvag NON-OB (xpd          Problem List Items Addressed This Visit          Renal/    Contraception management     Patient undergoing chemotherapy for ER+ and MT+ breast cancer   Hormonal contraception methods contraindicated   Mirena IUD removed 9/2024, pt using condoms and menstrual tracking  Counseled on the imperativeness of not getting pregnant during treatment do to the hormonal rise that would further progress her breast cancer and physiologic stress on body   Counseled on Copper IUD as best longacting non-hormonal anti-contraceptive option at this time, patient understood, however needs more time to come to decision surrounding copper IUD.   Discussed that in the setting of Mirena IUD removal and tamoxifen use, increased proliferation of endometrium could increase bleeding profile theoretically, however often times patient on chemotherapy can anticipate amenorrhea due to ovarian shutdown response to chemo, patient voiced understanding               Oncology    Inflammatory carcinoma of left breast    [Secondary malignancy of thoracic vertebral column]    Monoallelic mutation of BRCA1 gene    Breast cancer metastasized to bone    Adenocarcinoma of breast metastatic to liver    BRCA1  gene mutation positive     BRCA1 positivity increases lifetime  risk of breast cancer to 80% and lifetime risk of ovarian cancer to 40%  Patients over the age of 35, need annual pelvic US and  for surveillance of ovaries until definitive risk reducing BSO at recommended age of between 35-40   In setting of active chemo and inflammatory cancer deferred CA-125 to avoid false-positive   Pelvic US ordered   After chemo regimen, will discuss rrBSO and hysterectomy, espescially if patient will need continued tamoxifen use further increasing risk for endometrial cancer in setting of Class 2 Obesity and HTN             Other Visit Diagnoses       BRCA1 gene mutation negative    -  Primary    Relevant Orders    US Pelvis Comp with Transvag NON-OB (xpd          RTC in 1 month via telemed to follow up Copper IUD decision and US results .    Future Appointments   Date Time Provider Department Center   12/10/2024  8:30 AM NURSE, [facility] HEMATOLOGY ONCOLOGY [facility]OM[facility] [place]   12/10/2024  9:30 AM Yazmin Cates, LOY [facility]EMOM[facility] [place]   12/10/2024 10:00 AM INFUSION ROOM 533, [facility] CHEMOTHERAPY INFUSION [facility] CHEMO [place]   12/19/2024  7:00 AM [facility] ECHO 01 UL ECHO [place]   12/23/2024 10:30 AM [facility] US2 [facility] US [place]   1/3/2025 10:00 AM [facility] NM3 INJ [facility] NUCMED [place]   1/3/2025 10:30 AM [facility] CT1 450 LB LIMIT [facility] CTSCN [place]   1/3/2025 12:15 PM [facility] NM1  LB LIMIT [facility] NUCMED [place]   1/6/2025  1:00 PM [facility] ECHO 01 [facility] ECHO [place]   1/13/2025 11:15 AM RESIDENTS, [facility] GYN [facility] GYN [place]   3/12/2025  9:00 AM [facility] BRACC MRI1 420 LB LIMIT [facility] MRI [facility]   6/9/2025  1:00 PM Mary Andrade, LOY [facility] GASTRO [place]       Patient and plan were discussed with Dr. Raman.    Antonella Mckeon MD   PGY2, Obstetrics & Gynecology   [facility]

## 2024-12-10 ENCOUNTER — INFUSION (OUTPATIENT)
Dept: INFUSION THERAPY | Facility: HOSPITAL | Age: 34
End: 2024-12-10
Attending: INTERNAL MEDICINE
Payer: MEDICAID

## 2024-12-10 ENCOUNTER — OFFICE VISIT (OUTPATIENT)
Dept: HEMATOLOGY/ONCOLOGY | Facility: CLINIC | Age: 34
End: 2024-12-10
Payer: MEDICAID

## 2024-12-10 VITALS
DIASTOLIC BLOOD PRESSURE: 77 MMHG | RESPIRATION RATE: 17 BRPM | HEIGHT: 65 IN | BODY MASS INDEX: 38.52 KG/M2 | TEMPERATURE: 98 F | WEIGHT: 231.19 LBS | HEART RATE: 80 BPM | OXYGEN SATURATION: 97 % | SYSTOLIC BLOOD PRESSURE: 135 MMHG

## 2024-12-10 VITALS
RESPIRATION RATE: 20 BRPM | SYSTOLIC BLOOD PRESSURE: 113 MMHG | BODY MASS INDEX: 38.53 KG/M2 | OXYGEN SATURATION: 97 % | DIASTOLIC BLOOD PRESSURE: 80 MMHG | HEIGHT: 65 IN | HEART RATE: 71 BPM | WEIGHT: 231.25 LBS | TEMPERATURE: 98 F

## 2024-12-10 DIAGNOSIS — C50.919 CARCINOMA OF BREAST METASTATIC TO BONE, UNSPECIFIED LATERALITY: ICD-10-CM

## 2024-12-10 DIAGNOSIS — N95.9 PREMENOPAUSAL PATIENT: ICD-10-CM

## 2024-12-10 DIAGNOSIS — Z80.41 FAMILY HISTORY OF OVARIAN CANCER: ICD-10-CM

## 2024-12-10 DIAGNOSIS — Z80.3 FAMILY HISTORY OF BREAST CANCER: ICD-10-CM

## 2024-12-10 DIAGNOSIS — C79.51 CARCINOMA OF BREAST METASTATIC TO BONE, UNSPECIFIED LATERALITY: ICD-10-CM

## 2024-12-10 DIAGNOSIS — C79.51 SECONDARY MALIGNANCY OF SACRUM: ICD-10-CM

## 2024-12-10 DIAGNOSIS — C50.911 INVASIVE DUCTAL CARCINOMA OF BREAST, RIGHT: Primary | ICD-10-CM

## 2024-12-10 DIAGNOSIS — C50.912 INFLAMMATORY CARCINOMA OF LEFT BREAST: ICD-10-CM

## 2024-12-10 DIAGNOSIS — C50.912 INVASIVE DUCTAL CARCINOMA OF LEFT BREAST: Primary | ICD-10-CM

## 2024-12-10 DIAGNOSIS — G44.52 NEW DAILY PERSISTENT HEADACHE: ICD-10-CM

## 2024-12-10 DIAGNOSIS — Z15.09 MONOALLELIC MUTATION OF BRCA1 GENE: ICD-10-CM

## 2024-12-10 DIAGNOSIS — K92.1 HEMATOCHEZIA: ICD-10-CM

## 2024-12-10 DIAGNOSIS — C77.3 SECONDARY MALIGNANT NEOPLASM OF AXILLARY LYMPH NODES: ICD-10-CM

## 2024-12-10 DIAGNOSIS — Z80.0 FAMILY HISTORY OF COLON CANCER: ICD-10-CM

## 2024-12-10 DIAGNOSIS — Z79.83 LONG TERM (CURRENT) USE OF BISPHOSPHONATES: ICD-10-CM

## 2024-12-10 DIAGNOSIS — Z15.01 MONOALLELIC MUTATION OF BRCA1 GENE: ICD-10-CM

## 2024-12-10 LAB
B-HCG UR QL: NEGATIVE
CTP QC/QA: YES

## 2024-12-10 PROCEDURE — 96413 CHEMO IV INFUSION 1 HR: CPT

## 2024-12-10 PROCEDURE — A4216 STERILE WATER/SALINE, 10 ML: HCPCS | Performed by: INTERNAL MEDICINE

## 2024-12-10 PROCEDURE — 63600175 PHARM REV CODE 636 W HCPCS: Mod: JG | Performed by: INTERNAL MEDICINE

## 2024-12-10 PROCEDURE — 99214 OFFICE O/P EST MOD 30 MIN: CPT | Mod: PBBFAC | Performed by: NURSE PRACTITIONER

## 2024-12-10 PROCEDURE — 81025 URINE PREGNANCY TEST: CPT

## 2024-12-10 PROCEDURE — 25000003 PHARM REV CODE 250: Performed by: INTERNAL MEDICINE

## 2024-12-10 RX ORDER — HEPARIN 100 UNIT/ML
500 SYRINGE INTRAVENOUS
Status: DISCONTINUED | OUTPATIENT
Start: 2024-12-10 | End: 2024-12-10 | Stop reason: HOSPADM

## 2024-12-10 RX ORDER — SODIUM CHLORIDE 0.9 % (FLUSH) 0.9 %
10 SYRINGE (ML) INJECTION
Status: DISCONTINUED | OUTPATIENT
Start: 2024-12-10 | End: 2024-12-10 | Stop reason: HOSPADM

## 2024-12-10 RX ORDER — EPINEPHRINE 1 MG/ML
0.3 INJECTION INTRAMUSCULAR; INTRAVENOUS; SUBCUTANEOUS ONCE AS NEEDED
Status: DISCONTINUED | OUTPATIENT
Start: 2024-12-10 | End: 2024-12-10 | Stop reason: HOSPADM

## 2024-12-10 RX ORDER — PROCHLORPERAZINE EDISYLATE 5 MG/ML
10 INJECTION INTRAMUSCULAR; INTRAVENOUS ONCE AS NEEDED
Status: DISCONTINUED | OUTPATIENT
Start: 2024-12-10 | End: 2024-12-10 | Stop reason: HOSPADM

## 2024-12-10 RX ORDER — DIPHENHYDRAMINE HYDROCHLORIDE 50 MG/ML
50 INJECTION INTRAMUSCULAR; INTRAVENOUS ONCE AS NEEDED
Status: DISCONTINUED | OUTPATIENT
Start: 2024-12-10 | End: 2024-12-10 | Stop reason: HOSPADM

## 2024-12-10 RX ADMIN — SODIUM CHLORIDE, PRESERVATIVE FREE 10 ML: 5 INJECTION INTRAVENOUS at 11:12

## 2024-12-10 RX ADMIN — SODIUM CHLORIDE: 9 INJECTION, SOLUTION INTRAVENOUS at 10:12

## 2024-12-10 RX ADMIN — TRASTUZUMAB-ANNS 653 MG: 420 INJECTION, POWDER, LYOPHILIZED, FOR SOLUTION INTRAVENOUS at 10:12

## 2024-12-10 RX ADMIN — HEPARIN 500 UNITS: 100 SYRINGE at 11:12

## 2024-12-10 NOTE — ASSESSMENT & PLAN NOTE
BRCA1 positivity increases lifetime  risk of breast cancer to 80% and lifetime risk of ovarian cancer to 40%  Patients over the age of 35, need annual pelvic US and  for surveillance of ovaries until definitive risk reducing BSO at recommended age of between 35-40   In setting of active chemo and inflammatory cancer deferred CA-125 to avoid false-positive   Pelvic US ordered   After chemo regimen, will discuss rrBSO and hysterectomy, espescially if patient will need continued tamoxifen use further increasing risk for endometrial cancer in setting of Class 2 Obesity and HTN

## 2024-12-10 NOTE — Clinical Note
infusion today Herceptin cycle 4  Schedule Xgeva injection to start 12/31 at next follow up follow up with NP in 3 weeks on 12/31/24 with MRI of the brain results and lab work prior to xgeva injection and palliative herceptin

## 2024-12-10 NOTE — PROGRESS NOTES
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   -FDG PET-CT for staging 09/30/2024: Sites of disease:  Multifocal, left breast; left axillary and subpectoral lymphadenopathy; small focus upper right breast; bilobar liver metastases; portal caval lymph node; multifocal bone metastases  -Monoallelic mutation of BRCA1 gene   -premenopausal   -family history of breast cancer, ovarian cancer, cervical cancer, stomach cancer, colon cancer    Current Treatment:  -tamoxifen 20 mg daily  -trastuzumab 8 mg/kg IV day 1 cycle 1; followed by 6 mg/kg IV day 1 beginning with cycle 2  start 10/8/24    xgeva 120mg SQ every 28 days   start 12/31/24    Treatment History:  -MediPort placed 08/14/2024   -Mirena removed on 9/18/24    Past medical history: Hypertension  Surgical/procedure history:  MediPort placement 08/14/2024    Social history: .  Lives in [place].  Has a 3-year-old son.  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  Denies history of tobacco, alcohol, or illicit drug abuse.  Family history:   -Maternal great aunt # 1: Breast cancer, in her 50s   -sister: Ovarian cancer, age 50  -sister: Cervical cancer, age 25  -mother: Ovarian cancer, age 50  -Maternal aunt: Cervical cancer, age 50  -maternal grandfather: Stomach cancer, age 70 (smoker)  -maternal great aunt # 2: Colon cancer, age 60  Health maintenance: PCP at [facility].  No screening colonoscopy ever.  Menstrual/Ob gyn history: Menarche, age 11; 1 pregnancy, 1 child; gave birth to her child at age 30; no abortions or miscarriages  premenopausal; OCP at age 16, received Depo shots from age [age]; no contraception from age [age] when she became pregnant; Mirena IUD after pregnancy at age 30; experienced hot flashes.  No menstrual cycles after Mirena was placed.      History of Present Illness:   Oncologic/Hematologic History:  Oncology History   Malignant neoplasm of overlapping sites of left breast in female, estrogen receptor positive   8/8/2024 Initial Diagnosis    Malignant neoplasm of overlapping sites of left breast in female, estrogen receptor positive     8/8/2024 Cancer Staged    Staging form: Breast, AJCC 8th Edition  - Clinical stage from 8/8/2024: Stage IIIB (cT4d, cN1(f), cM0, G3, ER+, SC-, HER2+)     Malignant neoplasm of overlapping sites of right breast in female, estrogen receptor positive   8/8/2024 Initial Diagnosis    Malignant neoplasm of overlapping sites of right breast in female, estrogen receptor positive     8/8/2024 Cancer Staged    Staging form: Breast, AJCC 8th Edition  - Clinical stage from 8/8/2024: Stage IIA (cT2, cN0(f), cM0, G2, ER+, SC-, HER2-)     Invasive ductal carcinoma of breast, right   7/17/2024 Cancer Staged    Staging form: Breast, AJCC 8th Edition  - Clinical stage from 7/17/2024: Stage IA (cT1c, cN0, cM0, G2, ER+, SC+, HER2-)     8/29/2024 Initial Diagnosis    Invasive ductal carcinoma of breast, right     Invasive ductal carcinoma of left breast   8/29/2024 Initial Diagnosis    Invasive ductal carcinoma of left breast     9/16/2024 Cancer Staged    Staging form: Breast, AJCC 8th Edition  - Clinical stage from 9/16/2024: Stage IV (cT4d, cN2, pM1, G3, ER+, SC-, HER2+)     10/8/2024 -  Chemotherapy    Treatment Summary   Plan Name: OP BREAST TRASTUZUMAB Q3W  Treatment Goal: Palliative  Status: Active  Start Date: 10/8/2024  End Date: 9/9/2025 (Planned)  Provider: Kevon Subramanian MD  Chemotherapy: trastuzumab-anns (KANJINTI) 871 mg in 0.9% NaCl 326 mL chemo infusion, 8 mg/kg = 871 mg, Intravenous,  Clinic/HOD 1 time, 3 of 17 cycles  Administration: 871 mg (10/8/2024), 636 mg (10/29/2024), 653 mg (11/19/2024)     34-year-old lady, referred from [facility] surgery, with invasive ductal carcinoma of breast.      08/09/2024:  [facility] surgery Office note:   CC: b/l breast IDC     HPI: Jeannie Greene is a 34 y.o. female with PMHx of HTN, R breast IDC Grade 2, and L breast/axillary ICD Grade 3 presents to clinic today with L breast and arm pain.   She first noticed the pain and swelling in L breast in May where she presented to the ED and received antibiotics. Pain did not resolve so patient followed up with her OB/Gyn who ordered the imaging and biopsy and was subsequently referred to our clinic. Today, patient reports swelling and pain in left breast, axilla, and down her arm. No pain in right breast, but has had some milky discharge in the past from R nipple. She states her pain as a 6/10 that was at its worse in July and has improved some since. She takes ibuprofen 4x daily without relief.    Patient had first menarche at 11. One pregnancy with child and not gone through menopause. She was on OCP at 17yo, received Depo shot from [age]. No contraception from [age] when she got pregnant. Got Murina IUD after pregnancy at 30  that is still in place.  Pt has an extensive family history of breast and cervical cancer. Her mother and sister had cervical cancer. Her mother had a hysterectomy. She also believes her maternal aunt had breast cancer. She only takes losartan for HTN. No prior surgeries.  Physical exam:   # right breast: 3 cm mass 12 o'clock, 4 in above nipple, no skin changes, no nipple retraction or discharge, no palpable right axillary lymphadenopathy  # left breast: Swollen and hard; large mass appreciated two o'clock position right above breast, 1 hard immobile lymph node in left axilla, breast and axilla tender to palpation (inflammatory carcinoma left breast)      Review of Systems   Neurological:  Positive  for headaches.   Endo/Heme/Allergies:         Hot flashes   All other systems reviewed and are negative.      Physical Exam  Constitutional:       Appearance: Normal appearance.   HENT:      Head: Normocephalic.      Mouth/Throat:      Mouth: Mucous membranes are moist.   Eyes:      Pupils: Pupils are equal, round, and reactive to light.   Cardiovascular:      Rate and Rhythm: Normal rate and regular rhythm.      Pulses: Normal pulses.      Heart sounds: Normal heart sounds.   Pulmonary:      Effort: Pulmonary effort is normal.      Breath sounds: Normal breath sounds.   Abdominal:      General: Bowel sounds are normal.      Palpations: Abdomen is soft.   Musculoskeletal:         General: Normal range of motion.      Cervical back: Normal range of motion.   Skin:     General: Skin is warm and dry.      Capillary Refill: Capillary refill takes less than 2 seconds.   Neurological:      General: No focal deficit present.      Mental Status: She is alert and oriented to person, place, and time.   Psychiatric:         Attention and Perception: Attention normal.         Mood and Affect: Affect normal.         Speech: Speech normal.         Behavior: Behavior normal.         Thought Content: Thought content normal.       Interval History 12/10/24: Patient presents to clinic alone for scheduled follow up and treatment visit.  Due to receive palliative Herceptin infusion today.  Patient reports tolerating treatment well without any cardiac concerns.  Patient reports compliance with tamoxifen daily as ordered.  Patient denies any vision changes or abnormal bleeding.  Patient reports worsening headaches to lower back portion of head.  Patient says headaches has gradually worsened over the past few weeks.  Patient says she has noticed improvement in breast lumps and no longer as painful as it was before.  Patient denies any new or worsening breast changes, dyspnea, chest pain, changes in bowel/bladder patterns or blood in  urine/stool.  Lab work reviewed with the patient, stable.  Dental clearance received today.  Reviewed treatment options for bone metastasis with the patient.  We discussed plan of care and follow up.    VITAL SIGNS:   Vitals:    12/10/24 0848   BP: 135/77   Pulse: 80   Resp: 17   Temp: 97.9 °F (36.6 °C)     Lab Results   Component Value Date    WBC 7.42 12/10/2024    RBC 3.91 (L) 12/10/2024    HGB 12.4 12/10/2024    HCT 37.1 12/10/2024    MCV 94.9 (H) 12/10/2024    MCH 31.7 (H) 12/10/2024    MCHC 33.4 12/10/2024    RDW 12.4 12/10/2024     12/10/2024    MPV 9.1 12/10/2024         CMP  Sodium   Date Value Ref Range Status   12/10/2024 138 136 - 145 mmol/L Final     Potassium   Date Value Ref Range Status   12/10/2024 3.7 3.5 - 5.1 mmol/L Final     Chloride   Date Value Ref Range Status   12/10/2024 103 98 - 107 mmol/L Final     CO2   Date Value Ref Range Status   12/10/2024 28 22 - 29 mmol/L Final     Blood Urea Nitrogen   Date Value Ref Range Status   12/10/2024 22.9 (H) 7.0 - 18.7 mg/dL Final     Creatinine   Date Value Ref Range Status   12/10/2024 0.84 0.55 - 1.02 mg/dL Final   06/16/2020 0.65 0.57 - 1.25 mg/dL Final     Calcium   Date Value Ref Range Status   12/10/2024 9.5 8.4 - 10.2 mg/dL Final     Albumin   Date Value Ref Range Status   12/10/2024 3.6 3.5 - 5.0 g/dL Final     Bilirubin Total   Date Value Ref Range Status   12/10/2024 0.5 <=1.5 mg/dL Final     ALP   Date Value Ref Range Status   12/10/2024 64 40 - 150 unit/L Final     AST   Date Value Ref Range Status   12/10/2024 23 5 - 34 unit/L Final     ALT   Date Value Ref Range Status   12/10/2024 30 0 - 55 unit/L Final     eGFR   Date Value Ref Range Status   12/10/2024 >60 mL/min/1.73/m2 Final           Assessment:  IDC both breasts; left breast inflammatory breast carcinoma:   -presentation:  Pain and swelling left breast 05/2024  -Mirena IUD in place  -extensive family history of breast cancer and cervical cancer  -physical exam: Right breast:  3 cm mass 12 o'clock position, 4 in above nipple  -physical exam: Left breast: Swollen, hard, large mass to o'clock position, 1 hard immobile lymph node in left axilla, breast and axilla tender to palpation (inflammatory carcinoma of left breast)  -mammogram and ultrasound 07/01/2024  # right breast:  16 x 11 x 13 mm mass, BI-RADS 4; right axillary lymph node, mildly prominent, BI-RADS 4  # left breast: Inflammatory breast carcinoma; metastasis to left axillary lymph nodes; multiple irregular left breast masses, BI-RADS 5;  -core biopsy left breast 07/16/2024:  IDC, grade 3; lymph node biopsy positive as well; ER 59%; TN 0%; HER2 positive (3+); Ki-67 unfavorable (66.4%); HER2 by FISH analysis, group 1 (HER2 gene amplification)  -right breast core biopsy 07/17/2024:  IDC, grade 2; lymph node biopsy negative; ER 97.8%; TN 9.6%; HER2 negative (score 0); Ki-67 unfavorable (69.7%); HER2 by FISH negative/nonamplified  -MediPort placed 08/14/2024   -genetic testing 08/22/2024  To summarize:  -IDC right breast:  IDC, grade 2, lymph node biopsy negative, ER positive, TN positive, HER2 negative;   cT1c cN0 MX, AJCC anatomic stage at least IA, clinical prognostic stage IA  -07/17/2024: S/P needle biopsy right breast and right axilla  -inflammatory carcinoma left breast, grade 3, lymph node biopsy positive, ER positive, TN negative, HER2 positive; cT4d cN2 M1, stage IV  (By virtue of palpable, fixed lymph node in the left axilla, cN2)  -07/16/2024:  S/P needle biopsy left breast and left axilla  -genetic testing 08/22/2024:  BRCA1 mutation positive, heterozygous  -premenopausal per labs 08/29/2024  -baseline TTE 09/05/2024:  LVEF 60-65%   -DEXA scan 09/16/2024:  Normal BMD of lumbar vertebrae end of femoral necks  -baseline staging CTs C/A/P 0 09/16/2024:  Multiple thoracolumbar vertebrae and sacral lytic metastases   -baseline staging whole-body nuclear medicine bone scan 09/16/2024:  Thoracolumbar vertebrae and sacrum  metastases  -09/16/2024: Scans reviewed; patient has metastatic disease; FDG PET-CT ordered; brain MRI scan with and without contrast ordered  -09/16/2024: Patient referred to IR for biopsy of suspicious bone lesions  -liquid biopsy 09/19/2024:  BRCA1 positive; HER2 positive;TMB could not be calculated due to insufficient circulating tumor DNA; MSI-high not detected  -09/19/2024: Baseline tumor markers: CEA 19.74, elevated; CA 27.29, CA 15-3 levels normal  -brain MRI for staging 09/23/2024: No intracranial metastases  -FDG PET-CT for staging 09/30/2024: Sites of disease:  Multifocal, left breast; left axillary and subpectoral lymphadenopathy; small focus upper right breast; bilobar liver metastases; portal caval lymph node; multifocal bone metastases      Sites of disease:   -IDC right breast, ER positive, LA positive, HER2 negative, cT1c cN0 MX  -inflammatory carcinoma left breast, ER positive, LA negative, HER2 positive, cT4d cN2 M1, stage IV  -FDG PET-CT for staging 09/30/2024: Sites of disease:  Multifocal, left breast; left axillary and subpectoral lymphadenopathy; small focus upper right breast; bilobar liver metastases; portal caval lymph node; multifocal bone metastases      Molecular markers:  -liquid biopsy:  BRCA1 positive; HER2 positive;TMB could not be calculated due to insufficient circulating tumor DNA; MSI-high not detected    Plan:  IDC both breasts; left breast inflammatory breast carcinoma:  and  IDC right breast:  IDC, grade 2, lymph node biopsy negative, ER positive, LA positive, HER2 negative;   -BRCA1 mutation positive, heterozygous  -routine surgical resection of primary breast tumor is not indicated in the management of de Joaquina stage IV disease; although there is no survival benefit, it may be considered for local control of the primary tumor (deferred to surgery)  -patient can be treated with up to 3 lines of endocrine therapy + HER2 targeted therapy  -BRCA1 mutation positive; therefore, a  candidate for palliative systemic therapy with PARPi (olaparib, talazoparib), as well (lower level evidence for HER2 positive tumors, therefore, category 2A in this setting)  -unfortunately, scans show thoracolumbar vertebral and sacral lytic metastases; therefore, stage IV disease  -because of metastatic disease, now, intent of treatment is palliation    -will treat with tamoxifen + trastuzumab    -baseline CEA level was elevated; baseline CA 15-3 and CA 27.29 levels were normal; follow CEA level every month to assess response  -On left breast biopsy, please order PD-L1 and NGS testing  -Dental evaluation and preventive Dentistry   -on biopsy of bone lesions, we will order comprehensive somatic profiling testing (NGS testing) to identify targets for palliative therapies    -okay to proceed with Herceptin today  -follow up with NP in 3 weeks with lab work (CBC, CMP, mg, CEA, vitamin-D) prior to infusion for initial Xgeva injection and palliative Herceptin IV  -continue tamoxifen 20 mg p.o. daily  -Echocardiogram every 3 months scheduled 12/19/24   -3 months after starting treatment (1/2025), re-stage with contrast-enhanced CT scans of C/A/P and whole-body nuclear medicine bone scan-scheduled for 01/03/2025  -Check CEA level every month to assess response  -dental evaluation and preventive Dentistry before patient can be started on zoledronic acid or denosumab to prevent skeletal events from bone metastases-pending    Bone metastasis  12/10/2024 dental clearance received-scanned into media  Start Xgeva 120 mg SQ q.4 weeks monitor calcium levels before each injection, Vitamin D level prior to iniital start and then periodically thereafter      New Onset of headaches  12/10/24: Patient says headaches have worsened within the last 2 weeks mostly back portion of head  MRI of the brain-ordered today    Left Breast Pain  -08/29/2024: Left breast pain; started on Norco 5 mg every 6 hours prn  10/16/24: Patient reports Norco  5/325 infective in managing pain  Increase Norco 10/325mg po every 6 hours prn for pain  -12/10/2024:  Patient says his pain not as severe as it was in the beginning since starting treatment  Continue Norco 10/325mg prn     If, at anytime, develops visceral crisis or (endocrine refractory, then, treatment options:  # first-line:    Pertuzumab +trastuzumab +docetaxel (category 1, Preferred);   pertuzumab +trastuzumab +paclitaxel (Preferred)  (maintenance trastuzumab/pertuzumab after response, with concurrent endocrine therapy)  # second-line:  -Fam trastuzumab deruxtecan (category 1, Preferred)  -tucatinib +trastuzumab +capecitabine is preferred in patients with both systemic and CNS progression in the 3rd line setting and beyond; it may also be given in the second-line setting, etc.    Palliative systemic therapy options:  -systemic therapy +HER2 targeted therapy; or  -endocrine therapy +/-HER2 targeted therapy +ovarian suppression in premenopausal patient  -for premenopausal patients, tamoxifen alone (without ovarian ablation/suppression) + HER2 targeted therapy is also an option  >>>  -patient has bone only metastases on CTs and bone scan; no visceral metastases  -will treat with tamoxifen + trastuzumab    Regimen:  -tamoxifen 20 mg daily  -trastuzumab 8 mg/kg IV day 1 cycle 1; followed by 6 mg/kg IV day 1 beginning with cycle 2    Herceptin:  -watch for cardiomyopathy, infusion reactions and pulmonary toxicity  -adverse reactions:> 10%:  Decreased LVEF, skin rash, abdominal pain, anorexia, infusion related reactions, asthenia, chills, back pain, dyspnea, etc.  -warnings/precautions:  Cardiomyopathy; infusion reactions; pulmonary toxicity; renal toxicity  -monitoring: Assess left ventricular ejection fraction (by ECG or MUGA scan) at baseline (immediately prior to trastuzumab initiation), every 3 months during trastuzumab therapy, every 4 weeks if trastuzumab is withheld for significant left ventricular cardiac  dysfunction, and every 6 months for at least 2 years following completion of adjuvant trastuzumab therapy. Monitor vital signs during infusion; monitor for hypersensitivity or infusion reaction; if a reaction occurs, monitor carefully until symptoms resolve completely. Monitor for signs/symptoms of cardiac dysfunction or pulmonary toxicity. If pregnancy inadvertently occurs during treatment, monitor amniotic fluid volume.    Fertility and birth control issues:  Discussion about fertility and contraception:  -Informed her about the potential impact of chemotherapy on fertility  -Made her aware of the option of referral to fertility specialist before chemotherapy and/or endocrine therapy to discuss options in case she desires future pregnancies.  Fertility preservation options include oocyte and embryo cryopreservation, etc.  -Amenorrhea frequently occurs during or after chemotherapy.  The majority of women younger than 35 years to resume menses within 2 years of finishing adjuvant chemotherapy  -Informed that menses and fertility are not necessarily linked. Absence of regular menses does not necessarily imply lack of fertility.  Conversely, the presence of menses does not guarantee fertility.  -There are limited data regarding continued fertility after chemotherapy.  -Cautioned her to avoid becoming pregnant during treatment with radiation therapy, chemotherapy, or endocrine therapy  -Hormone-based birth control is discouraged regardless of the harmless of the hormone receptor status of the patient's cancer  -Alternative methods of birth control, including IUD, barrier method, tubal ligation, vasectomy for the partner, etc.   -fertility specialist consultation if she so desires  -cautioned her not to become pregnant during treatment  -referred to gyn for consultation regarding nonhormonal methods of contraception-check on status of referral      Hematochezia:   Since yesterday, 08/28/2024, has a experienced painless  diarrhea with blood on toilet wipes as well as in toilet bowl; no weakness or dizziness; 5 loose stools yesterday, 2 loose stools today; no nausea, vomiting, hematemesis, or melena.  Never experienced GI bleeding before.  -08/29/2024:Urgent referral to GI for evaluation which she has been experiencing since 08/28/2024  10/16/24: Has not experienced any further Hematachezia  Continue to monitor  Will proceed with referral to GI-Scheduled 6/9/25    Family history of breast cancer, ovarian cancer, cervical cancer, stomach cancer, colon cancer:  -Maternal great aunt # 1: Breast cancer, in her 50s   -sister: Ovarian cancer, age 50  -sister: Cervical cancer, age 25  -mother: Ovarian cancer, age 50  -Maternal aunt: Cervical cancer, age 50  -maternal grandfather: Stomach cancer, age 70 (smoker)  -maternal great aunt # 2: Colon cancer, age 60  -genetic testing 08/22/2024:  BRCA1 mutation positive, heterozygous    Above discussed with the patient.  All questions answered.    Guarded prognosis discussed.  Discussed the following: That she has stage IV, incurable disease; palliation with systemic therapy can be attempted but response can not be guaranteed; prognosis guarded.  Potential side effects of tamoxifen and Herceptin discussed;: She understands and agrees with this plan.

## 2024-12-10 NOTE — ASSESSMENT & PLAN NOTE
Patient undergoing chemotherapy for ER+ and NC+ breast cancer   Hormonal contraception methods contraindicated   Mirena IUD removed 9/2024, pt using condoms and menstrual tracking  Counseled on the imperativeness of not getting pregnant during treatment do to the hormonal rise that would further progress her breast cancer and physiologic stress on body   Counseled on Copper IUD as best longacting non-hormonal anti-contraceptive option at this time, patient understood, however needs more time to come to decision surrounding copper IUD.   Discussed that in the setting of Mirena IUD removal and tamoxifen use, increased proliferation of endometrium could increase bleeding profile theoretically, however often times patient on chemotherapy can anticipate amenorrhea due to ovarian shutdown response to chemo, patient voiced understanding

## 2024-12-10 NOTE — NURSING
Infusion Note:  Pt has an appointment today for: Labs, Yazmin Cates FNP, and Infusion    Treatment Regimen: Trastuzumab   Cycle: 4 Day: 1   every Q3 weeks cycle;      Given via Right Mediport    UPT done: Yes and Result negative  UPT exception: N/A    Nursing note:  Treatment parameters: were met    Pt escorted to Infusion Clinic, no complaints, proceeded with treatment    Future appts scheduled: Labs, Provider, and Infusion on 12/31/24 & next ECHO 1/6/25.

## 2024-12-19 ENCOUNTER — HOSPITAL ENCOUNTER (OUTPATIENT)
Dept: CARDIOLOGY | Facility: HOSPITAL | Age: 34
Discharge: HOME | End: 2024-12-19
Attending: INTERNAL MEDICINE
Payer: MEDICAID

## 2024-12-19 VITALS
DIASTOLIC BLOOD PRESSURE: 75 MMHG | BODY MASS INDEX: 38.49 KG/M2 | WEIGHT: 231 LBS | HEIGHT: 65 IN | SYSTOLIC BLOOD PRESSURE: 117 MMHG

## 2024-12-19 DIAGNOSIS — C50.912 INFLAMMATORY CARCINOMA OF LEFT BREAST: ICD-10-CM

## 2024-12-19 DIAGNOSIS — K92.1 HEMATOCHEZIA: ICD-10-CM

## 2024-12-19 DIAGNOSIS — C77.3 MALIGNANT NEOPLASM METASTATIC TO LYMPH NODE OF AXILLA: ICD-10-CM

## 2024-12-19 LAB
APICAL FOUR CHAMBER EJECTION FRACTION: 65 %
APICAL TWO CHAMBER EJECTION FRACTION: 67 %
AV INDEX (PROSTH): 0.75
AV MEAN GRADIENT: 4 MMHG
AV PEAK GRADIENT: 6.8 MMHG
AV VALVE AREA BY VELOCITY RATIO: 2.9 CM²
AV VALVE AREA: 2.9 CM²
AV VELOCITY RATIO: 0.77
BSA FOR ECHO PROCEDURE: 2.19 M2
CV ECHO LV RWT: 0.54 CM
DOP CALC AO PEAK VEL: 1.3 M/S
DOP CALC AO VTI: 29.9 CM
DOP CALC LVOT AREA: 3.8 CM2
DOP CALC LVOT DIAMETER: 2.2 CM
DOP CALC LVOT PEAK VEL: 1 M/S
DOP CALC LVOT STROKE VOLUME: 85.5 CM3
DOP CALC MV VTI: 26.9 CM
DOP CALCLVOT PEAK VEL VTI: 22.5 CM
E WAVE DECELERATION TIME: 201.7 MSEC
E/A RATIO: 1.22
ECHO LV POSTERIOR WALL: 1 CM (ref 0.6–1.1)
FRACTIONAL SHORTENING: 43.2 % (ref 28–44)
GLOBAL LONGITUIDAL STRAIN: 14.5 %
HR MV ECHO: 68 BPM
INTERVENTRICULAR SEPTUM: 1.2 CM (ref 0.6–1.1)
LEFT ATRIUM AREA SYSTOLIC (APICAL 2 CHAMBER): 13.32 CM2
LEFT ATRIUM AREA SYSTOLIC (APICAL 4 CHAMBER): 10.84 CM2
LEFT ATRIUM SIZE: 3.46 CM
LEFT ATRIUM VOLUME INDEX MOD: 13.9 ML/M2
LEFT ATRIUM VOLUME MOD: 29.09 ML
LEFT INTERNAL DIMENSION IN SYSTOLE: 2.1 CM (ref 2.1–4)
LEFT VENTRICLE DIASTOLIC VOLUME INDEX: 26.88 ML/M2
LEFT VENTRICLE DIASTOLIC VOLUME: 56.44 ML
LEFT VENTRICLE END DIASTOLIC VOLUME APICAL 2 CHAMBER: 43.26 ML
LEFT VENTRICLE END DIASTOLIC VOLUME APICAL 4 CHAMBER: 59.7 ML
LEFT VENTRICLE END SYSTOLIC VOLUME APICAL 2 CHAMBER: 30.18 ML
LEFT VENTRICLE END SYSTOLIC VOLUME APICAL 4 CHAMBER: 23.99 ML
LEFT VENTRICLE MASS INDEX: 61.6 G/M2
LEFT VENTRICLE SYSTOLIC VOLUME INDEX: 6.6 ML/M2
LEFT VENTRICLE SYSTOLIC VOLUME: 13.78 ML
LEFT VENTRICULAR INTERNAL DIMENSION IN DIASTOLE: 3.7 CM (ref 3.5–6)
LEFT VENTRICULAR MASS: 129.3 G
LV LATERAL E/E' RATIO: 8.2 M/S
LVED V (TEICH): 56.44 ML
LVES V (TEICH): 13.78 ML
LVOT MG: 2.64 MMHG
LVOT MV: 0.77 CM/S
MV MEAN GRADIENT: 1 MMHG
MV PEAK A VEL: 0.67 M/S
MV PEAK E VEL: 0.82 M/S
MV PEAK GRADIENT: 3 MMHG
MV VALVE AREA BY CONTINUITY EQUATION: 3.18 CM2
OHS LV EJECTION FRACTION SIMPSONS BIPLANE MOD: 67 %
PISA TR MAX VEL: 2.34 M/S
RA MAJOR: 3.73 CM
RA PRESSURE ESTIMATED: 0 MMHG
RV TB RVSP: 2 MMHG
TDI LATERAL: 0.1 M/S
TR MAX PG: 22 MMHG
TRICUSPID ANNULAR PLANE SYSTOLIC EXCURSION: 2.34 CM
TV REST PULMONARY ARTERY PRESSURE: 22 MMHG
Z-SCORE OF LEFT VENTRICULAR DIMENSION IN END DIASTOLE: -5.68
Z-SCORE OF LEFT VENTRICULAR DIMENSION IN END SYSTOLE: -5.07

## 2024-12-19 PROCEDURE — 93306 TTE W/DOPPLER COMPLETE: CPT

## 2024-12-23 ENCOUNTER — HOSPITAL ENCOUNTER (OUTPATIENT)
Dept: RADIOLOGY | Facility: HOSPITAL | Age: 34
Discharge: HOME | End: 2024-12-23
Payer: MEDICAID

## 2024-12-23 DIAGNOSIS — Z13.71 BRCA1 GENE MUTATION NEGATIVE: ICD-10-CM

## 2024-12-23 PROCEDURE — 76830 TRANSVAGINAL US NON-OB: CPT | Mod: TC

## 2024-12-24 ENCOUNTER — HOSPITAL ENCOUNTER (OUTPATIENT)
Dept: RADIOLOGY | Facility: HOSPITAL | Age: 34
Discharge: HOME | End: 2024-12-24
Attending: NURSE PRACTITIONER
Payer: MEDICAID

## 2024-12-24 DIAGNOSIS — C50.911 INVASIVE DUCTAL CARCINOMA OF BREAST, RIGHT: ICD-10-CM

## 2024-12-24 DIAGNOSIS — C79.51 CARCINOMA OF BREAST METASTATIC TO BONE, UNSPECIFIED LATERALITY: ICD-10-CM

## 2024-12-24 DIAGNOSIS — C50.919 CARCINOMA OF BREAST METASTATIC TO BONE, UNSPECIFIED LATERALITY: ICD-10-CM

## 2024-12-24 DIAGNOSIS — G44.52 NEW DAILY PERSISTENT HEADACHE: ICD-10-CM

## 2024-12-24 PROCEDURE — 25500020 PHARM REV CODE 255

## 2024-12-24 PROCEDURE — A9577 INJ MULTIHANCE: HCPCS

## 2024-12-24 PROCEDURE — 70553 MRI BRAIN STEM W/O & W/DYE: CPT | Mod: TC

## 2024-12-24 RX ADMIN — GADOBENATE DIMEGLUMINE 20 ML: 529 INJECTION, SOLUTION INTRAVENOUS at 11:12

## 2024-12-30 DIAGNOSIS — C50.911 INVASIVE DUCTAL CARCINOMA OF BREAST, RIGHT: Primary | ICD-10-CM

## 2024-12-31 ENCOUNTER — OFFICE VISIT (OUTPATIENT)
Dept: HEMATOLOGY/ONCOLOGY | Facility: CLINIC | Age: 34
End: 2024-12-31
Payer: MEDICAID

## 2024-12-31 ENCOUNTER — APPOINTMENT (OUTPATIENT)
Dept: HEMATOLOGY/ONCOLOGY | Facility: CLINIC | Age: 34
End: 2024-12-31
Payer: MEDICAID

## 2024-12-31 ENCOUNTER — INFUSION (OUTPATIENT)
Dept: INFUSION THERAPY | Facility: HOSPITAL | Age: 34
End: 2024-12-31
Attending: INTERNAL MEDICINE
Payer: MEDICAID

## 2024-12-31 VITALS
DIASTOLIC BLOOD PRESSURE: 100 MMHG | TEMPERATURE: 98 F | RESPIRATION RATE: 18 BRPM | HEART RATE: 85 BPM | SYSTOLIC BLOOD PRESSURE: 127 MMHG | HEIGHT: 65 IN | WEIGHT: 232.19 LBS | OXYGEN SATURATION: 98 % | BODY MASS INDEX: 38.69 KG/M2

## 2024-12-31 VITALS
RESPIRATION RATE: 18 BRPM | TEMPERATURE: 99 F | DIASTOLIC BLOOD PRESSURE: 79 MMHG | OXYGEN SATURATION: 98 % | SYSTOLIC BLOOD PRESSURE: 120 MMHG | HEART RATE: 77 BPM | BODY MASS INDEX: 38.69 KG/M2 | HEIGHT: 65 IN | WEIGHT: 232.19 LBS

## 2024-12-31 DIAGNOSIS — C50.912 INVASIVE DUCTAL CARCINOMA OF LEFT BREAST: ICD-10-CM

## 2024-12-31 DIAGNOSIS — Z15.09 BRCA1 GENE MUTATION POSITIVE: ICD-10-CM

## 2024-12-31 DIAGNOSIS — C77.3 MALIGNANT NEOPLASM METASTATIC TO LYMPH NODE OF AXILLA: ICD-10-CM

## 2024-12-31 DIAGNOSIS — C50.912 INFLAMMATORY CARCINOMA OF LEFT BREAST: ICD-10-CM

## 2024-12-31 DIAGNOSIS — C79.51 CARCINOMA OF LEFT BREAST METASTATIC TO BONE: Primary | ICD-10-CM

## 2024-12-31 DIAGNOSIS — C50.911 INVASIVE DUCTAL CARCINOMA OF BREAST, RIGHT: Primary | ICD-10-CM

## 2024-12-31 DIAGNOSIS — C50.911 INVASIVE DUCTAL CARCINOMA OF BREAST, RIGHT: ICD-10-CM

## 2024-12-31 DIAGNOSIS — C50.912 CARCINOMA OF LEFT BREAST METASTATIC TO BONE: Primary | ICD-10-CM

## 2024-12-31 DIAGNOSIS — Z15.01 BRCA1 GENE MUTATION POSITIVE: ICD-10-CM

## 2024-12-31 LAB
25(OH)D3+25(OH)D2 SERPL-MCNC: 37 NG/ML (ref 30–80)
ALBUMIN SERPL-MCNC: 3.5 G/DL (ref 3.5–5)
ALBUMIN/GLOB SERPL: 0.9 RATIO (ref 1.1–2)
ALP SERPL-CCNC: 68 UNIT/L (ref 40–150)
ALT SERPL-CCNC: 36 UNIT/L (ref 0–55)
ANION GAP SERPL CALC-SCNC: 6 MEQ/L
AST SERPL-CCNC: 26 UNIT/L (ref 5–34)
B-HCG UR QL: NEGATIVE
BASOPHILS # BLD AUTO: 0.04 X10(3)/MCL
BASOPHILS NFR BLD AUTO: 0.5 %
BILIRUB SERPL-MCNC: 0.5 MG/DL
BUN SERPL-MCNC: 20.1 MG/DL (ref 7–18.7)
CALCIUM SERPL-MCNC: 9.5 MG/DL (ref 8.4–10.2)
CEA SERPL-MCNC: 16.35 NG/ML (ref 0–3)
CHLORIDE SERPL-SCNC: 102 MMOL/L (ref 98–107)
CO2 SERPL-SCNC: 30 MMOL/L (ref 22–29)
CREAT SERPL-MCNC: 0.77 MG/DL (ref 0.55–1.02)
CREAT/UREA NIT SERPL: 26
CTP QC/QA: YES
EOSINOPHIL # BLD AUTO: 1.63 X10(3)/MCL (ref 0–0.9)
EOSINOPHIL NFR BLD AUTO: 20.9 %
ERYTHROCYTE [DISTWIDTH] IN BLOOD BY AUTOMATED COUNT: 12.3 % (ref 11.5–17)
GFR SERPLBLD CREATININE-BSD FMLA CKD-EPI: >60 ML/MIN/1.73/M2
GLOBULIN SER-MCNC: 3.9 GM/DL (ref 2.4–3.5)
GLUCOSE SERPL-MCNC: 135 MG/DL (ref 74–100)
HCT VFR BLD AUTO: 36.5 % (ref 37–47)
HGB BLD-MCNC: 11.7 G/DL (ref 12–16)
IMM GRANULOCYTES # BLD AUTO: 0.02 X10(3)/MCL (ref 0–0.04)
IMM GRANULOCYTES NFR BLD AUTO: 0.3 %
LYMPHOCYTES # BLD AUTO: 2.6 X10(3)/MCL (ref 0.6–4.6)
LYMPHOCYTES NFR BLD AUTO: 33.4 %
MAGNESIUM SERPL-MCNC: 1.7 MG/DL (ref 1.6–2.6)
MCH RBC QN AUTO: 31.2 PG (ref 27–31)
MCHC RBC AUTO-ENTMCNC: 32.1 G/DL (ref 33–36)
MCV RBC AUTO: 97.3 FL (ref 80–94)
MONOCYTES # BLD AUTO: 0.64 X10(3)/MCL (ref 0.1–1.3)
MONOCYTES NFR BLD AUTO: 8.2 %
NEUTROPHILS # BLD AUTO: 2.86 X10(3)/MCL (ref 2.1–9.2)
NEUTROPHILS NFR BLD AUTO: 36.7 %
NRBC BLD AUTO-RTO: 0 %
PLATELET # BLD AUTO: 272 X10(3)/MCL (ref 130–400)
PMV BLD AUTO: 9.8 FL (ref 7.4–10.4)
POTASSIUM SERPL-SCNC: 3.5 MMOL/L (ref 3.5–5.1)
PROT SERPL-MCNC: 7.4 GM/DL (ref 6.4–8.3)
RBC # BLD AUTO: 3.75 X10(6)/MCL (ref 4.2–5.4)
SODIUM SERPL-SCNC: 138 MMOL/L (ref 136–145)
WBC # BLD AUTO: 7.79 X10(3)/MCL (ref 4.5–11.5)

## 2024-12-31 PROCEDURE — 99215 OFFICE O/P EST HI 40 MIN: CPT | Mod: 95,,, | Performed by: NURSE PRACTITIONER

## 2024-12-31 PROCEDURE — 80053 COMPREHEN METABOLIC PANEL: CPT

## 2024-12-31 PROCEDURE — 3078F DIAST BP <80 MM HG: CPT | Mod: CPTII,95,, | Performed by: NURSE PRACTITIONER

## 2024-12-31 PROCEDURE — 4010F ACE/ARB THERAPY RXD/TAKEN: CPT | Mod: CPTII,95,, | Performed by: NURSE PRACTITIONER

## 2024-12-31 PROCEDURE — 36415 COLL VENOUS BLD VENIPUNCTURE: CPT

## 2024-12-31 PROCEDURE — 63600175 PHARM REV CODE 636 W HCPCS: Mod: JZ,JG | Performed by: NURSE PRACTITIONER

## 2024-12-31 PROCEDURE — 1160F RVW MEDS BY RX/DR IN RCRD: CPT | Mod: CPTII,95,, | Performed by: NURSE PRACTITIONER

## 2024-12-31 PROCEDURE — 81025 URINE PREGNANCY TEST: CPT | Performed by: NURSE PRACTITIONER

## 2024-12-31 PROCEDURE — 3008F BODY MASS INDEX DOCD: CPT | Mod: CPTII,95,, | Performed by: NURSE PRACTITIONER

## 2024-12-31 PROCEDURE — 83735 ASSAY OF MAGNESIUM: CPT

## 2024-12-31 PROCEDURE — 96413 CHEMO IV INFUSION 1 HR: CPT

## 2024-12-31 PROCEDURE — 63600175 PHARM REV CODE 636 W HCPCS: Performed by: NURSE PRACTITIONER

## 2024-12-31 PROCEDURE — 96372 THER/PROPH/DIAG INJ SC/IM: CPT | Mod: 59

## 2024-12-31 PROCEDURE — 25000003 PHARM REV CODE 250: Performed by: NURSE PRACTITIONER

## 2024-12-31 PROCEDURE — 85025 COMPLETE CBC W/AUTO DIFF WBC: CPT

## 2024-12-31 PROCEDURE — 3074F SYST BP LT 130 MM HG: CPT | Mod: CPTII,95,, | Performed by: NURSE PRACTITIONER

## 2024-12-31 PROCEDURE — 1159F MED LIST DOCD IN RCRD: CPT | Mod: CPTII,95,, | Performed by: NURSE PRACTITIONER

## 2024-12-31 PROCEDURE — 82306 VITAMIN D 25 HYDROXY: CPT

## 2024-12-31 PROCEDURE — 82378 CARCINOEMBRYONIC ANTIGEN: CPT

## 2024-12-31 RX ORDER — SODIUM CHLORIDE 0.9 % (FLUSH) 0.9 %
10 SYRINGE (ML) INJECTION
Status: CANCELLED | OUTPATIENT
Start: 2024-12-31

## 2024-12-31 RX ORDER — HEPARIN 100 UNIT/ML
500 SYRINGE INTRAVENOUS
Status: CANCELLED | OUTPATIENT
Start: 2024-12-31

## 2024-12-31 RX ORDER — SODIUM CHLORIDE 0.9 % (FLUSH) 0.9 %
10 SYRINGE (ML) INJECTION
Status: DISCONTINUED | OUTPATIENT
Start: 2024-12-31 | End: 2024-12-31 | Stop reason: HOSPADM

## 2024-12-31 RX ORDER — DIPHENHYDRAMINE HYDROCHLORIDE 50 MG/ML
50 INJECTION INTRAMUSCULAR; INTRAVENOUS ONCE AS NEEDED
Status: CANCELLED | OUTPATIENT
Start: 2024-12-31

## 2024-12-31 RX ORDER — EPINEPHRINE 0.3 MG/.3ML
0.3 INJECTION SUBCUTANEOUS ONCE AS NEEDED
Status: CANCELLED | OUTPATIENT
Start: 2024-12-31

## 2024-12-31 RX ORDER — EPINEPHRINE 1 MG/ML
0.3 INJECTION INTRAMUSCULAR; INTRAVENOUS; SUBCUTANEOUS ONCE AS NEEDED
Status: DISCONTINUED | OUTPATIENT
Start: 2024-12-31 | End: 2024-12-31 | Stop reason: HOSPADM

## 2024-12-31 RX ORDER — HEPARIN 100 UNIT/ML
500 SYRINGE INTRAVENOUS
Status: DISCONTINUED | OUTPATIENT
Start: 2024-12-31 | End: 2024-12-31 | Stop reason: HOSPADM

## 2024-12-31 RX ORDER — PROCHLORPERAZINE EDISYLATE 5 MG/ML
10 INJECTION INTRAMUSCULAR; INTRAVENOUS ONCE AS NEEDED
Status: CANCELLED
Start: 2024-12-31

## 2024-12-31 RX ORDER — DIPHENHYDRAMINE HYDROCHLORIDE 50 MG/ML
50 INJECTION INTRAMUSCULAR; INTRAVENOUS ONCE AS NEEDED
Status: DISCONTINUED | OUTPATIENT
Start: 2024-12-31 | End: 2024-12-31 | Stop reason: HOSPADM

## 2024-12-31 RX ORDER — PROCHLORPERAZINE EDISYLATE 5 MG/ML
10 INJECTION INTRAMUSCULAR; INTRAVENOUS ONCE AS NEEDED
Status: DISCONTINUED | OUTPATIENT
Start: 2024-12-31 | End: 2024-12-31 | Stop reason: HOSPADM

## 2024-12-31 RX ADMIN — TRASTUZUMAB-ANNS 653 MG: 420 INJECTION, POWDER, LYOPHILIZED, FOR SOLUTION INTRAVENOUS at 12:12

## 2024-12-31 RX ADMIN — HEPARIN 500 UNITS: 100 SYRINGE at 01:12

## 2024-12-31 RX ADMIN — DENOSUMAB 120 MG: 120 INJECTION SUBCUTANEOUS at 11:12

## 2024-12-31 RX ADMIN — SODIUM CHLORIDE: 9 INJECTION, SOLUTION INTRAVENOUS at 12:12

## 2024-12-31 NOTE — PROGRESS NOTES
Established Patient - Video Telehealth Visit     The patient location is: [facility]  The chief complaint leading to consultation is: Follow-up  Visit type: Virtual visit with Video Visual  Total time spent with patient: 30 mins        Each patient to whom I provide medical services by telemedicine is:  (1) informed of the relationship between the physician and patient and the respective role of any other health care provider with respect to management of the patient; and (2) notified that they may decline to receive medical services by telemedicine and may withdraw from such care at any time. Patient verbally consented to receive this Video service.     This service was not originating from a related E/M service provided within the previous 7 days nor will  to an E/M service or procedure within the next 24 hours or my soonest available appointment.  Prevailing standard of care was able to be met in this video visit.      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   -FDG PET-CT for staging 09/30/2024: Sites of disease:  Multifocal, left breast; left axillary and subpectoral lymphadenopathy; small focus upper right breast; bilobar liver metastases; portal caval lymph node; multifocal bone metastases  -Monoallelic mutation of BRCA1 gene   -premenopausal   -family history of breast cancer, ovarian cancer, cervical cancer, stomach cancer, colon cancer    Current Treatment:  -tamoxifen 20 mg daily  -trastuzumab 8 mg/kg IV day 1 cycle 1; followed by 6 mg/kg IV day 1 beginning with cycle 2  start  10/8/24    xgeva 120mg SQ every 28 days   start 12/31/24    Treatment History:  -MediPort placed 08/14/2024   -Mirena removed on 9/18/24    Past medical history: Hypertension  Surgical/procedure history:  MediPort placement 08/14/2024    Social history: .  Lives in [place].  Has a 3-year-old son.  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  Denies history of tobacco, alcohol, or illicit drug abuse.  Family history:   -Maternal great aunt # 1: Breast cancer, in her 50s   -sister: Ovarian cancer, age 50  -sister: Cervical cancer, age 25  -mother: Ovarian cancer, age 50  -Maternal aunt: Cervical cancer, age 50  -maternal grandfather: Stomach cancer, age 70 (smoker)  -maternal great aunt # 2: Colon cancer, age 60  Health maintenance: PCP at [facility].  No screening colonoscopy ever.  Menstrual/Ob gyn history: Menarche, age 11; 1 pregnancy, 1 child; gave birth to her child at age 30; no abortions or miscarriages premenopausal; OCP at age 16, received Depo shots from age [age]; no contraception from age [age] when she became pregnant; Mirena IUD after pregnancy at age 30; experienced hot flashes.  No menstrual cycles after Mirena was placed.      History of Present Illness:   Oncologic/Hematologic History:  Oncology History   Malignant neoplasm of overlapping sites of left breast in female, estrogen receptor positive   8/8/2024 Initial Diagnosis    Malignant neoplasm of overlapping sites of left breast in female, estrogen receptor positive     8/8/2024 Cancer Staged    Staging form: Breast, AJCC 8th Edition  - Clinical stage from 8/8/2024: Stage IIIB (cT4d, cN1(f), cM0, G3, ER+, AR-, HER2+)     Malignant neoplasm of overlapping sites of right breast in female, estrogen receptor positive   8/8/2024 Initial Diagnosis    Malignant neoplasm of overlapping sites of right breast in female, estrogen receptor positive     8/8/2024 Cancer Staged    Staging form: Breast, AJCC 8th Edition  - Clinical stage  from 8/8/2024: Stage IIA (cT2, cN0(f), cM0, G2, ER+, CA-, HER2-)     Invasive ductal carcinoma of breast, right   7/17/2024 Cancer Staged    Staging form: Breast, AJCC 8th Edition  - Clinical stage from 7/17/2024: Stage IA (cT1c, cN0, cM0, G2, ER+, CA+, HER2-)     8/29/2024 Initial Diagnosis    Invasive ductal carcinoma of breast, right     Invasive ductal carcinoma of left breast   8/29/2024 Initial Diagnosis    Invasive ductal carcinoma of left breast     9/16/2024 Cancer Staged    Staging form: Breast, AJCC 8th Edition  - Clinical stage from 9/16/2024: Stage IV (cT4d, cN2, pM1, G3, ER+, CA-, HER2+)     10/8/2024 -  Chemotherapy    Treatment Summary   Plan Name: OP BREAST TRASTUZUMAB Q3W  Treatment Goal: Palliative  Status: Active  Start Date: 10/8/2024  End Date: 9/9/2025 (Planned)  Provider: Kevon Subramanian MD  Chemotherapy: trastuzumab-anns (KANJINTI) 871 mg in 0.9% NaCl 326 mL chemo infusion, 8 mg/kg = 871 mg, Intravenous, Clinic/HOD 1 time, 4 of 17 cycles  Administration: 871 mg (10/8/2024), 636 mg (10/29/2024), 653 mg (11/19/2024)     34-year-old lady, referred from [facility] surgery, with invasive ductal carcinoma of breast.      08/09/2024:  [facility] surgery Office note:   CC: b/l breast IDC     HPI: Jeannie Greene is a 34 y.o. female with PMHx of HTN, R breast IDC Grade 2, and L breast/axillary ICD Grade 3 presents to clinic today with L breast and arm pain.   She first noticed the pain and swelling in L breast in May where she presented to the ED and received antibiotics. Pain did not resolve so patient followed up with her OB/Gyn who ordered the imaging and biopsy and was subsequently referred to our clinic. Today, patient reports swelling and pain in left breast, axilla, and down her arm. No pain in right breast, but has had some milky discharge in the past from R nipple. She states her pain as a 6/10 that was at its worse in July and has improved some since. She takes ibuprofen 4x daily without relief.     Patient had first menarche at 11. One pregnancy with child and not gone through menopause. She was on OCP at 15yo, received Depo shot from [age]. No contraception from [age] when she got pregnant. Got Murina IUD after pregnancy at 30  that is still in place.  Pt has an extensive family history of breast and cervical cancer. Her mother and sister had cervical cancer. Her mother had a hysterectomy. She also believes her maternal aunt had breast cancer. She only takes losartan for HTN. No prior surgeries.  Physical exam:   # right breast: 3 cm mass 12 o'clock, 4 in above nipple, no skin changes, no nipple retraction or discharge, no palpable right axillary lymphadenopathy  # left breast: Swollen and hard; large mass appreciated two o'clock position right above breast, 1 hard immobile lymph node in left axilla, breast and axilla tender to palpation (inflammatory carcinoma left breast)      Review of Systems   Respiratory:  Negative for cough and shortness of breath.    Cardiovascular:  Negative for chest pain.   Gastrointestinal:  Negative for abdominal pain and diarrhea.   Genitourinary:  Positive for frequency.   Musculoskeletal:  Negative for back pain.   Skin:  Negative for rash.   Neurological:  Positive for headaches.   Endo/Heme/Allergies:         Hot flashes   Psychiatric/Behavioral:  The patient is not nervous/anxious.    All other systems reviewed and are negative.      Physical Exam  Constitutional:       Appearance: Normal appearance.   HENT:      Head: Normocephalic.      Mouth/Throat:      Mouth: Mucous membranes are moist.   Eyes:      Pupils: Pupils are equal, round, and reactive to light.   Cardiovascular:      Rate and Rhythm: Normal rate and regular rhythm.      Pulses: Normal pulses.      Heart sounds: Normal heart sounds.   Pulmonary:      Effort: Pulmonary effort is normal.      Breath sounds: Normal breath sounds.   Abdominal:      General: Bowel sounds are normal.      Palpations: Abdomen is  soft.   Musculoskeletal:         General: Normal range of motion.      Cervical back: Normal range of motion.   Skin:     General: Skin is warm and dry.      Capillary Refill: Capillary refill takes less than 2 seconds.   Neurological:      General: No focal deficit present.      Mental Status: She is alert and oriented to person, place, and time.   Psychiatric:         Attention and Perception: Attention normal.         Mood and Affect: Affect normal.         Speech: Speech normal.         Behavior: Behavior normal.         Thought Content: Thought content normal.     Interval History    12/31/24  Ms. rGeene presents today for follow-up. She reports feeling well today. She underwent MRI brain on 12/24/24 due to headaches. No metastatic disease noted in her brain, she is noted with a C4 lytic lesion. She actually reports the headaches have improved with the use of Claritin in the last few days. She says it was mostly pressure in her frontal sinuses, She denies any neck pain at this time. Patient says she has noticed improvement in breast lumps and the pain has reduced.  She denies any new or worsening breast masses, dyspnea, chest pain, changes in bowel/bladder, N/V , difficulty with ambulation or changes in bowel patterns or blood in urine/stool.           12/10/24: Patient presents to clinic alone for scheduled follow up and treatment visit.  Due to receive palliative Herceptin infusion today.  Patient reports tolerating treatment well without any cardiac concerns.  Patient reports compliance with tamoxifen daily as ordered.  Patient denies any vision changes or abnormal bleeding.  Patient reports worsening headaches to lower back portion of head.  Patient says headaches has gradually worsened over the past few weeks.  Patient says she has noticed improvement in breast lumps and no longer as painful as it was before.  Patient denies any new or worsening breast changes, dyspnea, chest pain, changes in  bowel/bladder patterns or blood in urine/stool.  Lab work reviewed with the patient, stable.  Dental clearance received today.  Reviewed treatment options for bone metastasis with the patient.  We discussed plan of care and follow up.    VITAL SIGNS:   Vitals:    12/31/24 1104   BP: 120/79   Pulse: 77   Resp: 18   Temp: 99 °F (37.2 °C)     Lab Results   Component Value Date    WBC 7.79 12/31/2024    RBC 3.75 (L) 12/31/2024    HGB 11.7 (L) 12/31/2024    HCT 36.5 (L) 12/31/2024    MCV 97.3 (H) 12/31/2024    MCH 31.2 (H) 12/31/2024    MCHC 32.1 (L) 12/31/2024    RDW 12.3 12/31/2024     12/31/2024    MPV 9.8 12/31/2024         CMP  Sodium   Date Value Ref Range Status   12/31/2024 138 136 - 145 mmol/L Final     Potassium   Date Value Ref Range Status   12/31/2024 3.5 3.5 - 5.1 mmol/L Final     Chloride   Date Value Ref Range Status   12/31/2024 102 98 - 107 mmol/L Final     CO2   Date Value Ref Range Status   12/31/2024 30 (H) 22 - 29 mmol/L Final     Blood Urea Nitrogen   Date Value Ref Range Status   12/31/2024 20.1 (H) 7.0 - 18.7 mg/dL Final     Creatinine   Date Value Ref Range Status   12/31/2024 0.77 0.55 - 1.02 mg/dL Final   06/16/2020 0.65 0.57 - 1.25 mg/dL Final     Calcium   Date Value Ref Range Status   12/31/2024 9.5 8.4 - 10.2 mg/dL Final     Albumin   Date Value Ref Range Status   12/31/2024 3.5 3.5 - 5.0 g/dL Final     Bilirubin Total   Date Value Ref Range Status   12/31/2024 0.5 <=1.5 mg/dL Final     ALP   Date Value Ref Range Status   12/31/2024 68 40 - 150 unit/L Final     AST   Date Value Ref Range Status   12/31/2024 26 5 - 34 unit/L Final     ALT   Date Value Ref Range Status   12/31/2024 36 0 - 55 unit/L Final     eGFR   Date Value Ref Range Status   12/31/2024 >60 mL/min/1.73/m2 Final       Orders 12/31/24  -Proceed with Herceptin today  -Echo 12/19/24 EF 67%  -Continue tamoxifen 20 mg p.o. daily-Proceed with infusion for initial Xgeva injection   -Echocardiogram every 3 months March  2025  -3 months after starting treatment (1/2025), re-stage with contrast-enhanced CT scans of C/A/P and whole-body nuclear medicine bone scan-scheduled for 01/03/2025  -Check CEA level every month to assess response  -follow up with MD in 3 weeks with lab work (CBC, CMP, mg, CEA), chemo and scan review       Assessment:  IDC both breasts; left breast inflammatory breast carcinoma:   -presentation:  Pain and swelling left breast 05/2024  -Mirena IUD in place  -extensive family history of breast cancer and cervical cancer  -physical exam: Right breast: 3 cm mass 12 o'clock position, 4 in above nipple  -physical exam: Left breast: Swollen, hard, large mass to o'clock position, 1 hard immobile lymph node in left axilla, breast and axilla tender to palpation (inflammatory carcinoma of left breast)  -mammogram and ultrasound 07/01/2024  # right breast:  16 x 11 x 13 mm mass, BI-RADS 4; right axillary lymph node, mildly prominent, BI-RADS 4  # left breast: Inflammatory breast carcinoma; metastasis to left axillary lymph nodes; multiple irregular left breast masses, BI-RADS 5;  -core biopsy left breast 07/16/2024:  IDC, grade 3; lymph node biopsy positive as well; ER 59%; HI 0%; HER2 positive (3+); Ki-67 unfavorable (66.4%); HER2 by FISH analysis, group 1 (HER2 gene amplification)  -right breast core biopsy 07/17/2024:  IDC, grade 2; lymph node biopsy negative; ER 97.8%; HI 9.6%; HER2 negative (score 0); Ki-67 unfavorable (69.7%); HER2 by FISH negative/nonamplified  -MediPort placed 08/14/2024   -genetic testing 08/22/2024  To summarize:  -IDC right breast:  IDC, grade 2, lymph node biopsy negative, ER positive, HI positive, HER2 negative;   cT1c cN0 MX, AJCC anatomic stage at least IA, clinical prognostic stage IA  -07/17/2024: S/P needle biopsy right breast and right axilla  -inflammatory carcinoma left breast, grade 3, lymph node biopsy positive, ER positive, HI negative, HER2 positive; cT4d cN2 M1, stage IV  (By virtue  of palpable, fixed lymph node in the left axilla, cN2)  -07/16/2024:  S/P needle biopsy left breast and left axilla  -genetic testing 08/22/2024:  BRCA1 mutation positive, heterozygous  -premenopausal per labs 08/29/2024  -baseline TTE 09/05/2024:  LVEF 60-65%   -DEXA scan 09/16/2024:  Normal BMD of lumbar vertebrae end of femoral necks  -baseline staging CTs C/A/P 0 09/16/2024:  Multiple thoracolumbar vertebrae and sacral lytic metastases   -baseline staging whole-body nuclear medicine bone scan 09/16/2024:  Thoracolumbar vertebrae and sacrum metastases  -09/16/2024: Scans reviewed; patient has metastatic disease; FDG PET-CT ordered; brain MRI scan with and without contrast ordered  -09/16/2024: Patient referred to IR for biopsy of suspicious bone lesions  -liquid biopsy 09/19/2024:  BRCA1 positive; HER2 positive;TMB could not be calculated due to insufficient circulating tumor DNA; MSI-high not detected  -09/19/2024: Baseline tumor markers: CEA 19.74, elevated; CA 27.29, CA 15-3 levels normal  -brain MRI for staging 09/23/2024: No intracranial metastases  -FDG PET-CT for staging 09/30/2024: Sites of disease:  Multifocal, left breast; left axillary and subpectoral lymphadenopathy; small focus upper right breast; bilobar liver metastases; portal caval lymph node; multifocal bone metastases      Sites of disease:   -IDC right breast, ER positive, ID positive, HER2 negative, cT1c cN0 MX  -inflammatory carcinoma left breast, ER positive, ID negative, HER2 positive, cT4d cN2 M1, stage IV  -FDG PET-CT for staging 09/30/2024: Sites of disease:  Multifocal, left breast; left axillary and subpectoral lymphadenopathy; small focus upper right breast; bilobar liver metastases; portal caval lymph node; multifocal bone metastases      Molecular markers:  -liquid biopsy:  BRCA1 positive; HER2 positive;TMB could not be calculated due to insufficient circulating tumor DNA; MSI-high not detected    Plan:  IDC both breasts; left  breast inflammatory breast carcinoma:  and  IDC right breast:  IDC, grade 2, lymph node biopsy negative, ER positive, CO positive, HER2 negative;   -BRCA1 mutation positive, heterozygous  -routine surgical resection of primary breast tumor is not indicated in the management of de Joaquina stage IV disease; although there is no survival benefit, it may be considered for local control of the primary tumor (deferred to surgery)  -patient can be treated with up to 3 lines of endocrine therapy + HER2 targeted therapy  -BRCA1 mutation positive; therefore, a candidate for palliative systemic therapy with PARPi (olaparib, talazoparib), as well (lower level evidence for HER2 positive tumors, therefore, category 2A in this setting)  -unfortunately, scans show thoracolumbar vertebral and sacral lytic metastases; therefore, stage IV disease  -because of metastatic disease, now, intent of treatment is palliation    -will treat with tamoxifen + trastuzumab    -baseline CEA level was elevated; baseline CA 15-3 and CA 27.29 levels were normal; follow CEA level every month to assess response  -On left breast biopsy, please order PD-L1 and NGS testing  -Dental evaluation and preventive Dentistry   -on biopsy of bone lesions, we will order comprehensive somatic profiling testing (NGS testing) to identify targets for palliative therapies    -    Bone metastasis  12/10/2024 dental clearance received-scanned into media  Start Xgeva 120 mg SQ q.4 weeks monitor calcium levels before each injection, Vitamin D level prior to iniital start and then periodically thereafter      New Onset of headaches  12/10/24: Patient says headaches have worsened within the last 2 weeks mostly back portion of head  MRI of the brain-ordered today    Left Breast Pain  -08/29/2024: Left breast pain; started on Norco 5 mg every 6 hours prn  10/16/24: Patient reports Rowley 5/325 infective in managing pain  Increase Norco 10/325mg po every 6 hours prn for  pain  -12/10/2024:  Patient says his pain not as severe as it was in the beginning since starting treatment  Continue Norco 10/325mg prn     If, at anytime, develops visceral crisis or (endocrine refractory, then, treatment options:  # first-line:    Pertuzumab +trastuzumab +docetaxel (category 1, Preferred);   pertuzumab +trastuzumab +paclitaxel (Preferred)  (maintenance trastuzumab/pertuzumab after response, with concurrent endocrine therapy)  # second-line:  -Fam trastuzumab deruxtecan (category 1, Preferred)  -tucatinib +trastuzumab +capecitabine is preferred in patients with both systemic and CNS progression in the 3rd line setting and beyond; it may also be given in the second-line setting, etc.    Palliative systemic therapy options:  -systemic therapy +HER2 targeted therapy; or  -endocrine therapy +/-HER2 targeted therapy +ovarian suppression in premenopausal patient  -for premenopausal patients, tamoxifen alone (without ovarian ablation/suppression) + HER2 targeted therapy is also an option  >>>  -patient has bone only metastases on CTs and bone scan; no visceral metastases  -will treat with tamoxifen + trastuzumab    Regimen:  -tamoxifen 20 mg daily  -trastuzumab 8 mg/kg IV day 1 cycle 1; followed by 6 mg/kg IV day 1 beginning with cycle 2    Herceptin:  -watch for cardiomyopathy, infusion reactions and pulmonary toxicity  -adverse reactions:> 10%:  Decreased LVEF, skin rash, abdominal pain, anorexia, infusion related reactions, asthenia, chills, back pain, dyspnea, etc.  -warnings/precautions:  Cardiomyopathy; infusion reactions; pulmonary toxicity; renal toxicity  -monitoring: Assess left ventricular ejection fraction (by ECG or MUGA scan) at baseline (immediately prior to trastuzumab initiation), every 3 months during trastuzumab therapy, every 4 weeks if trastuzumab is withheld for significant left ventricular cardiac dysfunction, and every 6 months for at least 2 years following completion of adjuvant  trastuzumab therapy. Monitor vital signs during infusion; monitor for hypersensitivity or infusion reaction; if a reaction occurs, monitor carefully until symptoms resolve completely. Monitor for signs/symptoms of cardiac dysfunction or pulmonary toxicity. If pregnancy inadvertently occurs during treatment, monitor amniotic fluid volume.    Fertility and birth control issues:  Discussion about fertility and contraception:  -Informed her about the potential impact of chemotherapy on fertility  -Made her aware of the option of referral to fertility specialist before chemotherapy and/or endocrine therapy to discuss options in case she desires future pregnancies.  Fertility preservation options include oocyte and embryo cryopreservation, etc.  -Amenorrhea frequently occurs during or after chemotherapy.  The majority of women younger than 35 years to resume menses within 2 years of finishing adjuvant chemotherapy  -Informed that menses and fertility are not necessarily linked. Absence of regular menses does not necessarily imply lack of fertility.  Conversely, the presence of menses does not guarantee fertility.  -There are limited data regarding continued fertility after chemotherapy.  -Cautioned her to avoid becoming pregnant during treatment with radiation therapy, chemotherapy, or endocrine therapy  -Hormone-based birth control is discouraged regardless of the harmless of the hormone receptor status of the patient's cancer  -Alternative methods of birth control, including IUD, barrier method, tubal ligation, vasectomy for the partner, etc.   -fertility specialist consultation if she so desires  -cautioned her not to become pregnant during treatment  -referred to gyn for consultation regarding nonhormonal methods of contraception-check on status of referral      Hematochezia:   Since yesterday, 08/28/2024, has a experienced painless diarrhea with blood on toilet wipes as well as in toilet bowl; no weakness or  dizziness; 5 loose stools yesterday, 2 loose stools today; no nausea, vomiting, hematemesis, or melena.  Never experienced GI bleeding before.  -08/29/2024:Urgent referral to GI for evaluation which she has been experiencing since 08/28/2024  10/16/24: Has not experienced any further Hematachezia  Continue to monitor  Will proceed with referral to GI-Scheduled 6/9/25    Family history of breast cancer, ovarian cancer, cervical cancer, stomach cancer, colon cancer:  -Maternal great aunt # 1: Breast cancer, in her 50s   -sister: Ovarian cancer, age 50  -sister: Cervical cancer, age 25  -mother: Ovarian cancer, age 50  -Maternal aunt: Cervical cancer, age 50  -maternal grandfather: Stomach cancer, age 70 (smoker)  -maternal great aunt # 2: Colon cancer, age 60  -genetic testing 08/22/2024:  BRCA1 mutation positive, heterozygous    Above discussed with the patient.  All questions answered.    Guarded prognosis discussed.  Discussed the following: That she has stage IV, incurable disease; palliation with systemic therapy can be attempted but response can not be guaranteed; prognosis guarded.  Potential side effects of tamoxifen and Herceptin discussed;: She understands and agrees with this plan.        Answers submitted by the patient for this visit:  Review of Systems Questionnaire (Submitted on 12/30/2024)  appetite change : No  unexpected weight change: No  mouth sores: No  visual disturbance: No  adenopathy: No

## 2024-12-31 NOTE — Clinical Note
Hello, can this patient scan be moved up to 1/17/24 to be done prior to the next cycle of chemo and Md visit...

## 2024-12-31 NOTE — NURSING
0004 Patient is here for labs , virtual NP visit & C5 Kanjinti q 3 wks & Xgeva q 4 wks /1st dose. Calcium 9.5. Mediport flushes easily, but no blood return today.    Patient voices no c/o.   [facility] gave blood return prior to deaccessing. Infusion completed w/o incident.

## 2025-01-13 ENCOUNTER — CLINICAL SUPPORT (OUTPATIENT)
Dept: GYNECOLOGY | Facility: CLINIC | Age: 35
End: 2025-01-13
Payer: MEDICAID

## 2025-01-13 DIAGNOSIS — Z15.01 BRCA1 GENE MUTATION POSITIVE: Primary | ICD-10-CM

## 2025-01-13 DIAGNOSIS — C50.912 INFLAMMATORY CARCINOMA OF LEFT BREAST: ICD-10-CM

## 2025-01-13 DIAGNOSIS — Z15.09 BRCA1 GENE MUTATION POSITIVE: Primary | ICD-10-CM

## 2025-01-13 NOTE — PROGRESS NOTES
[facility] Progress Note    Primary Site: [facility]  Patient location: Home  Physician location: In office    Chief Complaint: F/u Contraception Counseling in the setting of BRCA1 Breast CA    HPI  Jeannie Greene joined me via our telemedicine platform.      I have reviewed family history, social history, medications and allergies as documented in the patient's electronic medical record.      Reports, labs, outside records, and images have been reviewed with pertinent interpretations below. See telemedicine notes records for intervening communications.      Assessment/Plan  Problem List Items Addressed This Visit          Oncology    Inflammatory carcinoma of left breast    [Secondary malignancy of thoracic vertebral column]    BRCA1 gene mutation positive - Primary     Contraception options limited given hormone positive breast CA. Pt would like to proceed with Paragard IUD insertion  Stressed importance of continued abstinence vs condom use if sexually active. Pt voices understanding   TVUS reviewed, overall unremarkable. Will continue annual TVUS, CA-125 for surveillance until risk reducing Hyst and BSO recommended at 35-40 or when done with child bearing. Given advanced staging, will delay surgery until further prognosis can be made              See correspondence above for plan.   Patient's needs assessed and health education provided. Patient understands risks, benefits, and alternatives of treatment prescribed above. Patient verbalizes understanding and agrees to follow plan.    Patient's identity was confirmed and confidentiality/privacy confirmed prior to visit. I certify that this visit was done via secure two-way transmission with informed consent of the patient and/or guardian.  Each patient to whom I provide medical services by telemedicine is:  (1) informed of the relationship between the physician and patient and the respective role of any other health care provider with respect to  management of the patient; and (2) notified that they may decline to receive medical services by telemedicine and may withdraw from such care at any time. Patient verbally consented to receive this service via voice-only telephone call.    Audio only was selected because there was no capability for video conferencing with patient.      20 mins of the time was counseling or coordinating care.    Discussed with Dr. Provost ELLIS for Paragard IUD insertion    Michael Calderon MD  [facility]  01/13/2025 12:09 PM

## 2025-01-14 ENCOUNTER — TELEPHONE (OUTPATIENT)
Dept: GYNECOLOGY | Facility: CLINIC | Age: 35
End: 2025-01-14
Payer: MEDICAID

## 2025-01-14 NOTE — TELEPHONE ENCOUNTER
----- Message from Michael Calderon sent at 1/13/2025 12:09 PM CST -----  Can you make her the next available Paragard IUD insertion appt, needs contraception ASAP given breast CA on chemo    Michael Calderon MD  [facility]  01/13/2025 12:10 PM

## 2025-01-17 ENCOUNTER — TELEPHONE (OUTPATIENT)
Dept: HEMATOLOGY/ONCOLOGY | Facility: CLINIC | Age: 35
End: 2025-01-17
Payer: MEDICAID

## 2025-01-17 DIAGNOSIS — C50.911 INVASIVE DUCTAL CARCINOMA OF BREAST, RIGHT: Primary | ICD-10-CM

## 2025-01-20 ENCOUNTER — TELEPHONE (OUTPATIENT)
Dept: HEMATOLOGY/ONCOLOGY | Facility: CLINIC | Age: 35
End: 2025-01-20
Payer: MEDICAID

## 2025-01-21 ENCOUNTER — TELEPHONE (OUTPATIENT)
Dept: HEMATOLOGY/ONCOLOGY | Facility: CLINIC | Age: 35
End: 2025-01-21
Payer: MEDICAID

## 2025-01-26 PROBLEM — C79.51: Status: ACTIVE | Noted: 2025-01-26

## 2025-01-26 NOTE — PROGRESS NOTES
History:  Past Medical History:   Diagnosis Date    BRCA1 gene mutation positive 09/09/2024    BRCA1 [id number] ([id number]) - [facility] BRCA1 and BRCA2 gene sequence and deletion/duplication and 85-gene CustomNext-Cancer Panel (85 genes), VUS in NF1    Cancer     Hypertension      Past Surgical History:   Procedure Laterality Date    INSERTION OF TUNNELED CENTRAL VENOUS CATHETER (CVC) WITH SUBCUTANEOUS PORT N/A 08/14/2024    Procedure: [id number];  Surgeon: Jc Chauhan Jr., MD;  Location: [facility];  Service: General;  Laterality: N/A;      Social History     Socioeconomic History    Marital status: Other   Tobacco Use    Smoking status: Never     Passive exposure: Never    Smokeless tobacco: Never   Substance and Sexual Activity    Alcohol use: Never    Drug use: Never    Sexual activity: Yes     Partners: Male     Social Drivers of Health     Financial Resource Strain: Low Risk  (11/2/2020)    [facility]    Overall Financial Resource Strain (CARDIA)     Difficulty of Paying Living Expenses: Not hard at all   Food Insecurity: No Food Insecurity (11/2/2020)    Received from [facility]    Hunger Vital Sign     Worried About Running Out of Food in the Last Year: Never true     Ran Out of Food in the Last Year: Never true   Transportation Needs: No Transportation Needs (11/2/2020)    Received from [facility]    PRAPARE - Transportation     Lack of Transportation (Medical): No     Lack of Transportation (Non-Medical): No   Physical Activity: Inactive (11/2/2020)    Received from [facility]    Exercise Vital Sign     Days of Exercise per Week: 0 days     Minutes of Exercise per Session: 0  min   Stress: No Stress Concern Present (11/2/2020)    Received from [facility]    [employer] - Occupational Stress Questionnaire     Feeling of Stress : Not at all      Family History   Problem Relation Name Age of Onset    Cervical cancer Mother Pao Zacarias 50    Hypertension Mother Pao Zacarias     Diabetes Father Robinson Bland     Hypertension Father Robinson Bland     Colon cancer Maternal Grandfather Robert Chang Jr 70    Cancer Maternal Grandfather Robert Chang Jr     Hypertension Paternal Grandmother Libertad Zacarias     Autism spectrum disorder Son Gilmar 3    Cervical cancer Other Conxavia 25    Kidney failure Other Robinson Jr     Cervical cancer Maternal Aunt Pao 50    Cervical cancer Maternal Aunt Yaima 44    Kidney failure Maternal Aunt Ghada 45    Cervical cancer Other      Stomach cancer Other          recurrence    Liver cancer Other Pedro Pablo 54    Hypertension Other [place]     Heart attack Paternal Uncle      BRCA1 Positive Paternal Cousin      Breast cancer Paternal Cousin  40    BRCA1 Positive Paternal Cousin      Breast cancer Paternal Cousin  42        BL mastect    Breast cancer Other      Hypertension Maternal Aunt Yaima Brown     Thyroid disease Maternal Aunt Yaima Brown     Cervical cancer Maternal Aunt Yaima Brown     Hypertension Maternal Aunt Mirta Brown     Hypertension Sister Evelio Brink     Breast cancer Maternal Aunt Anupama Damon         Breast Cancer    Cervical cancer Sister Victorina Barajas       Reason for Follow-up:  -bilateral breast cancer, diagnosed concurrently   -IDC right breast:  IDC, grade 2, lymph node biopsy negative, ER positive, LA positive, HER2 negative; S/P core biopsy 07/17/2024; cT1c cN0 MX, AJCC anatomic stage at least IA, clinical prognostic stage IA  -inflammatory carcinoma left breast, grade 3, lymph node biopsy positive, ER positive, LA  negative, HER2 positive; S/P core biopsy 07/16/2024; cT4d cN2 M1, stage IV  -Thoracolumbar vertebrae and sacrum metastatic involvement.   -FDG PET-CT for staging 09/30/2024: Sites of disease:  Multifocal, left breast; left axillary and subpectoral lymphadenopathy; small focus upper right breast; bilobar liver metastases; portal caval lymph node; multifocal bone metastases  -brain MRI 12/24/2024:  9 mm metastases anterior aspect of C4 vertebral body  -Monoallelic mutation of BRCA1 gene   -premenopausal   -family history of breast cancer, ovarian cancer, cervical cancer, stomach cancer, colon cancer    History of Present Illness:   Invasive ductal carcinoma of breast, right      Oncologic/Hematologic History:  Oncology History   Malignant neoplasm of overlapping sites of left breast in female, estrogen receptor positive   8/8/2024 Initial Diagnosis    Malignant neoplasm of overlapping sites of left breast in female, estrogen receptor positive     8/8/2024 Cancer Staged    Staging form: Breast, AJCC 8th Edition  - Clinical stage from 8/8/2024: Stage IV (cT4d, cN1(f), pM1, G3, ER+, KS-, HER2+)     Malignant neoplasm of overlapping sites of right breast in female, estrogen receptor positive   8/8/2024 Initial Diagnosis    Malignant neoplasm of overlapping sites of right breast in female, estrogen receptor positive     8/8/2024 Cancer Staged    Staging form: Breast, AJCC 8th Edition  - Clinical stage from 8/8/2024: Stage IIA (cT2, cN0(f), cM0, G2, ER+, KS-, HER2-)     Invasive ductal carcinoma of breast, right   7/17/2024 Cancer Staged    Staging form: Breast, AJCC 8th Edition  - Clinical stage from 7/17/2024: Stage IA (cT1c, cN0, cM0, G2, ER+, KS+, HER2-)     8/29/2024 Initial Diagnosis    Invasive ductal carcinoma of breast, right     Invasive ductal carcinoma of left breast   8/29/2024 Initial Diagnosis    Invasive ductal carcinoma of left breast     9/16/2024 Cancer Staged    Staging form: Breast, AJCC 8th Edition  -  Clinical stage from 9/16/2024: Stage IV (cT4d, cN2, pM1, G3, ER+, TX-, HER2+)     10/8/2024 -  Chemotherapy    Treatment Summary   Plan Name: OP BREAST TRASTUZUMAB Q3W  Treatment Goal: Palliative  Status: Active  Start Date: 10/8/2024  End Date: 9/9/2025 (Planned)  Provider: Kevon Subramanian MD  Chemotherapy: trastuzumab-anns (KANJINTI) 871 mg in 0.9% NaCl 326 mL chemo infusion, 8 mg/kg = 871 mg, Intravenous, Clinic/HOD 1 time, 5 of 17 cycles  Administration: 871 mg (10/8/2024), 636 mg (10/29/2024), 653 mg (11/19/2024), 653 mg (12/10/2024)     Past medical history: Hypertension  Surgical/procedure history:  MediPort placement 08/14/2024    Social history: .  Lives in [place].  Has a 3-year-old son.  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  Denies history of tobacco, alcohol, or illicit drug abuse.  Family history:   -Maternal great aunt # 1: Breast cancer, in her 50s   -sister: Ovarian cancer, age 50  -sister: Cervical cancer, age 25  -mother: Ovarian cancer, age 50  -Maternal aunt: Cervical cancer, age 50  -maternal grandfather: Stomach cancer, age 70 (smoker)  -maternal great aunt # 2: Colon cancer, age 60  Health maintenance: PCP at [facility].  No screening colonoscopy ever.  Menstrual/Ob gyn history: Menarche, age 11; 1 pregnancy, 1 child; gave birth to her child at age 30; no abortions or miscarriages premenopausal; OCP at age 16, received Depo shots from age [age]; no contraception from age [age] when she became pregnant; Mirena IUD after pregnancy at age 30; experienced hot flashes.  No menstrual cycles after Mirena was placed.  ================================      34-year-old lady, referred from [facility] surgery, with invasive ductal carcinoma of breast.  We are following her for metastatic breast cancer.    Please see assessment and plan section for details.    08/29/2024:   Pleasant lady who presents for initial medical oncology consultation.  In no acute discomfort.  Hot flashes  since May 2024.  Left breast is painful; gets sharp pains intermittently, 9/10 severity; also, pain in left arm but no swelling.  Pain is intermittent.  Left breast tumor is getting bigger.  No ulceration.  Since yesterday, 08/28/2024, has a experienced painless diarrhea with blood on toilet wipes as well as in toilet bowl; no weakness or dizziness; 5 loose stools yesterday, 2 loose stools today; no nausea, vomiting, hematemesis, or melena.  Never experienced GI bleeding before.  No unusual headaches, focal neurological symptoms, chest pain, cough, dyspnea, abdominal pain, nausea or vomiting.  No urinary problems.  No bone pains.  Appetite is okay.  No unintentional weight loss.    Interval History:  PROSTATE LEUPROLIDE (LUPRON) 3 MONTH   OP BREAST TRASTUZUMAB Q3W     01/28/2025:   -on Herceptin every 3 weeks +tamoxifen (palliative systemic therapy)  -Mirena IUD removed 09/2024  -12/19/2024: Surveillance echocardiogram:  LVEF 67%  -12/23/2024:  Pelvic ultrasound (encounter for non procreative screening for genetic disease carrier status):  Small fibroid; cyst in the right and left ovaries (right ovary cyst 1.5 cm and 1.4 cm; left ovary cyst 1.1 cm and 7 mm)  -12/24/2024: Brain MRI with and without contrast (headache; comparison brain MRI 09/23/2024):  9 mm ovoid osseous metastasis in the anterior aspect of C4 vertebral body.  No evidence of intracranial metastatic disease.   Stable 8 mm x 5 mm cystic nodule in the pituitary gland, favor Rathke's cleft cyst.  -dental clearance obtained 11/29/2024 (dated 11/26/2024)  -Xgeva for bone metastases, 120 mg subQ every 4 weeks, started 12/31/2024  -01/13/2025:  Gyn consultation/follow-up:  Patient would like to proceed with ParaGard IUD insertion (nonhormonal contraception); annual transvaginal ultrasound and CA-125 level for surveillance in view of BRCA1 mutation status until risk reduction HUA-BSO recommended at age 35-40 or when done with childbearing; given stage IV  breast cancer, plan to delay surgery until further prognosis can be made  -restaging CTs C/A/P 01/27/2025:  Overall progression of disease with multiple new and enlarging osseous metastases as well as a new metastatic lesion right liver lobe; the majority of the left axillary metastatic lymphadenopathy has decreased in size; however, there is a single left axillary lymph node which has enlarged; the left breast skin thickening and underlying asymmetry density in the left breast parenchyma is grossly unchanged (2.5 x 2.3 cm left axillary lymph node has increased in size from 2.1 x 1.7 cm previously; suggestion of a peripherally enhancing nodule dome of the right lobe of the liver 1.9 cm; multiple lucent lytic metastatic lesions throughout the visualized spine which have progressed from the prior exam with multiple new and enlarging lesions, representative 8 mm lucent lesion T11 vertebral body is new, T7 lesion has been increased in size 16 x 12 mm previously 13 x 11 mm)  -restaging whole-body nuclear medicine bone scan 01/27/2025:  Similar uptake in the spine, ribs, and pelvis compared to prior study  -01/28/2025: Labs reviewed; hemoglobin 11.3, rest of CBC essentially unremarkable; CMP and CEA levels are pending  Presents for a follow-up visit.  No complaints.  Doing great.  No pain.  No weakness or fatigue.  No anorexia or unintentional weight loss.  No new lumps or lymphadenopathy.  No abdominal pain, nausea, vomiting, or jaundice.  ECOG 0.  No unusual headaches or focal neurological symptoms, either.  We discussed labs, scans, and disease progression.  No back pain.  No allergic reactions with Herceptin.  Compliant with tamoxifen.    Medications:  Current Outpatient Medications on File Prior to Visit   Medication Sig Dispense Refill    HYDROcodone-acetaminophen (NORCO)  mg per tablet Take 1 tablet by mouth every 6 (six) hours as needed for Pain. 90 tablet 0    losartan-hydrochlorothiazide 100-25 mg (HYZAAR)  100-25 mg per tablet       ondansetron (ZOFRAN) 4 MG tablet Take 1 tablet (4 mg total) by mouth 2 (two) times daily. 10 tablet 1    prochlorperazine (COMPAZINE) 5 MG tablet Take 2 tablets (10 mg total) by mouth every 6 (six) hours as needed (nausea). 40 tablet 11    tamoxifen (NOLVADEX) 20 MG Tab Take 1 tablet (20 mg total) by mouth once daily. 30 tablet 5     Current Facility-Administered Medications on File Prior to Visit   Medication Dose Route Frequency Provider Last Rate Last Admin    [COMPLETED] iohexoL (OMNIPAQUE 350) 350 mg iodine/mL injection        100 mL at 01/27/25 1200    [COMPLETED] kit prep Tc-99m-medronate sod 20 mg SolR 8 millicurie  8 millicurie Intravenous ONCE PRN Kevon Subramanian MD   25.2 millicurie at 01/27/25 1101       Review of Systems:   All systems reviewed and found to be negative except for the symptoms detailed above    Physical Examination:   VITAL SIGNS:   Vitals:    01/28/25 0930   BP: 135/83   Pulse: 84   Resp: 18   Temp: 97.9 °F (36.6 °C)         GENERAL:  In no apparent distress.    HEAD:  No signs of head trauma.  EYES:  Pupils are equal.  Extraocular motions intact.    EARS:  Hearing grossly intact.  MOUTH:  Oropharynx is normal.   NECK:  No adenopathy, no JVD.     CHEST:  Chest with clear breath sounds bilaterally.  No wheezes, rales, rhonchi.    CARDIAC:  Regular rate and rhythm.  S1 and S2, without murmurs, gallops, rubs.  VASCULAR:  No Edema.  Peripheral pulses normal and equal in all extremities.  ABDOMEN:  Soft, without detectable tenderness.  No sign of distention.  No   rebound or guarding, and no masses palpated.   Bowel Sounds normal.  MUSCULOSKELETAL:  Good range of motion of all major joints. Extremities without clubbing, cyanosis or edema.    NEUROLOGIC EXAM:  Alert and oriented x 3.  No focal sensory or strength deficits.   Speech normal.  Follows commands.  PSYCHIATRIC:  Mood normal.  08/29/2024:  Breast examination was performed with a verbal consent, in the  presence of Zulma Woo LPN:  # right breast:  About 4 cm nontender mobile tumor palpable at 11:00 a.m. position in the right breast, several cm from nipple, without overlying skin ulceration/satellite nodules/fixation; no nipple discharge; no palpable regional lymphadenopathy   # left breast: Entire left breast is involved with the hard, nontender, freely mobile tumor; orange peel appearance of skin is noted with faint erythema; hard, immobile lymph node is palpable in the left axilla.  No swelling of left upper extremity.    No results found for this or any previous visit.  Results for orders placed or performed during the hospital encounter of 08/14/24   Comprehensive Metabolic Panel   Result Value Ref Range    Sodium 140 136 - 145 mmol/L    Potassium 3.3 (L) 3.5 - 5.1 mmol/L    Chloride 103 98 - 107 mmol/L    CO2 28 22 - 29 mmol/L    Glucose 153 (H) 74 - 100 mg/dL    Blood Urea Nitrogen 18.1 7.0 - 18.7 mg/dL    Creatinine 0.88 0.55 - 1.02 mg/dL    Calcium 10.2 8.4 - 10.2 mg/dL    Protein Total 8.1 6.4 - 8.3 gm/dL    Albumin 4.0 3.5 - 5.0 g/dL    Globulin 4.1 (H) 2.4 - 3.5 gm/dL    Albumin/Globulin Ratio 1.0 (L) 1.1 - 2.0 ratio    Bilirubin Total 0.5 <=1.5 mg/dL    ALP 56 40 - 150 unit/L    ALT 31 0 - 55 unit/L    AST 21 5 - 34 unit/L    eGFR >60 mL/min/1.73/m2    Anion Gap 9.0 mEq/L    BUN/Creatinine Ratio 21        Assessment:  Problem List Items Addressed This Visit       Malignant neoplasm of overlapping sites of left breast in female, estrogen receptor positive    Overview     Characteristics of Inflammatory breast cancer  1. LEFT BREAST, 2 O'CLOCK, 10 CM FN: 2.0 cm  - INVASIVE DUCTAL CARCINOMA, GRADE 3.   Comment #1: Carcinoma is present on all three core biopsies, comprising    95% of the tissue and measuring at least 1.2 cm.   2. LEFT AXILLA:   - INVASIVE DUCTAL CARCINOMA, GRADE 3.   Comment #2: No lymph node is present. However, this could represent    metastatic carcinoma completely replacing a lymph  node.   ER: POSITIVE, LA: NEGATIVE, HER2**: POSITIVE  3. LEFT BREAST, 1 O'CLOCK 12 CM FN - 2.3 cm         Metastasis to lymph nodes - Left axilla    Overview     1. LEFT BREAST, 2 O'CLOCK, 10 CM FN:   - INVASIVE DUCTAL CARCINOMA, GRADE 3.   Comment #1: Carcinoma is present on all three core biopsies, comprising    95% of the tissue and measuring at least 1.2 cm.   2. LEFT AXILLA:   - INVASIVE DUCTAL CARCINOMA, GRADE 3.   Comment #2: No lymph node is present. However, this could represent    metastatic carcinoma completely replacing a lymph node.   ER: POSITIVE, LA: NEGATIVE, HER2**: POSITIVE          Secondary malignant neoplasm of cervical vertebral column    Malignant neoplasm of overlapping sites of right breast in female, estrogen receptor positive    Overview     1. RIGHT BREAST, 12 O'CLOCK, 9 CM FN: 1.6 cm  - INVASIVE DUCTAL CARCINOMA, GRADE 2.   Comment: Invasive carcinoma is present on 3 of the core biopsies,    comprising 60% of the tissue and measures at least 0.7 cm.   2. RIGHT AXILLA:   - LYMPH NODE, NEGATIVE FOR METASTATIC CARCINOMA.     ER: POSITIVE, LA: POSITIVE, HER2**: NEGATIVE          Invasive ductal carcinoma of breast, right    Invasive ductal carcinoma of left breast    Secondary malignant neoplasm of axillary lymph nodes    Secondary malignancy of lumbar vertebral column    Secondary malignancy of sacrum    Family history of breast cancer    Family history of cervical cancer    Premenopausal patient - Primary    Family history of colon cancer    Family history of ovarian cancer    Family history of stomach cancer    Hematochezia    Inflammatory carcinoma of left breast    Secondary malignancy of thoracic vertebral column    Monoallelic mutation of BRCA1 gene    Breast cancer metastasized to bone    Adenocarcinoma of breast metastatic to liver    BRCA1 gene mutation positive         Orders for 01/28/2025:  Continue Herceptin  Start Lupron  Continue tamoxifen for 2 months  After 2 months,  discontinue tamoxifen and start Arimidex +ribociclib  Arimidex 1 mg p.o. q.day   Ribociclib 600 mg p.o. daily X 21 days, then 7 days off to complete 28 day cycle; to continue until disease progression or unacceptable toxicity  Re-stage with contrast-enhanced CT scans of C/A/P and whole-body nuclear medicine bone scan in 3 months  Follow-up with gyn for ovarian cancer surveillance  Refer to GI for evaluation of hematochezia  Continue Xgeva   Calcium and vitamin-D supplements to continue   DEXA scan September 2026   Echocardiogram in March  Follow-up with NP in 3 weeks     Above discussed at length with the patient.  All questions answered.    Discussed labs and scans and gave her copies of relevant results.  Disease progression discussed.  Plan to change systemic therapy discussed.  Potential side effects of anastrozole and ribociclib discussed; gave her educational materials from [employer].  Guarded prognosis discussed.    She understands and agrees with this plan.  =================================      # IDC both breasts, diagnosed concurrently:  #Inflammatory carcinoma left breast:  -presentation:  Pain and swelling left breast 05/2024  -Mirena IUD in place  -extensive family history of breast cancer and cervical cancer  -physical exam: Right breast: 3 cm mass 12 o'clock position, 4 in above nipple  -physical exam: Left breast: Swollen, hard, large mass to o'clock position, 1 hard immobile lymph node in left axilla, breast and axilla tender to palpation (inflammatory carcinoma of left breast)  -mammogram and ultrasound 07/01/2024  -multiple masses left breast on mammogram and ultrasound  -core biopsy left breast 07/16/2024:  IDC, grade 3; lymph node biopsy positive as well; ER 59%; FL 0%; HER2 positive (3+); Ki-67 unfavorable (66.4%); HER2 by FISH analysis, group 1 (HER2 gene amplification)  -right breast core biopsy 07/17/2024:  IDC, grade 2; lymph node biopsy negative; ER 97.8%; FL 9.6%; HER2 negative (score 0);  Ki-67 unfavorable (69.7%); HER2 by FISH negative/nonamplified  -MediPort placed 08/14/2024   -genetic testing 08/22/2024  To summarize:  -concurrently diagnosed bilateral breast cancer   -IDC right breast:  IDC, grade 2, lymph node biopsy negative, ER positive, TX positive, HER2 negative; cT1c cN0 MX, AJCC anatomic stage at least IA, clinical prognostic stage IA; S/P needle biopsy of right breast and right axilla 07/17/2024  -inflammatory carcinoma left breast, grade 3, lymph node biopsy positive, ER positive, TX negative, HER2 positive; cT4d cN2 M1, stage IV; S/P needle biopsy left breast and left axilla [phone]  (By virtue of palpable, fixed lymph node in the left axilla, cN2)  -genetic testing 08/22/2024:  BRCA1 mutation positive, heterozygous  -premenopausal per labs 08/29/2024  -baseline TTE 09/05/2024:  LVEF 60-65%   -DEXA scan 09/16/2024:  Normal BMD of lumbar vertebrae end of femoral necks  -baseline staging CTs C/A/P 0 09/16/2024:  Multiple thoracolumbar vertebrae and sacral lytic metastases   -baseline staging whole-body nuclear medicine bone scan 09/16/2024:  Thoracolumbar vertebrae and sacrum metastases  -09/16/2024: Patient referred to IR for biopsy of suspicious bone lesions  -liquid biopsy 09/19/2024:  BRCA1 positive; HER2 positive;TMB could not be calculated due to insufficient circulating tumor DNA; MSI-high not detected  -09/19/2024: Baseline tumor markers: CEA 19.74, elevated; CA 27.29, CA 15-3 levels normal  -brain MRI for staging 09/23/2024: No intracranial metastases  -FDG PET-CT for staging 09/30/2024: Sites of disease:  Multifocal, left breast; left axillary and subpectoral lymphadenopathy; small focus upper right breast; bilobar liver metastases; portal caval lymph node; multifocal bone metastases  -tamoxifen plus trastuzumab started 10/08/2024  -CT-guided biopsy left iliac crest 10/17/2024:  Metastases from breast carcinoma  -germline testing: BRCA1 mutation positive  -tissue NGS testing  (left breast, collected 07/16/2024):  BRCA1 mutation positive; HER2 positive; MSI stable; TMB 2.1 m/MB (low); fusion negative; PD-L1 negative (PD-L1 CPS 2; PD-L1 TPS 1%); PIK3CA negative; ESR1 negative  -cycle 2 of trastuzumab on 10/29/2024  -Mirena IUD removed 09/2024 (per gyn note dated 12/09/2024)  -surveillance TTE 12/19/2024: LVEF 67%  -pelvic ultrasound 12/23/2024:  Bilateral ovarian cysts, largest 1.5 cm  -brain MRI 12/24/2024: Headaches:  9 mm ovoid metastases anterior aspect of C4 vertebral body  -dental clearance obtained 11/29/2024 (dated 11/26/2024)  -Xgeva for bone metastases, 120 mg subQ every 4 weeks, started 12/31/2024  -01/13/2025:  Gyn consultation/follow-up:  Patient would like to proceed with ParaGard IUD insertion (nonhormonal contraception); annual transvaginal ultrasound and CA-125 level for surveillance in view of BRCA1 mutation status until risk reduction HUA-BSO recommended at age 35-40 or when done with childbearing; given stage IV breast cancer, plan to delay surgery until further prognosis can be made  -restaging CTs and bone scan 01/27/2025:  Progression of metastases  >>>  Plan:  -metastatic progression on restaging scans 01/27/2025  -discontinue Herceptin +tamoxifen  -no visceral metastases, therefore, we will continue treatment with Herceptin +second-line endocrine therapy  -will switch to Herceptin + ovarian function suppression + anastrazole +ribociclib (see below)  -01/28/2025:  continue Herceptin; we will start Lupron every 3 months ASAP; for a couple of months, continue tamoxifen along with Lupron; after 2 months, discontinue tamoxifen, and start Arimidex +ribociclib  -re-stage with contrast-enhanced CT scans of C/A/P and bone scan in 3 months (early April)  -bone metastases confirmed with biopsy of left iliac crest 10/17/2024  -tissue NGS: HER2 positive  -germline BRCA1 mutation positive  -we have requested ER and TX testing on left iliac crest biopsy as well; we will request  again  -premenopausal patient   -BRCA1 mutation positive, heterozygous  -baseline DEXA scan 09/16/2024: Normal BMD  -left breast tumor is ER positive, CA negative, HER2 positive  -Mirena IUD removed; follow-up with gyn for ParaGard insertion as nonhormonal methods of contraception; annual pelvic ultrasound and CA-125 level measurement for ovarian cancer surveillance in view of BRCA1 status, per gyn; risk reduction HUA-BSO per gyn at opportune moment  -no hormonal therapy or hormonal contraception, given the diagnosis of breast cancer  -08/29/2024:  Urgent referral to GI for evaluation of hematochezia which she has been experiencing since 08/28/2024  -08/29/2024: Left breast pain; started on Norco 5 mg every 6 hours prn  -because of metastatic disease, intent of treatment is palliation  -continue Xgeva every 4 weeks to prevent skeletal events from bone metastases  -calcium and vitamin-D supplements to prevent hypocalcemia with Xgeva  -routine surgical resection of primary breast tumor is not indicated in the management of de Joaquina stage IV disease; although there is no survival benefit, it may be considered for local control of the primary tumor (deferred to surgery)  -monitor LVEF TTE every 3 months; next, mid March  -patient can be treated with a up to 3 lines of endocrine therapy +HER2 targeted therapy  -BRCA1 mutation positive; therefore, a candidate for palliative systemic therapy with PARPi (olaparib, talazoparib), as well (lower level evidence for HER2 positive tumors, therefore, category 2A in this setting)  -baseline CEA level was elevated; baseline CA 15-3 and CA 27.29 levels were normal; follow CEA level every month to assess response  Briefly:  Switch to Herceptin + Lupron every 12 weeks + anastrazole +ribociclib  -01/28/2025:  continue Herceptin; we will start Lupron every 3 months ASAP; for a couple of months, continue tamoxifen along with Lupron; after 2 months, discontinue tamoxifen, and start Arimidex  +ribociclib  Re-stage with contrast-enhanced CT scans of C/A/P and bone scan early April  ER positive, NH negative, HER2 positive  Follow-up with gyn for ovarian cancer surveillance (BRCA1 mutation positive)  GI referral for hematochezia  Continue Xgeva   Calcium and vitamin-D supplements to continue  DEXA scan in 2 years (September 2026)  Echocardiogram mid March    If, at anytime, develops visceral crisis or endocrine refractory, then, treatment options:  # first-line:    Pertuzumab +trastuzumab +docetaxel (category 1, Preferred);   pertuzumab +trastuzumab +paclitaxel (Preferred)  (after response, maintenance trastuzumab/pertuzumab + concurrent endocrine therapy)   # second-line:  -Fam trastuzumab deruxtecan (category 1, Preferred)  -tucatinib +trastuzumab +capecitabine is preferred in patients with both systemic and CNS progression in the 3rd line setting and beyond; it may also be given in the second-line setting, etc.    Palliative systemic therapy options:  -systemic therapy +HER2 targeted therapy; or  -endocrine therapy +/-HER2 targeted therapy +ovarian suppression in premenopausal patient  -for premenopausal patients, tamoxifen alone (without ovarian ablation/suppression) + HER2 targeted therapy is also an option  >>>  -no visceral crisis, therefore, decided to treat with tamoxifen + trastuzumab    Herceptin:  -watch for cardiomyopathy, infusion reactions and pulmonary toxicity  -adverse reactions:> 10%:  Decreased LVEF, skin rash, abdominal pain, anorexia, infusion related reactions, asthenia, chills, back pain, dyspnea, etc.  -warnings/precautions:  Cardiomyopathy; infusion reactions; pulmonary toxicity; renal toxicity  -monitoring: Assess left ventricular ejection fraction (by ECG or MUGA scan) at baseline (immediately prior to trastuzumab initiation), every 3 months during trastuzumab therapy, every 4 weeks if trastuzumab is withheld for significant left ventricular cardiac dysfunction, and every 6  months for at least 2 years following completion of adjuvant trastuzumab therapy. Monitor vital signs during infusion; monitor for hypersensitivity or infusion reaction; if a reaction occurs, monitor carefully until symptoms resolve completely.   Monitor for signs/symptoms of cardiac dysfunction or pulmonary toxicity. If pregnancy inadvertently occurs during treatment, monitor amniotic fluid volume.    Monitoring with ribociclib:  -CBC: Baseline, every 2 weeks for first 2 cycles, at the beginning of the subsequent 4 cycles, and as clinically necessary  LFTs: Baseline, every 2 weeks for the first 2 cycles, at the beginning of the subsequent 4 cycles, and as clinically necessary  -Electrolytes: Prior to treatment, at the beginning of first 6 cycles, and as needed indicated  -EKG: Prior to treatment initiation, repeat on day 14 of cycle 1, at the beginning of cycle 2, and last week indicated    Anastrozole:   1 mg p.o. q.day    Ribociclib:  600 mg p.o. daily for 21 days, followed by a 7 day rest to complete a 28 day treatment cycle; continue until disease progression or unacceptable toxicity  With AI: 600 mg daily for 21 days, followed by 7-day rest period to complete a 28-day treatment cycle; continue until disease progression or unacceptable toxicity  -Dose reduction depending upon kidney impairment  -Dose reduction depending upon moderate or severe liver impairment  -Dose reductions: 600 mg daily, 400 mg daily, 200 mg daily  -Dose management depending upon toxicities: Hematologic toxicity (neutropenia), cardiovascular toxicity (QT prolonging agent), dermatologic toxicity, pulmonary toxicity  Dosage forms: 200 mg, 400 mg, 600 mg  Administration: With or without food  Moderate or high emetic potential  Warnings/precautions with ribociclib:  -Bone marrow suppression: Neutropenia  -Dermatologic toxicity  -Hepatobiliary toxicity  -QT prolongation  -Pulmonary toxicity  Adverse reactions with ribociclib:  Peripheral edema,  alopecia, pruritus, skin rash, abdominal pain, constipation, anorexia, UTIs, anemia, leukopenia, neutropenia, increased ALT, increased AST, etc.      Fertility and birth control issues:  Discussion about fertility and contraception:  -Informed her about the potential impact of chemotherapy on fertility  -Made her aware of the option of referral to fertility specialist before chemotherapy and/or endocrine therapy to discuss options in case she desires future pregnancies.  Fertility preservation options include oocyte and embryo cryopreservation, etc.  -Amenorrhea frequently occurs during or after chemotherapy.  The majority of women younger than 35 years to resume menses within 2 years of finishing adjuvant chemotherapy  -Informed that menses and fertility are not necessarily linked. Absence of regular menses does not necessarily imply lack of fertility.  Conversely, the presence of menses does not guarantee fertility.  -There are limited data regarding continued fertility after chemotherapy.  -Cautioned her to avoid becoming pregnant during treatment with radiation therapy, chemotherapy, or endocrine therapy  -Hormone-based birth control is discouraged regardless of the harmless of the hormone receptor status of the patient's cancer  -Alternative methods of birth control, including IUD, barrier method, tubal ligation, vasectomy for the partner, etc.   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, HER2 positive, cT4d cN2 M1, stage IV  -FDG PET-CT for staging 09/30/2024: Sites of disease:  Multifocal, left  breast; left axillary and subpectoral lymphadenopathy; small focus upper right breast; bilobar liver metastases; portal caval lymph node; multifocal bone metastases  -brain MRI 12/24/2024:  9 mm metastases anterior aspect of C4 vertebral body (no brain metastases)      # Molecular markers:  -liquid biopsy:  BRCA1 positive; HER2 positive;TMB could not be calculated due to insufficient circulating tumor DNA; MSI-high not detected  -CT-guided biopsy left iliac crest 10/17/2024:  Metastases from breast carcinoma  -germline testing: BRCA1 mutation positive  -tissue NGS testing (left breast, collected 07/16/2024):  BRCA1 mutation positive; HER2 positive; MSI stable; TMB 2.1 m/MB (low); fusion negative; PD-L1 negative (PD-L1 CPS 2; PD-L1 TPS 1%); PIK3CA negative; ESR1 negative  -genetic testing 08/22/2024:  BRCA1 mutation positive, heterozygous      # Hematochezia:   Since yesterday, 08/28/2024, has a experienced painless diarrhea with blood on toilet wipes as well as in toilet bowl; no weakness or dizziness; 5 loose stools yesterday, 2 loose stools today; no nausea, vomiting, hematemesis, or melena.  Never experienced GI bleeding before.  >>>   -08/29/2024: sent an urgent referral to GI for evaluation      Family history of breast cancer, ovarian cancer, cervical cancer, stomach cancer, colon cancer:  -Maternal great aunt # 1: Breast cancer, in her 50s   -sister: Ovarian cancer, age 50  -sister: Cervical cancer, age 25  -mother: Ovarian cancer, age 50  -Maternal aunt: Cervical cancer, age 50  -maternal grandfather: Stomach cancer, age 70 (smoker)  -maternal great aunt # 2: Colon cancer, age 60  >>>  -genetic testing 08/22/2024:  BRCA1 mutation positive, heterozygous      Follow-up:  No follow-ups on file.  Answers submitted by the patient for this visit:  Review of Systems Questionnaire (Submitted on 1/24/2025)  appetite change : No  unexpected weight change: No  mouth sores: No  visual disturbance: No  cough:  No  shortness of breath: No  chest pain: No  abdominal pain: No  diarrhea: No  frequency: No  back pain: No  rash: No  headaches: No  adenopathy: No  nervous/ anxious: No

## 2025-01-27 ENCOUNTER — HOSPITAL ENCOUNTER (OUTPATIENT)
Dept: RADIOLOGY | Facility: HOSPITAL | Age: 35
Discharge: HOME | End: 2025-01-27
Attending: INTERNAL MEDICINE
Payer: MEDICAID

## 2025-01-27 ENCOUNTER — TELEPHONE (OUTPATIENT)
Dept: ADMINISTRATIVE | Facility: HOSPITAL | Age: 35
End: 2025-01-27
Payer: MEDICAID

## 2025-01-27 DIAGNOSIS — C79.51 CARCINOMA OF BREAST METASTATIC TO BONE, UNSPECIFIED LATERALITY: ICD-10-CM

## 2025-01-27 DIAGNOSIS — C50.912 INFLAMMATORY CARCINOMA OF LEFT BREAST: Primary | ICD-10-CM

## 2025-01-27 DIAGNOSIS — C78.7 ADENOCARCINOMA OF BREAST METASTATIC TO LIVER, UNSPECIFIED LATERALITY: ICD-10-CM

## 2025-01-27 DIAGNOSIS — C50.912 INFLAMMATORY CARCINOMA OF LEFT BREAST: ICD-10-CM

## 2025-01-27 DIAGNOSIS — C50.919 ADENOCARCINOMA OF BREAST METASTATIC TO LIVER, UNSPECIFIED LATERALITY: ICD-10-CM

## 2025-01-27 DIAGNOSIS — C50.919 CARCINOMA OF BREAST METASTATIC TO BONE, UNSPECIFIED LATERALITY: ICD-10-CM

## 2025-01-27 PROCEDURE — 78306 BONE IMAGING WHOLE BODY: CPT | Mod: TC

## 2025-01-27 PROCEDURE — 25500020 PHARM REV CODE 255

## 2025-01-27 PROCEDURE — A9503 TC99M MEDRONATE: HCPCS | Performed by: INTERNAL MEDICINE

## 2025-01-27 PROCEDURE — 71260 CT THORAX DX C+: CPT | Mod: TC

## 2025-01-27 RX ORDER — TC 99M MEDRONATE 20 MG/10ML
8 INJECTION, POWDER, LYOPHILIZED, FOR SOLUTION INTRAVENOUS
Status: COMPLETED | OUTPATIENT
Start: 2025-01-27 | End: 2025-01-27

## 2025-01-27 RX ADMIN — IOHEXOL 100 ML: 350 INJECTION, SOLUTION INTRAVENOUS at 12:01

## 2025-01-27 RX ADMIN — TECHNETIUM TC 99M MEDRONATE 25.2 MILLICURIE: 20 INJECTION, POWDER, LYOPHILIZED, FOR SOLUTION INTRAVENOUS at 11:01

## 2025-01-28 ENCOUNTER — INFUSION (OUTPATIENT)
Dept: INFUSION THERAPY | Facility: HOSPITAL | Age: 35
End: 2025-01-28
Attending: INTERNAL MEDICINE
Payer: MEDICAID

## 2025-01-28 ENCOUNTER — OFFICE VISIT (OUTPATIENT)
Dept: HEMATOLOGY/ONCOLOGY | Facility: CLINIC | Age: 35
End: 2025-01-28
Attending: INTERNAL MEDICINE
Payer: MEDICAID

## 2025-01-28 ENCOUNTER — APPOINTMENT (OUTPATIENT)
Dept: HEMATOLOGY/ONCOLOGY | Facility: CLINIC | Age: 35
End: 2025-01-28
Payer: MEDICAID

## 2025-01-28 VITALS
WEIGHT: 232.56 LBS | HEIGHT: 65 IN | SYSTOLIC BLOOD PRESSURE: 122 MMHG | DIASTOLIC BLOOD PRESSURE: 73 MMHG | RESPIRATION RATE: 20 BRPM | TEMPERATURE: 98 F | HEART RATE: 79 BPM | OXYGEN SATURATION: 96 % | BODY MASS INDEX: 38.75 KG/M2

## 2025-01-28 VITALS
RESPIRATION RATE: 18 BRPM | OXYGEN SATURATION: 95 % | HEART RATE: 84 BPM | HEIGHT: 65 IN | SYSTOLIC BLOOD PRESSURE: 135 MMHG | TEMPERATURE: 98 F | WEIGHT: 232.63 LBS | DIASTOLIC BLOOD PRESSURE: 83 MMHG | BODY MASS INDEX: 38.76 KG/M2

## 2025-01-28 DIAGNOSIS — K92.1 HEMATOCHEZIA: ICD-10-CM

## 2025-01-28 DIAGNOSIS — C78.7 ADENOCARCINOMA OF BREAST METASTATIC TO LIVER, UNSPECIFIED LATERALITY: ICD-10-CM

## 2025-01-28 DIAGNOSIS — C50.911 INVASIVE DUCTAL CARCINOMA OF BREAST, RIGHT: ICD-10-CM

## 2025-01-28 DIAGNOSIS — Z80.0 FAMILY HISTORY OF COLON CANCER: ICD-10-CM

## 2025-01-28 DIAGNOSIS — C79.51 CARCINOMA OF BREAST METASTATIC TO BONE, UNSPECIFIED LATERALITY: ICD-10-CM

## 2025-01-28 DIAGNOSIS — C50.912 INFLAMMATORY CARCINOMA OF LEFT BREAST: Primary | ICD-10-CM

## 2025-01-28 DIAGNOSIS — C50.919 CARCINOMA OF BREAST METASTATIC TO BONE, UNSPECIFIED LATERALITY: ICD-10-CM

## 2025-01-28 DIAGNOSIS — C50.919 ADENOCARCINOMA OF BREAST METASTATIC TO LIVER, UNSPECIFIED LATERALITY: ICD-10-CM

## 2025-01-28 DIAGNOSIS — N95.9 PREMENOPAUSAL PATIENT: Primary | ICD-10-CM

## 2025-01-28 DIAGNOSIS — Z51.11 CHEMOTHERAPY MANAGEMENT, ENCOUNTER FOR: ICD-10-CM

## 2025-01-28 DIAGNOSIS — C79.51 SECONDARY MALIGNANCY OF SACRUM: ICD-10-CM

## 2025-01-28 DIAGNOSIS — C50.912 INVASIVE DUCTAL CARCINOMA OF LEFT BREAST: Primary | ICD-10-CM

## 2025-01-28 DIAGNOSIS — C79.51 CARCINOMA OF LEFT BREAST METASTATIC TO BONE: ICD-10-CM

## 2025-01-28 DIAGNOSIS — C79.51 SECONDARY MALIGNANCY OF LUMBAR VERTEBRAL COLUMN: ICD-10-CM

## 2025-01-28 DIAGNOSIS — C50.912 INFLAMMATORY CARCINOMA OF LEFT BREAST: ICD-10-CM

## 2025-01-28 DIAGNOSIS — C79.51: ICD-10-CM

## 2025-01-28 DIAGNOSIS — C50.811 MALIGNANT NEOPLASM OF OVERLAPPING SITES OF RIGHT BREAST IN FEMALE, ESTROGEN RECEPTOR POSITIVE: ICD-10-CM

## 2025-01-28 DIAGNOSIS — Z17.0 MALIGNANT NEOPLASM OF OVERLAPPING SITES OF RIGHT BREAST IN FEMALE, ESTROGEN RECEPTOR POSITIVE: ICD-10-CM

## 2025-01-28 DIAGNOSIS — Z80.0 FAMILY HISTORY OF STOMACH CANCER: ICD-10-CM

## 2025-01-28 DIAGNOSIS — C77.3 MALIGNANT NEOPLASM METASTATIC TO LYMPH NODE OF AXILLA: ICD-10-CM

## 2025-01-28 DIAGNOSIS — Z80.49 FAMILY HISTORY OF CERVICAL CANCER: ICD-10-CM

## 2025-01-28 DIAGNOSIS — C50.812 MALIGNANT NEOPLASM OF OVERLAPPING SITES OF LEFT BREAST IN FEMALE, ESTROGEN RECEPTOR POSITIVE: ICD-10-CM

## 2025-01-28 DIAGNOSIS — Z15.09 MONOALLELIC MUTATION OF BRCA1 GENE: ICD-10-CM

## 2025-01-28 DIAGNOSIS — Z17.0 MALIGNANT NEOPLASM OF OVERLAPPING SITES OF LEFT BREAST IN FEMALE, ESTROGEN RECEPTOR POSITIVE: ICD-10-CM

## 2025-01-28 DIAGNOSIS — Z15.01 MONOALLELIC MUTATION OF BRCA1 GENE: ICD-10-CM

## 2025-01-28 DIAGNOSIS — Z80.3 FAMILY HISTORY OF BREAST CANCER: ICD-10-CM

## 2025-01-28 DIAGNOSIS — Z15.01 BRCA1 GENE MUTATION POSITIVE: ICD-10-CM

## 2025-01-28 DIAGNOSIS — C77.3 SECONDARY MALIGNANT NEOPLASM OF AXILLARY LYMPH NODES: ICD-10-CM

## 2025-01-28 DIAGNOSIS — C79.51 SECONDARY MALIGNANCY OF THORACIC VERTEBRAL COLUMN: ICD-10-CM

## 2025-01-28 DIAGNOSIS — C50.912 CARCINOMA OF LEFT BREAST METASTATIC TO BONE: ICD-10-CM

## 2025-01-28 DIAGNOSIS — Z15.09 BRCA1 GENE MUTATION POSITIVE: ICD-10-CM

## 2025-01-28 DIAGNOSIS — C50.912 INVASIVE DUCTAL CARCINOMA OF LEFT BREAST: ICD-10-CM

## 2025-01-28 DIAGNOSIS — Z80.41 FAMILY HISTORY OF OVARIAN CANCER: ICD-10-CM

## 2025-01-28 LAB
ALBUMIN SERPL-MCNC: 3.3 G/DL (ref 3.5–5)
ALBUMIN/GLOB SERPL: 0.8 RATIO (ref 1.1–2)
ALP SERPL-CCNC: 42 UNIT/L (ref 40–150)
ALT SERPL-CCNC: 21 UNIT/L (ref 0–55)
ANION GAP SERPL CALC-SCNC: 7 MEQ/L
AST SERPL-CCNC: 16 UNIT/L (ref 5–34)
B-HCG UR QL: NEGATIVE
BASOPHILS # BLD AUTO: 0.03 X10(3)/MCL
BASOPHILS NFR BLD AUTO: 0.5 %
BILIRUB SERPL-MCNC: 0.3 MG/DL
BUN SERPL-MCNC: 20.1 MG/DL (ref 7–18.7)
CALCIUM SERPL-MCNC: 9 MG/DL (ref 8.4–10.2)
CEA SERPL-MCNC: 24.69 NG/ML (ref 0–3)
CHLORIDE SERPL-SCNC: 101 MMOL/L (ref 98–107)
CO2 SERPL-SCNC: 30 MMOL/L (ref 22–29)
CREAT SERPL-MCNC: 0.8 MG/DL (ref 0.55–1.02)
CREAT/UREA NIT SERPL: 25
CTP QC/QA: YES
EOSINOPHIL # BLD AUTO: 0.71 X10(3)/MCL (ref 0–0.9)
EOSINOPHIL NFR BLD AUTO: 11.5 %
ERYTHROCYTE [DISTWIDTH] IN BLOOD BY AUTOMATED COUNT: 12.4 % (ref 11.5–17)
GFR SERPLBLD CREATININE-BSD FMLA CKD-EPI: >60 ML/MIN/1.73/M2
GLOBULIN SER-MCNC: 4 GM/DL (ref 2.4–3.5)
GLUCOSE SERPL-MCNC: 199 MG/DL (ref 74–100)
HCT VFR BLD AUTO: 35.3 % (ref 37–47)
HGB BLD-MCNC: 11.3 G/DL (ref 12–16)
IMM GRANULOCYTES # BLD AUTO: 0.01 X10(3)/MCL (ref 0–0.04)
IMM GRANULOCYTES NFR BLD AUTO: 0.2 %
LYMPHOCYTES # BLD AUTO: 2.3 X10(3)/MCL (ref 0.6–4.6)
LYMPHOCYTES NFR BLD AUTO: 37.3 %
MAGNESIUM SERPL-MCNC: 1.8 MG/DL (ref 1.6–2.6)
MCH RBC QN AUTO: 31.3 PG (ref 27–31)
MCHC RBC AUTO-ENTMCNC: 32 G/DL (ref 33–36)
MCV RBC AUTO: 97.8 FL (ref 80–94)
MONOCYTES # BLD AUTO: 0.56 X10(3)/MCL (ref 0.1–1.3)
MONOCYTES NFR BLD AUTO: 9.1 %
NEUTROPHILS # BLD AUTO: 2.55 X10(3)/MCL (ref 2.1–9.2)
NEUTROPHILS NFR BLD AUTO: 41.4 %
NRBC BLD AUTO-RTO: 0 %
PLATELET # BLD AUTO: 297 X10(3)/MCL (ref 130–400)
PMV BLD AUTO: 9.7 FL (ref 7.4–10.4)
POTASSIUM SERPL-SCNC: 3.6 MMOL/L (ref 3.5–5.1)
PROT SERPL-MCNC: 7.3 GM/DL (ref 6.4–8.3)
RBC # BLD AUTO: 3.61 X10(6)/MCL (ref 4.2–5.4)
SODIUM SERPL-SCNC: 138 MMOL/L (ref 136–145)
WBC # BLD AUTO: 6.16 X10(3)/MCL (ref 4.5–11.5)

## 2025-01-28 PROCEDURE — 96372 THER/PROPH/DIAG INJ SC/IM: CPT

## 2025-01-28 PROCEDURE — 96413 CHEMO IV INFUSION 1 HR: CPT

## 2025-01-28 PROCEDURE — 25000003 PHARM REV CODE 250: Performed by: INTERNAL MEDICINE

## 2025-01-28 PROCEDURE — 1160F RVW MEDS BY RX/DR IN RCRD: CPT | Mod: CPTII,,, | Performed by: INTERNAL MEDICINE

## 2025-01-28 PROCEDURE — 3079F DIAST BP 80-89 MM HG: CPT | Mod: CPTII,,, | Performed by: INTERNAL MEDICINE

## 2025-01-28 PROCEDURE — 99214 OFFICE O/P EST MOD 30 MIN: CPT | Mod: PBBFAC | Performed by: INTERNAL MEDICINE

## 2025-01-28 PROCEDURE — A4216 STERILE WATER/SALINE, 10 ML: HCPCS | Performed by: INTERNAL MEDICINE

## 2025-01-28 PROCEDURE — 99215 OFFICE O/P EST HI 40 MIN: CPT | Mod: S$PBB,,, | Performed by: INTERNAL MEDICINE

## 2025-01-28 PROCEDURE — 36415 COLL VENOUS BLD VENIPUNCTURE: CPT

## 2025-01-28 PROCEDURE — 81025 URINE PREGNANCY TEST: CPT | Performed by: INTERNAL MEDICINE

## 2025-01-28 PROCEDURE — 1159F MED LIST DOCD IN RCRD: CPT | Mod: CPTII,,, | Performed by: INTERNAL MEDICINE

## 2025-01-28 PROCEDURE — 63600175 PHARM REV CODE 636 W HCPCS: Mod: JZ,TB | Performed by: INTERNAL MEDICINE

## 2025-01-28 PROCEDURE — 82378 CARCINOEMBRYONIC ANTIGEN: CPT

## 2025-01-28 PROCEDURE — 85025 COMPLETE CBC W/AUTO DIFF WBC: CPT

## 2025-01-28 PROCEDURE — 80053 COMPREHEN METABOLIC PANEL: CPT

## 2025-01-28 PROCEDURE — 83735 ASSAY OF MAGNESIUM: CPT

## 2025-01-28 PROCEDURE — 36593 DECLOT VASCULAR DEVICE: CPT

## 2025-01-28 PROCEDURE — 3075F SYST BP GE 130 - 139MM HG: CPT | Mod: CPTII,,, | Performed by: INTERNAL MEDICINE

## 2025-01-28 PROCEDURE — 3008F BODY MASS INDEX DOCD: CPT | Mod: CPTII,,, | Performed by: INTERNAL MEDICINE

## 2025-01-28 RX ORDER — PROCHLORPERAZINE EDISYLATE 5 MG/ML
10 INJECTION INTRAMUSCULAR; INTRAVENOUS ONCE AS NEEDED
Status: DISCONTINUED | OUTPATIENT
Start: 2025-01-28 | End: 2025-01-28 | Stop reason: HOSPADM

## 2025-01-28 RX ORDER — SODIUM CHLORIDE 0.9 % (FLUSH) 0.9 %
10 SYRINGE (ML) INJECTION
Status: DISCONTINUED | OUTPATIENT
Start: 2025-01-28 | End: 2025-01-28 | Stop reason: HOSPADM

## 2025-01-28 RX ORDER — DIPHENHYDRAMINE HYDROCHLORIDE 50 MG/ML
50 INJECTION INTRAMUSCULAR; INTRAVENOUS ONCE AS NEEDED
OUTPATIENT
Start: 2025-02-11

## 2025-01-28 RX ORDER — HEPARIN 100 UNIT/ML
500 SYRINGE INTRAVENOUS
Status: CANCELLED | OUTPATIENT
Start: 2025-01-28

## 2025-01-28 RX ORDER — HEPARIN 100 UNIT/ML
500 SYRINGE INTRAVENOUS
OUTPATIENT
Start: 2025-02-11

## 2025-01-28 RX ORDER — EPINEPHRINE 0.3 MG/.3ML
0.3 INJECTION SUBCUTANEOUS ONCE AS NEEDED
OUTPATIENT
Start: 2025-02-11

## 2025-01-28 RX ORDER — SODIUM CHLORIDE 0.9 % (FLUSH) 0.9 %
10 SYRINGE (ML) INJECTION
Status: CANCELLED | OUTPATIENT
Start: 2025-01-28

## 2025-01-28 RX ORDER — EPINEPHRINE 0.3 MG/.3ML
0.3 INJECTION SUBCUTANEOUS ONCE AS NEEDED
Status: CANCELLED | OUTPATIENT
Start: 2025-01-28

## 2025-01-28 RX ORDER — ANASTROZOLE 1 MG/1
1 TABLET ORAL DAILY
Qty: 30 TABLET | Refills: 3 | Status: SHIPPED | OUTPATIENT
Start: 2025-01-28 | End: 2026-01-28

## 2025-01-28 RX ORDER — HEPARIN 100 UNIT/ML
500 SYRINGE INTRAVENOUS
Status: DISCONTINUED | OUTPATIENT
Start: 2025-01-28 | End: 2025-01-28 | Stop reason: HOSPADM

## 2025-01-28 RX ORDER — PROCHLORPERAZINE EDISYLATE 5 MG/ML
10 INJECTION INTRAMUSCULAR; INTRAVENOUS ONCE AS NEEDED
Start: 2025-02-11

## 2025-01-28 RX ORDER — DIPHENHYDRAMINE HYDROCHLORIDE 50 MG/ML
50 INJECTION INTRAMUSCULAR; INTRAVENOUS ONCE AS NEEDED
Status: DISCONTINUED | OUTPATIENT
Start: 2025-01-28 | End: 2025-01-28 | Stop reason: HOSPADM

## 2025-01-28 RX ORDER — EPINEPHRINE 1 MG/ML
0.3 INJECTION INTRAMUSCULAR; INTRAVENOUS; SUBCUTANEOUS ONCE AS NEEDED
Status: DISCONTINUED | OUTPATIENT
Start: 2025-01-28 | End: 2025-01-28 | Stop reason: HOSPADM

## 2025-01-28 RX ORDER — SODIUM CHLORIDE 0.9 % (FLUSH) 0.9 %
10 SYRINGE (ML) INJECTION
OUTPATIENT
Start: 2025-02-11

## 2025-01-28 RX ORDER — PROCHLORPERAZINE EDISYLATE 5 MG/ML
10 INJECTION INTRAMUSCULAR; INTRAVENOUS ONCE AS NEEDED
Status: CANCELLED
Start: 2025-01-28

## 2025-01-28 RX ORDER — DIPHENHYDRAMINE HYDROCHLORIDE 50 MG/ML
50 INJECTION INTRAMUSCULAR; INTRAVENOUS ONCE AS NEEDED
Status: CANCELLED | OUTPATIENT
Start: 2025-01-28

## 2025-01-28 RX ADMIN — HEPARIN 500 UNITS: 100 SYRINGE at 12:01

## 2025-01-28 RX ADMIN — TRASTUZUMAB-ANNS 653 MG: 420 INJECTION, POWDER, LYOPHILIZED, FOR SOLUTION INTRAVENOUS at 12:01

## 2025-01-28 RX ADMIN — ALTEPLASE 2 MG: 2.2 INJECTION, POWDER, LYOPHILIZED, FOR SOLUTION INTRAVENOUS at 10:01

## 2025-01-28 RX ADMIN — DENOSUMAB 120 MG: 120 INJECTION SUBCUTANEOUS at 10:01

## 2025-01-28 RX ADMIN — Medication 10 ML: at 12:01

## 2025-01-28 RX ADMIN — SODIUM CHLORIDE: 9 INJECTION, SOLUTION INTRAVENOUS at 11:01

## 2025-01-28 NOTE — NURSING
1018  Pt did labs, saw Dr. Subramanian, and is here for C 6 Kanjinti, xgeva.  Pt rates 6/10 LOP to left breast.  Denies any other complaints.  States is taking vit D/ Ca supplements.  Denies any issues with her teeth, gums, or jaw.  1028  Negative UPT.  Physician put orders in for pt to start lupron.  Awaiting PA.  1036  MP accessed without a blood return.  1051  Cath danny instilled into MP.  1125  Blood return obtained.  Pt has echo scheduled for March.

## 2025-01-28 NOTE — Clinical Note
Orders for 01/28/2025: Continue Herceptin Start Lupron Continue tamoxifen for 2 months After 2 months, discontinue tamoxifen and start Arimidex +ribociclib Arimidex 1 mg p.o. q.day  Ribociclib 600 mg p.o. daily X 21 days, then 7 days off to complete 28 day cycle; to continue until disease progression or unacceptable toxicity Re-stage with contrast-enhanced CT scans of C/A/P and whole-body nuclear medicine bone scan in 3 months Follow-up with gyn for ovarian cancer surveillance Refer to GI for evaluation of hematochezia Continue Xgeva  Calcium and vitamin-D supplements to continue  DEXA scan September 2026  Echocardiogram in March Follow-up with NP in 3 weeks

## 2025-02-11 DIAGNOSIS — C50.912 INVASIVE DUCTAL CARCINOMA OF LEFT BREAST: Primary | ICD-10-CM

## 2025-02-11 DIAGNOSIS — N95.9 PREMENOPAUSAL PATIENT: ICD-10-CM

## 2025-02-17 ENCOUNTER — TELEPHONE (OUTPATIENT)
Dept: HEMATOLOGY/ONCOLOGY | Facility: CLINIC | Age: 35
End: 2025-02-17
Payer: MEDICAID

## 2025-02-17 NOTE — PROGRESS NOTES
[facility] Hematology/Oncology  PROGRESS NOTE    Subjective:       Patient ID: Jeannie Greene is a 34 y.o. female.    Diagnosis:   -IDC right breast:  IDC, grade 2, lymph node biopsy negative, ER positive, VA positive, HER2 negative; S/P core biopsy 07/17/2024; cT1c cN0 MX, AJCC anatomic stage at least IA, clinical prognostic stage IA  -inflammatory carcinoma left breast, grade 3, lymph node biopsy positive, ER positive, VA negative, HER2 positive;   cT4d cN2 M1, stage IV; S/P core biopsy 07/16/2024  -Thoracolumbar vertebrae and sacrum metastatic involvement.   -FDG PET-CT for staging 09/30/2024: Sites of disease:  Multifocal, left breast; left axillary and subpectoral lymphadenopathy; small focus upper right breast; bilobar liver metastases; portal caval lymph node; multifocal bone metastases  -Monoallelic mutation of BRCA1 gene   -premenopausal   -family history of breast cancer, ovarian cancer, cervical cancer, stomach cancer, colon cancer    Current Treatment:  -tamoxifen 20 mg daily  -trastuzumab 8 mg/kg IV day 1 cycle 1; followed by 6 mg/kg IV day 1 beginning with cycle 2  start 10/8/24     xgeva 120mg SQ every 28 days   start 12/31/24     Lupron 22.5 mg IM every 12 weeks started on 2/18/2025    Treatment History:  -MediPort placed 08/14/2024   -Mirena removed on 9/18/24    Chief Complaint: No chief complaint on file.    HPI:  34-year-old lady, referred from [facility] surgery, with invasive ductal carcinoma of breast.  We are following her for metastatic breast cancer.    Please see assessment and plan section for details.     08/29/2024:   Pleasant lady who presents for initial medical oncology consultation.  In no acute discomfort.  Hot flashes since May 2024.  Left breast is painful; gets sharp pains intermittently, 9/10 severity; also, pain in left arm but no swelling.  Pain is intermittent.  Left breast tumor is getting bigger.  No ulceration.  Since yesterday, 08/28/2024, has a experienced painless diarrhea with  blood on toilet wipes as well as in toilet bowl; no weakness or dizziness; 5 loose stools yesterday, 2 loose stools today; no nausea, vomiting, hematemesis, or melena.  Never experienced GI bleeding before.  No unusual headaches, focal neurological symptoms, chest pain, cough, dyspnea, abdominal pain, nausea or vomiting.  No urinary problems.  No bone pains.  Appetite is okay.  No unintentional weight loss.    08/09/2024:  [facility] surgery Office note:   CC: b/l breast IDC     HPI: Jeannie Greene is a 34 y.o. female with PMHx of HTN, R breast IDC Grade 2, and L breast/axillary ICD Grade 3 presents to clinic today with L breast and arm pain.   She first noticed the pain and swelling in L breast in May where she presented to the ED and received antibiotics. Pain did not resolve so patient followed up with her OB/Gyn who ordered the imaging and biopsy and was subsequently referred to our clinic. Today, patient reports swelling and pain in left breast, axilla, and down her arm. No pain in right breast, but has had some milky discharge in the past from R nipple. She states her pain as a 6/10 that was at its worse in July and has improved some since. She takes ibuprofen 4x daily without relief.    Patient had first menarche at 11. One pregnancy with child and not gone through menopause. She was on OCP at 15yo, received Depo shot from [age]. No contraception from [age] when she got pregnant. Got Murina IUD after pregnancy at 30  that is still in place.  Pt has an extensive family history of breast and cervical cancer. Her mother and sister had cervical cancer. Her mother had a hysterectomy. She also believes her maternal aunt had breast cancer. She only takes losartan for HTN. No prior surgeries.  Physical exam:   # right breast: 3 cm mass 12 o'clock, 4 in above nipple, no skin changes, no nipple retraction or discharge, no palpable right axillary lymphadenopathy  # left breast: Swollen and hard; large mass appreciated two  o'clock position right above breast, 1 hard immobile lymph node in left axilla, breast and axilla tender to palpation (inflammatory carcinoma left breast)    Past medical history: Hypertension  Surgical/procedure history:  MediPort placement 08/14/2024    Social history: .  Lives in [place].  Has a 3-year-old son.  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  Denies history of tobacco, alcohol, or illicit drug abuse.  Family history:   -Maternal great aunt # 1: Breast cancer, in her 50s   -sister: Ovarian cancer, age 50  -sister: Cervical cancer, age 25  -mother: Ovarian cancer, age 50  -Maternal aunt: Cervical cancer, age 50  -maternal grandfather: Stomach cancer, age 70 (smoker)  -maternal great aunt # 2: Colon cancer, age 60  Health maintenance: PCP at [facility].  No screening colonoscopy ever.  Menstrual/Ob gyn history: Menarche, age 11; 1 pregnancy, 1 child; gave birth to her child at age 30; no abortions or miscarriages premenopausal; OCP at age 16, received Depo shots from age [age]; no contraception from age [age] when she became pregnant; Mirena IUD after pregnancy at age 30; experienced hot flashes.  No menstrual cycles after Mirena was placed.        Interval History:  Patient presents to clinic for follow-up and treatment clearance for cycle 7 Trastuzumab. She continues on tamoxifen 20 mg daily.  She is also taking calcium and vitamin-D daily.  She is due for Xgeva on 2/25/2025.  She is also scheduled to start her Lupron today.  She was seen by Dr. Subramanian last visit, with plans to continue tamoxifen for 2 months, and then switched to Arimidex and Ribociclib, along with Lupron every 3 months and xegva every 4 weeks.  She continues on Norco 10 mg every 6 hours as needed for pain.  Echo on 12/19/2024 with ejection fraction of 67%. Labs reviewed in detail with patient and we discussed plan of care, she verbalized understanding    PMX/PSHx  Past Medical History:   Diagnosis Date     BRCA1 gene mutation positive 09/09/2024    BRCA1 [id number] ([id number]) - [facility] BRCA1 and BRCA2 gene sequence and deletion/duplication and 85-gene CustomNext-Cancer Panel (85 genes), VUS in NF1    Cancer     Hypertension       Past Surgical History:   Procedure Laterality Date    INSERTION OF TUNNELED CENTRAL VENOUS CATHETER (CVC) WITH SUBCUTANEOUS PORT N/A 08/14/2024    Procedure: [id number]-[id number];  Surgeon: cJ Chauhan Jr., MD;  Location: [facility];  Service: General;  Laterality: N/A;     Allergies:  Review of patient's allergies indicates:  No Known Allergies   Social History:   Social History[1]  Medications:  Current Outpatient Medications   Medication Instructions    anastrozole (ARIMIDEX) 1 mg, Oral, Daily    HYDROcodone-acetaminophen (NORCO)  mg per tablet 1 tablet, Oral, Every 6 hours PRN    losartan-hydrochlorothiazide 100-25 mg (HYZAAR) 100-25 mg per tablet     ondansetron (ZOFRAN) 4 mg, Oral, 2 times daily    prochlorperazine (COMPAZINE) 10 mg, Oral, Every 6 hours PRN    ribociclib (KISQALI) 600 mg, Oral, Daily, X 21 DAYS followed by a 7 day rest; to complete a 28 day treatment cycle;    tamoxifen (NOLVADEX) 20 mg, Oral, Daily     ROS:  Review of Systems   Constitutional:  Negative for appetite change, chills, fatigue, fever and unexpected weight change.   HENT:  Negative for facial swelling, mouth sores, nosebleeds, sinus pressure/congestion and sore throat.    Eyes:  Negative for photophobia, pain and visual disturbance.   Respiratory:  Negative for cough, chest tightness, shortness of breath and wheezing.    Cardiovascular:  Negative for chest pain, palpitations and leg swelling.   Gastrointestinal:  Negative for abdominal pain, blood in stool, change in bowel habit, constipation, diarrhea, nausea and vomiting.   Endocrine: Negative.    Genitourinary:  Negative for dysuria, frequency, hematuria, hot flashes, pelvic pain, urgency and vaginal pain.   Musculoskeletal:  Negative  for arthralgias, back pain, gait problem, joint swelling and myalgias.   Integumentary:  Negative for pallor, rash, wound, breast mass, breast discharge and breast tenderness.   Allergic/Immunologic: Negative.  Negative for immunocompromised state.   Neurological:  Negative for dizziness, vertigo, syncope, weakness, numbness and headaches.   Hematological:  Negative for adenopathy. Does not bruise/bleed easily.   Psychiatric/Behavioral:  Negative for behavioral problems and dysphoric mood. The patient is not nervous/anxious.    All other systems reviewed and are negative.  Breast: Negative for mass and tenderness      Objective:   Vitals:  There were no vitals filed for this visit.   Wt Readings from Last 3 Encounters:   01/28/25 1018 105.5 kg (232 lb 9.4 oz)   01/28/25 0930 105.5 kg (232 lb 9.6 oz)   12/31/24 1147 105.3 kg (232 lb 3.2 oz)      Physical Examination:  Physical Exam  Vitals and nursing note reviewed.   HENT:      Head: Normocephalic and atraumatic.      Mouth/Throat:      Mouth: Mucous membranes are moist.      Pharynx: Oropharynx is clear.   Eyes:      Extraocular Movements: Extraocular movements intact.      Conjunctiva/sclera: Conjunctivae normal.      Pupils: Pupils are equal, round, and reactive to light.   Cardiovascular:      Rate and Rhythm: Normal rate and regular rhythm.   Pulmonary:      Effort: Pulmonary effort is normal.      Breath sounds: Normal breath sounds.   Abdominal:      General: Abdomen is flat. Bowel sounds are normal.      Palpations: Abdomen is soft.   Musculoskeletal:         General: Normal range of motion.      Cervical back: Normal range of motion and neck supple.   Skin:     General: Skin is warm and dry.   Neurological:      General: No focal deficit present.      Mental Status: She is alert.   Psychiatric:         Mood and Affect: Mood normal.       ECOG SCORE           Labs:  No visits with results within 1 Week(s) from this visit.   Latest known visit with results  is:   Infusion on 01/28/2025   Component Date Value    POC Preg Test, Ur 01/28/2025 Negative      Acceptab* 01/28/2025 Yes       Pathology:  -IDC right breast:  IDC, grade 2, lymph node biopsy negative, ER positive, FL positive, HER2 negative; S/P core biopsy 07/17/2024; cT1c cN0 MX, AJCC anatomic stage at least IA, clinical prognostic stage IA  -inflammatory carcinoma left breast, grade 3, lymph node biopsy positive, ER positive, FL negative, HER2 positive; S/P core biopsy 07/16/2024; cT4d cN2 M1, stage IV    -09/16/2024: Patient referred to IR for biopsy of suspicious bone lesions  -liquid biopsy 09/19/2024:  BRCA1 positive; HER2 positive;TMB could not be calculated due to insufficient circulating tumor DNA; MSI-high not detected    -CT-guided biopsy left iliac crest 10/17/2024:  Metastases from breast carcinoma  -germline testing: BRCA1 mutation positive  -tissue NGS testing (left breast, collected 07/16/2024):  BRCA1 mutation positive; HER2 positive; MSI stable; TMB 2.1 m/MB (low); fusion negative; PD-L1 negative (PD-L1 CPS 2; PD-L1 TPS 1%); PIK3CA negative; ESR1 negative    Radiology/Diagnostics:  -baseline TTE 09/05/2024:  LVEF 60-65%   -DEXA scan 09/16/2024:  Normal BMD of lumbar vertebrae end of femoral necks  -baseline staging CTs C/A/P 0 09/16/2024:  Multiple thoracolumbar vertebrae and sacral lytic metastases   -baseline staging whole-body nuclear medicine bone scan 09/16/2024:  Thoracolumbar vertebrae and sacrum metastases  -09/16/2024: Patient referred to IR for biopsy of suspicious bone lesions  -FDG PET-CT for staging 09/30/2024: Sites of disease:  Multifocal, left breast; left axillary and subpectoral lymphadenopathy; small focus upper right breast; bilobar liver metastases; portal caval lymph node; multifocal bone metastases  -brain MRI 12/24/2024:  9 mm metastases anterior aspect of C4 vertebral body  -12/19/2024: Surveillance echocardiogram:  LVEF 67%  -12/23/2024:  Pelvic  ultrasound (encounter for non procreative screening for genetic disease carrier status):  Small fibroid; cyst in the right and left ovaries (right ovary cyst 1.5 cm and 1.4 cm; left ovary cyst 1.1 cm and 7 mm)  -restaging CTs C/A/P 01/27/2025:  Overall progression of disease with multiple new and enlarging osseous metastases as well as a new metastatic lesion right liver lobe; the majority of the left axillary metastatic lymphadenopathy has decreased in size; however, there is a single left axillary lymph node which has enlarged; the left breast skin thickening and underlying asymmetry density in the left breast parenchyma is grossly unchanged (2.5 x 2.3 cm left axillary lymph node has increased in size from 2.1 x 1.7 cm previously; suggestion of a peripherally enhancing nodule dome of the right lobe of the liver 1.9 cm; multiple lucent lytic metastatic lesions throughout the visualized spine which have progressed from the prior exam with multiple new and enlarging lesions, representative 8 mm lucent lesion T11 vertebral body is new, T7 lesion has been increased in size 16 x 12 mm previously 13 x 11 mm)  -restaging whole-body nuclear medicine bone scan 01/27/2025:  Similar uptake in the spine, ribs, and pelvis compared to prior study    I have reviewed all available lab results and radiology reports.  Assessment:   Invasive ductal carcinoma of right breast/inflammatory carcinoma of left breast  Proceed with Cycle 7 Kanjinti today  Continue tamoxifen for 1 month  After 1 month, discontinue tamoxifen and start Armidex + Ribociclib  Arimidex 1 mg p.o. q.day   Ribociclib 600 mg p.o. daily X 21 days, then 7 days off to complete 28 day cycle; to continue until disease progression or unacceptable toxicity  Re-stage with contrast-enhanced CT scans of C/A/P and whole-body nuclear medicine bone scan every 3 months- scheduled on 4/28/2025  Bilateral mammogram and ultrasound on 2/25/2025  Breast MRI 3/26/2025  Echocardiogram  every 3 months, due in March, 3/10/2025  Lupron 22.5 mg every 12 weeks due now   2. Breast cancer metastasized to bone   Xgeva 120 mg every 4 weeks, next due 2/25/2025  Calcium and vitamin-D supplements to continue   DEXA scan September 2025, 9/15/2025  Problem List Items Addressed This Visit       Invasive ductal carcinoma of breast, right - Primary    Secondary malignant neoplasm of axillary lymph nodes    Inflammatory carcinoma of left breast    Secondary malignancy of thoracic vertebral column    Secondary malignancy of lumbar vertebral column    Breast cancer metastasized to bone    Adenocarcinoma of breast metastatic to liver         Plan:    2/18/2025  Labs and exam stable  Proceed with Cycle 7 Kanjinti today  Continue tamoxifen for 1 month  After 1 month, discontinue tamoxifen and start Armidex + Ribociclib  Arimidex 1 mg p.o. q.day   Ribociclib 600 mg p.o. daily X 21 days, then 7 days off to complete 28 day cycle; to continue until disease progression or unacceptable toxicity  Schedule education for Ribociclib   With Ribociclib EKG at baseline, Day 14, and Day 28 to monitor QTc  Re-stage with contrast-enhanced CT scans of C/A/P and whole-body nuclear medicine bone scan every 3 months- scheduled on 4/28/2025  Bilateral mammogram and ultrasound on 2/25/2025  Breast MRI 3/26/2025  Follow-up with gyn for ovarian cancer surveillance, 2/26/2025  Follow up with GI for evaluation of hematochezia, 6/9/2025  Xgeva 120 mg every 4 weeks, next due 2/25/2025  Calcium and vitamin-D supplements to continue   DEXA scan September 2025, 9/15/2025  Echocardiogram every 3 months, due in March, 3/10/2025  Lupron 22.5 mg every 12 weeks, given today, next due 5/13/2025  RTC in 3 weeks labs/NP/Kanjinti and to start armidex/Ribociclib  CBC CMP MG POCT Urine Pregnancy        Providers:         Orders for 01/28/2025:  Continue Herceptin  Start Lupron  Continue tamoxifen for 2 months  After 2 months, discontinue tamoxifen and start  Arimidex +ribociclib  Arimidex 1 mg p.o. q.day   Ribociclib 600 mg p.o. daily X 21 days, then 7 days off to complete 28 day cycle; to continue until disease progression or unacceptable toxicity  Re-stage with contrast-enhanced CT scans of C/A/P and whole-body nuclear medicine bone scan in 3 months  Follow-up with gyn for ovarian cancer surveillance  Refer to GI for evaluation of hematochezia  Continue Xgeva   Calcium and vitamin-D supplements to continue   DEXA scan September 2026   Echocardiogram in March  Follow-up with NP in 3 weeks      Above discussed at length with the patient.  All questions answered.    Discussed labs and scans and gave her copies of relevant results.  Disease progression discussed.  Plan to change systemic therapy discussed.  Potential side effects of anastrozole and ribociclib discussed; gave her educational materials from [employer].  Guarded prognosis discussed.    She understands and agrees with this plan.  =================================        # IDC both breasts, diagnosed concurrently:  #Inflammatory carcinoma left breast:  -presentation:  Pain and swelling left breast 05/2024  -Mirena IUD in place  -extensive family history of breast cancer and cervical cancer  -physical exam: Right breast: 3 cm mass 12 o'clock position, 4 in above nipple  -physical exam: Left breast: Swollen, hard, large mass to o'clock position, 1 hard immobile lymph node in left axilla, breast and axilla tender to palpation (inflammatory carcinoma of left breast)  -mammogram and ultrasound 07/01/2024  -multiple masses left breast on mammogram and ultrasound  -core biopsy left breast 07/16/2024:  IDC, grade 3; lymph node biopsy positive as well; ER 59%; GA 0%; HER2 positive (3+); Ki-67 unfavorable (66.4%); HER2 by FISH analysis, group 1 (HER2 gene amplification)  -right breast core biopsy 07/17/2024:  IDC, grade 2; lymph node biopsy negative; ER 97.8%; GA 9.6%; HER2 negative (score 0); Ki-67 unfavorable (69.7%);  HER2 by FISH negative/nonamplified  -MediPort placed 08/14/2024   -genetic testing 08/22/2024  To summarize:  -concurrently diagnosed bilateral breast cancer   -IDC right breast:  IDC, grade 2, lymph node biopsy negative, ER positive, NC positive, HER2 negative; cT1c cN0 MX, AJCC anatomic stage at least IA, clinical prognostic stage IA; S/P needle biopsy of right breast and right axilla 07/17/2024  -inflammatory carcinoma left breast, grade 3, lymph node biopsy positive, ER positive, NC negative, HER2 positive; cT4d cN2 M1, stage IV; S/P needle biopsy left breast and left axilla [phone]  (By virtue of palpable, fixed lymph node in the left axilla, cN2)  -genetic testing 08/22/2024:  BRCA1 mutation positive, heterozygous  -premenopausal per labs 08/29/2024  -baseline TTE 09/05/2024:  LVEF 60-65%   -DEXA scan 09/16/2024:  Normal BMD of lumbar vertebrae end of femoral necks  -baseline staging CTs C/A/P 0 09/16/2024:  Multiple thoracolumbar vertebrae and sacral lytic metastases   -baseline staging whole-body nuclear medicine bone scan 09/16/2024:  Thoracolumbar vertebrae and sacrum metastases  -09/16/2024: Patient referred to IR for biopsy of suspicious bone lesions  -liquid biopsy 09/19/2024:  BRCA1 positive; HER2 positive;TMB could not be calculated due to insufficient circulating tumor DNA; MSI-high not detected  -09/19/2024: Baseline tumor markers: CEA 19.74, elevated; CA 27.29, CA 15-3 levels normal  -brain MRI for staging 09/23/2024: No intracranial metastases  -FDG PET-CT for staging 09/30/2024: Sites of disease:  Multifocal, left breast; left axillary and subpectoral lymphadenopathy; small focus upper right breast; bilobar liver metastases; portal caval lymph node; multifocal bone metastases  -tamoxifen plus trastuzumab started 10/08/2024  -CT-guided biopsy left iliac crest 10/17/2024:  Metastases from breast carcinoma  -germline testing: BRCA1 mutation positive  -tissue NGS testing (left breast, collected  07/16/2024):  BRCA1 mutation positive; HER2 positive; MSI stable; TMB 2.1 m/MB (low); fusion negative; PD-L1 negative (PD-L1 CPS 2; PD-L1 TPS 1%); PIK3CA negative; ESR1 negative  -cycle 2 of trastuzumab on 10/29/2024  -Mirena IUD removed 09/2024 (per gyn note dated 12/09/2024)  -surveillance TTE 12/19/2024: LVEF 67%  -pelvic ultrasound 12/23/2024:  Bilateral ovarian cysts, largest 1.5 cm  -brain MRI 12/24/2024: Headaches:  9 mm ovoid metastases anterior aspect of C4 vertebral body  -dental clearance obtained 11/29/2024 (dated 11/26/2024)  -Xgeva for bone metastases, 120 mg subQ every 4 weeks, started 12/31/2024  -01/13/2025:  Gyn consultation/follow-up:  Patient would like to proceed with ParaGard IUD insertion (nonhormonal contraception); annual transvaginal ultrasound and CA-125 level for surveillance in view of BRCA1 mutation status until risk reduction HUA-BSO recommended at age 35-40 or when done with childbearing; given stage IV breast cancer, plan to delay surgery until further prognosis can be made  -restaging CTs and bone scan 01/27/2025:  Progression of metastases  >>>  Plan:  -metastatic progression on restaging scans 01/27/2025  -discontinue Herceptin +tamoxifen  -no visceral metastases, therefore, we will continue treatment with Herceptin +second-line endocrine therapy  -will switch to Herceptin + ovarian function suppression + anastrazole +ribociclib (see below)  -01/28/2025:  continue Herceptin; we will start Lupron every 3 months ASAP; for a couple of months, continue tamoxifen along with Lupron; after 2 months, discontinue tamoxifen, and start Arimidex +ribociclib  -re-stage with contrast-enhanced CT scans of C/A/P and bone scan in 3 months (early April)  -bone metastases confirmed with biopsy of left iliac crest 10/17/2024  -tissue NGS: HER2 positive  -germline BRCA1 mutation positive  -we have requested ER and AK testing on left iliac crest biopsy as well; we will request again  -premenopausal  patient   -BRCA1 mutation positive, heterozygous  -baseline DEXA scan 09/16/2024: Normal BMD  -left breast tumor is ER positive, CT negative, HER2 positive  -Mirena IUD removed; follow-up with gyn for ParaGard insertion as nonhormonal methods of contraception; annual pelvic ultrasound and CA-125 level measurement for ovarian cancer surveillance in view of BRCA1 status, per gyn; risk reduction HUA-BSO per gyn at opportune moment  -no hormonal therapy or hormonal contraception, given the diagnosis of breast cancer  -08/29/2024:  Urgent referral to GI for evaluation of hematochezia which she has been experiencing since 08/28/2024  -08/29/2024: Left breast pain; started on Norco 5 mg every 6 hours prn  -because of metastatic disease, intent of treatment is palliation  -continue Xgeva every 4 weeks to prevent skeletal events from bone metastases  -calcium and vitamin-D supplements to prevent hypocalcemia with Xgeva  -routine surgical resection of primary breast tumor is not indicated in the management of de Joaquina stage IV disease; although there is no survival benefit, it may be considered for local control of the primary tumor (deferred to surgery)  -monitor LVEF TTE every 3 months; next, mid March  -patient can be treated with a up to 3 lines of endocrine therapy +HER2 targeted therapy  -BRCA1 mutation positive; therefore, a candidate for palliative systemic therapy with PARPi (olaparib, talazoparib), as well (lower level evidence for HER2 positive tumors, therefore, category 2A in this setting)  -baseline CEA level was elevated; baseline CA 15-3 and CA 27.29 levels were normal; follow CEA level every month to assess response  Briefly:  Switch to Herceptin + Lupron every 12 weeks + anastrazole +ribociclib  -01/28/2025:  continue Herceptin; we will start Lupron every 3 months ASAP; for a couple of months, continue tamoxifen along with Lupron; after 2 months, discontinue tamoxifen, and start Arimidex +ribociclib  Re-stage  with contrast-enhanced CT scans of C/A/P and bone scan early April  ER positive, NM negative, HER2 positive  Follow-up with gyn for ovarian cancer surveillance (BRCA1 mutation positive)  GI referral for hematochezia  Continue Xgeva   Calcium and vitamin-D supplements to continue  DEXA scan in 2 years (September 2026)  Echocardiogram mid March     If, at anytime, develops visceral crisis or endocrine refractory, then, treatment options:  # first-line:    Pertuzumab +trastuzumab +docetaxel (category 1, Preferred);   pertuzumab +trastuzumab +paclitaxel (Preferred)  (after response, maintenance trastuzumab/pertuzumab + concurrent endocrine therapy)   # second-line:  -Fam trastuzumab deruxtecan (category 1, Preferred)  -tucatinib +trastuzumab +capecitabine is preferred in patients with both systemic and CNS progression in the 3rd line setting and beyond; it may also be given in the second-line setting, etc.     Palliative systemic therapy options:  -systemic therapy +HER2 targeted therapy; or  -endocrine therapy +/-HER2 targeted therapy +ovarian suppression in premenopausal patient  -for premenopausal patients, tamoxifen alone (without ovarian ablation/suppression) + HER2 targeted therapy is also an option  >>>  -no visceral crisis, therefore, decided to treat with tamoxifen + trastuzumab     Herceptin:  -watch for cardiomyopathy, infusion reactions and pulmonary toxicity  -adverse reactions:> 10%:  Decreased LVEF, skin rash, abdominal pain, anorexia, infusion related reactions, asthenia, chills, back pain, dyspnea, etc.  -warnings/precautions:  Cardiomyopathy; infusion reactions; pulmonary toxicity; renal toxicity  -monitoring: Assess left ventricular ejection fraction (by ECG or MUGA scan) at baseline (immediately prior to trastuzumab initiation), every 3 months during trastuzumab therapy, every 4 weeks if trastuzumab is withheld for significant left ventricular cardiac dysfunction, and every 6 months for at least 2  years following completion of adjuvant trastuzumab therapy. Monitor vital signs during infusion; monitor for hypersensitivity or infusion reaction; if a reaction occurs, monitor carefully until symptoms resolve completely.   Monitor for signs/symptoms of cardiac dysfunction or pulmonary toxicity. If pregnancy inadvertently occurs during treatment, monitor amniotic fluid volume.     Monitoring with ribociclib:  -CBC: Baseline, every 2 weeks for first 2 cycles, at the beginning of the subsequent 4 cycles, and as clinically necessary  LFTs: Baseline, every 2 weeks for the first 2 cycles, at the beginning of the subsequent 4 cycles, and as clinically necessary  -Electrolytes: Prior to treatment, at the beginning of first 6 cycles, and as needed indicated  -EKG: Prior to treatment initiation, repeat on day 14 of cycle 1, at the beginning of cycle 2, and last week indicated     Anastrozole:   1 mg p.o. q.day     Ribociclib:  600 mg p.o. daily for 21 days, followed by a 7 day rest to complete a 28 day treatment cycle; continue until disease progression or unacceptable toxicity  With AI: 600 mg daily for 21 days, followed by 7-day rest period to complete a 28-day treatment cycle; continue until disease progression or unacceptable toxicity  -Dose reduction depending upon kidney impairment  -Dose reduction depending upon moderate or severe liver impairment  -Dose reductions: 600 mg daily, 400 mg daily, 200 mg daily  -Dose management depending upon toxicities: Hematologic toxicity (neutropenia), cardiovascular toxicity (QT prolonging agent), dermatologic toxicity, pulmonary toxicity  Dosage forms: 200 mg, 400 mg, 600 mg  Administration: With or without food  Moderate or high emetic potential  Warnings/precautions with ribociclib:  -Bone marrow suppression: Neutropenia  -Dermatologic toxicity  -Hepatobiliary toxicity  -QT prolongation  -Pulmonary toxicity  Adverse reactions with ribociclib:  Peripheral edema, alopecia,  pruritus, skin rash, abdominal pain, constipation, anorexia, UTIs, anemia, leukopenia, neutropenia, increased ALT, increased AST, etc.        Fertility and birth control issues:  Discussion about fertility and contraception:  -Informed her about the potential impact of chemotherapy on fertility  -Made her aware of the option of referral to fertility specialist before chemotherapy and/or endocrine therapy to discuss options in case she desires future pregnancies.  Fertility preservation options include oocyte and embryo cryopreservation, etc.  -Amenorrhea frequently occurs during or after chemotherapy.  The majority of women younger than 35 years to resume menses within 2 years of finishing adjuvant chemotherapy  -Informed that menses and fertility are not necessarily linked. Absence of regular menses does not necessarily imply lack of fertility.  Conversely, the presence of menses does not guarantee fertility.  -There are limited data regarding continued fertility after chemotherapy.  -Cautioned her to avoid becoming pregnant during treatment with radiation therapy, chemotherapy, or endocrine therapy  -Hormone-based birth control is discouraged regardless of the harmless of the hormone receptor status of the patient's cancer  -Alternative methods of birth control, including IUD, barrier method, tubal ligation, vasectomy for the partner, etc.   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, HER2 positive, cT4d cN2 M1, stage IV  -FDG PET-CT for staging 09/30/2024: Sites of disease:  Multifocal, left breast;  left axillary and subpectoral lymphadenopathy; small focus upper right breast; bilobar liver metastases; portal caval lymph node; multifocal bone metastases  -brain MRI 12/24/2024:  9 mm metastases anterior aspect of C4 vertebral body (no brain metastases)        # Molecular markers:  -liquid biopsy:  BRCA1 positive; HER2 positive;TMB could not be calculated due to insufficient circulating tumor DNA; MSI-high not detected  -CT-guided biopsy left iliac crest 10/17/2024:  Metastases from breast carcinoma  -germline testing: BRCA1 mutation positive  -tissue NGS testing (left breast, collected 07/16/2024):  BRCA1 mutation positive; HER2 positive; MSI stable; TMB 2.1 m/MB (low); fusion negative; PD-L1 negative (PD-L1 CPS 2; PD-L1 TPS 1%); PIK3CA negative; ESR1 negative  -genetic testing 08/22/2024:  BRCA1 mutation positive, heterozygous        # Hematochezia:   Since yesterday, 08/28/2024, has a experienced painless diarrhea with blood on toilet wipes as well as in toilet bowl; no weakness or dizziness; 5 loose stools yesterday, 2 loose stools today; no nausea, vomiting, hematemesis, or melena.  Never experienced GI bleeding before.  >>>   -08/29/2024: sent an urgent referral to GI for evaluation        Family history of breast cancer, ovarian cancer, cervical cancer, stomach cancer, colon cancer:  -Maternal great aunt # 1: Breast cancer, in her 50s   -sister: Ovarian cancer, age 50  -sister: Cervical cancer, age 25  -mother: Ovarian cancer, age 50  -Maternal aunt: Cervical cancer, age 50  -maternal grandfather: Stomach cancer, age 70 (smoker)  -maternal great aunt # 2: Colon cancer, age 60  >>>  -genetic testing 08/22/2024:  BRCA1 mutation positive, heterozygous           I have explained all of the above in detail and the patient understands all of the current recommendation(s). I have answered all of their questions to the best of my ability and to their complete satisfaction.       Ana Allen,  FNP-C  Oncology/Hematology   [facility]           [1]   Social History  Tobacco Use    Smoking status: Never     Passive exposure: Never    Smokeless tobacco: Never   Substance Use Topics    Alcohol use: Never    Drug use: Never

## 2025-02-18 ENCOUNTER — INFUSION (OUTPATIENT)
Dept: INFUSION THERAPY | Facility: HOSPITAL | Age: 35
End: 2025-02-18
Attending: INTERNAL MEDICINE
Payer: MEDICAID

## 2025-02-18 ENCOUNTER — OFFICE VISIT (OUTPATIENT)
Dept: HEMATOLOGY/ONCOLOGY | Facility: CLINIC | Age: 35
End: 2025-02-18
Payer: MEDICAID

## 2025-02-18 ENCOUNTER — APPOINTMENT (OUTPATIENT)
Dept: HEMATOLOGY/ONCOLOGY | Facility: CLINIC | Age: 35
End: 2025-02-18
Payer: MEDICAID

## 2025-02-18 VITALS
WEIGHT: 232 LBS | HEART RATE: 73 BPM | OXYGEN SATURATION: 96 % | RESPIRATION RATE: 16 BRPM | HEIGHT: 65 IN | SYSTOLIC BLOOD PRESSURE: 109 MMHG | DIASTOLIC BLOOD PRESSURE: 76 MMHG | TEMPERATURE: 98 F | BODY MASS INDEX: 38.65 KG/M2

## 2025-02-18 VITALS
SYSTOLIC BLOOD PRESSURE: 113 MMHG | TEMPERATURE: 98 F | HEIGHT: 65 IN | RESPIRATION RATE: 20 BRPM | DIASTOLIC BLOOD PRESSURE: 76 MMHG | WEIGHT: 232.56 LBS | HEART RATE: 77 BPM | OXYGEN SATURATION: 95 % | BODY MASS INDEX: 38.75 KG/M2

## 2025-02-18 DIAGNOSIS — C79.51 CARCINOMA OF BREAST METASTATIC TO BONE, UNSPECIFIED LATERALITY: ICD-10-CM

## 2025-02-18 DIAGNOSIS — C79.51 SECONDARY MALIGNANCY OF LUMBAR VERTEBRAL COLUMN: ICD-10-CM

## 2025-02-18 DIAGNOSIS — C50.919 CARCINOMA OF BREAST METASTATIC TO BONE, UNSPECIFIED LATERALITY: ICD-10-CM

## 2025-02-18 DIAGNOSIS — C78.7 ADENOCARCINOMA OF BREAST METASTATIC TO LIVER, UNSPECIFIED LATERALITY: Primary | ICD-10-CM

## 2025-02-18 DIAGNOSIS — C50.912 INFLAMMATORY CARCINOMA OF LEFT BREAST: ICD-10-CM

## 2025-02-18 DIAGNOSIS — C78.7 ADENOCARCINOMA OF BREAST METASTATIC TO LIVER, UNSPECIFIED LATERALITY: ICD-10-CM

## 2025-02-18 DIAGNOSIS — C77.3 SECONDARY MALIGNANT NEOPLASM OF AXILLARY LYMPH NODES: ICD-10-CM

## 2025-02-18 DIAGNOSIS — N95.9 PREMENOPAUSAL PATIENT: ICD-10-CM

## 2025-02-18 DIAGNOSIS — C79.51 SECONDARY MALIGNANCY OF THORACIC VERTEBRAL COLUMN: ICD-10-CM

## 2025-02-18 DIAGNOSIS — C50.919 ADENOCARCINOMA OF BREAST METASTATIC TO LIVER, UNSPECIFIED LATERALITY: ICD-10-CM

## 2025-02-18 DIAGNOSIS — C50.911 INVASIVE DUCTAL CARCINOMA OF BREAST, RIGHT: Primary | ICD-10-CM

## 2025-02-18 DIAGNOSIS — C50.912 INVASIVE DUCTAL CARCINOMA OF LEFT BREAST: ICD-10-CM

## 2025-02-18 DIAGNOSIS — C50.919 ADENOCARCINOMA OF BREAST METASTATIC TO LIVER, UNSPECIFIED LATERALITY: Primary | ICD-10-CM

## 2025-02-18 LAB
ALBUMIN SERPL-MCNC: 3.5 G/DL (ref 3.5–5)
ALBUMIN/GLOB SERPL: 0.8 RATIO (ref 1.1–2)
ALP SERPL-CCNC: 52 UNIT/L (ref 40–150)
ALT SERPL-CCNC: 22 UNIT/L (ref 0–55)
ANION GAP SERPL CALC-SCNC: 6 MEQ/L
AST SERPL-CCNC: 32 UNIT/L (ref 5–34)
B-HCG UR QL: NEGATIVE
BASOPHILS # BLD AUTO: 0.05 X10(3)/MCL
BASOPHILS NFR BLD AUTO: 0.6 %
BILIRUB SERPL-MCNC: 0.5 MG/DL
BUN SERPL-MCNC: 22.2 MG/DL (ref 7–18.7)
CALCIUM SERPL-MCNC: 9.3 MG/DL (ref 8.4–10.2)
CHLORIDE SERPL-SCNC: 103 MMOL/L (ref 98–107)
CO2 SERPL-SCNC: 29 MMOL/L (ref 22–29)
CREAT SERPL-MCNC: 0.81 MG/DL (ref 0.55–1.02)
CREAT/UREA NIT SERPL: 27
CTP QC/QA: YES
EOSINOPHIL # BLD AUTO: 1.59 X10(3)/MCL (ref 0–0.9)
EOSINOPHIL NFR BLD AUTO: 18.6 %
ERYTHROCYTE [DISTWIDTH] IN BLOOD BY AUTOMATED COUNT: 12.6 % (ref 11.5–17)
GFR SERPLBLD CREATININE-BSD FMLA CKD-EPI: >60 ML/MIN/1.73/M2
GLOBULIN SER-MCNC: 4.4 GM/DL (ref 2.4–3.5)
GLUCOSE SERPL-MCNC: 169 MG/DL (ref 74–100)
HCT VFR BLD AUTO: 36.2 % (ref 37–47)
HGB BLD-MCNC: 11.5 G/DL (ref 12–16)
IMM GRANULOCYTES # BLD AUTO: 0.02 X10(3)/MCL (ref 0–0.04)
IMM GRANULOCYTES NFR BLD AUTO: 0.2 %
LYMPHOCYTES # BLD AUTO: 2.98 X10(3)/MCL (ref 0.6–4.6)
LYMPHOCYTES NFR BLD AUTO: 34.8 %
MAGNESIUM SERPL-MCNC: 1.9 MG/DL (ref 1.6–2.6)
MCH RBC QN AUTO: 30.9 PG (ref 27–31)
MCHC RBC AUTO-ENTMCNC: 31.8 G/DL (ref 33–36)
MCV RBC AUTO: 97.3 FL (ref 80–94)
MONOCYTES # BLD AUTO: 0.62 X10(3)/MCL (ref 0.1–1.3)
MONOCYTES NFR BLD AUTO: 7.2 %
NEUTROPHILS # BLD AUTO: 3.3 X10(3)/MCL (ref 2.1–9.2)
NEUTROPHILS NFR BLD AUTO: 38.6 %
NRBC BLD AUTO-RTO: 0 %
PLATELET # BLD AUTO: 269 X10(3)/MCL (ref 130–400)
PMV BLD AUTO: 9.6 FL (ref 7.4–10.4)
POTASSIUM SERPL-SCNC: 3.8 MMOL/L (ref 3.5–5.1)
PROT SERPL-MCNC: 7.9 GM/DL (ref 6.4–8.3)
RBC # BLD AUTO: 3.72 X10(6)/MCL (ref 4.2–5.4)
SODIUM SERPL-SCNC: 138 MMOL/L (ref 136–145)
WBC # BLD AUTO: 8.56 X10(3)/MCL (ref 4.5–11.5)

## 2025-02-18 PROCEDURE — A4216 STERILE WATER/SALINE, 10 ML: HCPCS | Performed by: INTERNAL MEDICINE

## 2025-02-18 PROCEDURE — 81025 URINE PREGNANCY TEST: CPT | Performed by: INTERNAL MEDICINE

## 2025-02-18 PROCEDURE — 99214 OFFICE O/P EST MOD 30 MIN: CPT | Mod: PBBFAC,25

## 2025-02-18 PROCEDURE — 80053 COMPREHEN METABOLIC PANEL: CPT

## 2025-02-18 PROCEDURE — 63600175 PHARM REV CODE 636 W HCPCS: Performed by: INTERNAL MEDICINE

## 2025-02-18 PROCEDURE — 83735 ASSAY OF MAGNESIUM: CPT

## 2025-02-18 PROCEDURE — 36415 COLL VENOUS BLD VENIPUNCTURE: CPT

## 2025-02-18 PROCEDURE — 25000003 PHARM REV CODE 250: Performed by: INTERNAL MEDICINE

## 2025-02-18 PROCEDURE — 96413 CHEMO IV INFUSION 1 HR: CPT

## 2025-02-18 PROCEDURE — 85025 COMPLETE CBC W/AUTO DIFF WBC: CPT

## 2025-02-18 PROCEDURE — 96402 CHEMO HORMON ANTINEOPL SQ/IM: CPT

## 2025-02-18 RX ORDER — SODIUM CHLORIDE 0.9 % (FLUSH) 0.9 %
10 SYRINGE (ML) INJECTION
Status: DISCONTINUED | OUTPATIENT
Start: 2025-02-18 | End: 2025-02-18 | Stop reason: HOSPADM

## 2025-02-18 RX ORDER — PROCHLORPERAZINE EDISYLATE 5 MG/ML
10 INJECTION INTRAMUSCULAR; INTRAVENOUS ONCE AS NEEDED
Status: DISCONTINUED | OUTPATIENT
Start: 2025-02-18 | End: 2025-02-18 | Stop reason: HOSPADM

## 2025-02-18 RX ORDER — ANASTROZOLE 1 MG/1
1 TABLET ORAL DAILY
Qty: 30 TABLET | Refills: 2 | Status: SHIPPED | OUTPATIENT
Start: 2025-02-25 | End: 2026-02-25

## 2025-02-18 RX ORDER — HEPARIN 100 UNIT/ML
500 SYRINGE INTRAVENOUS
Status: DISCONTINUED | OUTPATIENT
Start: 2025-02-18 | End: 2025-02-18 | Stop reason: HOSPADM

## 2025-02-18 RX ORDER — DIPHENHYDRAMINE HYDROCHLORIDE 50 MG/ML
50 INJECTION INTRAMUSCULAR; INTRAVENOUS ONCE AS NEEDED
Status: DISCONTINUED | OUTPATIENT
Start: 2025-02-18 | End: 2025-02-18 | Stop reason: HOSPADM

## 2025-02-18 RX ORDER — EPINEPHRINE 1 MG/ML
0.3 INJECTION INTRAMUSCULAR; INTRAVENOUS; SUBCUTANEOUS ONCE AS NEEDED
Status: DISCONTINUED | OUTPATIENT
Start: 2025-02-18 | End: 2025-02-18 | Stop reason: HOSPADM

## 2025-02-18 RX ADMIN — HEPARIN 500 UNITS: 100 SYRINGE at 11:02

## 2025-02-18 RX ADMIN — Medication 10 ML: at 11:02

## 2025-02-18 RX ADMIN — TRASTUZUMAB-ANNS 653 MG: 420 INJECTION, POWDER, LYOPHILIZED, FOR SOLUTION INTRAVENOUS at 10:02

## 2025-02-18 RX ADMIN — SODIUM CHLORIDE: 9 INJECTION, SOLUTION INTRAVENOUS at 10:02

## 2025-02-18 RX ADMIN — LEUPROLIDE ACETATE 22.5 MG: KIT at 10:02

## 2025-02-18 NOTE — NURSING
0950  Pt did labs, saw MAYA Allen NP, and  is here for C 7 kanjinti and #1 lupron.  Pt denies any pain or complaint.  0951  Urine collected for UPT.  0954  Negative UPT.

## 2025-02-25 ENCOUNTER — HOSPITAL ENCOUNTER (OUTPATIENT)
Dept: RADIOLOGY | Facility: HOSPITAL | Age: 35
Discharge: HOME | End: 2025-02-25
Attending: SURGERY
Payer: MEDICAID

## 2025-02-25 ENCOUNTER — ANCILLARY ORDERS (OUTPATIENT)
Dept: SURGERY | Facility: CLINIC | Age: 35
End: 2025-02-25
Payer: MEDICAID

## 2025-02-25 DIAGNOSIS — C50.912 BREAST CANCER, LEFT BREAST: ICD-10-CM

## 2025-02-25 DIAGNOSIS — C50.912 BREAST CANCER, LEFT BREAST: Primary | ICD-10-CM

## 2025-02-25 LAB — BEAKER SEE SCANNED REPORT: NORMAL

## 2025-02-25 PROCEDURE — 76641 ULTRASOUND BREAST COMPLETE: CPT | Mod: TC,50

## 2025-02-25 PROCEDURE — 77062 BREAST TOMOSYNTHESIS BI: CPT | Mod: TC

## 2025-02-26 ENCOUNTER — INFUSION (OUTPATIENT)
Dept: INFUSION THERAPY | Facility: HOSPITAL | Age: 35
End: 2025-02-26
Attending: INTERNAL MEDICINE
Payer: MEDICAID

## 2025-02-26 VITALS
OXYGEN SATURATION: 96 % | RESPIRATION RATE: 20 BRPM | SYSTOLIC BLOOD PRESSURE: 119 MMHG | HEART RATE: 79 BPM | BODY MASS INDEX: 38.8 KG/M2 | WEIGHT: 232.88 LBS | TEMPERATURE: 98 F | HEIGHT: 65 IN | DIASTOLIC BLOOD PRESSURE: 78 MMHG

## 2025-02-26 DIAGNOSIS — C79.51 CARCINOMA OF LEFT BREAST METASTATIC TO BONE: Primary | ICD-10-CM

## 2025-02-26 DIAGNOSIS — C50.912 CARCINOMA OF LEFT BREAST METASTATIC TO BONE: Primary | ICD-10-CM

## 2025-02-26 PROCEDURE — 96372 THER/PROPH/DIAG INJ SC/IM: CPT

## 2025-02-26 PROCEDURE — 63600175 PHARM REV CODE 636 W HCPCS: Mod: JZ,TB | Performed by: INTERNAL MEDICINE

## 2025-02-26 RX ADMIN — DENOSUMAB 120 MG: 120 INJECTION SUBCUTANEOUS at 01:02

## 2025-02-26 NOTE — NURSING
Patient is here for labs & Xgeva q 4wks. Calcium 9.2 today. Xgeva 120mgs given to left arm subcu.   Next Xgeva appt not scheduled at time of dc. Message sent to Aleja SELF MA for appt. Scheduling.

## 2025-02-27 ENCOUNTER — PATIENT MESSAGE (OUTPATIENT)
Dept: HEMATOLOGY/ONCOLOGY | Facility: CLINIC | Age: 35
End: 2025-02-27
Payer: MEDICAID

## 2025-03-06 ENCOUNTER — OFFICE VISIT (OUTPATIENT)
Dept: HEMATOLOGY/ONCOLOGY | Facility: CLINIC | Age: 35
End: 2025-03-06
Payer: MEDICAID

## 2025-03-06 VITALS
HEIGHT: 65 IN | SYSTOLIC BLOOD PRESSURE: 108 MMHG | DIASTOLIC BLOOD PRESSURE: 76 MMHG | HEART RATE: 94 BPM | WEIGHT: 230 LBS | RESPIRATION RATE: 18 BRPM | BODY MASS INDEX: 38.32 KG/M2 | TEMPERATURE: 99 F | OXYGEN SATURATION: 97 %

## 2025-03-06 DIAGNOSIS — C50.911 INVASIVE DUCTAL CARCINOMA OF BREAST, RIGHT: ICD-10-CM

## 2025-03-06 DIAGNOSIS — C50.912 INVASIVE DUCTAL CARCINOMA OF LEFT BREAST: Primary | ICD-10-CM

## 2025-03-06 PROCEDURE — 3078F DIAST BP <80 MM HG: CPT | Mod: CPTII,,,

## 2025-03-06 PROCEDURE — 99214 OFFICE O/P EST MOD 30 MIN: CPT | Mod: PBBFAC

## 2025-03-06 PROCEDURE — 99215 OFFICE O/P EST HI 40 MIN: CPT | Mod: S$PBB,,,

## 2025-03-06 PROCEDURE — 1159F MED LIST DOCD IN RCRD: CPT | Mod: CPTII,,,

## 2025-03-06 PROCEDURE — 3074F SYST BP LT 130 MM HG: CPT | Mod: CPTII,,,

## 2025-03-06 PROCEDURE — 3008F BODY MASS INDEX DOCD: CPT | Mod: CPTII,,,

## 2025-03-06 NOTE — PROGRESS NOTES
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                                    Trastuzumab Q3W with C8 to start on 3/11/25                                   Xgeva 120mg SQ every 28 days. Next injection 3/25/25                                   Lupron 22.5 mg IM every 12 weeks. Next injection 5/13/25     Interval History      3/6/25: Mrs. Greene presents to the clinic by herself for therapy education. Patient reports no major complaints at today's visit. Denies fever, chills, SOB, N/V/D, constipation, recent infection, chest pain or unexplained bleeding or bruising.          PLAN     -Therapy education completed on 3/6/25.  -ECHO scheduled for 3/10/25.  -Breat MRI scheduled for 3/25/25.  -CT scans of C/A/P and whole-body nuclear medicine bone scan every 3 months- scheduled on 4/28/2025   -Plans to start Kisqali + Arimidex after 1 month left of Tamoxifen.With Ribociclib EKG at baseline, Day 14, and Day 28 to monitor QTc   -Zofran PRN prescribed for at home with instructions to be taken by mouth every 8 hours as needed for nausea. If not working, Compazine can be prescribed as 2nd choice.    -OTC Imodium AD recommended for diarrhea (4-5 BMs a day). Take 2 tablets after the first loose bowel movement, and 1 tablet after each loose bowel movement after the first dose has been taken. No more than 4 tablets should be taken in any 24-hour period. If not working, Lomotil can be prescribed as 2nd choice.    -Mouth sore prevention with 1-quart warm water with 1 tsp of baking soda and salt and alcohol-free mouthwash.     -Emphasized adequate hand-hygiene and limited contact with  people who are sick.   -Monitor and notify any bleeding in urine, stool, or sputum.  As well as unusual bleeding or bruising and stomach pain.   - Emphasized hydration with 4 16 oz bottles of water a day.    -Importance of moisturizing with fragrance free lotion to prevent skin rash.  -Calcium and vitamin-D supplements to continue   -DEXA scan September 2025, 9/15/2025  -Call clinic if fever >100.4, shakes or chills, shortness of breath, chest pain, uncontrolled vomiting or diarrhea, pain and tingling in the chest or arm, or just not feeling well.    -Plans to RTC on 3/11/25 fro same day labs and toxicity check with NP prior to C8 Herceptin     DISCUSSION:    1.  A total of 60 minutes were spent in counseling today, in which 100% were face-to-face.  At today's therapy teaching session, we discussed the patient's cancer diagnosis as well as planned therapy regimen, protocol, side effects and toxicities.  A handout of each therapeutic agent in the regimen was provided and reviewed in detail.    2.  The following side effects were discussed but not limited to:    Anastrozole Side Effects:  Allergic Reactions  Bone Pain  Breathing Problems  Chest Pain  Chills  Cough  Dizziness  Feeling Faint  Fever  Hair Loss  Headache  Infection  Itching  Joint Pain  Muscle Pain  Numbness  Pain  Rash  Swelling  Weakness    Ribociclib Side Effects:  Allergic Reactions  Breathing Problems  Bruising  Chest Pain  Chills  Constipation  Cough  Diarrhea  Dizziness  Feeling Faint  Fever  Hair Loss  Headache  Infection  Injury  Itching  Mouth Sores  Muscle Pain  Nausea  Numbness  Pain  Rash  Seizures  Swelling  Tingling  Trouble Sleeping  Vomiting  Weakness                a.  Discussed the risk of infection while on therapy related to pancytopenia, specifically a decrease in their white blood cell count.  Instructed to contact our office for temperature >100.4 F, chills, sudden onset cough or shortness of breath, symptoms of a urinary tract  infection.                b.  Discussed the risk of anemia. Instructed to contact our office for dizziness, heart palpitations, or extreme or sudden changes in weakness.                c.  Discussed the risk of thrombocytopenia, which increases the risk of bruising or bleeding.  Instructed the patient to contact our office for spontaneous signs of bleeding, including nose bleeds, bleeding from the gums or mouth, blood in sputum, urine or stool and unusual or excessive bruising or rash.                d.  Discussed GI side effects including weight changes, changes in appetite, altered sense of taste, stomatitis, nausea, vomiting, diarrhea, constipation, and heartburn.                e.  Discussed  side effects including painful urination, changes in the amount of urination, possible urine color changes.  Discussed fertility issues and to prevent  pregnancy if of child bearing age.                f.  Discussed neurological side effects including the risk of peripheral neuropathy, either temporary or permanent.                g.  Discussed the potential for skin, hair, and nail changes.       3.  Instructed to contact our office for discussion of medication changes, the addition of vitamin and/or herbal supplementation as they may interact with some chemotherapy agents.    4.  Discussed dietary modifications and the need to maintain adequate caloric intake and proper oral hydration.  Recommended 64 ounces of fluid per day.    5.  Discussed anti-emetic protocol and bowel regimen protocol.    6.  Office contact information given including after hours number.  Discussed there is an oncologist on call 24/7, 365 days including weekends.  Provided primary nurse's information .    7.  In summary, the patient is in agreement with the plan of care.  Questions appeared to be answered to their satisfaction. Consented the patient to the treatment plan and the patient was educated on the planned duration of the treatment and  schedule of the treatment administration. Copy to be scanned into the chart.    All questions answered to the satisfaction of the patient and family.     Follow up appointments given to patient.       ANIYA DAWSON  PATIENT EDUCATOR  [facility]      Answers submitted by the patient for this visit:  Review of Systems Questionnaire (Submitted on 2/27/2025)  appetite change : No  unexpected weight change: No  mouth sores: No  visual disturbance: No  cough: No  shortness of breath: No  chest pain: No  abdominal pain: No  diarrhea: No  frequency: No  back pain: No  rash: No  headaches: No  adenopathy: No  nervous/ anxious: No

## 2025-03-10 ENCOUNTER — HOSPITAL ENCOUNTER (OUTPATIENT)
Dept: CARDIOLOGY | Facility: HOSPITAL | Age: 35
Discharge: HOME | End: 2025-03-10
Attending: INTERNAL MEDICINE
Payer: MEDICAID

## 2025-03-10 VITALS
SYSTOLIC BLOOD PRESSURE: 124 MMHG | DIASTOLIC BLOOD PRESSURE: 78 MMHG | WEIGHT: 230 LBS | BODY MASS INDEX: 38.32 KG/M2 | HEIGHT: 65 IN

## 2025-03-10 DIAGNOSIS — C78.7 ADENOCARCINOMA OF BREAST METASTATIC TO LIVER, UNSPECIFIED LATERALITY: ICD-10-CM

## 2025-03-10 DIAGNOSIS — C50.912 INVASIVE DUCTAL CARCINOMA OF LEFT BREAST: ICD-10-CM

## 2025-03-10 DIAGNOSIS — C50.919 ADENOCARCINOMA OF BREAST METASTATIC TO LIVER, UNSPECIFIED LATERALITY: ICD-10-CM

## 2025-03-10 PROCEDURE — 93306 TTE W/DOPPLER COMPLETE: CPT

## 2025-03-11 ENCOUNTER — TELEPHONE (OUTPATIENT)
Dept: HEMATOLOGY/ONCOLOGY | Facility: CLINIC | Age: 35
End: 2025-03-11
Payer: MEDICAID

## 2025-03-11 ENCOUNTER — DOCUMENTATION ONLY (OUTPATIENT)
Dept: INFUSION THERAPY | Facility: HOSPITAL | Age: 35
End: 2025-03-11
Payer: MEDICAID

## 2025-03-11 DIAGNOSIS — C50.912 LEFT BREAST CANCER WITH T3 TUMOR, >5 CM IN GREATEST DIMENSION: Primary | ICD-10-CM

## 2025-03-11 LAB
APICAL FOUR CHAMBER EJECTION FRACTION: 60 %
APICAL TWO CHAMBER EJECTION FRACTION: 60 %
AV INDEX (PROSTH): 0.93
AV MEAN GRADIENT: 4 MMHG
AV PEAK GRADIENT: 7 MMHG
AV VALVE AREA BY VELOCITY RATIO: 2.7 CM²
AV VALVE AREA: 2.9 CM²
AV VELOCITY RATIO: 0.85
BSA FOR ECHO PROCEDURE: 2.19 M2
CV ECHO LV RWT: 0.51 CM
DOP CALC AO PEAK VEL: 1.3 M/S
DOP CALC AO VTI: 25.4 CM
DOP CALC LVOT AREA: 3.1 CM2
DOP CALC LVOT DIAMETER: 2 CM
DOP CALC LVOT PEAK VEL: 1.1 M/S
DOP CALC LVOT STROKE VOLUME: 74.4 CM3
DOP CALC MV VTI: 18.7 CM
DOP CALCLVOT PEAK VEL VTI: 23.7 CM
E WAVE DECELERATION TIME: 243 MSEC
E/A RATIO: 0.94
ECHO LV POSTERIOR WALL: 1 CM (ref 0.6–1.1)
FRACTIONAL SHORTENING: 35.9 % (ref 28–44)
HR MV ECHO: 81 BPM
INTERVENTRICULAR SEPTUM: 0.9 CM (ref 0.6–1.1)
LEFT ATRIUM AREA SYSTOLIC (APICAL 2 CHAMBER): 11.1 CM2
LEFT ATRIUM AREA SYSTOLIC (APICAL 4 CHAMBER): 7.5 CM2
LEFT ATRIUM SIZE: 3.4 CM
LEFT ATRIUM VOLUME INDEX MOD: 9 ML/M2
LEFT ATRIUM VOLUME MOD: 18 ML
LEFT INTERNAL DIMENSION IN SYSTOLE: 2.5 CM (ref 2.1–4)
LEFT VENTRICLE DIASTOLIC VOLUME INDEX: 31.43 ML/M2
LEFT VENTRICLE DIASTOLIC VOLUME: 66 ML
LEFT VENTRICLE END DIASTOLIC VOLUME APICAL 2 CHAMBER: 34.3 ML
LEFT VENTRICLE END DIASTOLIC VOLUME APICAL 4 CHAMBER: 48.64 ML
LEFT VENTRICLE END SYSTOLIC VOLUME APICAL 2 CHAMBER: 24.3 ML
LEFT VENTRICLE END SYSTOLIC VOLUME APICAL 4 CHAMBER: 11.42 ML
LEFT VENTRICLE MASS INDEX: 54.1 G/M2
LEFT VENTRICLE SYSTOLIC VOLUME INDEX: 11 ML/M2
LEFT VENTRICLE SYSTOLIC VOLUME: 23 ML
LEFT VENTRICULAR INTERNAL DIMENSION IN DIASTOLE: 3.9 CM (ref 3.5–6)
LEFT VENTRICULAR MASS: 113.6 G
LV LATERAL E/E' RATIO: 6.4 M/S
LVED V (TEICH): 65.61 ML
LVES V (TEICH): 22.68 ML
LVOT MG: 3.04 MMHG
LVOT MV: 0.84 CM/S
MV MEAN GRADIENT: 1 MMHG
MV PEAK A VEL: 0.62 M/S
MV PEAK E VEL: 0.58 M/S
MV PEAK GRADIENT: 3 MMHG
MV VALVE AREA BY CONTINUITY EQUATION: 3.98 CM2
OHS LV EJECTION FRACTION SIMPSONS BIPLANE MOD: 60 %
PISA TR MAX VEL: 2.2 M/S
RA MAJOR: 3.86 CM
TDI LATERAL: 0.09 M/S
TR MAX PG: 18 MMHG
TRICUSPID ANNULAR PLANE SYSTOLIC EXCURSION: 2.34 CM
Z-SCORE OF LEFT VENTRICULAR DIMENSION IN END DIASTOLE: -5.15
Z-SCORE OF LEFT VENTRICULAR DIMENSION IN END SYSTOLE: -3.69

## 2025-03-12 ENCOUNTER — APPOINTMENT (OUTPATIENT)
Dept: RADIOLOGY | Facility: HOSPITAL | Age: 35
End: 2025-03-12
Attending: SURGERY
Payer: MEDICAID

## 2025-03-12 DIAGNOSIS — N63.10 BREAST MASS, RIGHT: ICD-10-CM

## 2025-03-12 PROCEDURE — 77049 MRI BREAST C-+ W/CAD BI: CPT | Mod: TC

## 2025-03-12 PROCEDURE — A9577 INJ MULTIHANCE: HCPCS | Performed by: SURGERY

## 2025-03-12 PROCEDURE — 25500020 PHARM REV CODE 255: Performed by: SURGERY

## 2025-03-12 PROCEDURE — 77049 MRI BREAST C-+ W/CAD BI: CPT | Mod: 26,,, | Performed by: STUDENT IN AN ORGANIZED HEALTH CARE EDUCATION/TRAINING PROGRAM

## 2025-03-12 RX ADMIN — GADOBENATE DIMEGLUMINE 20 ML: 529 INJECTION, SOLUTION INTRAVENOUS at 10:03

## 2025-03-17 ENCOUNTER — PATIENT MESSAGE (OUTPATIENT)
Dept: HEMATOLOGY/ONCOLOGY | Facility: CLINIC | Age: 35
End: 2025-03-17
Payer: MEDICAID

## 2025-03-17 DIAGNOSIS — C50.912 INVASIVE DUCTAL CARCINOMA OF LEFT BREAST: Primary | ICD-10-CM

## 2025-03-17 DIAGNOSIS — C50.911 INVASIVE DUCTAL CARCINOMA OF BREAST, RIGHT: ICD-10-CM

## 2025-03-17 NOTE — PROGRESS NOTES
[facility] Hematology/Oncology  PROGRESS NOTE    Subjective:       Patient ID: Jeannie Greene is a 34 y.o. female.    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   -FDG PET-CT for staging 09/30/2024: Sites of disease:  Multifocal, left breast; left axillary and subpectoral lymphadenopathy; small focus upper right breast; bilobar liver metastases; portal caval lymph node; multifocal bone metastases  -Monoallelic mutation of BRCA1 gene   -premenopausal   -family history of breast cancer, ovarian cancer, cervical cancer, stomach cancer, colon cancer    Current Treatment:  -tamoxifen 20 mg daily  -trastuzumab 8 mg/kg IV day 1 cycle 1; followed by 6 mg/kg IV day 1 beginning with cycle 2  start 10/8/24     xgeva 120mg SQ every 28 days   start 12/31/24     Lupron 22.5 mg IM every 12 weeks started on 2/18/2025    Arimidex and Ribociclib started 03/19/2025    Treatment History:  -MediPort placed 08/14/2024   -Mirena removed on 9/18/24    Tamoxifen stopped 03/18/2025    Chief Complaint: Follow-up    HPI:  34-year-old lady, referred from [facility] surgery, with invasive ductal carcinoma of breast.  We are following her for metastatic breast cancer.    Please see assessment and plan section for details.     08/29/2024:   Pleasant lady who presents for initial medical oncology consultation.  In no acute discomfort.  Hot flashes since May 2024.  Left breast is painful; gets sharp pains intermittently, 9/10 severity; also, pain in left arm but no swelling.  Pain is intermittent.  Left breast tumor is getting bigger.  No ulceration.  Since  yesterday, 08/28/2024, has a experienced painless diarrhea with blood on toilet wipes as well as in toilet bowl; no weakness or dizziness; 5 loose stools yesterday, 2 loose stools today; no nausea, vomiting, hematemesis, or melena.  Never experienced GI bleeding before.  No unusual headaches, focal neurological symptoms, chest pain, cough, dyspnea, abdominal pain, nausea or vomiting.  No urinary problems.  No bone pains.  Appetite is okay.  No unintentional weight loss.    08/09/2024:  [facility] surgery Office note:   CC: b/l breast IDC     HPI: Jeannie Greene is a 34 y.o. female with PMHx of HTN, R breast IDC Grade 2, and L breast/axillary ICD Grade 3 presents to clinic today with L breast and arm pain.   She first noticed the pain and swelling in L breast in May where she presented to the ED and received antibiotics. Pain did not resolve so patient followed up with her OB/Gyn who ordered the imaging and biopsy and was subsequently referred to our clinic. Today, patient reports swelling and pain in left breast, axilla, and down her arm. No pain in right breast, but has had some milky discharge in the past from R nipple. She states her pain as a 6/10 that was at its worse in July and has improved some since. She takes ibuprofen 4x daily without relief.    Patient had first menarche at 11. One pregnancy with child and not gone through menopause. She was on OCP at 17yo, received Depo shot from [age]. No contraception from [age] when she got pregnant. Got Murina IUD after pregnancy at 30  that is still in place.  Pt has an extensive family history of breast and cervical cancer. Her mother and sister had cervical cancer. Her mother had a hysterectomy. She also believes her maternal aunt had breast cancer. She only takes losartan for HTN. No prior surgeries.  Physical exam:   # right breast: 3 cm mass 12 o'clock, 4 in above nipple, no skin changes, no nipple retraction or discharge, no palpable right axillary  lymphadenopathy  # left breast: Swollen and hard; large mass appreciated two o'clock position right above breast, 1 hard immobile lymph node in left axilla, breast and axilla tender to palpation (inflammatory carcinoma left breast)    Past medical history: Hypertension  Surgical/procedure history:  MediPort placement 08/14/2024    Social history: .  Lives in [place].  Has a 3-year-old son.  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  Denies history of tobacco, alcohol, or illicit drug abuse.  Family history:   -Maternal great aunt # 1: Breast cancer, in her 50s   -sister: Ovarian cancer, age 50  -sister: Cervical cancer, age 25  -mother: Ovarian cancer, age 50  -Maternal aunt: Cervical cancer, age 50  -maternal grandfather: Stomach cancer, age 70 (smoker)  -maternal great aunt # 2: Colon cancer, age 60  Health maintenance: PCP at [facility].  No screening colonoscopy ever.  Menstrual/Ob gyn history: Menarche, age 11; 1 pregnancy, 1 child; gave birth to her child at age 30; no abortions or miscarriages premenopausal; OCP at age 16, received Depo shots from age [age]; no contraception from age [age] when she became pregnant; Mirena IUD after pregnancy at age 30; experienced hot flashes.  No menstrual cycles after Mirena was placed.        Interval History:  Patient presents to clinic for follow-up and treatment clearance for cycle 8 Trastuzumab. She continues on tamoxifen 20 mg daily.  She is also taking calcium and vitamin-D daily.  She is due for Xgeva on 3/26/2025.  She received Lupron 22.5 mg on 2/18/2025, next due 05/13/2025 . She was seen by Dr. Subramanian in January, with plans to continue tamoxifen for 2 months, and then switch to Arimidex and Ribociclib, along with Lupron every 3 months and xegva every 4 weeks. She will be starting arimidex and Ribociclib on 03/19/2025. She continues on Norco 10 mg every 6 hours as needed for pain.  Echo on 03/10/2025 with ejection fraction of 60%. Labs  reviewed in detail with patient and we discussed plan of care, she verbalized understanding    PMX/PSHx  Past Medical History:   Diagnosis Date    BRCA1 gene mutation positive 09/09/2024    BRCA1 [id number] ([id number]) - [facility] BRCA1 and BRCA2 gene sequence and deletion/duplication and 85-gene CustomNext-Cancer Panel (85 genes), VUS in NF1    Cancer     Hypertension       Past Surgical History:   Procedure Laterality Date    INSERTION OF TUNNELED CENTRAL VENOUS CATHETER (CVC) WITH SUBCUTANEOUS PORT N/A 08/14/2024    Procedure: [id number];  Surgeon: Jc Chauhan Jr., MD;  Location: [facility];  Service: General;  Laterality: N/A;     Allergies:  Review of patient's allergies indicates:  No Known Allergies   Social History:   Social History[1]  Medications:  Current Outpatient Medications   Medication Instructions    anastrozole (ARIMIDEX) 1 mg, Oral, Daily    anastrozole (ARIMIDEX) 1 mg, Oral, Daily    HYDROcodone-acetaminophen (NORCO)  mg per tablet 1 tablet, Oral, Every 6 hours PRN    KISQALI 600 mg, Oral, Daily, for 21 DAYS followed by a 7 day rest; to complete a 28 day treatment cycle    losartan-hydrochlorothiazide 100-25 mg (HYZAAR) 100-25 mg per tablet     ondansetron (ZOFRAN) 4 mg, Oral, 2 times daily    prochlorperazine (COMPAZINE) 10 mg, Oral, Every 6 hours PRN     ROS:  Review of Systems   Constitutional:  Negative for appetite change, chills, fatigue, fever and unexpected weight change.   HENT:  Negative for facial swelling, mouth sores, nosebleeds, sinus pressure/congestion and sore throat.    Eyes:  Negative for photophobia, pain and visual disturbance.   Respiratory:  Negative for cough, chest tightness, shortness of breath and wheezing.    Cardiovascular:  Negative for chest pain, palpitations and leg swelling.   Gastrointestinal:  Negative for abdominal pain, blood in stool, change in bowel habit, constipation, diarrhea, nausea and vomiting.   Endocrine: Negative.     Genitourinary:  Negative for dysuria, frequency, hematuria, hot flashes, pelvic pain, urgency and vaginal pain.   Musculoskeletal:  Negative for arthralgias, back pain, gait problem, joint swelling and myalgias.   Integumentary:  Negative for pallor, rash, wound, breast mass, breast discharge and breast tenderness.   Allergic/Immunologic: Negative.  Negative for immunocompromised state.   Neurological:  Negative for dizziness, vertigo, syncope, weakness, numbness and headaches.   Hematological:  Negative for adenopathy. Does not bruise/bleed easily.   Psychiatric/Behavioral:  Negative for behavioral problems and dysphoric mood. The patient is not nervous/anxious.    All other systems reviewed and are negative.  Breast: Negative for mass and tenderness      Objective:   Vitals:  Vitals:    03/18/25 0929   BP: 135/70   Pulse: 85   Resp: 17   Temp: 97.9 °F (36.6 °C)      Wt Readings from Last 3 Encounters:   03/18/25 1026 106 kg (233 lb 9.6 oz)   03/18/25 0929 106 kg (233 lb 9.6 oz)   03/10/25 1301 104.3 kg (230 lb)      Physical Examination:  Physical Exam  Vitals and nursing note reviewed.   HENT:      Head: Normocephalic and atraumatic.      Mouth/Throat:      Mouth: Mucous membranes are moist.      Pharynx: Oropharynx is clear.   Eyes:      Extraocular Movements: Extraocular movements intact.      Conjunctiva/sclera: Conjunctivae normal.      Pupils: Pupils are equal, round, and reactive to light.   Cardiovascular:      Rate and Rhythm: Normal rate and regular rhythm.   Pulmonary:      Effort: Pulmonary effort is normal.      Breath sounds: Normal breath sounds.   Abdominal:      General: Abdomen is flat. Bowel sounds are normal.      Palpations: Abdomen is soft.   Musculoskeletal:         General: Normal range of motion.      Cervical back: Normal range of motion and neck supple.   Skin:     General: Skin is warm and dry.   Neurological:      General: No focal deficit present.      Mental Status: She is alert.    Psychiatric:         Mood and Affect: Mood normal.       ECOG SCORE           Labs:  Infusion on 03/18/2025   Component Date Value    POC Preg Test, Ur 03/18/2025 Negative      Acceptab* 03/18/2025 Yes    Lab Visit on 03/18/2025   Component Date Value    Sodium 03/18/2025 140     Potassium 03/18/2025 3.7     Chloride 03/18/2025 105     CO2 03/18/2025 26     Glucose 03/18/2025 179 (H)     Blood Urea Nitrogen 03/18/2025 22.3 (H)     Creatinine 03/18/2025 0.75     Calcium 03/18/2025 9.1     Protein Total 03/18/2025 7.7     Albumin 03/18/2025 3.5     Globulin 03/18/2025 4.2 (H)     Albumin/Globulin Ratio 03/18/2025 0.8 (L)     Bilirubin Total 03/18/2025 0.4     ALP 03/18/2025 47     ALT 03/18/2025 23     AST 03/18/2025 19     eGFR 03/18/2025 >60     Anion Gap 03/18/2025 9.0     BUN/Creatinine Ratio 03/18/2025 30     Magnesium Level 03/18/2025 1.90     WBC 03/18/2025 7.62     RBC 03/18/2025 3.68 (L)     Hgb 03/18/2025 11.5 (L)     Hct 03/18/2025 36.3 (L)     MCV 03/18/2025 98.6 (H)     MCH 03/18/2025 31.3 (H)     MCHC 03/18/2025 31.7 (L)     RDW 03/18/2025 12.4     Platelet 03/18/2025 262     MPV 03/18/2025 9.9     Neut % 03/18/2025 43.8     Lymph % 03/18/2025 38.7     Mono % 03/18/2025 6.7     Eos % 03/18/2025 10.0     Basophil % 03/18/2025 0.5     Imm Grans % 03/18/2025 0.3     Neut # 03/18/2025 3.34     Lymph # 03/18/2025 2.95     Mono # 03/18/2025 0.51     Eos # 03/18/2025 0.76     Baso # 03/18/2025 0.04     Imm Gran # 03/18/2025 0.02     NRBC% 03/18/2025 0.0    Lab Visit on 03/12/2025   Component Date Value    hCG Qualitative, Urine 03/12/2025 Negative       Infusion on 03/18/2025   Component Date Value Ref Range Status    POC Preg Test, Ur 03/18/2025 Negative  Negative Final     Acceptable 03/18/2025 Yes   Final   Lab Visit on 03/18/2025   Component Date Value Ref Range Status    Sodium 03/18/2025 140  136 - 145 mmol/L Final    Potassium 03/18/2025 3.7  3.5 - 5.1 mmol/L Final     Chloride 03/18/2025 105  98 - 107 mmol/L Final    CO2 03/18/2025 26  22 - 29 mmol/L Final    Glucose 03/18/2025 179 (H)  74 - 100 mg/dL Final    Blood Urea Nitrogen 03/18/2025 22.3 (H)  7.0 - 18.7 mg/dL Final    Creatinine 03/18/2025 0.75  0.55 - 1.02 mg/dL Final    Calcium 03/18/2025 9.1  8.4 - 10.2 mg/dL Final    Protein Total 03/18/2025 7.7  6.4 - 8.3 gm/dL Final    Albumin 03/18/2025 3.5  3.5 - 5.0 g/dL Final    Globulin 03/18/2025 4.2 (H)  2.4 - 3.5 gm/dL Final    Albumin/Globulin Ratio 03/18/2025 0.8 (L)  1.1 - 2.0 ratio Final    Bilirubin Total 03/18/2025 0.4  <=1.5 mg/dL Final    ALP 03/18/2025 47  40 - 150 unit/L Final    ALT 03/18/2025 23  0 - 55 unit/L Final    AST 03/18/2025 19  5 - 34 unit/L Final    eGFR 03/18/2025 >60  mL/min/1.73/m2 Final    Estimated GFR calculated using the CKD-EPI creatinine (2021) equation.    Anion Gap 03/18/2025 9.0  mEq/L Final    BUN/Creatinine Ratio 03/18/2025 30   Final    Magnesium Level 03/18/2025 1.90  1.60 - 2.60 mg/dL Final    WBC 03/18/2025 7.62  4.50 - 11.50 x10(3)/mcL Final    RBC 03/18/2025 3.68 (L)  4.20 - 5.40 x10(6)/mcL Final    Hgb 03/18/2025 11.5 (L)  12.0 - 16.0 g/dL Final    Hct 03/18/2025 36.3 (L)  37.0 - 47.0 % Final    MCV 03/18/2025 98.6 (H)  80.0 - 94.0 fL Final    MCH 03/18/2025 31.3 (H)  27.0 - 31.0 pg Final    MCHC 03/18/2025 31.7 (L)  33.0 - 36.0 g/dL Final    RDW 03/18/2025 12.4  11.5 - 17.0 % Final    Platelet 03/18/2025 262  130 - 400 x10(3)/mcL Final    MPV 03/18/2025 9.9  7.4 - 10.4 fL Final    Neut % 03/18/2025 43.8  % Final    Lymph % 03/18/2025 38.7  % Final    Mono % 03/18/2025 6.7  % Final    Eos % 03/18/2025 10.0  % Final    Basophil % 03/18/2025 0.5  % Final    Imm Grans % 03/18/2025 0.3  % Final    Neut # 03/18/2025 3.34  2.1 - 9.2 x10(3)/mcL Final    Lymph # 03/18/2025 2.95  0.6 - 4.6 x10(3)/mcL Final    Mono # 03/18/2025 0.51  0.1 - 1.3 x10(3)/mcL Final    Eos # 03/18/2025 0.76  0 - 0.9 x10(3)/mcL Final    Baso # 03/18/2025 0.04   <=0.2 x10(3)/mcL Final    Imm Gran # 03/18/2025 0.02  0.00 - 0.04 x10(3)/mcL Final    NRBC% 03/18/2025 0.0  % Final       Pathology:  -IDC right breast:  IDC, grade 2, lymph node biopsy negative, ER positive, TX positive, HER2 negative; S/P core biopsy 07/17/2024; cT1c cN0 MX, AJCC anatomic stage at least IA, clinical prognostic stage IA  -inflammatory carcinoma left breast, grade 3, lymph node biopsy positive, ER positive, TX negative, HER2 positive; S/P core biopsy 07/16/2024; cT4d cN2 M1, stage IV    -09/16/2024: Patient referred to IR for biopsy of suspicious bone lesions  -liquid biopsy 09/19/2024:  BRCA1 positive; HER2 positive;TMB could not be calculated due to insufficient circulating tumor DNA; MSI-high not detected    -CT-guided biopsy left iliac crest 10/17/2024:  Metastases from breast carcinoma  -germline testing: BRCA1 mutation positive  -tissue NGS testing (left breast, collected 07/16/2024):  BRCA1 mutation positive; HER2 positive; MSI stable; TMB 2.1 m/MB (low); fusion negative; PD-L1 negative (PD-L1 CPS 2; PD-L1 TPS 1%); PIK3CA negative; ESR1 negative    Radiology/Diagnostics:  -baseline TTE 09/05/2024:  LVEF 60-65%   -DEXA scan 09/16/2024:  Normal BMD of lumbar vertebrae end of femoral necks  -baseline staging CTs C/A/P 0 09/16/2024:  Multiple thoracolumbar vertebrae and sacral lytic metastases   -baseline staging whole-body nuclear medicine bone scan 09/16/2024:  Thoracolumbar vertebrae and sacrum metastases  -09/16/2024: Patient referred to IR for biopsy of suspicious bone lesions  -FDG PET-CT for staging 09/30/2024: Sites of disease:  Multifocal, left breast; left axillary and subpectoral lymphadenopathy; small focus upper right breast; bilobar liver metastases; portal caval lymph node; multifocal bone metastases  -brain MRI 12/24/2024:  9 mm metastases anterior aspect of C4 vertebral body  -12/19/2024: Surveillance echocardiogram:  LVEF 67%  -12/23/2024:  Pelvic ultrasound (encounter for non  procreative screening for genetic disease carrier status):  Small fibroid; cyst in the right and left ovaries (right ovary cyst 1.5 cm and 1.4 cm; left ovary cyst 1.1 cm and 7 mm)  -restaging CTs C/A/P 01/27/2025:  Overall progression of disease with multiple new and enlarging osseous metastases as well as a new metastatic lesion right liver lobe; the majority of the left axillary metastatic lymphadenopathy has decreased in size; however, there is a single left axillary lymph node which has enlarged; the left breast skin thickening and underlying asymmetry density in the left breast parenchyma is grossly unchanged (2.5 x 2.3 cm left axillary lymph node has increased in size from 2.1 x 1.7 cm previously; suggestion of a peripherally enhancing nodule dome of the right lobe of the liver 1.9 cm; multiple lucent lytic metastatic lesions throughout the visualized spine which have progressed from the prior exam with multiple new and enlarging lesions, representative 8 mm lucent lesion T11 vertebral body is new, T7 lesion has been increased in size 16 x 12 mm previously 13 x 11 mm)  -restaging whole-body nuclear medicine bone scan 01/27/2025:  Similar uptake in the spine, ribs, and pelvis compared to prior study  -03/03/2025 bilateral diagnostic mammogram and ultrasound:   Bilateral Diagnostic Mammogram:   In the right breast 12:00 axis middle depth, there is an irregular high density spiculated mass with an associated T4-butterfly clip, correlating with outside prior malignant ultrasound-guided core needle biopsy that diagnosed grade 2 invasive ductal carcinoma.  There has been an increase in the size and density of the malignant mass, particularly the anterior and lateral component.     No other new suspicious mammographic finding in the right breast.     In the right axilla, there is a lymph node with an associated mini-T3-coil clip, correlating with outside prior benign ultrasound-guided core needle biopsy.  There has  been no significant interval change of this lymph node.     There is an implanted MediPort in the right anterior chest wall which partially obscures evaluation of the right axilla.  No suspicious mammographic finding in the right axilla.     The left breast is contracted and significantly smaller compared to the most recent prior mammogram dated 07/01/2024 (8 months ago).  This contraction is related to a large irregular high density spiculated mass occupying all 4 quadrants posterior to anterior depths with spiculations extending out to the skin, with severe thickening and retraction of the same.  The mass extends into and engulfs the nipple-areolar complex, with retraction and destruction of the same.  These findings are compatible with the shrunken breast sign due to locally advanced breast cancer.  The irregular high density spiculated mass has its most significant component in the upper-outer quadrant, and there is a T4-butterfly clip in the 2:00 axis middle depth related to outside prior malignant ultrasound-guided core needle biopsy that diagnosed grade 3 invasive ductal carcinoma.  Overall, the mammographic appearance of the malignant process throughout the left breast is worse.       In the left axilla, there is an irregular high density spiculated mass with an associated mini-T3-coil clip, correlating with outside prior malignant ultrasound-guided core needle biopsy that diagnosed grade 3 invasive ductal carcinoma.  Given the location of this malignant mass, it is most compatible with an axillary lymph node that has been entirely replaced by metastatic carcinoma.  This malignant lymph node has significantly increased in size compared to the most recent prior mammogram dated 07/01/2024 (8 months ago).        Bilateral Complete Breast Ultrasound:  Sonographic evaluation of both complete breasts (all 4 quadrants, subareolar, axilla) was performed.       In the right breast 12:00 axis 9 cm FN position, there  is a 1.8 x 1.8 x 2.2 cm irregular nonparallel hypoechoic vascular mass with angular margins, correlating with biopsy-proven grade 2 invasive ductal carcinoma.  This has increased in size compared to outside prior ultrasound on 07/01/2024, at which time it measured 1.3 x 1.1 x 1.6 cm.  There is also been an increase in the diameter and hypoechoic intraductal material within the ductal system extending laterally away from the malignant mass, suggestive for extension of malignancy into this ductal system.  Taken together, these findings correlate with the increase in size and density of the right breast 12:00 axis middle depth malignant mass, particularly the anterior and lateral component, as seen on today's mammogram.     No other new suspicious sonographic finding in the right breast.       No suspicious right axillary adenopathy.  One of the right axillary level 1 lymph nodes has an associated biopsy clip (mini-T3-coil), correlating with the biopsy-proven benign right axillary lymph node.     In the left breast all 4 quadrants subareolar-12 cm FN, there is an irregular hypoechoic spiculated mass with larger confluent portions of discrete masses interconnected by hypoechoic tracts of non-mass lesions.  The most confluent discrete masses are at the 1:00 axis 7 cm FN (5.6 x 4.1 x 5.5 cm), 2:00 axis 9 cm FN (2.5 x 1.3 x 1.4 cm), and 1:00 axis 12 cm FN (0.5 x 0.7 x 0.9 cm) positions.  The malignancy extends into and destroys the nipple-areolar complex.  There is diffuse severe skin thickening, maximal thickness 0.8 cm as measured in the 9:00 axis 1 cm FN position.  Comparison to the outside prior ultrasound on 07/01/2024 is suboptimal due to differences in positioning and technique as well as overall contraction/global decrease in size of the breast in the interval since that prior exam.  Given those limitations, the overall change in sonographic appearance of the malignant process throughout the left breast is worse.    In the left axilla, there is a 3 x 2.3 x 2.6 cm irregular nonparallel hypoechoic spiculated vascular mass with a hyperechoic rind, correlating with biopsy-proven grade 3 invasive ductal carcinoma.  This has increased in size compared to the outside prior ultrasound on 07/01/2024, at which time it measured 1.2 x 1.5 x 1.6 cm.   In the deep aspect of the left axilla level 1 andra station, there has been decrease in size and conspicuity of a morphologically abnormal lymph node identified on the outside prior ultrasound at [facility] on 07/01/2024.      IMPRESSION: KNOWN BIOPSY PROVEN MALIGNANCY, ULTRASOUND KNOWN BIOPSY PROVEN MALIGNANCY   1) Poor response to neoadjuvant chemotherapy.  The mammographic and sonographic appearance of the malignant process throughout the left breast and in the left axilla is worse compared to the outside prior mammogram and ultrasound at [facility] on 07/01/2024 (8 months ago).  BI-RADS 6: Known biopsy-proven malignancy.   2) Poor response to neoadjuvant chemotherapy.  The right breast 12:00 axis 9 cm FN malignant mass has increased in size, with new involvement of the ductal system extending laterally away from the malignant mass.  BI-RADS 6: Known biopsy-proven malignancy.   3) Review of the patient's restaging CT C/A/P and [id number]-MDP bone scan 01/27/2025 revealed progression of metastatic disease, which correlates with today's mammographic and sonographic findings showing progression of disease in both breasts and the left axilla.    -3/10/2025 ECHO: EF 60%  -3/18/2025 EKG: Qtc 457 ms    I have reviewed all available lab results and radiology reports.  Assessment:   Invasive ductal carcinoma of right breast/inflammatory carcinoma of left breast  Proceed with Cycle 7 Kanjinti today  Stop Tamoxifen   start Armidex + Ribociclib  Arimidex 1 mg p.o. q.day   Ribociclib 600 mg p.o. daily X 21 days, then 7 days off to complete 28 day cycle; to continue until disease  progression or unacceptable toxicity  Re-stage with contrast-enhanced CT scans of C/A/P and whole-body nuclear medicine bone scan every 3 months- scheduled on 4/28/2025  Bilateral mammogram and ultrasound on 2/25/2025  Breast MRI 3/26/2025  Echocardiogram every 3 months, due in 6/2025  Lupron 22.5 mg every 12 weeks due 5/13/2025  2. 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 q.day   Ribociclib 600 mg p.o. daily X 21 days, then 7 days off to complete 28 day cycle; to continue until disease progression or unacceptable toxicity  With Ribociclib EKG at baseline, Day 14, and Day 28 to monitor QTc  Re-stage with contrast-enhanced CT scans of C/A/P and whole-body nuclear medicine bone scan every 3 months- scheduled on 4/28/2025  Breast MRI 3/26/2025  Follow-up with gyn for ovarian cancer surveillance  Follow up with GI for evaluation of hematochezia, 6/9/2025  Xgeva 120 mg every 4 weeks, next due 3/26/2025  Calcium and vitamin-D supplements to continue   DEXA scan September 2025, 9/15/2025  Echocardiogram every 3 months, due in 6/2025  Lupron 22.5 mg every 12 weeks, given on 2/18/2025, next due 5/13/2025  RTC in 2 weeks for toxicity check and EKG for Ribociclib  CBC CMP MG and EKG        Providers:         Orders for 01/28/2025:  Continue Herceptin  Start Lupron  Continue tamoxifen for 2 months  After 2 months,  discontinue tamoxifen and start Arimidex +ribociclib  Arimidex 1 mg p.o. q.day   Ribociclib 600 mg p.o. daily X 21 days, then 7 days off to complete 28 day cycle; to continue until disease progression or unacceptable toxicity  Re-stage with contrast-enhanced CT scans of C/A/P and whole-body nuclear medicine bone scan in 3 months  Follow-up with gyn for ovarian cancer surveillance  Refer to GI for evaluation of hematochezia  Continue Xgeva   Calcium and vitamin-D supplements to continue   DEXA scan September 2026   Echocardiogram in March  Follow-up with NP in 3 weeks      Above discussed at length with the patient.  All questions answered.    Discussed labs and scans and gave her copies of relevant results.  Disease progression discussed.  Plan to change systemic therapy discussed.  Potential side effects of anastrozole and ribociclib discussed; gave her educational materials from [employer].  Guarded prognosis discussed.    She understands and agrees with this plan.  =================================        # IDC both breasts, diagnosed concurrently:  #Inflammatory carcinoma left breast:  -presentation:  Pain and swelling left breast 05/2024  -Mirena IUD in place  -extensive family history of breast cancer and cervical cancer  -physical exam: Right breast: 3 cm mass 12 o'clock position, 4 in above nipple  -physical exam: Left breast: Swollen, hard, large mass to o'clock position, 1 hard immobile lymph node in left axilla, breast and axilla tender to palpation (inflammatory carcinoma of left breast)  -mammogram and ultrasound 07/01/2024  -multiple masses left breast on mammogram and ultrasound  -core biopsy left breast 07/16/2024:  IDC, grade 3; lymph node biopsy positive as well; ER 59%; DC 0%; HER2 positive (3+); Ki-67 unfavorable (66.4%); HER2 by FISH analysis, group 1 (HER2 gene amplification)  -right breast core biopsy 07/17/2024:  IDC, grade 2; lymph node biopsy negative; ER 97.8%; DC 9.6%; HER2 negative (score  0); Ki-67 unfavorable (69.7%); HER2 by FISH negative/nonamplified  -MediPort placed 08/14/2024   -genetic testing 08/22/2024  To summarize:  -concurrently diagnosed bilateral breast cancer   -IDC right breast:  IDC, grade 2, lymph node biopsy negative, ER positive, AZ positive, HER2 negative; cT1c cN0 MX, AJCC anatomic stage at least IA, clinical prognostic stage IA; S/P needle biopsy of right breast and right axilla 07/17/2024  -inflammatory carcinoma left breast, grade 3, lymph node biopsy positive, ER positive, AZ negative, HER2 positive; cT4d cN2 M1, stage IV; S/P needle biopsy left breast and left axilla [phone]  (By virtue of palpable, fixed lymph node in the left axilla, cN2)  -genetic testing 08/22/2024:  BRCA1 mutation positive, heterozygous  -premenopausal per labs 08/29/2024  -baseline TTE 09/05/2024:  LVEF 60-65%   -DEXA scan 09/16/2024:  Normal BMD of lumbar vertebrae end of femoral necks  -baseline staging CTs C/A/P 0 09/16/2024:  Multiple thoracolumbar vertebrae and sacral lytic metastases   -baseline staging whole-body nuclear medicine bone scan 09/16/2024:  Thoracolumbar vertebrae and sacrum metastases  -09/16/2024: Patient referred to IR for biopsy of suspicious bone lesions  -liquid biopsy 09/19/2024:  BRCA1 positive; HER2 positive;TMB could not be calculated due to insufficient circulating tumor DNA; MSI-high not detected  -09/19/2024: Baseline tumor markers: CEA 19.74, elevated; CA 27.29, CA 15-3 levels normal  -brain MRI for staging 09/23/2024: No intracranial metastases  -FDG PET-CT for staging 09/30/2024: Sites of disease:  Multifocal, left breast; left axillary and subpectoral lymphadenopathy; small focus upper right breast; bilobar liver metastases; portal caval lymph node; multifocal bone metastases  -tamoxifen plus trastuzumab started 10/08/2024  -CT-guided biopsy left iliac crest 10/17/2024:  Metastases from breast carcinoma  -germline testing: BRCA1 mutation positive  -tissue NGS  testing (left breast, collected 07/16/2024):  BRCA1 mutation positive; HER2 positive; MSI stable; TMB 2.1 m/MB (low); fusion negative; PD-L1 negative (PD-L1 CPS 2; PD-L1 TPS 1%); PIK3CA negative; ESR1 negative  -cycle 2 of trastuzumab on 10/29/2024  -Mirena IUD removed 09/2024 (per gyn note dated 12/09/2024)  -surveillance TTE 12/19/2024: LVEF 67%  -pelvic ultrasound 12/23/2024:  Bilateral ovarian cysts, largest 1.5 cm  -brain MRI 12/24/2024: Headaches:  9 mm ovoid metastases anterior aspect of C4 vertebral body  -dental clearance obtained 11/29/2024 (dated 11/26/2024)  -Xgeva for bone metastases, 120 mg subQ every 4 weeks, started 12/31/2024  -01/13/2025:  Gyn consultation/follow-up:  Patient would like to proceed with ParaGard IUD insertion (nonhormonal contraception); annual transvaginal ultrasound and CA-125 level for surveillance in view of BRCA1 mutation status until risk reduction HUA-BSO recommended at age 35-40 or when done with childbearing; given stage IV breast cancer, plan to delay surgery until further prognosis can be made  -restaging CTs and bone scan 01/27/2025:  Progression of metastases  >>>  Plan:  -metastatic progression on restaging scans 01/27/2025  -discontinue Herceptin +tamoxifen  -no visceral metastases, therefore, we will continue treatment with Herceptin +second-line endocrine therapy  -will switch to Herceptin + ovarian function suppression + anastrazole +ribociclib (see below)  -01/28/2025:  continue Herceptin; we will start Lupron every 3 months ASAP; for a couple of months, continue tamoxifen along with Lupron; after 2 months, discontinue tamoxifen, and start Arimidex +ribociclib  -re-stage with contrast-enhanced CT scans of C/A/P and bone scan in 3 months (early April)  -bone metastases confirmed with biopsy of left iliac crest 10/17/2024  -tissue NGS: HER2 positive  -germline BRCA1 mutation positive  -we have requested ER and SC testing on left iliac crest biopsy as well; we will  request again  -premenopausal patient   -BRCA1 mutation positive, heterozygous  -baseline DEXA scan 09/16/2024: Normal BMD  -left breast tumor is ER positive, MI negative, HER2 positive  -Mirena IUD removed; follow-up with gyn for ParaGard insertion as nonhormonal methods of contraception; annual pelvic ultrasound and CA-125 level measurement for ovarian cancer surveillance in view of BRCA1 status, per gyn; risk reduction HUA-BSO per gyn at opportune moment  -no hormonal therapy or hormonal contraception, given the diagnosis of breast cancer  -08/29/2024:  Urgent referral to GI for evaluation of hematochezia which she has been experiencing since 08/28/2024  -08/29/2024: Left breast pain; started on Norco 5 mg every 6 hours prn  -because of metastatic disease, intent of treatment is palliation  -continue Xgeva every 4 weeks to prevent skeletal events from bone metastases  -calcium and vitamin-D supplements to prevent hypocalcemia with Xgeva  -routine surgical resection of primary breast tumor is not indicated in the management of de Joaquina stage IV disease; although there is no survival benefit, it may be considered for local control of the primary tumor (deferred to surgery)  -monitor LVEF TTE every 3 months; next, mid March  -patient can be treated with a up to 3 lines of endocrine therapy +HER2 targeted therapy  -BRCA1 mutation positive; therefore, a candidate for palliative systemic therapy with PARPi (olaparib, talazoparib), as well (lower level evidence for HER2 positive tumors, therefore, category 2A in this setting)  -baseline CEA level was elevated; baseline CA 15-3 and CA 27.29 levels were normal; follow CEA level every month to assess response  Briefly:  Switch to Herceptin + Lupron every 12 weeks + anastrazole +ribociclib  -01/28/2025:  continue Herceptin; we will start Lupron every 3 months ASAP; for a couple of months, continue tamoxifen along with Lupron; after 2 months, discontinue tamoxifen, and start  Arimidex +ribociclib  Re-stage with contrast-enhanced CT scans of C/A/P and bone scan early April  ER positive, HI negative, HER2 positive  Follow-up with gyn for ovarian cancer surveillance (BRCA1 mutation positive)  GI referral for hematochezia  Continue Xgeva   Calcium and vitamin-D supplements to continue  DEXA scan in 2 years (September 2026)  Echocardiogram mid March     If, at anytime, develops visceral crisis or endocrine refractory, then, treatment options:  # first-line:    Pertuzumab +trastuzumab +docetaxel (category 1, Preferred);   pertuzumab +trastuzumab +paclitaxel (Preferred)  (after response, maintenance trastuzumab/pertuzumab + concurrent endocrine therapy)   # second-line:  -Fam trastuzumab deruxtecan (category 1, Preferred)  -tucatinib +trastuzumab +capecitabine is preferred in patients with both systemic and CNS progression in the 3rd line setting and beyond; it may also be given in the second-line setting, etc.     Palliative systemic therapy options:  -systemic therapy +HER2 targeted therapy; or  -endocrine therapy +/-HER2 targeted therapy +ovarian suppression in premenopausal patient  -for premenopausal patients, tamoxifen alone (without ovarian ablation/suppression) + HER2 targeted therapy is also an option  >>>  -no visceral crisis, therefore, decided to treat with tamoxifen + trastuzumab     Herceptin:  -watch for cardiomyopathy, infusion reactions and pulmonary toxicity  -adverse reactions:> 10%:  Decreased LVEF, skin rash, abdominal pain, anorexia, infusion related reactions, asthenia, chills, back pain, dyspnea, etc.  -warnings/precautions:  Cardiomyopathy; infusion reactions; pulmonary toxicity; renal toxicity  -monitoring: Assess left ventricular ejection fraction (by ECG or MUGA scan) at baseline (immediately prior to trastuzumab initiation), every 3 months during trastuzumab therapy, every 4 weeks if trastuzumab is withheld for significant left ventricular cardiac dysfunction, and  every 6 months for at least 2 years following completion of adjuvant trastuzumab therapy. Monitor vital signs during infusion; monitor for hypersensitivity or infusion reaction; if a reaction occurs, monitor carefully until symptoms resolve completely.   Monitor for signs/symptoms of cardiac dysfunction or pulmonary toxicity. If pregnancy inadvertently occurs during treatment, monitor amniotic fluid volume.     Monitoring with ribociclib:  -CBC: Baseline, every 2 weeks for first 2 cycles, at the beginning of the subsequent 4 cycles, and as clinically necessary  LFTs: Baseline, every 2 weeks for the first 2 cycles, at the beginning of the subsequent 4 cycles, and as clinically necessary  -Electrolytes: Prior to treatment, at the beginning of first 6 cycles, and as needed indicated  -EKG: Prior to treatment initiation, repeat on day 14 of cycle 1, at the beginning of cycle 2, and last week indicated     Anastrozole:   1 mg p.o. q.day     Ribociclib:  600 mg p.o. daily for 21 days, followed by a 7 day rest to complete a 28 day treatment cycle; continue until disease progression or unacceptable toxicity  With AI: 600 mg daily for 21 days, followed by 7-day rest period to complete a 28-day treatment cycle; continue until disease progression or unacceptable toxicity  -Dose reduction depending upon kidney impairment  -Dose reduction depending upon moderate or severe liver impairment  -Dose reductions: 600 mg daily, 400 mg daily, 200 mg daily  -Dose management depending upon toxicities: Hematologic toxicity (neutropenia), cardiovascular toxicity (QT prolonging agent), dermatologic toxicity, pulmonary toxicity  Dosage forms: 200 mg, 400 mg, 600 mg  Administration: With or without food  Moderate or high emetic potential  Warnings/precautions with ribociclib:  -Bone marrow suppression: Neutropenia  -Dermatologic toxicity  -Hepatobiliary toxicity  -QT prolongation  -Pulmonary toxicity  Adverse reactions with  ribociclib:  Peripheral edema, alopecia, pruritus, skin rash, abdominal pain, constipation, anorexia, UTIs, anemia, leukopenia, neutropenia, increased ALT, increased AST, etc.        Fertility and birth control issues:  Discussion about fertility and contraception:  -Informed her about the potential impact of chemotherapy on fertility  -Made her aware of the option of referral to fertility specialist before chemotherapy and/or endocrine therapy to discuss options in case she desires future pregnancies.  Fertility preservation options include oocyte and embryo cryopreservation, etc.  -Amenorrhea frequently occurs during or after chemotherapy.  The majority of women younger than 35 years to resume menses within 2 years of finishing adjuvant chemotherapy  -Informed that menses and fertility are not necessarily linked. Absence of regular menses does not necessarily imply lack of fertility.  Conversely, the presence of menses does not guarantee fertility.  -There are limited data regarding continued fertility after chemotherapy.  -Cautioned her to avoid becoming pregnant during treatment with radiation therapy, chemotherapy, or endocrine therapy  -Hormone-based birth control is discouraged regardless of the harmless of the hormone receptor status of the patient's cancer  -Alternative methods of birth control, including IUD, barrier method, tubal ligation, vasectomy for the partner, etc.   >>>  -Mirena IUD removed  -follow-up with gyn for ParaGard insertion as nonhormonal methods of contraception  -fertility specialist consultation: she declined  -cautioned her not to become pregnant during treatment  -referred to gyn for consultation regarding nonhormonal methods of contraception        # Sites of disease:   -IDC right breast, ER positive, [place] positive, HER2 negative, cT1c cN0 MX  -inflammatory carcinoma left breast, ER positive, [place] negative, HER2 positive, cT4d cN2 M1, stage IV  -FDG PET-CT for staging 09/30/2024: Sites  of disease:  Multifocal, left breast; left axillary and subpectoral lymphadenopathy; small focus upper right breast; bilobar liver metastases; portal caval lymph node; multifocal bone metastases  -brain MRI 12/24/2024:  9 mm metastases anterior aspect of C4 vertebral body (no brain metastases)        # Molecular markers:  -liquid biopsy:  BRCA1 positive; HER2 positive;TMB could not be calculated due to insufficient circulating tumor DNA; MSI-high not detected  -CT-guided biopsy left iliac crest 10/17/2024:  Metastases from breast carcinoma  -germline testing: BRCA1 mutation positive  -tissue NGS testing (left breast, collected 07/16/2024):  BRCA1 mutation positive; HER2 positive; MSI stable; TMB 2.1 m/MB (low); fusion negative; PD-L1 negative (PD-L1 CPS 2; PD-L1 TPS 1%); PIK3CA negative; ESR1 negative  -genetic testing 08/22/2024:  BRCA1 mutation positive, heterozygous        # Hematochezia:   Since yesterday, 08/28/2024, has a experienced painless diarrhea with blood on toilet wipes as well as in toilet bowl; no weakness or dizziness; 5 loose stools yesterday, 2 loose stools today; no nausea, vomiting, hematemesis, or melena.  Never experienced GI bleeding before.  >>>   -08/29/2024: sent an urgent referral to GI for evaluation        Family history of breast cancer, ovarian cancer, cervical cancer, stomach cancer, colon cancer:  -Maternal great aunt # 1: Breast cancer, in her 50s   -sister: Ovarian cancer, age 50  -sister: Cervical cancer, age 25  -mother: Ovarian cancer, age 50  -Maternal aunt: Cervical cancer, age 50  -maternal grandfather: Stomach cancer, age 70 (smoker)  -maternal great aunt # 2: Colon cancer, age 60  >>>  -genetic testing 08/22/2024:  BRCA1 mutation positive, heterozygous           I have explained all of the above in detail and the patient understands all of the current recommendation(s). I have answered all of their questions to the best of my ability and to their complete satisfaction.        Ana Allen, AMANDAP-C  Oncology/Hematology   [facility]               [1]   Social History  Tobacco Use    Smoking status: Never     Passive exposure: Never    Smokeless tobacco: Never   Substance Use Topics    Alcohol use: Never    Drug use: Never

## 2025-03-18 ENCOUNTER — INFUSION (OUTPATIENT)
Dept: INFUSION THERAPY | Facility: HOSPITAL | Age: 35
End: 2025-03-18
Attending: INTERNAL MEDICINE
Payer: MEDICAID

## 2025-03-18 ENCOUNTER — OFFICE VISIT (OUTPATIENT)
Dept: HEMATOLOGY/ONCOLOGY | Facility: CLINIC | Age: 35
End: 2025-03-18
Payer: MEDICAID

## 2025-03-18 ENCOUNTER — LAB VISIT (OUTPATIENT)
Dept: HEMATOLOGY/ONCOLOGY | Facility: CLINIC | Age: 35
End: 2025-03-18
Payer: MEDICAID

## 2025-03-18 VITALS
HEART RATE: 85 BPM | DIASTOLIC BLOOD PRESSURE: 70 MMHG | SYSTOLIC BLOOD PRESSURE: 135 MMHG | RESPIRATION RATE: 17 BRPM | BODY MASS INDEX: 38.92 KG/M2 | TEMPERATURE: 98 F | HEIGHT: 65 IN | WEIGHT: 233.63 LBS | OXYGEN SATURATION: 97 %

## 2025-03-18 VITALS
DIASTOLIC BLOOD PRESSURE: 93 MMHG | SYSTOLIC BLOOD PRESSURE: 143 MMHG | BODY MASS INDEX: 38.92 KG/M2 | WEIGHT: 233.63 LBS | HEIGHT: 65 IN | TEMPERATURE: 98 F | RESPIRATION RATE: 18 BRPM | OXYGEN SATURATION: 97 % | HEART RATE: 82 BPM

## 2025-03-18 DIAGNOSIS — C79.51 SECONDARY MALIGNANCY OF LUMBAR VERTEBRAL COLUMN: ICD-10-CM

## 2025-03-18 DIAGNOSIS — C50.919 CARCINOMA OF BREAST METASTATIC TO BONE, UNSPECIFIED LATERALITY: ICD-10-CM

## 2025-03-18 DIAGNOSIS — C78.7 ADENOCARCINOMA OF BREAST METASTATIC TO LIVER, UNSPECIFIED LATERALITY: ICD-10-CM

## 2025-03-18 DIAGNOSIS — Z79.899 IMMUNODEFICIENCY DUE TO CHEMOTHERAPY: ICD-10-CM

## 2025-03-18 DIAGNOSIS — C50.911 INVASIVE DUCTAL CARCINOMA OF BREAST, RIGHT: Primary | ICD-10-CM

## 2025-03-18 DIAGNOSIS — C79.51 SECONDARY MALIGNANCY OF THORACIC VERTEBRAL COLUMN: ICD-10-CM

## 2025-03-18 DIAGNOSIS — C50.919 ADENOCARCINOMA OF BREAST METASTATIC TO LIVER, UNSPECIFIED LATERALITY: ICD-10-CM

## 2025-03-18 DIAGNOSIS — C50.912 INVASIVE DUCTAL CARCINOMA OF LEFT BREAST: Primary | ICD-10-CM

## 2025-03-18 DIAGNOSIS — D84.821 IMMUNODEFICIENCY DUE TO CHEMOTHERAPY: ICD-10-CM

## 2025-03-18 DIAGNOSIS — C79.51 CARCINOMA OF BREAST METASTATIC TO BONE, UNSPECIFIED LATERALITY: ICD-10-CM

## 2025-03-18 DIAGNOSIS — C77.3 SECONDARY MALIGNANT NEOPLASM OF AXILLARY LYMPH NODES: ICD-10-CM

## 2025-03-18 DIAGNOSIS — C50.912 INFLAMMATORY CARCINOMA OF LEFT BREAST: ICD-10-CM

## 2025-03-18 DIAGNOSIS — C50.911 INVASIVE DUCTAL CARCINOMA OF BREAST, RIGHT: ICD-10-CM

## 2025-03-18 DIAGNOSIS — C50.912 INVASIVE DUCTAL CARCINOMA OF LEFT BREAST: ICD-10-CM

## 2025-03-18 DIAGNOSIS — T45.1X5A IMMUNODEFICIENCY DUE TO CHEMOTHERAPY: ICD-10-CM

## 2025-03-18 PROBLEM — Z79.69 IMMUNODEFICIENCY DUE TO CHEMOTHERAPY: Status: ACTIVE | Noted: 2025-03-18

## 2025-03-18 LAB
ALBUMIN SERPL-MCNC: 3.5 G/DL (ref 3.5–5)
ALBUMIN/GLOB SERPL: 0.8 RATIO (ref 1.1–2)
ALP SERPL-CCNC: 47 UNIT/L (ref 40–150)
ALT SERPL-CCNC: 23 UNIT/L (ref 0–55)
ANION GAP SERPL CALC-SCNC: 9 MEQ/L
AST SERPL-CCNC: 19 UNIT/L (ref 5–34)
B-HCG UR QL: NEGATIVE
BASOPHILS # BLD AUTO: 0.04 X10(3)/MCL
BASOPHILS NFR BLD AUTO: 0.5 %
BILIRUB SERPL-MCNC: 0.4 MG/DL
BUN SERPL-MCNC: 22.3 MG/DL (ref 7–18.7)
CALCIUM SERPL-MCNC: 9.1 MG/DL (ref 8.4–10.2)
CHLORIDE SERPL-SCNC: 105 MMOL/L (ref 98–107)
CO2 SERPL-SCNC: 26 MMOL/L (ref 22–29)
CREAT SERPL-MCNC: 0.75 MG/DL (ref 0.55–1.02)
CREAT/UREA NIT SERPL: 30
CTP QC/QA: YES
EOSINOPHIL # BLD AUTO: 0.76 X10(3)/MCL (ref 0–0.9)
EOSINOPHIL NFR BLD AUTO: 10 %
ERYTHROCYTE [DISTWIDTH] IN BLOOD BY AUTOMATED COUNT: 12.4 % (ref 11.5–17)
GFR SERPLBLD CREATININE-BSD FMLA CKD-EPI: >60 ML/MIN/1.73/M2
GLOBULIN SER-MCNC: 4.2 GM/DL (ref 2.4–3.5)
GLUCOSE SERPL-MCNC: 179 MG/DL (ref 74–100)
HCT VFR BLD AUTO: 36.3 % (ref 37–47)
HGB BLD-MCNC: 11.5 G/DL (ref 12–16)
IMM GRANULOCYTES # BLD AUTO: 0.02 X10(3)/MCL (ref 0–0.04)
IMM GRANULOCYTES NFR BLD AUTO: 0.3 %
LYMPHOCYTES # BLD AUTO: 2.95 X10(3)/MCL (ref 0.6–4.6)
LYMPHOCYTES NFR BLD AUTO: 38.7 %
MAGNESIUM SERPL-MCNC: 1.9 MG/DL (ref 1.6–2.6)
MCH RBC QN AUTO: 31.3 PG (ref 27–31)
MCHC RBC AUTO-ENTMCNC: 31.7 G/DL (ref 33–36)
MCV RBC AUTO: 98.6 FL (ref 80–94)
MONOCYTES # BLD AUTO: 0.51 X10(3)/MCL (ref 0.1–1.3)
MONOCYTES NFR BLD AUTO: 6.7 %
NEUTROPHILS # BLD AUTO: 3.34 X10(3)/MCL (ref 2.1–9.2)
NEUTROPHILS NFR BLD AUTO: 43.8 %
NRBC BLD AUTO-RTO: 0 %
PLATELET # BLD AUTO: 262 X10(3)/MCL (ref 130–400)
PMV BLD AUTO: 9.9 FL (ref 7.4–10.4)
POTASSIUM SERPL-SCNC: 3.7 MMOL/L (ref 3.5–5.1)
PROT SERPL-MCNC: 7.7 GM/DL (ref 6.4–8.3)
RBC # BLD AUTO: 3.68 X10(6)/MCL (ref 4.2–5.4)
SODIUM SERPL-SCNC: 140 MMOL/L (ref 136–145)
WBC # BLD AUTO: 7.62 X10(3)/MCL (ref 4.5–11.5)

## 2025-03-18 PROCEDURE — 1159F MED LIST DOCD IN RCRD: CPT | Mod: CPTII,,,

## 2025-03-18 PROCEDURE — 99214 OFFICE O/P EST MOD 30 MIN: CPT | Mod: PBBFAC

## 2025-03-18 PROCEDURE — 3008F BODY MASS INDEX DOCD: CPT | Mod: CPTII,,,

## 2025-03-18 PROCEDURE — 3078F DIAST BP <80 MM HG: CPT | Mod: CPTII,,,

## 2025-03-18 PROCEDURE — 36415 COLL VENOUS BLD VENIPUNCTURE: CPT

## 2025-03-18 PROCEDURE — 83735 ASSAY OF MAGNESIUM: CPT

## 2025-03-18 PROCEDURE — 85025 COMPLETE CBC W/AUTO DIFF WBC: CPT

## 2025-03-18 PROCEDURE — 81025 URINE PREGNANCY TEST: CPT

## 2025-03-18 PROCEDURE — 63600175 PHARM REV CODE 636 W HCPCS

## 2025-03-18 PROCEDURE — 96413 CHEMO IV INFUSION 1 HR: CPT

## 2025-03-18 PROCEDURE — 93005 ELECTROCARDIOGRAM TRACING: CPT

## 2025-03-18 PROCEDURE — 1160F RVW MEDS BY RX/DR IN RCRD: CPT | Mod: CPTII,,,

## 2025-03-18 PROCEDURE — 3075F SYST BP GE 130 - 139MM HG: CPT | Mod: CPTII,,,

## 2025-03-18 PROCEDURE — 25000003 PHARM REV CODE 250

## 2025-03-18 PROCEDURE — 99215 OFFICE O/P EST HI 40 MIN: CPT | Mod: S$PBB,,,

## 2025-03-18 PROCEDURE — 80053 COMPREHEN METABOLIC PANEL: CPT

## 2025-03-18 RX ORDER — DIPHENHYDRAMINE HYDROCHLORIDE 50 MG/ML
50 INJECTION, SOLUTION INTRAMUSCULAR; INTRAVENOUS ONCE AS NEEDED
Status: DISCONTINUED | OUTPATIENT
Start: 2025-03-18 | End: 2025-03-18 | Stop reason: HOSPADM

## 2025-03-18 RX ORDER — PROCHLORPERAZINE EDISYLATE 5 MG/ML
10 INJECTION INTRAMUSCULAR; INTRAVENOUS ONCE AS NEEDED
Status: CANCELLED
Start: 2025-03-18

## 2025-03-18 RX ORDER — DIPHENHYDRAMINE HYDROCHLORIDE 50 MG/ML
50 INJECTION, SOLUTION INTRAMUSCULAR; INTRAVENOUS ONCE AS NEEDED
Status: CANCELLED | OUTPATIENT
Start: 2025-03-18

## 2025-03-18 RX ORDER — SODIUM CHLORIDE 0.9 % (FLUSH) 0.9 %
10 SYRINGE (ML) INJECTION
Status: CANCELLED | OUTPATIENT
Start: 2025-03-18

## 2025-03-18 RX ORDER — SODIUM CHLORIDE 0.9 % (FLUSH) 0.9 %
10 SYRINGE (ML) INJECTION
Status: DISCONTINUED | OUTPATIENT
Start: 2025-03-18 | End: 2025-03-18 | Stop reason: HOSPADM

## 2025-03-18 RX ORDER — HEPARIN 100 UNIT/ML
500 SYRINGE INTRAVENOUS
Status: CANCELLED | OUTPATIENT
Start: 2025-03-18

## 2025-03-18 RX ORDER — EPINEPHRINE 1 MG/ML
0.3 INJECTION INTRAMUSCULAR; INTRAVENOUS; SUBCUTANEOUS ONCE AS NEEDED
Status: DISCONTINUED | OUTPATIENT
Start: 2025-03-18 | End: 2025-03-18 | Stop reason: HOSPADM

## 2025-03-18 RX ORDER — PROCHLORPERAZINE EDISYLATE 5 MG/ML
10 INJECTION INTRAMUSCULAR; INTRAVENOUS ONCE AS NEEDED
Status: DISCONTINUED | OUTPATIENT
Start: 2025-03-18 | End: 2025-03-18 | Stop reason: HOSPADM

## 2025-03-18 RX ORDER — HEPARIN 100 UNIT/ML
500 SYRINGE INTRAVENOUS
Status: DISCONTINUED | OUTPATIENT
Start: 2025-03-18 | End: 2025-03-18 | Stop reason: HOSPADM

## 2025-03-18 RX ORDER — EPINEPHRINE 0.3 MG/.3ML
0.3 INJECTION SUBCUTANEOUS ONCE AS NEEDED
Status: CANCELLED | OUTPATIENT
Start: 2025-03-18

## 2025-03-18 RX ADMIN — HEPARIN 500 UNITS: 100 SYRINGE at 11:03

## 2025-03-18 RX ADMIN — SODIUM CHLORIDE: 9 INJECTION, SOLUTION INTRAVENOUS at 11:03

## 2025-03-18 RX ADMIN — TRASTUZUMAB-ANNS 653 MG: 150 INJECTION, POWDER, LYOPHILIZED, FOR SOLUTION INTRAVENOUS at 11:03

## 2025-03-18 NOTE — NURSING
1026 Patient is here for labs, NP visit & C8 Kanjinti.   UPT collected; neg.   Xgeva q 4 wks due 3/26/25  Lupron q 12 wks due 5/13/25    1151 Kanjinti infused via [employer].

## 2025-03-19 LAB
OHS QRS DURATION: 84 MS
OHS QTC CALCULATION: 457 MS

## 2025-03-26 ENCOUNTER — INFUSION (OUTPATIENT)
Dept: INFUSION THERAPY | Facility: HOSPITAL | Age: 35
End: 2025-03-26
Attending: INTERNAL MEDICINE
Payer: MEDICAID

## 2025-03-26 ENCOUNTER — LAB VISIT (OUTPATIENT)
Dept: HEMATOLOGY/ONCOLOGY | Facility: CLINIC | Age: 35
End: 2025-03-26
Payer: MEDICAID

## 2025-03-26 VITALS
DIASTOLIC BLOOD PRESSURE: 84 MMHG | SYSTOLIC BLOOD PRESSURE: 129 MMHG | TEMPERATURE: 98 F | HEART RATE: 82 BPM | OXYGEN SATURATION: 100 % | RESPIRATION RATE: 18 BRPM

## 2025-03-26 DIAGNOSIS — C50.912 CARCINOMA OF LEFT BREAST METASTATIC TO BONE: Primary | ICD-10-CM

## 2025-03-26 DIAGNOSIS — C79.51 SECONDARY MALIGNANCY OF THORACIC VERTEBRAL COLUMN: ICD-10-CM

## 2025-03-26 DIAGNOSIS — C79.51 SECONDARY MALIGNANCY OF LUMBAR VERTEBRAL COLUMN: ICD-10-CM

## 2025-03-26 DIAGNOSIS — C50.911 INVASIVE DUCTAL CARCINOMA OF BREAST, RIGHT: ICD-10-CM

## 2025-03-26 DIAGNOSIS — C50.919 ADENOCARCINOMA OF BREAST METASTATIC TO LIVER, UNSPECIFIED LATERALITY: ICD-10-CM

## 2025-03-26 DIAGNOSIS — C79.51 CARCINOMA OF LEFT BREAST METASTATIC TO BONE: Primary | ICD-10-CM

## 2025-03-26 DIAGNOSIS — Z79.69 IMMUNODEFICIENCY DUE TO CHEMOTHERAPY: ICD-10-CM

## 2025-03-26 DIAGNOSIS — C78.7 ADENOCARCINOMA OF BREAST METASTATIC TO LIVER, UNSPECIFIED LATERALITY: ICD-10-CM

## 2025-03-26 DIAGNOSIS — T45.1X5A IMMUNODEFICIENCY DUE TO CHEMOTHERAPY: ICD-10-CM

## 2025-03-26 DIAGNOSIS — C50.912 INFLAMMATORY CARCINOMA OF LEFT BREAST: ICD-10-CM

## 2025-03-26 DIAGNOSIS — D84.821 IMMUNODEFICIENCY DUE TO CHEMOTHERAPY: ICD-10-CM

## 2025-03-26 LAB
ALBUMIN SERPL-MCNC: 3.5 G/DL (ref 3.5–5)
ALBUMIN/GLOB SERPL: 0.8 RATIO (ref 1.1–2)
ALP SERPL-CCNC: 48 UNIT/L (ref 40–150)
ALT SERPL-CCNC: 21 UNIT/L (ref 0–55)
ANION GAP SERPL CALC-SCNC: 7 MEQ/L
AST SERPL-CCNC: 19 UNIT/L (ref 11–45)
BASOPHILS # BLD AUTO: 0.03 X10(3)/MCL
BASOPHILS NFR BLD AUTO: 0.7 %
BILIRUB SERPL-MCNC: 0.6 MG/DL
BUN SERPL-MCNC: 18.9 MG/DL (ref 7–18.7)
CALCIUM SERPL-MCNC: 8.7 MG/DL (ref 8.4–10.2)
CHLORIDE SERPL-SCNC: 103 MMOL/L (ref 98–107)
CO2 SERPL-SCNC: 27 MMOL/L (ref 22–29)
CREAT SERPL-MCNC: 0.94 MG/DL (ref 0.55–1.02)
CREAT/UREA NIT SERPL: 20
EOSINOPHIL # BLD AUTO: 0.36 X10(3)/MCL (ref 0–0.9)
EOSINOPHIL NFR BLD AUTO: 7.9 %
ERYTHROCYTE [DISTWIDTH] IN BLOOD BY AUTOMATED COUNT: 12.6 % (ref 11.5–17)
GFR SERPLBLD CREATININE-BSD FMLA CKD-EPI: >60 ML/MIN/1.73/M2
GLOBULIN SER-MCNC: 4.4 GM/DL (ref 2.4–3.5)
GLUCOSE SERPL-MCNC: 211 MG/DL (ref 74–100)
HCT VFR BLD AUTO: 34.5 % (ref 37–47)
HGB BLD-MCNC: 11.1 G/DL (ref 12–16)
IMM GRANULOCYTES # BLD AUTO: 0.01 X10(3)/MCL (ref 0–0.04)
IMM GRANULOCYTES NFR BLD AUTO: 0.2 %
LYMPHOCYTES # BLD AUTO: 1.79 X10(3)/MCL (ref 0.6–4.6)
LYMPHOCYTES NFR BLD AUTO: 39.3 %
MAGNESIUM SERPL-MCNC: 1.9 MG/DL (ref 1.6–2.6)
MCH RBC QN AUTO: 31.5 PG (ref 27–31)
MCHC RBC AUTO-ENTMCNC: 32.2 G/DL (ref 33–36)
MCV RBC AUTO: 98 FL (ref 80–94)
MONOCYTES # BLD AUTO: 0.16 X10(3)/MCL (ref 0.1–1.3)
MONOCYTES NFR BLD AUTO: 3.5 %
NEUTROPHILS # BLD AUTO: 2.2 X10(3)/MCL (ref 2.1–9.2)
NEUTROPHILS NFR BLD AUTO: 48.4 %
NRBC BLD AUTO-RTO: 0 %
PLATELET # BLD AUTO: 298 X10(3)/MCL (ref 130–400)
PMV BLD AUTO: 9.7 FL (ref 7.4–10.4)
POTASSIUM SERPL-SCNC: 3.4 MMOL/L (ref 3.5–5.1)
PROT SERPL-MCNC: 7.9 GM/DL (ref 6.4–8.3)
RBC # BLD AUTO: 3.52 X10(6)/MCL (ref 4.2–5.4)
SODIUM SERPL-SCNC: 137 MMOL/L (ref 136–145)
WBC # BLD AUTO: 4.55 X10(3)/MCL (ref 4.5–11.5)

## 2025-03-26 PROCEDURE — 83735 ASSAY OF MAGNESIUM: CPT

## 2025-03-26 PROCEDURE — 80053 COMPREHEN METABOLIC PANEL: CPT

## 2025-03-26 PROCEDURE — 63600175 PHARM REV CODE 636 W HCPCS: Mod: JZ,TB

## 2025-03-26 PROCEDURE — 36415 COLL VENOUS BLD VENIPUNCTURE: CPT

## 2025-03-26 PROCEDURE — 85025 COMPLETE CBC W/AUTO DIFF WBC: CPT

## 2025-03-26 RX ADMIN — DENOSUMAB 120 MG: 120 INJECTION SUBCUTANEOUS at 09:03

## 2025-04-01 ENCOUNTER — OFFICE VISIT (OUTPATIENT)
Dept: HEMATOLOGY/ONCOLOGY | Facility: CLINIC | Age: 35
End: 2025-04-01
Payer: MEDICAID

## 2025-04-01 VITALS
HEART RATE: 88 BPM | HEIGHT: 66 IN | RESPIRATION RATE: 16 BRPM | BODY MASS INDEX: 37.64 KG/M2 | DIASTOLIC BLOOD PRESSURE: 87 MMHG | WEIGHT: 234.19 LBS | TEMPERATURE: 98 F | OXYGEN SATURATION: 96 % | SYSTOLIC BLOOD PRESSURE: 140 MMHG

## 2025-04-01 DIAGNOSIS — N95.9 PREMENOPAUSAL PATIENT: ICD-10-CM

## 2025-04-01 DIAGNOSIS — Z79.69 IMMUNODEFICIENCY DUE TO CHEMOTHERAPY: ICD-10-CM

## 2025-04-01 DIAGNOSIS — C79.51 SECONDARY MALIGNANCY OF THORACIC VERTEBRAL COLUMN: ICD-10-CM

## 2025-04-01 DIAGNOSIS — D84.821 IMMUNODEFICIENCY DUE TO CHEMOTHERAPY: ICD-10-CM

## 2025-04-01 DIAGNOSIS — C50.912 INFLAMMATORY CARCINOMA OF LEFT BREAST: ICD-10-CM

## 2025-04-01 DIAGNOSIS — T45.1X5A IMMUNODEFICIENCY DUE TO CHEMOTHERAPY: ICD-10-CM

## 2025-04-01 DIAGNOSIS — C50.911 INVASIVE DUCTAL CARCINOMA OF BREAST, RIGHT: ICD-10-CM

## 2025-04-01 DIAGNOSIS — C50.912 INVASIVE DUCTAL CARCINOMA OF LEFT BREAST: Primary | ICD-10-CM

## 2025-04-01 DIAGNOSIS — C79.51 SECONDARY MALIGNANCY OF LUMBAR VERTEBRAL COLUMN: Primary | ICD-10-CM

## 2025-04-01 LAB
OHS QRS DURATION: 84 MS
OHS QTC CALCULATION: 448 MS

## 2025-04-01 PROCEDURE — 93005 ELECTROCARDIOGRAM TRACING: CPT

## 2025-04-01 NOTE — PROGRESS NOTES
[facility] Hematology/Oncology  PROGRESS NOTE    Subjective:       Patient ID: Jeannie Greene is a 34 y.o. female.    Diagnosis:   -IDC right breast:  IDC, grade 2, lymph node biopsy negative, ER positive, NM positive, HER2 negative; S/P core biopsy 07/17/2024; cT1c cN0 MX, AJCC anatomic stage at least IA, clinical prognostic stage IA  -inflammatory carcinoma left breast, grade 3, lymph node biopsy positive, ER positive, NM negative, HER2 positive;   cT4d cN2 M1, stage IV; S/P core biopsy 07/16/2024  -Thoracolumbar vertebrae and sacrum metastatic involvement.   -FDG PET-CT for staging 09/30/2024: Sites of disease:  Multifocal, left breast; left axillary and subpectoral lymphadenopathy; small focus upper right breast; bilobar liver metastases; portal caval lymph node; multifocal bone metastases  -Monoallelic mutation of BRCA1 gene   -premenopausal   -family history of breast cancer, ovarian cancer, cervical cancer, stomach cancer, colon cancer    Current Treatment:  -trastuzumab 8 mg/kg IV day 1 cycle 1; followed by 6 mg/kg IV day 1 beginning with cycle 2  start 10/8/24     xgeva 120mg SQ every 28 days   start 12/31/24     Lupron 22.5 mg IM every 12 weeks started on 2/18/2025    Arimidex and Ribociclib started 03/19/2025    Treatment History:  -MediPort placed 08/14/2024   -Mirena removed on 9/18/24    Tamoxifen stopped 03/18/2025    Chief Complaint: Follow-up (Patient stated that she experiencing hot flashes on and off this started about 2 weeks ago.)    HPI:  34-year-old lady, referred from [facility] surgery, with invasive ductal carcinoma of breast.  We are following her for metastatic breast cancer.    Please see assessment and plan section for details.     08/29/2024:   Pleasant lady who presents for initial medical oncology consultation.  In no acute discomfort.  Hot flashes since May 2024.  Left breast is painful; gets sharp pains intermittently, 9/10 severity; also, pain in left arm but no swelling.  Pain is  intermittent.  Left breast tumor is getting bigger.  No ulceration.  Since yesterday, 08/28/2024, has a experienced painless diarrhea with blood on toilet wipes as well as in toilet bowl; no weakness or dizziness; 5 loose stools yesterday, 2 loose stools today; no nausea, vomiting, hematemesis, or melena.  Never experienced GI bleeding before.  No unusual headaches, focal neurological symptoms, chest pain, cough, dyspnea, abdominal pain, nausea or vomiting.  No urinary problems.  No bone pains.  Appetite is okay.  No unintentional weight loss.    08/09/2024:  [facility] surgery Office note:   CC: b/l breast IDC     HPI: Jeannie Greene is a 34 y.o. female with PMHx of HTN, R breast IDC Grade 2, and L breast/axillary ICD Grade 3 presents to clinic today with L breast and arm pain.   She first noticed the pain and swelling in L breast in May where she presented to the ED and received antibiotics. Pain did not resolve so patient followed up with her OB/Gyn who ordered the imaging and biopsy and was subsequently referred to our clinic. Today, patient reports swelling and pain in left breast, axilla, and down her arm. No pain in right breast, but has had some milky discharge in the past from R nipple. She states her pain as a 6/10 that was at its worse in July and has improved some since. She takes ibuprofen 4x daily without relief.    Patient had first menarche at 11. One pregnancy with child and not gone through menopause. She was on OCP at 15yo, received Depo shot from [age]. No contraception from [age] when she got pregnant. Got Murina IUD after pregnancy at 30  that is still in place.  Pt has an extensive family history of breast and cervical cancer. Her mother and sister had cervical cancer. Her mother had a hysterectomy. She also believes her maternal aunt had breast cancer. She only takes losartan for HTN. No prior surgeries.  Physical exam:   # right breast: 3 cm mass 12 o'clock, 4 in above nipple, no skin changes,  no nipple retraction or discharge, no palpable right axillary lymphadenopathy  # left breast: Swollen and hard; large mass appreciated two o'clock position right above breast, 1 hard immobile lymph node in left axilla, breast and axilla tender to palpation (inflammatory carcinoma left breast)    Past medical history: Hypertension  Surgical/procedure history:  MediPort placement 08/14/2024    Social history: .  Lives in [place].  Has a 3-year-old son.  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  Denies history of tobacco, alcohol, or illicit drug abuse.  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.  No screening colonoscopy ever.  Menstrual/Ob gyn history: Menarche, age 11; 1 pregnancy, 1 child; gave birth to her child at age 30; no abortions or miscarriages premenopausal; OCP at age 16, received Depo shots from age [age]; no contraception from age [age] when she became pregnant; Mirena IUD after pregnancy at age 30; experienced hot flashes.  No menstrual cycles after Mirena was placed.        Interval History:  Patient presents to clinic for follow-up and toxicity check. She continues on arimidex daily and ribociclib 600 mg on days 1-21, last day of cycle is on 4/8/2025. New cycle of ribociclib to start on 4/16/2025. She does report hot flashes that are tolerable. Denies any joint stiffness, mood changes, vaginal dryness,  She is also taking calcium and vitamin-D daily.  She is due for Xgeva on 4/23/2025.  She received Lupron 22.5 mg on 2/18/2025, next due 05/13/2025 .She continues on Norco 10 mg every 6 hours as needed for pain.  Echo on 03/10/2025 with ejection fraction of 60%. Labs  reviewed in detail with patient and we discussed plan of care, she verbalized understanding    PMX/PSHx  Past Medical History:   Diagnosis Date    BRCA1 gene mutation positive 09/09/2024    BRCA1 [id number] ([id number]) - [facility] BRCA1 and BRCA2 gene sequence and deletion/duplication and 85-gene CustomNext-Cancer Panel (85 genes), VUS in NF1    Cancer     Hypertension       Past Surgical History:   Procedure Laterality Date    INSERTION OF TUNNELED CENTRAL VENOUS CATHETER (CVC) WITH SUBCUTANEOUS PORT N/A 08/14/2024    Procedure: [id number];  Surgeon: Jc Chauhan Jr., MD;  Location: [facility];  Service: General;  Laterality: N/A;     Allergies:  Review of patient's allergies indicates:  No Known Allergies   Social History:   Social History[1]  Medications:  Current Outpatient Medications   Medication Instructions    anastrozole (ARIMIDEX) 1 mg, Oral, Daily    anastrozole (ARIMIDEX) 1 mg, Oral, Daily    HYDROcodone-acetaminophen (NORCO)  mg per tablet 1 tablet, Oral, Every 6 hours PRN    KISQALI 600 mg, Oral, Daily, for 21 DAYS followed by a 7 day rest; to complete a 28 day treatment cycle    losartan-hydrochlorothiazide 100-25 mg (HYZAAR) 100-25 mg per tablet     ondansetron (ZOFRAN) 4 mg, Oral, 2 times daily    prochlorperazine (COMPAZINE) 10 mg, Oral, Every 6 hours PRN     ROS:  Review of Systems   Constitutional:  Negative for appetite change, chills, fatigue, fever and unexpected weight change.   HENT:  Negative for facial swelling, mouth sores, nosebleeds, sinus pressure/congestion and sore throat.    Eyes:  Negative for photophobia, pain and visual disturbance.   Respiratory:  Negative for cough, chest tightness, shortness of breath and wheezing.    Cardiovascular:  Negative for chest pain, palpitations and leg swelling.   Gastrointestinal:  Negative for abdominal pain, blood in stool, change in bowel habit, constipation, diarrhea, nausea and vomiting.   Endocrine: Negative.     Genitourinary:  Negative for dysuria, frequency, hematuria, hot flashes, pelvic pain, urgency and vaginal pain.   Musculoskeletal:  Negative for arthralgias, back pain, gait problem, joint swelling and myalgias.   Integumentary:  Negative for pallor, rash, wound, breast mass, breast discharge and breast tenderness.   Allergic/Immunologic: Negative.  Negative for immunocompromised state.   Neurological:  Negative for dizziness, vertigo, syncope, weakness, numbness and headaches.   Hematological:  Negative for adenopathy. Does not bruise/bleed easily.   Psychiatric/Behavioral:  Negative for behavioral problems and dysphoric mood. The patient is not nervous/anxious.    All other systems reviewed and are negative.  Breast: Negative for mass and tenderness      Objective:   Vitals:  Vitals:    04/01/25 1308   BP: (!) 140/87   Pulse: 88   Resp: 16   Temp: 98.4 °F (36.9 °C)      Wt Readings from Last 3 Encounters:   04/01/25 1308 106.2 kg (234 lb 3.2 oz)   03/18/25 1026 106 kg (233 lb 9.6 oz)   03/18/25 0929 106 kg (233 lb 9.6 oz)      Physical Examination:  Physical Exam  Vitals and nursing note reviewed.   HENT:      Head: Normocephalic and atraumatic.      Mouth/Throat:      Mouth: Mucous membranes are moist.      Pharynx: Oropharynx is clear.   Eyes:      Extraocular Movements: Extraocular movements intact.      Conjunctiva/sclera: Conjunctivae normal.      Pupils: Pupils are equal, round, and reactive to light.   Cardiovascular:      Rate and Rhythm: Normal rate and regular rhythm.   Pulmonary:      Effort: Pulmonary effort is normal.      Breath sounds: Normal breath sounds.   Abdominal:      General: Abdomen is flat. Bowel sounds are normal.      Palpations: Abdomen is soft.   Musculoskeletal:         General: Normal range of motion.      Cervical back: Normal range of motion and neck supple.   Skin:     General: Skin is warm and dry.   Neurological:      General: No focal deficit present.      Mental Status: She  is alert.   Psychiatric:         Mood and Affect: Mood normal.       ECOG SCORE           Labs:     Office Visit on 04/01/2025   Component Date Value Ref Range Status    QRS Duration 04/01/2025 84  ms In process    [facility]HS QTC Calculation 04/01/2025 448  ms In process   Lab Visit on 04/01/2025   Component Date Value Ref Range Status    Sodium 04/01/2025 138  136 - 145 mmol/L Final    Potassium 04/01/2025 3.6  3.5 - 5.1 mmol/L Final    Chloride 04/01/2025 106  98 - 107 mmol/L Final    CO2 04/01/2025 31 (H)  22 - 29 mmol/L Final    Glucose 04/01/2025 146 (H)  74 - 100 mg/dL Final    Blood Urea Nitrogen 04/01/2025 17.2  7.0 - 18.7 mg/dL Final    Creatinine 04/01/2025 0.95  0.55 - 1.02 mg/dL Final    Calcium 04/01/2025 9.2  8.4 - 10.2 mg/dL Final    Protein Total 04/01/2025 7.8  6.4 - 8.3 gm/dL Final    Albumin 04/01/2025 3.5  3.5 - 5.0 g/dL Final    Globulin 04/01/2025 4.3 (H)  2.4 - 3.5 gm/dL Final    Albumin/Globulin Ratio 04/01/2025 0.8 (L)  1.1 - 2.0 ratio Final    Bilirubin Total 04/01/2025 0.2  <=1.5 mg/dL Final    ALP 04/01/2025 54  40 - 150 unit/L Final    ALT 04/01/2025 22  0 - 55 unit/L Final    AST 04/01/2025 19  11 - 45 unit/L Final    eGFR 04/01/2025 >60  mL/min/1.73/m2 Final    Estimated GFR calculated using the CKD-EPI creatinine (2021) equation.    Anion Gap 04/01/2025 1.0  mEq/L Final    BUN/Creatinine Ratio 04/01/2025 18   Final    Magnesium Level 04/01/2025 1.90  1.60 - 2.60 mg/dL Final    WBC 04/01/2025 3.43 (L)  4.50 - 11.50 x10(3)/mcL Final    RBC 04/01/2025 3.35 (L)  4.20 - 5.40 x10(6)/mcL Final    Hgb 04/01/2025 10.8 (L)  12.0 - 16.0 g/dL Final    Hct 04/01/2025 33.4 (L)  37.0 - 47.0 % Final    MCV 04/01/2025 99.7 (H)  80.0 - 94.0 fL Final    MCH 04/01/2025 32.2 (H)  27.0 - 31.0 pg Final    MCHC 04/01/2025 32.3 (L)  33.0 - 36.0 g/dL Final    RDW 04/01/2025 13.3  11.5 - 17.0 % Final    Platelet 04/01/2025 244  130 - 400 x10(3)/mcL Final    MPV 04/01/2025 8.8  7.4 - 10.4 fL Final    NRBC% 04/01/2025  0.0  % Final    Neutrophils % 04/01/2025 43 (L)  47 - 80 % Final    Lymphs % 04/01/2025 34  13 - 40 % Final    Monocytes % 04/01/2025 1 (L)  2 - 11 % Final    Eosinophils % 04/01/2025 19 (H)  0 - 8 % Final    Basophils % 04/01/2025 3 (H)  0 - 2 % Final    Neutrophils Abs Calc 04/01/2025 1.4749 (L)  2.1 - 9.2 x10(3)/mcL Final    Basophils Abs 04/01/2025 0.1029  0 - 0.2 x10(3)/mcL Final    Lymphs Abs 04/01/2025 1.1662  0.6 - 4.6 x10(3)/mcL Final    Eosinophils Abs 04/01/2025 0.6517  0 - 0.9 x10(3)/mcL Final    Monocytes Abs 04/01/2025 0.0343 (L)  0.1 - 1.3 x10(3)/mcL Final    Platelets 04/01/2025 Adequate  Normal, Adequate Final    RBC Morph 04/01/2025 Normal  Normal Final         Pathology:  -IDC right breast:  IDC, grade 2, lymph node biopsy negative, ER positive, KY positive, HER2 negative; S/P core biopsy 07/17/2024; cT1c cN0 MX, AJCC anatomic stage at least IA, clinical prognostic stage IA  -inflammatory carcinoma left breast, grade 3, lymph node biopsy positive, ER positive, KY negative, HER2 positive; S/P core biopsy 07/16/2024; cT4d cN2 M1, stage IV    -09/16/2024: Patient referred to IR for biopsy of suspicious bone lesions  -liquid biopsy 09/19/2024:  BRCA1 positive; HER2 positive;TMB could not be calculated due to insufficient circulating tumor DNA; MSI-high not detected    -CT-guided biopsy left iliac crest 10/17/2024:  Metastases from breast carcinoma  -germline testing: BRCA1 mutation positive  -tissue NGS testing (left breast, collected 07/16/2024):  BRCA1 mutation positive; HER2 positive; MSI stable; TMB 2.1 m/MB (low); fusion negative; PD-L1 negative (PD-L1 CPS 2; PD-L1 TPS 1%); PIK3CA negative; ESR1 negative    Radiology/Diagnostics:  -baseline TTE 09/05/2024:  LVEF 60-65%   -DEXA scan 09/16/2024:  Normal BMD of lumbar vertebrae end of femoral necks  -baseline staging CTs C/A/P 0 09/16/2024:  Multiple thoracolumbar vertebrae and sacral lytic metastases   -baseline staging whole-body nuclear medicine  bone scan 09/16/2024:  Thoracolumbar vertebrae and sacrum metastases  -09/16/2024: Patient referred to IR for biopsy of suspicious bone lesions  -FDG PET-CT for staging 09/30/2024: Sites of disease:  Multifocal, left breast; left axillary and subpectoral lymphadenopathy; small focus upper right breast; bilobar liver metastases; portal caval lymph node; multifocal bone metastases  -brain MRI 12/24/2024:  9 mm metastases anterior aspect of C4 vertebral body  -12/19/2024: Surveillance echocardiogram:  LVEF 67%  -12/23/2024:  Pelvic ultrasound (encounter for non procreative screening for genetic disease carrier status):  Small fibroid; cyst in the right and left ovaries (right ovary cyst 1.5 cm and 1.4 cm; left ovary cyst 1.1 cm and 7 mm)  -restaging CTs C/A/P 01/27/2025:  Overall progression of disease with multiple new and enlarging osseous metastases as well as a new metastatic lesion right liver lobe; the majority of the left axillary metastatic lymphadenopathy has decreased in size; however, there is a single left axillary lymph node which has enlarged; the left breast skin thickening and underlying asymmetry density in the left breast parenchyma is grossly unchanged (2.5 x 2.3 cm left axillary lymph node has increased in size from 2.1 x 1.7 cm previously; suggestion of a peripherally enhancing nodule dome of the right lobe of the liver 1.9 cm; multiple lucent lytic metastatic lesions throughout the visualized spine which have progressed from the prior exam with multiple new and enlarging lesions, representative 8 mm lucent lesion T11 vertebral body is new, T7 lesion has been increased in size 16 x 12 mm previously 13 x 11 mm)  -restaging whole-body nuclear medicine bone scan 01/27/2025:  Similar uptake in the spine, ribs, and pelvis compared to prior study  -03/03/2025 bilateral diagnostic mammogram and ultrasound:   Bilateral Diagnostic Mammogram:   In the right breast 12:00 axis middle depth, there is an  irregular high density spiculated mass with an associated T4-butterfly clip, correlating with outside prior malignant ultrasound-guided core needle biopsy that diagnosed grade 2 invasive ductal carcinoma.  There has been an increase in the size and density of the malignant mass, particularly the anterior and lateral component.     No other new suspicious mammographic finding in the right breast.     In the right axilla, there is a lymph node with an associated mini-T3-coil clip, correlating with outside prior benign ultrasound-guided core needle biopsy.  There has been no significant interval change of this lymph node.     There is an implanted MediPort in the right anterior chest wall which partially obscures evaluation of the right axilla.  No suspicious mammographic finding in the right axilla.     The left breast is contracted and significantly smaller compared to the most recent prior mammogram dated 07/01/2024 (8 months ago).  This contraction is related to a large irregular high density spiculated mass occupying all 4 quadrants posterior to anterior depths with spiculations extending out to the skin, with severe thickening and retraction of the same.  The mass extends into and engulfs the nipple-areolar complex, with retraction and destruction of the same.  These findings are compatible with the shrunken breast sign due to locally advanced breast cancer.  The irregular high density spiculated mass has its most significant component in the upper-outer quadrant, and there is a T4-butterfly clip in the 2:00 axis middle depth related to outside prior malignant ultrasound-guided core needle biopsy that diagnosed grade 3 invasive ductal carcinoma.  Overall, the mammographic appearance of the malignant process throughout the left breast is worse.       In the left axilla, there is an irregular high density spiculated mass with an associated mini-T3-coil clip, correlating with outside prior malignant  ultrasound-guided core needle biopsy that diagnosed grade 3 invasive ductal carcinoma.  Given the location of this malignant mass, it is most compatible with an axillary lymph node that has been entirely replaced by metastatic carcinoma.  This malignant lymph node has significantly increased in size compared to the most recent prior mammogram dated 07/01/2024 (8 months ago).        Bilateral Complete Breast Ultrasound:  Sonographic evaluation of both complete breasts (all 4 quadrants, subareolar, axilla) was performed.       In the right breast 12:00 axis 9 cm FN position, there is a 1.8 x 1.8 x 2.2 cm irregular nonparallel hypoechoic vascular mass with angular margins, correlating with biopsy-proven grade 2 invasive ductal carcinoma.  This has increased in size compared to outside prior ultrasound on 07/01/2024, at which time it measured 1.3 x 1.1 x 1.6 cm.  There is also been an increase in the diameter and hypoechoic intraductal material within the ductal system extending laterally away from the malignant mass, suggestive for extension of malignancy into this ductal system.  Taken together, these findings correlate with the increase in size and density of the right breast 12:00 axis middle depth malignant mass, particularly the anterior and lateral component, as seen on today's mammogram.     No other new suspicious sonographic finding in the right breast.       No suspicious right axillary adenopathy.  One of the right axillary level 1 lymph nodes has an associated biopsy clip (mini-T3-coil), correlating with the biopsy-proven benign right axillary lymph node.     In the left breast all 4 quadrants subareolar-12 cm FN, there is an irregular hypoechoic spiculated mass with larger confluent portions of discrete masses interconnected by hypoechoic tracts of non-mass lesions.  The most confluent discrete masses are at the 1:00 axis 7 cm FN (5.6 x 4.1 x 5.5 cm), 2:00 axis 9 cm FN (2.5 x 1.3 x 1.4 cm), and 1:00 axis  12 cm FN (0.5 x 0.7 x 0.9 cm) positions.  The malignancy extends into and destroys the nipple-areolar complex.  There is diffuse severe skin thickening, maximal thickness 0.8 cm as measured in the 9:00 axis 1 cm FN position.  Comparison to the outside prior ultrasound on 07/01/2024 is suboptimal due to differences in positioning and technique as well as overall contraction/global decrease in size of the breast in the interval since that prior exam.  Given those limitations, the overall change in sonographic appearance of the malignant process throughout the left breast is worse.   In the left axilla, there is a 3 x 2.3 x 2.6 cm irregular nonparallel hypoechoic spiculated vascular mass with a hyperechoic rind, correlating with biopsy-proven grade 3 invasive ductal carcinoma.  This has increased in size compared to the outside prior ultrasound on 07/01/2024, at which time it measured 1.2 x 1.5 x 1.6 cm.   In the deep aspect of the left axilla level 1 andra station, there has been decrease in size and conspicuity of a morphologically abnormal lymph node identified on the outside prior ultrasound at [facility] on 07/01/2024.      IMPRESSION: KNOWN BIOPSY PROVEN MALIGNANCY, ULTRASOUND KNOWN BIOPSY PROVEN MALIGNANCY   1) Poor response to neoadjuvant chemotherapy.  The mammographic and sonographic appearance of the malignant process throughout the left breast and in the left axilla is worse compared to the outside prior mammogram and ultrasound at [facility] on 07/01/2024 (8 months ago).  BI-RADS 6: Known biopsy-proven malignancy.   2) Poor response to neoadjuvant chemotherapy.  The right breast 12:00 axis 9 cm FN malignant mass has increased in size, with new involvement of the ductal system extending laterally away from the malignant mass.  BI-RADS 6: Known biopsy-proven malignancy.   3) Review of the patient's restaging CT C/A/P and Tc-99m-MDP bone scan 01/27/2025 revealed progression of metastatic  disease, which correlates with today's mammographic and sonographic findings showing progression of disease in both breasts and the left axilla.    -3/10/2025 ECHO: EF 60%  -3/18/2025 EKG: Qtc 457 ms    I have reviewed all available lab results and radiology reports.  Assessment:   Invasive ductal carcinoma of right breast/inflammatory carcinoma of left breast  Proceed with Cycle 9 Kanjinti on 4/8/2025  Arimidex 1 mg p.o. q.day   Ribociclib 600 mg p.o. daily X 21 days, then 7 days off to complete 28 day cycle; to continue until disease progression or unacceptable toxicity  Re-stage with contrast-enhanced CT scans of C/A/P and whole-body nuclear medicine bone scan every 3 months- scheduled on 4/28/2025  Echocardiogram every 3 months, due in 6/2025  Lupron 22.5 mg every 12 weeks due 5/13/2025  2. 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 q.day   Ribociclib 600 mg p.o. daily X 21 days, then 7 days off to complete 28 day cycle; to continue until disease progression or unacceptable toxicity  Re-stage with contrast-enhanced CT scans of C/A/P and whole-body nuclear medicine bone scan every 3 months- scheduled on 4/28/2025  Follow-up with gyn for ovarian cancer surveillance  Follow up with GI for evaluation of hematochezia, 6/9/2025  Xgeva 120 mg every 4 weeks, next due 4/23/2025  Calcium and vitamin-D supplements to continue   DEXA scan  September 2025, 9/15/2025  Echocardiogram every 3 months, due in 6/2025  Lupron 22.5 mg every 12 weeks, given on 2/18/2025, next due 5/13/2025  RTC on 4/15/2025 labs/NP/Cycle 2 ribociclib  CBC CMP MG   RTC on 4/29 labs/MD for scan review/Cycle 10 Kanjinti  CBC CMP MG    Providers:         Orders for 01/28/2025:  Continue Herceptin  Start Lupron  Continue tamoxifen for 2 months  After 2 months, discontinue tamoxifen and start Arimidex +ribociclib  Arimidex 1 mg p.o. q.day   Ribociclib 600 mg p.o. daily X 21 days, then 7 days off to complete 28 day cycle; to continue until disease progression or unacceptable toxicity  Re-stage with contrast-enhanced CT scans of C/A/P and whole-body nuclear medicine bone scan in 3 months  Follow-up with gyn for ovarian cancer surveillance  Refer to GI for evaluation of hematochezia  Continue Xgeva   Calcium and vitamin-D supplements to continue   DEXA scan September 2026   Echocardiogram in March  Follow-up with NP in 3 weeks      Above discussed at length with the patient.  All questions answered.    Discussed labs and scans and gave her copies of relevant results.  Disease progression discussed.  Plan to change systemic therapy discussed.  Potential side effects of anastrozole and ribociclib discussed; gave her educational materials from [employer].  Guarded prognosis discussed.    She understands and agrees with this plan.  =================================        # IDC both breasts, diagnosed concurrently:  #Inflammatory carcinoma left breast:  -presentation:  Pain and swelling left breast 05/2024  -Mirena IUD in place  -extensive family history of breast cancer and cervical cancer  -physical exam: Right breast: 3 cm mass 12 o'clock position, 4 in above nipple  -physical exam: Left breast: Swollen, hard, large mass to o'clock position, 1 hard immobile lymph node in left axilla, breast and axilla tender to palpation (inflammatory carcinoma of left breast)  -mammogram and ultrasound  07/01/2024  -multiple masses left breast on mammogram and ultrasound  -core biopsy left breast 07/16/2024:  IDC, grade 3; lymph node biopsy positive as well; ER 59%; AL 0%; HER2 positive (3+); Ki-67 unfavorable (66.4%); HER2 by FISH analysis, group 1 (HER2 gene amplification)  -right breast core biopsy 07/17/2024:  IDC, grade 2; lymph node biopsy negative; ER 97.8%; AL 9.6%; HER2 negative (score 0); Ki-67 unfavorable (69.7%); HER2 by FISH negative/nonamplified  -MediPort placed 08/14/2024   -genetic testing 08/22/2024  To summarize:  -concurrently diagnosed bilateral breast cancer   -IDC right breast:  IDC, grade 2, lymph node biopsy negative, ER positive, AL positive, HER2 negative; cT1c cN0 MX, AJCC anatomic stage at least IA, clinical prognostic stage IA; S/P needle biopsy of right breast and right axilla 07/17/2024  -inflammatory carcinoma left breast, grade 3, lymph node biopsy positive, ER positive, AL negative, HER2 positive; cT4d cN2 M1, stage IV; S/P needle biopsy left breast and left axilla [phone]  (By virtue of palpable, fixed lymph node in the left axilla, cN2)  -genetic testing 08/22/2024:  BRCA1 mutation positive, heterozygous  -premenopausal per labs 08/29/2024  -baseline TTE 09/05/2024:  LVEF 60-65%   -DEXA scan 09/16/2024:  Normal BMD of lumbar vertebrae end of femoral necks  -baseline staging CTs C/A/P 0 09/16/2024:  Multiple thoracolumbar vertebrae and sacral lytic metastases   -baseline staging whole-body nuclear medicine bone scan 09/16/2024:  Thoracolumbar vertebrae and sacrum metastases  -09/16/2024: Patient referred to IR for biopsy of suspicious bone lesions  -liquid biopsy 09/19/2024:  BRCA1 positive; HER2 positive;TMB could not be calculated due to insufficient circulating tumor DNA; MSI-high not detected  -09/19/2024: Baseline tumor markers: CEA 19.74, elevated; CA 27.29, CA 15-3 levels normal  -brain MRI for staging 09/23/2024: No intracranial metastases  -FDG PET-CT for staging  09/30/2024: Sites of disease:  Multifocal, left breast; left axillary and subpectoral lymphadenopathy; small focus upper right breast; bilobar liver metastases; portal caval lymph node; multifocal bone metastases  -tamoxifen plus trastuzumab started 10/08/2024  -CT-guided biopsy left iliac crest 10/17/2024:  Metastases from breast carcinoma  -germline testing: BRCA1 mutation positive  -tissue NGS testing (left breast, collected 07/16/2024):  BRCA1 mutation positive; HER2 positive; MSI stable; TMB 2.1 m/MB (low); fusion negative; PD-L1 negative (PD-L1 CPS 2; PD-L1 TPS 1%); PIK3CA negative; ESR1 negative  -cycle 2 of trastuzumab on 10/29/2024  -Mirena IUD removed 09/2024 (per gyn note dated 12/09/2024)  -surveillance TTE 12/19/2024: LVEF 67%  -pelvic ultrasound 12/23/2024:  Bilateral ovarian cysts, largest 1.5 cm  -brain MRI 12/24/2024: Headaches:  9 mm ovoid metastases anterior aspect of C4 vertebral body  -dental clearance obtained 11/29/2024 (dated 11/26/2024)  -Xgeva for bone metastases, 120 mg subQ every 4 weeks, started 12/31/2024  -01/13/2025:  Gyn consultation/follow-up:  Patient would like to proceed with ParaGard IUD insertion (nonhormonal contraception); annual transvaginal ultrasound and CA-125 level for surveillance in view of BRCA1 mutation status until risk reduction HUA-BSO recommended at age 35-40 or when done with childbearing; given stage IV breast cancer, plan to delay surgery until further prognosis can be made  -restaging CTs and bone scan 01/27/2025:  Progression of metastases  >>>  Plan:  -metastatic progression on restaging scans 01/27/2025  -discontinue Herceptin +tamoxifen  -no visceral metastases, therefore, we will continue treatment with Herceptin +second-line endocrine therapy  -will switch to Herceptin + ovarian function suppression + anastrazole +ribociclib (see below)  -01/28/2025:  continue Herceptin; we will start Lupron every 3 months ASAP; for a couple of months, continue  tamoxifen along with Lupron; after 2 months, discontinue tamoxifen, and start Arimidex +ribociclib  -re-stage with contrast-enhanced CT scans of C/A/P and bone scan in 3 months (early April)  -bone metastases confirmed with biopsy of left iliac crest 10/17/2024  -tissue NGS: HER2 positive  -germline BRCA1 mutation positive  -we have requested ER and CA testing on left iliac crest biopsy as well; we will request again  -premenopausal patient   -BRCA1 mutation positive, heterozygous  -baseline DEXA scan 09/16/2024: Normal BMD  -left breast tumor is ER positive, CA negative, HER2 positive  -Mirena IUD removed; follow-up with gyn for ParaGard insertion as nonhormonal methods of contraception; annual pelvic ultrasound and CA-125 level measurement for ovarian cancer surveillance in view of BRCA1 status, per gyn; risk reduction HUA-BSO per gyn at opportune moment  -no hormonal therapy or hormonal contraception, given the diagnosis of breast cancer  -08/29/2024:  Urgent referral to GI for evaluation of hematochezia which she has been experiencing since 08/28/2024  -08/29/2024: Left breast pain; started on Norco 5 mg every 6 hours prn  -because of metastatic disease, intent of treatment is palliation  -continue Xgeva every 4 weeks to prevent skeletal events from bone metastases  -calcium and vitamin-D supplements to prevent hypocalcemia with Xgeva  -routine surgical resection of primary breast tumor is not indicated in the management of de Joaquina stage IV disease; although there is no survival benefit, it may be considered for local control of the primary tumor (deferred to surgery)  -monitor LVEF TTE every 3 months; next, mid March  -patient can be treated with a up to 3 lines of endocrine therapy +HER2 targeted therapy  -BRCA1 mutation positive; therefore, a candidate for palliative systemic therapy with PARPi (olaparib, talazoparib), as well (lower level evidence for HER2 positive tumors, therefore, category 2A in this  setting)  -baseline CEA level was elevated; baseline CA 15-3 and CA 27.29 levels were normal; follow CEA level every month to assess response  Briefly:  Switch to Herceptin + Lupron every 12 weeks + anastrazole +ribociclib  -01/28/2025:  continue Herceptin; we will start Lupron every 3 months ASAP; for a couple of months, continue tamoxifen along with Lupron; after 2 months, discontinue tamoxifen, and start Arimidex +ribociclib  Re-stage with contrast-enhanced CT scans of C/A/P and bone scan early April  ER positive, MD negative, HER2 positive  Follow-up with gyn for ovarian cancer surveillance (BRCA1 mutation positive)  GI referral for hematochezia  Continue Xgeva   Calcium and vitamin-D supplements to continue  DEXA scan in 2 years (September 2026)  Echocardiogram mid March     If, at anytime, develops visceral crisis or endocrine refractory, then, treatment options:  # first-line:    Pertuzumab +trastuzumab +docetaxel (category 1, Preferred);   pertuzumab +trastuzumab +paclitaxel (Preferred)  (after response, maintenance trastuzumab/pertuzumab + concurrent endocrine therapy)   # second-line:  -Fam trastuzumab deruxtecan (category 1, Preferred)  -tucatinib +trastuzumab +capecitabine is preferred in patients with both systemic and CNS progression in the 3rd line setting and beyond; it may also be given in the second-line setting, etc.     Palliative systemic therapy options:  -systemic therapy +HER2 targeted therapy; or  -endocrine therapy +/-HER2 targeted therapy +ovarian suppression in premenopausal patient  -for premenopausal patients, tamoxifen alone (without ovarian ablation/suppression) + HER2 targeted therapy is also an option  >>>  -no visceral crisis, therefore, decided to treat with tamoxifen + trastuzumab     Herceptin:  -watch for cardiomyopathy, infusion reactions and pulmonary toxicity  -adverse reactions:> 10%:  Decreased LVEF, skin rash, abdominal pain, anorexia, infusion related reactions,  asthenia, chills, back pain, dyspnea, etc.  -warnings/precautions:  Cardiomyopathy; infusion reactions; pulmonary toxicity; renal toxicity  -monitoring: Assess left ventricular ejection fraction (by ECG or MUGA scan) at baseline (immediately prior to trastuzumab initiation), every 3 months during trastuzumab therapy, every 4 weeks if trastuzumab is withheld for significant left ventricular cardiac dysfunction, and every 6 months for at least 2 years following completion of adjuvant trastuzumab therapy. Monitor vital signs during infusion; monitor for hypersensitivity or infusion reaction; if a reaction occurs, monitor carefully until symptoms resolve completely.   Monitor for signs/symptoms of cardiac dysfunction or pulmonary toxicity. If pregnancy inadvertently occurs during treatment, monitor amniotic fluid volume.     Monitoring with ribociclib:  -CBC: Baseline, every 2 weeks for first 2 cycles, at the beginning of the subsequent 4 cycles, and as clinically necessary  LFTs: Baseline, every 2 weeks for the first 2 cycles, at the beginning of the subsequent 4 cycles, and as clinically necessary  -Electrolytes: Prior to treatment, at the beginning of first 6 cycles, and as needed indicated  -EKG: Prior to treatment initiation, repeat on day 14 of cycle 1, at the beginning of cycle 2, and last week indicated     Anastrozole:   1 mg p.o. q.day     Ribociclib:  600 mg p.o. daily for 21 days, followed by a 7 day rest to complete a 28 day treatment cycle; continue until disease progression or unacceptable toxicity  With AI: 600 mg daily for 21 days, followed by 7-day rest period to complete a 28-day treatment cycle; continue until disease progression or unacceptable toxicity  -Dose reduction depending upon kidney impairment  -Dose reduction depending upon moderate or severe liver impairment  -Dose reductions: 600 mg daily, 400 mg daily, 200 mg daily  -Dose management depending upon toxicities: Hematologic toxicity  (neutropenia), cardiovascular toxicity (QT prolonging agent), dermatologic toxicity, pulmonary toxicity  Dosage forms: 200 mg, 400 mg, 600 mg  Administration: With or without food  Moderate or high emetic potential  Warnings/precautions with ribociclib:  -Bone marrow suppression: Neutropenia  -Dermatologic toxicity  -Hepatobiliary toxicity  -QT prolongation  -Pulmonary toxicity  Adverse reactions with ribociclib:  Peripheral edema, alopecia, pruritus, skin rash, abdominal pain, constipation, anorexia, UTIs, anemia, leukopenia, neutropenia, increased ALT, increased AST, etc.        Fertility and birth control issues:  Discussion about fertility and contraception:  -Informed her about the potential impact of chemotherapy on fertility  -Made her aware of the option of referral to fertility specialist before chemotherapy and/or endocrine therapy to discuss options in case she desires future pregnancies.  Fertility preservation options include oocyte and embryo cryopreservation, etc.  -Amenorrhea frequently occurs during or after chemotherapy.  The majority of women younger than 35 years to resume menses within 2 years of finishing adjuvant chemotherapy  -Informed that menses and fertility are not necessarily linked. Absence of regular menses does not necessarily imply lack of fertility.  Conversely, the presence of menses does not guarantee fertility.  -There are limited data regarding continued fertility after chemotherapy.  -Cautioned her to avoid becoming pregnant during treatment with radiation therapy, chemotherapy, or endocrine therapy  -Hormone-based birth control is discouraged regardless of the harmless of the hormone receptor status of the patient's cancer  -Alternative methods of birth control, including IUD, barrier method, tubal ligation, vasectomy for the partner, etc.   >>>  -Mirena IUD removed  -follow-up with gyn for ParaGard insertion as nonhormonal methods of contraception  -fertility specialist  consultation: she declined  -cautioned her not to become pregnant during treatment  -referred to gyn for consultation regarding nonhormonal methods of contraception        # Sites of disease:   -IDC right breast, ER positive, KY positive, HER2 negative, cT1c cN0 MX  -inflammatory carcinoma left breast, ER positive, KY negative, HER2 positive, cT4d cN2 M1, stage IV  -FDG PET-CT for staging 09/30/2024: Sites of disease:  Multifocal, left breast; left axillary and subpectoral lymphadenopathy; small focus upper right breast; bilobar liver metastases; portal caval lymph node; multifocal bone metastases  -brain MRI 12/24/2024:  9 mm metastases anterior aspect of C4 vertebral body (no brain metastases)        # Molecular markers:  -liquid biopsy:  BRCA1 positive; HER2 positive;TMB could not be calculated due to insufficient circulating tumor DNA; MSI-high not detected  -CT-guided biopsy left iliac crest 10/17/2024:  Metastases from breast carcinoma  -germline testing: BRCA1 mutation positive  -tissue NGS testing (left breast, collected 07/16/2024):  BRCA1 mutation positive; HER2 positive; MSI stable; TMB 2.1 m/MB (low); fusion negative; PD-L1 negative (PD-L1 CPS 2; PD-L1 TPS 1%); PIK3CA negative; ESR1 negative  -genetic testing 08/22/2024:  BRCA1 mutation positive, heterozygous        # Hematochezia:   Since yesterday, 08/28/2024, has a experienced painless diarrhea with blood on toilet wipes as well as in toilet bowl; no weakness or dizziness; 5 loose stools yesterday, 2 loose stools today; no nausea, vomiting, hematemesis, or melena.  Never experienced GI bleeding before.  >>>   -08/29/2024: sent an urgent referral to GI for evaluation        Family history of breast cancer, ovarian cancer, cervical cancer, stomach cancer, colon cancer:  -Maternal great aunt # 1: Breast cancer, in her 50s   -sister: Ovarian cancer, age 50  -sister: Cervical cancer, age 25  -mother: Ovarian cancer, age 50  -Maternal aunt: Cervical cancer,  age 50  -maternal grandfather: Stomach cancer, age 70 (smoker)  -maternal great aunt # 2: Colon cancer, age 60  >>>  -genetic testing 08/22/2024:  BRCA1 mutation positive, heterozygous           I have explained all of the above in detail and the patient understands all of the current recommendation(s). I have answered all of their questions to the best of my ability and to their complete satisfaction.       Ana Allen, AMANDAP-C  Oncology/Hematology   [facility]                 [1]   Social History  Tobacco Use    Smoking status: Never     Passive exposure: Never    Smokeless tobacco: Never   Substance Use Topics    Alcohol use: Never    Drug use: Never

## 2025-04-08 ENCOUNTER — INFUSION (OUTPATIENT)
Dept: INFUSION THERAPY | Facility: HOSPITAL | Age: 35
End: 2025-04-08
Attending: INTERNAL MEDICINE
Payer: MEDICAID

## 2025-04-08 ENCOUNTER — LAB VISIT (OUTPATIENT)
Dept: HEMATOLOGY/ONCOLOGY | Facility: CLINIC | Age: 35
End: 2025-04-08
Payer: MEDICAID

## 2025-04-08 VITALS
TEMPERATURE: 98 F | SYSTOLIC BLOOD PRESSURE: 131 MMHG | BODY MASS INDEX: 37.38 KG/M2 | DIASTOLIC BLOOD PRESSURE: 85 MMHG | HEIGHT: 66 IN | RESPIRATION RATE: 20 BRPM | WEIGHT: 232.56 LBS | HEART RATE: 91 BPM | OXYGEN SATURATION: 96 %

## 2025-04-08 DIAGNOSIS — C50.911 INVASIVE DUCTAL CARCINOMA OF BREAST, RIGHT: ICD-10-CM

## 2025-04-08 DIAGNOSIS — C50.912 INVASIVE DUCTAL CARCINOMA OF LEFT BREAST: ICD-10-CM

## 2025-04-08 DIAGNOSIS — C50.912 INFLAMMATORY CARCINOMA OF LEFT BREAST: ICD-10-CM

## 2025-04-08 DIAGNOSIS — N95.9 PREMENOPAUSAL PATIENT: ICD-10-CM

## 2025-04-08 DIAGNOSIS — C50.912 INVASIVE DUCTAL CARCINOMA OF LEFT BREAST: Primary | ICD-10-CM

## 2025-04-08 LAB
ABS NEUT CALC (OHS): 2.29 X10(3)/MCL (ref 2.1–9.2)
ALBUMIN SERPL-MCNC: 3.4 G/DL (ref 3.5–5)
ALBUMIN/GLOB SERPL: 0.8 RATIO (ref 1.1–2)
ALP SERPL-CCNC: 63 UNIT/L (ref 40–150)
ALT SERPL-CCNC: 24 UNIT/L (ref 0–55)
ANION GAP SERPL CALC-SCNC: 5 MEQ/L
AST SERPL-CCNC: 17 UNIT/L (ref 11–45)
BASOPHILS NFR BLD MANUAL: 0.04 X10(3)/MCL (ref 0–0.2)
BASOPHILS NFR BLD MANUAL: 1 % (ref 0–2)
BILIRUB SERPL-MCNC: 0.6 MG/DL
BUN SERPL-MCNC: 16.2 MG/DL (ref 7–18.7)
CALCIUM SERPL-MCNC: 8.9 MG/DL (ref 8.4–10.2)
CHLORIDE SERPL-SCNC: 103 MMOL/L (ref 98–107)
CO2 SERPL-SCNC: 28 MMOL/L (ref 22–29)
CREAT SERPL-MCNC: 1.15 MG/DL (ref 0.55–1.02)
CREAT/UREA NIT SERPL: 14
EOSINOPHIL NFR BLD MANUAL: 0.13 X10(3)/MCL (ref 0–0.9)
EOSINOPHIL NFR BLD MANUAL: 3 % (ref 0–8)
ERYTHROCYTE [DISTWIDTH] IN BLOOD BY AUTOMATED COUNT: 13.7 % (ref 11.5–17)
GFR SERPLBLD CREATININE-BSD FMLA CKD-EPI: >60 ML/MIN/1.73/M2
GLOBULIN SER-MCNC: 4.5 GM/DL (ref 2.4–3.5)
GLUCOSE SERPL-MCNC: 237 MG/DL (ref 74–100)
HCT VFR BLD AUTO: 33 % (ref 37–47)
HGB BLD-MCNC: 10.7 G/DL (ref 12–16)
LYMPHOCYTES NFR BLD MANUAL: 1.53 X10(3)/MCL (ref 0.6–4.6)
LYMPHOCYTES NFR BLD MANUAL: 36 % (ref 13–40)
MAGNESIUM SERPL-MCNC: 2 MG/DL (ref 1.6–2.6)
MCH RBC QN AUTO: 31.9 PG (ref 27–31)
MCHC RBC AUTO-ENTMCNC: 32.4 G/DL (ref 33–36)
MCV RBC AUTO: 98.5 FL (ref 80–94)
MONOCYTES NFR BLD MANUAL: 0.26 X10(3)/MCL (ref 0.1–1.3)
MONOCYTES NFR BLD MANUAL: 6 % (ref 2–11)
NEUTROPHILS NFR BLD MANUAL: 54 % (ref 47–80)
NRBC BLD AUTO-RTO: 0 %
PLATELET # BLD AUTO: 300 X10(3)/MCL (ref 130–400)
PLATELET # BLD EST: ADEQUATE 10*3/UL
PMV BLD AUTO: 8.5 FL (ref 7.4–10.4)
POTASSIUM SERPL-SCNC: 3.6 MMOL/L (ref 3.5–5.1)
PROT SERPL-MCNC: 7.9 GM/DL (ref 6.4–8.3)
RBC # BLD AUTO: 3.35 X10(6)/MCL (ref 4.2–5.4)
RBC MORPH BLD: NORMAL
SODIUM SERPL-SCNC: 136 MMOL/L (ref 136–145)
WBC # BLD AUTO: 4.25 X10(3)/MCL (ref 4.5–11.5)

## 2025-04-08 PROCEDURE — 36415 COLL VENOUS BLD VENIPUNCTURE: CPT

## 2025-04-08 PROCEDURE — 63600175 PHARM REV CODE 636 W HCPCS: Mod: TB

## 2025-04-08 PROCEDURE — A4216 STERILE WATER/SALINE, 10 ML: HCPCS

## 2025-04-08 PROCEDURE — 80053 COMPREHEN METABOLIC PANEL: CPT

## 2025-04-08 PROCEDURE — 25000003 PHARM REV CODE 250

## 2025-04-08 PROCEDURE — 83735 ASSAY OF MAGNESIUM: CPT

## 2025-04-08 PROCEDURE — 96413 CHEMO IV INFUSION 1 HR: CPT

## 2025-04-08 PROCEDURE — 85025 COMPLETE CBC W/AUTO DIFF WBC: CPT

## 2025-04-08 RX ORDER — DIPHENHYDRAMINE HYDROCHLORIDE 50 MG/ML
50 INJECTION, SOLUTION INTRAMUSCULAR; INTRAVENOUS ONCE AS NEEDED
Status: CANCELLED | OUTPATIENT
Start: 2025-04-08

## 2025-04-08 RX ORDER — HEPARIN 100 UNIT/ML
500 SYRINGE INTRAVENOUS
Status: CANCELLED | OUTPATIENT
Start: 2025-04-08

## 2025-04-08 RX ORDER — SODIUM CHLORIDE 0.9 % (FLUSH) 0.9 %
10 SYRINGE (ML) INJECTION
Status: CANCELLED | OUTPATIENT
Start: 2025-04-08

## 2025-04-08 RX ORDER — DIPHENHYDRAMINE HYDROCHLORIDE 50 MG/ML
50 INJECTION, SOLUTION INTRAMUSCULAR; INTRAVENOUS ONCE AS NEEDED
Status: DISCONTINUED | OUTPATIENT
Start: 2025-04-08 | End: 2025-04-08 | Stop reason: HOSPADM

## 2025-04-08 RX ORDER — HEPARIN 100 UNIT/ML
500 SYRINGE INTRAVENOUS
Status: DISCONTINUED | OUTPATIENT
Start: 2025-04-08 | End: 2025-04-08 | Stop reason: HOSPADM

## 2025-04-08 RX ORDER — PROCHLORPERAZINE EDISYLATE 5 MG/ML
10 INJECTION INTRAMUSCULAR; INTRAVENOUS ONCE AS NEEDED
Status: DISCONTINUED | OUTPATIENT
Start: 2025-04-08 | End: 2025-04-08 | Stop reason: HOSPADM

## 2025-04-08 RX ORDER — EPINEPHRINE 0.3 MG/.3ML
0.3 INJECTION SUBCUTANEOUS ONCE AS NEEDED
Status: DISCONTINUED | OUTPATIENT
Start: 2025-04-08 | End: 2025-04-08 | Stop reason: HOSPADM

## 2025-04-08 RX ORDER — PROCHLORPERAZINE EDISYLATE 5 MG/ML
10 INJECTION INTRAMUSCULAR; INTRAVENOUS ONCE AS NEEDED
Status: CANCELLED
Start: 2025-04-08

## 2025-04-08 RX ORDER — SODIUM CHLORIDE 0.9 % (FLUSH) 0.9 %
10 SYRINGE (ML) INJECTION
Status: DISCONTINUED | OUTPATIENT
Start: 2025-04-08 | End: 2025-04-08 | Stop reason: HOSPADM

## 2025-04-08 RX ORDER — EPINEPHRINE 0.3 MG/.3ML
0.3 INJECTION SUBCUTANEOUS ONCE AS NEEDED
Status: CANCELLED | OUTPATIENT
Start: 2025-04-08

## 2025-04-08 RX ADMIN — HEPARIN 500 UNITS: 100 SYRINGE at 10:04

## 2025-04-08 RX ADMIN — TRASTUZUMAB-ANNS 653 MG: 420 INJECTION, POWDER, LYOPHILIZED, FOR SOLUTION INTRAVENOUS at 10:04

## 2025-04-08 RX ADMIN — Medication 10 ML: at 10:04

## 2025-04-08 RX ADMIN — SODIUM CHLORIDE: 9 INJECTION, SOLUTION INTRAVENOUS at 09:04

## 2025-04-08 NOTE — NURSING
0914  Pt did labs and is here for C 9 kanjinti.  Pt denies any pain.  Does report some constipation and fatigue.

## 2025-04-14 NOTE — PROGRESS NOTES
[facility] Hematology/Oncology  PROGRESS NOTE    Subjective:       Patient ID: Jeannie Greene is a 34 y.o. female.    Diagnosis:   -IDC right breast:  IDC, grade 2, lymph node biopsy negative, ER positive, GA positive, HER2 negative; S/P core biopsy 07/17/2024; cT1c cN0 MX, AJCC anatomic stage at least IA, clinical prognostic stage IA  -inflammatory carcinoma left breast, grade 3, lymph node biopsy positive, ER positive, GA negative, HER2 positive;   cT4d cN2 M1, stage IV; S/P core biopsy 07/16/2024  -Thoracolumbar vertebrae and sacrum metastatic involvement.   -FDG PET-CT for staging 09/30/2024: Sites of disease:  Multifocal, left breast; left axillary and subpectoral lymphadenopathy; small focus upper right breast; bilobar liver metastases; portal caval lymph node; multifocal bone metastases  -Monoallelic mutation of BRCA1 gene   -premenopausal   -family history of breast cancer, ovarian cancer, cervical cancer, stomach cancer, colon cancer    Current Treatment:  -trastuzumab 8 mg/kg IV day 1 cycle 1; followed by 6 mg/kg IV day 1 beginning with cycle 2  start 10/8/24     xgeva 120mg SQ every 28 days   start 12/31/24     Lupron 22.5 mg IM every 12 weeks started on 2/18/2025    Arimidex and Ribociclib started 03/19/2025    Treatment History:  -MediPort placed 08/14/2024   -Mirena removed on 9/18/24    Tamoxifen stopped 03/18/2025    Chief Complaint: Follow-up (Patient reported that she's experiencing fatigue, hot flashes and constipation. )    HPI:  34-year-old lady, referred from [facility] surgery, with invasive ductal carcinoma of breast.  We are following her for metastatic breast cancer.    Please see assessment and plan section for details.     08/29/2024:   Pleasant lady who presents for initial medical oncology consultation.  In no acute discomfort.  Hot flashes since May 2024.  Left breast is painful; gets sharp pains intermittently, 9/10 severity; also, pain in left arm but no swelling.  Pain is intermittent.  Left  breast tumor is getting bigger.  No ulceration.  Since yesterday, 08/28/2024, has a experienced painless diarrhea with blood on toilet wipes as well as in toilet bowl; no weakness or dizziness; 5 loose stools yesterday, 2 loose stools today; no nausea, vomiting, hematemesis, or melena.  Never experienced GI bleeding before.  No unusual headaches, focal neurological symptoms, chest pain, cough, dyspnea, abdominal pain, nausea or vomiting.  No urinary problems.  No bone pains.  Appetite is okay.  No unintentional weight loss.    08/09/2024:  [facility] [place] surgery Office note:   CC: b/l breast IDC     HPI: Jeannie Greene is a 34 y.o. female with PMHx of HTN, R breast IDC Grade 2, and L breast/axillary ICD Grade 3 presents to clinic today with L breast and arm pain.   She first noticed the pain and swelling in L breast in May where she presented to the ED and received antibiotics. Pain did not resolve so patient followed up with her OB/Gyn who ordered the imaging and biopsy and was subsequently referred to our clinic. Today, patient reports swelling and pain in left breast, axilla, and down her arm. No pain in right breast, but has had some milky discharge in the past from R nipple. She states her pain as a 6/10 that was at its worse in July and has improved some since. She takes ibuprofen 4x daily without relief.    Patient had first menarche at 11. One pregnancy with child and not gone through menopause. She was on OCP at 15yo, received Depo shot from [age]. No contraception from [age] when she got pregnant. Got Murina IUD after pregnancy at 30  that is still in place.  Pt has an extensive family history of breast and cervical cancer. Her mother and sister had cervical cancer. Her mother had a hysterectomy. She also believes her maternal aunt had breast cancer. She only takes losartan for HTN. No prior surgeries.  Physical exam:   # right breast: 3 cm mass 12 o'clock, 4 in above nipple, no skin changes, no nipple retraction  or discharge, no palpable right axillary lymphadenopathy  # left breast: Swollen and hard; large mass appreciated two o'clock position right above breast, 1 hard immobile lymph node in left axilla, breast and axilla tender to palpation (inflammatory carcinoma left breast)    Past medical history: Hypertension  Surgical/procedure history:  MediPort placement 08/14/2024    Social history: .  Lives in [place].  Has a 3-year-old son.  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  Denies history of tobacco, alcohol, or illicit drug abuse.  Family history:   -Maternal great aunt # 1: Breast cancer, in her 50s   -sister: Ovarian cancer, age 50  -sister: Cervical cancer, age 25  -mother: Ovarian cancer, age 50  -Maternal aunt: Cervical cancer, age 50  -maternal grandfather: Stomach cancer, age 70 (smoker)  -maternal great aunt # 2: Colon cancer, age 60  Health maintenance: PCP at [facility].  No screening colonoscopy ever.  Menstrual/Ob gyn history: Menarche, age 11; 1 pregnancy, 1 child; gave birth to her child at age 30; no abortions or miscarriages premenopausal; OCP at age 16, received Depo shots from age [age]; no contraception from age [age] when she became pregnant; Mirena IUD after pregnancy at age 30; experienced hot flashes.  No menstrual cycles after Mirena was placed.        Interval History:  Patient presents to clinic for follow-up and toxicity check. She continues on arimidex daily and ribociclib 600 mg on days 1-21. New cycle of ribociclib to start on 4/16/2025 until 05/06/2025. She does report hot flashes that have gotten worse. She is also reporting fatigue while on Ribociclib. Denies any joint stiffness, mood changes, vaginal dryness, chest pain, shortness of breath.  She is also taking calcium and vitamin-D daily.  She is due for Xgeva on 4/23/2025.  She received Lupron 22.5 mg on 2/18/2025, next due 05/13/2025 .She continues on Norco 10 mg every 6 hours as needed for pain.  Echo on  03/10/2025 with ejection fraction of 60%. Labs reviewed in detail with patient and we discussed plan of care, she verbalized understanding    PMX/PSHx  Past Medical History:   Diagnosis Date    BRCA1 gene mutation positive 09/09/2024    BRCA1 [id number] ([id number]) - [place] BRCA1 and BRCA2 gene sequence and deletion/duplication and 85-gene CustomNext-Cancer Panel (85 genes), VUS in NF1    Cancer     Hypertension       Past Surgical History:   Procedure Laterality Date    INSERTION OF TUNNELED CENTRAL VENOUS CATHETER (CVC) WITH SUBCUTANEOUS PORT N/A 08/14/2024    Procedure: [id number];  Surgeon: Jc Chauhan Jr., MD;  Location: [facility];  Service: General;  Laterality: N/A;     Allergies:  Review of patient's allergies indicates:  No Known Allergies   Social History:   Social History[1]  Medications:  Current Outpatient Medications   Medication Instructions    anastrozole (ARIMIDEX) 1 mg, Oral, Daily    anastrozole (ARIMIDEX) 1 mg, Oral, Daily    HYDROcodone-acetaminophen (NORCO)  mg per tablet 1 tablet, Oral, Every 6 hours PRN    KISQALI 600 mg, Oral, Daily, for 21 DAYS followed by a 7 day rest; to complete a 28 day treatment cycle    losartan-hydrochlorothiazide 100-25 mg (HYZAAR) 100-25 mg per tablet     ondansetron (ZOFRAN) 4 mg, Oral, 2 times daily    potassium chloride SA (K-DUR,KLOR-CON) 20 MEQ tablet 20 mEq, Oral, Daily    prochlorperazine (COMPAZINE) 10 mg, Oral, Every 6 hours PRN     ROS:  Review of Systems   Constitutional:  Positive for fatigue. Negative for appetite change, chills, fever and unexpected weight change.   HENT:  Negative for facial swelling, mouth sores, nosebleeds, sinus pressure/congestion and sore throat.    Eyes:  Negative for photophobia, pain and visual disturbance.   Respiratory:  Negative for cough, chest tightness, shortness of breath and wheezing.    Cardiovascular:  Negative for chest pain, palpitations and leg swelling.   Gastrointestinal:   Negative for abdominal pain, blood in stool, change in bowel habit, constipation, diarrhea, nausea and vomiting.   Genitourinary:  Positive for hot flashes. Negative for dysuria, frequency, hematuria, pelvic pain, urgency and vaginal pain.   Musculoskeletal:  Negative for arthralgias, back pain, gait problem, joint swelling and myalgias.   Integumentary:  Negative for pallor, rash, wound, breast mass, breast discharge and breast tenderness.   Allergic/Immunologic: Negative.  Negative for immunocompromised state.   Neurological:  Negative for dizziness, vertigo, syncope, weakness, numbness and headaches.   Hematological:  Negative for adenopathy. Does not bruise/bleed easily.   Psychiatric/Behavioral:  Negative for behavioral problems and dysphoric mood. The patient is not nervous/anxious.    All other systems reviewed and are negative.  Breast: Negative for mass and tenderness      Objective:   Vitals:  Vitals:    04/15/25 1007   BP: 122/87   Pulse: 88   Resp: 18   Temp: 98.2 °F (36.8 °C)        Wt Readings from Last 3 Encounters:   04/15/25 1007 106.1 kg (234 lb)   04/08/25 0914 105.5 kg (232 lb 9.4 oz)   04/01/25 1308 106.2 kg (234 lb 3.2 oz)      Physical Examination:  Physical Exam  Vitals and nursing note reviewed.   HENT:      Head: Normocephalic and atraumatic.      Mouth/Throat:      Mouth: Mucous membranes are moist.      Pharynx: Oropharynx is clear.   Eyes:      Extraocular Movements: Extraocular movements intact.      Conjunctiva/sclera: Conjunctivae normal.      Pupils: Pupils are equal, round, and reactive to light.   Cardiovascular:      Rate and Rhythm: Normal rate and regular rhythm.   Pulmonary:      Effort: Pulmonary effort is normal.      Breath sounds: Normal breath sounds.   Abdominal:      General: Abdomen is flat. Bowel sounds are normal.      Palpations: Abdomen is soft.   Musculoskeletal:         General: Normal range of motion.      Cervical back: Normal range of motion and neck supple.    Skin:     General: Skin is warm and dry.   Neurological:      General: No focal deficit present.      Mental Status: She is alert.   Psychiatric:         Mood and Affect: Mood normal.       ECOG SCORE           Labs:     Lab Visit on 04/15/2025   Component Date Value Ref Range Status    Sodium 04/15/2025 140  136 - 145 mmol/L Final    Potassium 04/15/2025 3.3 (L)  3.5 - 5.1 mmol/L Final    Chloride 04/15/2025 101  98 - 107 mmol/L Final    CO2 04/15/2025 29  22 - 29 mmol/L Final    Glucose 04/15/2025 226 (H)  74 - 100 mg/dL Final    Blood Urea Nitrogen 04/15/2025 18.1  7.0 - 18.7 mg/dL Final    Creatinine 04/15/2025 0.83  0.55 - 1.02 mg/dL Final    Calcium 04/15/2025 8.8  8.4 - 10.2 mg/dL Final    Protein Total 04/15/2025 7.8  6.4 - 8.3 gm/dL Final    Albumin 04/15/2025 3.2 (L)  3.5 - 5.0 g/dL Final    Globulin 04/15/2025 4.6 (H)  2.4 - 3.5 gm/dL Final    Albumin/Globulin Ratio 04/15/2025 0.7 (L)  1.1 - 2.0 ratio Final    Bilirubin Total 04/15/2025 0.2  <=1.5 mg/dL Final    ALP 04/15/2025 60  40 - 150 unit/L Final    ALT 04/15/2025 18  0 - 55 unit/L Final    AST 04/15/2025 12  11 - 45 unit/L Final    eGFR 04/15/2025 >60  mL/min/1.73/m2 Final    Estimated GFR calculated using the CKD-EPI creatinine (2021) equation.    Anion Gap 04/15/2025 10.0  mEq/L Final    BUN/Creatinine Ratio 04/15/2025 22   Final    Magnesium Level 04/15/2025 1.80  1.60 - 2.60 mg/dL Final    WBC 04/15/2025 4.29 (L)  4.50 - 11.50 x10(3)/mcL Final    RBC 04/15/2025 3.46 (L)  4.20 - 5.40 x10(6)/mcL Final    Hgb 04/15/2025 11.3 (L)  12.0 - 16.0 g/dL Final    Hct 04/15/2025 34.2 (L)  37.0 - 47.0 % Final    MCV 04/15/2025 98.8 (H)  80.0 - 94.0 fL Final    MCH 04/15/2025 32.7 (H)  27.0 - 31.0 pg Final    MCHC 04/15/2025 33.0  33.0 - 36.0 g/dL Final    RDW 04/15/2025 14.1  11.5 - 17.0 % Final    Platelet 04/15/2025 424 (H)  130 - 400 x10(3)/mcL Final    MPV 04/15/2025 8.3  7.4 - 10.4 fL Final    Neut % 04/15/2025 32.1  % Final    Lymph % 04/15/2025  44.1  % Final    Mono % 04/15/2025 11.0  % Final    Eos % 04/15/2025 11.2  % Final    Basophil % 04/15/2025 1.4  % Final    Imm Grans % 04/15/2025 0.2  % Final    Neut # 04/15/2025 1.38 (L)  2.1 - 9.2 x10(3)/mcL Final    Lymph # 04/15/2025 1.89  0.6 - 4.6 x10(3)/mcL Final    Mono # 04/15/2025 0.47  0.1 - 1.3 x10(3)/mcL Final    Eos # 04/15/2025 0.48  0 - 0.9 x10(3)/mcL Final    Baso # 04/15/2025 0.06  <=0.2 x10(3)/mcL Final    Imm Gran # 04/15/2025 0.01  0.00 - 0.04 x10(3)/mcL Final    NRBC% 04/15/2025 0.0  % Final           Pathology:  -IDC right breast:  IDC, grade 2, lymph node biopsy negative, ER positive, NY positive, HER2 negative; S/P core biopsy 07/17/2024; cT1c cN0 MX, AJCC anatomic stage at least IA, clinical prognostic stage IA  -inflammatory carcinoma left breast, grade 3, lymph node biopsy positive, ER positive, NY negative, HER2 positive; S/P core biopsy 07/16/2024; cT4d cN2 M1, stage IV    -09/16/2024: Patient referred to IR for biopsy of suspicious bone lesions  -liquid biopsy 09/19/2024:  BRCA1 positive; HER2 positive;TMB could not be calculated due to insufficient circulating tumor DNA; MSI-high not detected    -CT-guided biopsy left iliac crest 10/17/2024:  Metastases from breast carcinoma  -germline testing: BRCA1 mutation positive  -tissue NGS testing (left breast, collected 07/16/2024):  BRCA1 mutation positive; HER2 positive; MSI stable; TMB 2.1 m/MB (low); fusion negative; PD-L1 negative (PD-L1 CPS 2; PD-L1 TPS 1%); PIK3CA negative; ESR1 negative    Radiology/Diagnostics:  -baseline TTE 09/05/2024:  LVEF 60-65%   -DEXA scan 09/16/2024:  Normal BMD of lumbar vertebrae end of femoral necks  -baseline staging CTs C/A/P 0 09/16/2024:  Multiple thoracolumbar vertebrae and sacral lytic metastases   -baseline staging whole-body nuclear medicine bone scan 09/16/2024:  Thoracolumbar vertebrae and sacrum metastases  -09/16/2024: Patient referred to IR for biopsy of suspicious bone lesions  -FDG PET-CT  for staging 09/30/2024: Sites of disease:  Multifocal, left breast; left axillary and subpectoral lymphadenopathy; small focus upper right breast; bilobar liver metastases; portal caval lymph node; multifocal bone metastases  -brain MRI 12/24/2024:  9 mm metastases anterior aspect of C4 vertebral body  -12/19/2024: Surveillance echocardiogram:  LVEF 67%  -12/23/2024:  Pelvic ultrasound (encounter for non procreative screening for genetic disease carrier status):  Small fibroid; cyst in the right and left ovaries (right ovary cyst 1.5 cm and 1.4 cm; left ovary cyst 1.1 cm and 7 mm)  -restaging CTs C/A/P 01/27/2025:  Overall progression of disease with multiple new and enlarging osseous metastases as well as a new metastatic lesion right liver lobe; the majority of the left axillary metastatic lymphadenopathy has decreased in size; however, there is a single left axillary lymph node which has enlarged; the left breast skin thickening and underlying asymmetry density in the left breast parenchyma is grossly unchanged (2.5 x 2.3 cm left axillary lymph node has increased in size from 2.1 x 1.7 cm previously; suggestion of a peripherally enhancing nodule dome of the right lobe of the liver 1.9 cm; multiple lucent lytic metastatic lesions throughout the visualized spine which have progressed from the prior exam with multiple new and enlarging lesions, representative 8 mm lucent lesion T11 vertebral body is new, T7 lesion has been increased in size 16 x 12 mm previously 13 x 11 mm)  -restaging whole-body nuclear medicine bone scan 01/27/2025:  Similar uptake in the spine, ribs, and pelvis compared to prior study  -03/03/2025 bilateral diagnostic mammogram and ultrasound:   Bilateral Diagnostic Mammogram:   In the right breast 12:00 axis middle depth, there is an irregular high density spiculated mass with an associated T4-butterfly clip, correlating with outside prior malignant ultrasound-guided core needle biopsy that  diagnosed grade 2 invasive ductal carcinoma.  There has been an increase in the size and density of the malignant mass, particularly the anterior and lateral component.     No other new suspicious mammographic finding in the right breast.     In the right axilla, there is a lymph node with an associated mini-T3-coil clip, correlating with outside prior benign ultrasound-guided core needle biopsy.  There has been no significant interval change of this lymph node.     There is an implanted MediPort in the right anterior chest wall which partially obscures evaluation of the right axilla.  No suspicious mammographic finding in the right axilla.     The left breast is contracted and significantly smaller compared to the most recent prior mammogram dated 07/01/2024 (8 months ago).  This contraction is related to a large irregular high density spiculated mass occupying all 4 quadrants posterior to anterior depths with spiculations extending out to the skin, with severe thickening and retraction of the same.  The mass extends into and engulfs the nipple-areolar complex, with retraction and destruction of the same.  These findings are compatible with the shrunken breast sign due to locally advanced breast cancer.  The irregular high density spiculated mass has its most significant component in the upper-outer quadrant, and there is a T4-butterfly clip in the 2:00 axis middle depth related to outside prior malignant ultrasound-guided core needle biopsy that diagnosed grade 3 invasive ductal carcinoma.  Overall, the mammographic appearance of the malignant process throughout the left breast is worse.       In the left axilla, there is an irregular high density spiculated mass with an associated mini-T3-coil clip, correlating with outside prior malignant ultrasound-guided core needle biopsy that diagnosed grade 3 invasive ductal carcinoma.  Given the location of this malignant mass, it is most compatible with an axillary  lymph node that has been entirely replaced by metastatic carcinoma.  This malignant lymph node has significantly increased in size compared to the most recent prior mammogram dated 07/01/2024 (8 months ago).        Bilateral Complete Breast Ultrasound:  Sonographic evaluation of both complete breasts (all 4 quadrants, subareolar, axilla) was performed.       In the right breast 12:00 axis 9 cm FN position, there is a 1.8 x 1.8 x 2.2 cm irregular nonparallel hypoechoic vascular mass with angular margins, correlating with biopsy-proven grade 2 invasive ductal carcinoma.  This has increased in size compared to outside prior ultrasound on 07/01/2024, at which time it measured 1.3 x 1.1 x 1.6 cm.  There is also been an increase in the diameter and hypoechoic intraductal material within the ductal system extending laterally away from the malignant mass, suggestive for extension of malignancy into this ductal system.  Taken together, these findings correlate with the increase in size and density of the right breast 12:00 axis middle depth malignant mass, particularly the anterior and lateral component, as seen on today's mammogram.     No other new suspicious sonographic finding in the right breast.       No suspicious right axillary adenopathy.  One of the right axillary level 1 lymph nodes has an associated biopsy clip (mini-T3-coil), correlating with the biopsy-proven benign right axillary lymph node.     In the left breast all 4 quadrants subareolar-12 cm FN, there is an irregular hypoechoic spiculated mass with larger confluent portions of discrete masses interconnected by hypoechoic tracts of non-mass lesions.  The most confluent discrete masses are at the 1:00 axis 7 cm FN (5.6 x 4.1 x 5.5 cm), 2:00 axis 9 cm FN (2.5 x 1.3 x 1.4 cm), and 1:00 axis 12 cm FN (0.5 x 0.7 x 0.9 cm) positions.  The malignancy extends into and destroys the nipple-areolar complex.  There is diffuse severe skin thickening, maximal thickness  0.8 cm as measured in the 9:00 axis 1 cm FN position.  Comparison to the outside prior ultrasound on 07/01/2024 is suboptimal due to differences in positioning and technique as well as overall contraction/global decrease in size of the breast in the interval since that prior exam.  Given those limitations, the overall change in sonographic appearance of the malignant process throughout the left breast is worse.   In the left axilla, there is a 3 x 2.3 x 2.6 cm irregular nonparallel hypoechoic spiculated vascular mass with a hyperechoic rind, correlating with biopsy-proven grade 3 invasive ductal carcinoma.  This has increased in size compared to the outside prior ultrasound on 07/01/2024, at which time it measured 1.2 x 1.5 x 1.6 cm.   In the deep aspect of the left axilla level 1 andra station, there has been decrease in size and conspicuity of a morphologically abnormal lymph node identified on the outside prior ultrasound at [facility] on 07/01/2024.      IMPRESSION: KNOWN BIOPSY PROVEN MALIGNANCY, ULTRASOUND KNOWN BIOPSY PROVEN MALIGNANCY   1) Poor response to neoadjuvant chemotherapy.  The mammographic and sonographic appearance of the malignant process throughout the left breast and in the left axilla is worse compared to the outside prior mammogram and ultrasound at [facility] on 07/01/2024 (8 months ago).  BI-RADS 6: Known biopsy-proven malignancy.   2) Poor response to neoadjuvant chemotherapy.  The right breast 12:00 axis 9 cm FN malignant mass has increased in size, with new involvement of the ductal system extending laterally away from the malignant mass.  BI-RADS 6: Known biopsy-proven malignancy.   3) Review of the patient's restaging CT C/A/P and Tc-99m-MDP bone scan 01/27/2025 revealed progression of metastatic disease, which correlates with today's mammographic and sonographic findings showing progression of disease in both breasts and the left axilla.    -3/10/2025 ECHO: EF  60%  -3/18/2025 EKG: Qtc 457 ms    I have reviewed all available lab results and radiology reports.  Assessment:   Invasive ductal carcinoma of right breast/inflammatory carcinoma of left breast  Cycle 2 of Ribociclib to start on 04/16/2025-05/06/2025  Arimidex 1 mg p.o. q.day   Ribociclib 600 mg p.o. daily X 21 days, then 7 days off to complete 28 day cycle; to continue until disease progression or unacceptable toxicity  Re-stage with contrast-enhanced CT scans of C/A/P and whole-body nuclear medicine bone scan every 3 months- scheduled on 4/28/2025  Echocardiogram every 3 months, due in 6/2025  Lupron 22.5 mg every 12 weeks due 5/13/2025  2. Breast cancer metastasized to bone   Xgeva 120 mg every 4 weeks, next due 4/23/2025  Calcium and vitamin-D supplements to continue   DEXA scan September 2025, 9/15/2025  3. Hypokalemia   Start potassium chloride 20 meq daily for 14 days   Hand out given on potassium rich foods  4. Hot flashes due to aromatase inhibitor therapy   Start venlafaxine 37.5 mg daily, may increase to 75 mg daily   Problem List Items Addressed This Visit       Invasive ductal carcinoma of breast, right    Relevant Orders    Magnesium    Premenopausal patient    Secondary malignant neoplasm of axillary lymph nodes    Relevant Orders    CBC Auto Differential    Comprehensive Metabolic Panel    Inflammatory carcinoma of left breast    Relevant Orders    Magnesium    CBC Auto Differential    Comprehensive Metabolic Panel    Secondary malignancy of thoracic vertebral column    Relevant Orders    CBC Auto Differential    Comprehensive Metabolic Panel    Secondary malignancy of lumbar vertebral column    Relevant Orders    CBC Auto Differential    Comprehensive Metabolic Panel    Immunodeficiency due to chemotherapy    Relevant Orders    Magnesium    CBC Auto Differential    Comprehensive Metabolic Panel    Chemotherapy-induced neutropenia    Relevant Orders    CBC Auto Differential    Comprehensive  Metabolic Panel    Hypokalemia    Relevant Medications    potassium chloride SA (K-DUR,KLOR-CON) 20 MEQ tablet    Hot flashes related to aromatase inhibitor therapy             Plan:   04/15/2025  Labs and exam stable  Start potassium chloride 20 meq daily for 14 days  Handout given on potassium rich foods  Start venlafaxine 37.5 mg daily for hot flashes, may increase to 75 mg daily   Continue Armidex + Ribociclib, new cycle to start on 04/16/2025  Arimidex 1 mg p.o. q.day   Ribociclib 600 mg p.o. daily X 21 days, then 7 days off to complete 28 day cycle; to continue until disease progression or unacceptable toxicity  Re-stage with contrast-enhanced CT scans of C/A/P and whole-body nuclear medicine bone scan every 3 months- scheduled on 4/28/2025  Follow-up with gyn for ovarian cancer surveillance  Follow up with GI for evaluation of hematochezia, 6/9/2025  Xgeva 120 mg every 4 weeks, next due 4/23/2025  Calcium and vitamin-D supplements to continue   DEXA scan September 2025, 9/15/2025  Echocardiogram every 3 months, due in 6/2025  Lupron 22.5 mg every 12 weeks, given on 2/18/2025, next due 5/13/2025  RTC on 4/29 labs/MD for scan review/Cycle 10 Kanjinti  CBC CMP MG  RTC on 05/13/2025 labs/NP/ribociclib to start on 05/14/2025  CBC CMP MG  RTC on 05/20/205 labs/NP/Laureenti  CBC CMP MG      Providers:         Orders for 01/28/2025:  Continue Herceptin  Start Lupron  Continue tamoxifen for 2 months  After 2 months, discontinue tamoxifen and start Arimidex +ribociclib  Arimidex 1 mg p.o. q.day   Ribociclib 600 mg p.o. daily X 21 days, then 7 days off to complete 28 day cycle; to continue until disease progression or unacceptable toxicity  Re-stage with contrast-enhanced CT scans of C/A/P and whole-body nuclear medicine bone scan in 3 months  Follow-up with gyn for ovarian cancer surveillance  Refer to GI for evaluation of hematochezia  Continue Xgeva   Calcium and vitamin-D supplements to continue   DEXA scan September  2026   Echocardiogram in March  Follow-up with NP in 3 weeks      Above discussed at length with the patient.  All questions answered.    Discussed labs and scans and gave her copies of relevant results.  Disease progression discussed.  Plan to change systemic therapy discussed.  Potential side effects of anastrozole and ribociclib discussed; gave her educational materials from [employer].  Guarded prognosis discussed.    She understands and agrees with this plan.  =================================        # IDC both breasts, diagnosed concurrently:  #Inflammatory carcinoma left breast:  -presentation:  Pain and swelling left breast 05/2024  -Mirena IUD in place  -extensive family history of breast cancer and cervical cancer  -physical exam: Right breast: 3 cm mass 12 o'clock position, 4 in above nipple  -physical exam: Left breast: Swollen, hard, large mass to o'clock position, 1 hard immobile lymph node in left axilla, breast and axilla tender to palpation (inflammatory carcinoma of left breast)  -mammogram and ultrasound 07/01/2024  -multiple masses left breast on mammogram and ultrasound  -core biopsy left breast 07/16/2024:  IDC, grade 3; lymph node biopsy positive as well; ER 59%; CO 0%; HER2 positive (3+); Ki-67 unfavorable (66.4%); HER2 by FISH analysis, group 1 (HER2 gene amplification)  -right breast core biopsy 07/17/2024:  IDC, grade 2; lymph node biopsy negative; ER 97.8%; CO 9.6%; HER2 negative (score 0); Ki-67 unfavorable (69.7%); HER2 by FISH negative/nonamplified  -MediPort placed 08/14/2024   -genetic testing 08/22/2024  To summarize:  -concurrently diagnosed bilateral breast cancer   -IDC right breast:  IDC, grade 2, lymph node biopsy negative, ER positive, CO positive, HER2 negative; cT1c cN0 MX, AJCC anatomic stage at least IA, clinical prognostic stage IA; S/P needle biopsy of right breast and right axilla 07/17/2024  -inflammatory carcinoma left breast, grade 3, lymph node biopsy positive, ER  positive, HI negative, HER2 positive; cT4d cN2 M1, stage IV; S/P needle biopsy left breast and left axilla [phone]  (By virtue of palpable, fixed lymph node in the left axilla, cN2)  -genetic testing 08/22/2024:  BRCA1 mutation positive, heterozygous  -premenopausal per labs 08/29/2024  -baseline TTE 09/05/2024:  LVEF 60-65%   -DEXA scan 09/16/2024:  Normal BMD of lumbar vertebrae end of femoral necks  -baseline staging CTs C/A/P 0 09/16/2024:  Multiple thoracolumbar vertebrae and sacral lytic metastases   -baseline staging whole-body nuclear medicine bone scan 09/16/2024:  Thoracolumbar vertebrae and sacrum metastases  -09/16/2024: Patient referred to IR for biopsy of suspicious bone lesions  -liquid biopsy 09/19/2024:  BRCA1 positive; HER2 positive;TMB could not be calculated due to insufficient circulating tumor DNA; MSI-high not detected  -09/19/2024: Baseline tumor markers: CEA 19.74, elevated; CA 27.29, CA 15-3 levels normal  -brain MRI for staging 09/23/2024: No intracranial metastases  -FDG PET-CT for staging 09/30/2024: Sites of disease:  Multifocal, left breast; left axillary and subpectoral lymphadenopathy; small focus upper right breast; bilobar liver metastases; portal caval lymph node; multifocal bone metastases  -tamoxifen plus trastuzumab started 10/08/2024  -CT-guided biopsy left iliac crest 10/17/2024:  Metastases from breast carcinoma  -germline testing: BRCA1 mutation positive  -tissue NGS testing (left breast, collected 07/16/2024):  BRCA1 mutation positive; HER2 positive; MSI stable; TMB 2.1 m/MB (low); fusion negative; PD-L1 negative (PD-L1 CPS 2; PD-L1 TPS 1%); PIK3CA negative; ESR1 negative  -cycle 2 of trastuzumab on 10/29/2024  -Mirena IUD removed 09/2024 (per gyn note dated 12/09/2024)  -surveillance TTE 12/19/2024: LVEF 67%  -pelvic ultrasound 12/23/2024:  Bilateral ovarian cysts, largest 1.5 cm  -brain MRI 12/24/2024: Headaches:  9 mm ovoid metastases anterior aspect of C4 vertebral  body  -dental clearance obtained 11/29/2024 (dated 11/26/2024)  -Xgeva for bone metastases, 120 mg subQ every 4 weeks, started 12/31/2024  -01/13/2025:  Gyn consultation/follow-up:  Patient would like to proceed with ParaGard IUD insertion (nonhormonal contraception); annual transvaginal ultrasound and CA-125 level for surveillance in view of BRCA1 mutation status until risk reduction HUA-BSO recommended at age 35-40 or when done with childbearing; given stage IV breast cancer, plan to delay surgery until further prognosis can be made  -restaging CTs and bone scan 01/27/2025:  Progression of metastases  >>>  Plan:  -metastatic progression on restaging scans 01/27/2025  -discontinue Herceptin +tamoxifen  -no visceral metastases, therefore, we will continue treatment with Herceptin +second-line endocrine therapy  -will switch to Herceptin + ovarian function suppression + anastrazole +ribociclib (see below)  -01/28/2025:  continue Herceptin; we will start Lupron every 3 months ASAP; for a couple of months, continue tamoxifen along with Lupron; after 2 months, discontinue tamoxifen, and start Arimidex +ribociclib  -re-stage with contrast-enhanced CT scans of C/A/P and bone scan in 3 months (early April)  -bone metastases confirmed with biopsy of left iliac crest 10/17/2024  -tissue NGS: HER2 positive  -germline BRCA1 mutation positive  -we have requested ER and NC testing on left iliac crest biopsy as well; we will request again  -premenopausal patient   -BRCA1 mutation positive, heterozygous  -baseline DEXA scan 09/16/2024: Normal BMD  -left breast tumor is ER positive, NC negative, HER2 positive  -Mirena IUD removed; follow-up with gyn for ParaGard insertion as nonhormonal methods of contraception; annual pelvic ultrasound and CA-125 level measurement for ovarian cancer surveillance in view of BRCA1 status, per gyn; risk reduction HUA-BSO per gyn at opportune moment  -no hormonal therapy or hormonal contraception,  given the diagnosis of breast cancer  -08/29/2024:  Urgent referral to GI for evaluation of hematochezia which she has been experiencing since 08/28/2024  -08/29/2024: Left breast pain; started on Norco 5 mg every 6 hours prn  -because of metastatic disease, intent of treatment is palliation  -continue Xgeva every 4 weeks to prevent skeletal events from bone metastases  -calcium and vitamin-D supplements to prevent hypocalcemia with Xgeva  -routine surgical resection of primary breast tumor is not indicated in the management of de Joaquina stage IV disease; although there is no survival benefit, it may be considered for local control of the primary tumor (deferred to surgery)  -monitor LVEF TTE every 3 months; next, mid March  -patient can be treated with a up to 3 lines of endocrine therapy +HER2 targeted therapy  -BRCA1 mutation positive; therefore, a candidate for palliative systemic therapy with PARPi (olaparib, talazoparib), as well (lower level evidence for HER2 positive tumors, therefore, category 2A in this setting)  -baseline CEA level was elevated; baseline CA 15-3 and CA 27.29 levels were normal; follow CEA level every month to assess response  Briefly:  Switch to Herceptin + Lupron every 12 weeks + anastrazole +ribociclib  -01/28/2025:  continue Herceptin; we will start Lupron every 3 months ASAP; for a couple of months, continue tamoxifen along with Lupron; after 2 months, discontinue tamoxifen, and start Arimidex +ribociclib  Re-stage with contrast-enhanced CT scans of C/A/P and bone scan early April  ER positive, TN negative, HER2 positive  Follow-up with gyn for ovarian cancer surveillance (BRCA1 mutation positive)  GI referral for hematochezia  Continue Xgeva   Calcium and vitamin-D supplements to continue  DEXA scan in 2 years (September 2026)  Echocardiogram mid March     If, at anytime, develops visceral crisis or endocrine refractory, then, treatment options:  # first-line:    Pertuzumab  +trastuzumab +docetaxel (category 1, Preferred);   pertuzumab +trastuzumab +paclitaxel (Preferred)  (after response, maintenance trastuzumab/pertuzumab + concurrent endocrine therapy)   # second-line:  -Fam trastuzumab deruxtecan (category 1, Preferred)  -tucatinib +trastuzumab +capecitabine is preferred in patients with both systemic and CNS progression in the 3rd line setting and beyond; it may also be given in the second-line setting, etc.     Palliative systemic therapy options:  -systemic therapy +HER2 targeted therapy; or  -endocrine therapy +/-HER2 targeted therapy +ovarian suppression in premenopausal patient  -for premenopausal patients, tamoxifen alone (without ovarian ablation/suppression) + HER2 targeted therapy is also an option  >>>  -no visceral crisis, therefore, decided to treat with tamoxifen + trastuzumab     Herceptin:  -watch for cardiomyopathy, infusion reactions and pulmonary toxicity  -adverse reactions:> 10%:  Decreased LVEF, skin rash, abdominal pain, anorexia, infusion related reactions, asthenia, chills, back pain, dyspnea, etc.  -warnings/precautions:  Cardiomyopathy; infusion reactions; pulmonary toxicity; renal toxicity  -monitoring: Assess left ventricular ejection fraction (by ECG or MUGA scan) at baseline (immediately prior to trastuzumab initiation), every 3 months during trastuzumab therapy, every 4 weeks if trastuzumab is withheld for significant left ventricular cardiac dysfunction, and every 6 months for at least 2 years following completion of adjuvant trastuzumab therapy. Monitor vital signs during infusion; monitor for hypersensitivity or infusion reaction; if a reaction occurs, monitor carefully until symptoms resolve completely.   Monitor for signs/symptoms of cardiac dysfunction or pulmonary toxicity. If pregnancy inadvertently occurs during treatment, monitor amniotic fluid volume.     Monitoring with ribociclib:  -CBC: Baseline, every 2 weeks for first 2 cycles, at the  beginning of the subsequent 4 cycles, and as clinically necessary  LFTs: Baseline, every 2 weeks for the first 2 cycles, at the beginning of the subsequent 4 cycles, and as clinically necessary  -Electrolytes: Prior to treatment, at the beginning of first 6 cycles, and as needed indicated  -EKG: Prior to treatment initiation, repeat on day 14 of cycle 1, at the beginning of cycle 2, and last week indicated     Anastrozole:   1 mg p.o. q.day     Ribociclib:  600 mg p.o. daily for 21 days, followed by a 7 day rest to complete a 28 day treatment cycle; continue until disease progression or unacceptable toxicity  With AI: 600 mg daily for 21 days, followed by 7-day rest period to complete a 28-day treatment cycle; continue until disease progression or unacceptable toxicity  -Dose reduction depending upon kidney impairment  -Dose reduction depending upon moderate or severe liver impairment  -Dose reductions: 600 mg daily, 400 mg daily, 200 mg daily  -Dose management depending upon toxicities: Hematologic toxicity (neutropenia), cardiovascular toxicity (QT prolonging agent), dermatologic toxicity, pulmonary toxicity  Dosage forms: 200 mg, 400 mg, 600 mg  Administration: With or without food  Moderate or high emetic potential  Warnings/precautions with ribociclib:  -Bone marrow suppression: Neutropenia  -Dermatologic toxicity  -Hepatobiliary toxicity  -QT prolongation  -Pulmonary toxicity  Adverse reactions with ribociclib:  Peripheral edema, alopecia, pruritus, skin rash, abdominal pain, constipation, anorexia, UTIs, anemia, leukopenia, neutropenia, increased ALT, increased AST, etc.        Fertility and birth control issues:  Discussion about fertility and contraception:  -Informed her about the potential impact of chemotherapy on fertility  -Made her aware of the option of referral to fertility specialist before chemotherapy and/or endocrine therapy to discuss options in case she desires future pregnancies.  Fertility  preservation options include oocyte and embryo cryopreservation, etc.  -Amenorrhea frequently occurs during or after chemotherapy.  The majority of women younger than 35 years to resume menses within 2 years of finishing adjuvant chemotherapy  -Informed that menses and fertility are not necessarily linked. Absence of regular menses does not necessarily imply lack of fertility.  Conversely, the presence of menses does not guarantee fertility.  -There are limited data regarding continued fertility after chemotherapy.  -Cautioned her to avoid becoming pregnant during treatment with radiation therapy, chemotherapy, or endocrine therapy  -Hormone-based birth control is discouraged regardless of the harmless of the hormone receptor status of the patient's cancer  -Alternative methods of birth control, including IUD, barrier method, tubal ligation, vasectomy for the partner, etc.   >>>  -Mirena IUD removed  -follow-up with gyn for ParaGard insertion as nonhormonal methods of contraception  -fertility specialist consultation: she declined  -cautioned her not to become pregnant during treatment  -referred to gyn for consultation regarding nonhormonal methods of contraception        # Sites of disease:   -IDC right breast, ER positive, [place] positive, HER2 negative, cT1c cN0 MX  -inflammatory carcinoma left breast, ER positive, [place] negative, HER2 positive, cT4d cN2 M1, stage IV  -FDG PET-CT for staging 09/30/2024: Sites of disease:  Multifocal, left breast; left axillary and subpectoral lymphadenopathy; small focus upper right breast; bilobar liver metastases; portal caval lymph node; multifocal bone metastases  -brain MRI 12/24/2024:  9 mm metastases anterior aspect of C4 vertebral body (no brain metastases)        # Molecular markers:  -liquid biopsy:  BRCA1 positive; HER2 positive;TMB could not be calculated due to insufficient circulating tumor DNA; MSI-high not detected  -CT-guided biopsy left iliac crest 10/17/2024:   Metastases from breast carcinoma  -germline testing: BRCA1 mutation positive  -tissue NGS testing (left breast, collected 07/16/2024):  BRCA1 mutation positive; HER2 positive; MSI stable; TMB 2.1 m/MB (low); fusion negative; PD-L1 negative (PD-L1 CPS 2; PD-L1 TPS 1%); PIK3CA negative; ESR1 negative  -genetic testing 08/22/2024:  BRCA1 mutation positive, heterozygous        # Hematochezia:   Since yesterday, 08/28/2024, has a experienced painless diarrhea with blood on toilet wipes as well as in toilet bowl; no weakness or dizziness; 5 loose stools yesterday, 2 loose stools today; no nausea, vomiting, hematemesis, or melena.  Never experienced GI bleeding before.  >>>   -08/29/2024: sent an urgent referral to GI for evaluation        Family history of breast cancer, ovarian cancer, cervical cancer, stomach cancer, colon cancer:  -Maternal great aunt # 1: Breast cancer, in her 50s   -sister: Ovarian cancer, age 50  -sister: Cervical cancer, age 25  -mother: Ovarian cancer, age 50  -Maternal aunt: Cervical cancer, age 50  -maternal grandfather: Stomach cancer, age 70 (smoker)  -maternal great aunt # 2: Colon cancer, age 60  >>>  -genetic testing 08/22/2024:  BRCA1 mutation positive, heterozygous           I have explained all of the above in detail and the patient understands all of the current recommendation(s). I have answered all of their questions to the best of my ability and to their complete satisfaction.       Ana Allen, AMANDAP-C  Oncology/Hematology   [facility]                   [1]   Social History  Tobacco Use    Smoking status: Never     Passive exposure: Never    Smokeless tobacco: Never   Substance Use Topics    Alcohol use: Never    Drug use: Never

## 2025-04-15 ENCOUNTER — OFFICE VISIT (OUTPATIENT)
Dept: HEMATOLOGY/ONCOLOGY | Facility: CLINIC | Age: 35
End: 2025-04-15
Payer: MEDICAID

## 2025-04-15 VITALS
DIASTOLIC BLOOD PRESSURE: 87 MMHG | OXYGEN SATURATION: 98 % | TEMPERATURE: 98 F | HEART RATE: 88 BPM | SYSTOLIC BLOOD PRESSURE: 122 MMHG | WEIGHT: 234 LBS | BODY MASS INDEX: 37.61 KG/M2 | HEIGHT: 66 IN | RESPIRATION RATE: 18 BRPM

## 2025-04-15 DIAGNOSIS — C79.51 SECONDARY MALIGNANCY OF THORACIC VERTEBRAL COLUMN: ICD-10-CM

## 2025-04-15 DIAGNOSIS — C77.3 SECONDARY MALIGNANT NEOPLASM OF AXILLARY LYMPH NODES: ICD-10-CM

## 2025-04-15 DIAGNOSIS — T45.1X5A CHEMOTHERAPY-INDUCED NEUTROPENIA: ICD-10-CM

## 2025-04-15 DIAGNOSIS — N95.9 PREMENOPAUSAL PATIENT: ICD-10-CM

## 2025-04-15 DIAGNOSIS — D84.821 IMMUNODEFICIENCY DUE TO CHEMOTHERAPY: ICD-10-CM

## 2025-04-15 DIAGNOSIS — D70.1 CHEMOTHERAPY-INDUCED NEUTROPENIA: ICD-10-CM

## 2025-04-15 DIAGNOSIS — T45.1X5A IMMUNODEFICIENCY DUE TO CHEMOTHERAPY: ICD-10-CM

## 2025-04-15 DIAGNOSIS — C50.912 INFLAMMATORY CARCINOMA OF LEFT BREAST: ICD-10-CM

## 2025-04-15 DIAGNOSIS — E87.6 HYPOKALEMIA: ICD-10-CM

## 2025-04-15 DIAGNOSIS — Z79.69 IMMUNODEFICIENCY DUE TO CHEMOTHERAPY: ICD-10-CM

## 2025-04-15 DIAGNOSIS — C79.51 SECONDARY MALIGNANCY OF LUMBAR VERTEBRAL COLUMN: ICD-10-CM

## 2025-04-15 DIAGNOSIS — C50.911 INVASIVE DUCTAL CARCINOMA OF BREAST, RIGHT: ICD-10-CM

## 2025-04-15 DIAGNOSIS — R23.2 HOT FLASHES RELATED TO AROMATASE INHIBITOR THERAPY: ICD-10-CM

## 2025-04-15 DIAGNOSIS — T45.1X5A HOT FLASHES RELATED TO AROMATASE INHIBITOR THERAPY: ICD-10-CM

## 2025-04-15 RX ORDER — FEZOLINETANT 45 MG/1
45 TABLET, FILM COATED ORAL DAILY
Status: CANCELLED | OUTPATIENT
Start: 2025-04-15

## 2025-04-15 RX ORDER — POTASSIUM CHLORIDE 20 MEQ/1
20 TABLET, EXTENDED RELEASE ORAL DAILY
Qty: 14 TABLET | Refills: 0 | Status: SHIPPED | OUTPATIENT
Start: 2025-04-15 | End: 2025-04-29

## 2025-04-15 RX ORDER — VENLAFAXINE HYDROCHLORIDE 37.5 MG/1
37.5 CAPSULE, EXTENDED RELEASE ORAL DAILY
Qty: 21 CAPSULE | Refills: 0 | Status: SHIPPED | OUTPATIENT
Start: 2025-04-15 | End: 2025-05-06

## 2025-04-23 ENCOUNTER — LAB VISIT (OUTPATIENT)
Dept: HEMATOLOGY/ONCOLOGY | Facility: CLINIC | Age: 35
End: 2025-04-23
Payer: MEDICAID

## 2025-04-23 ENCOUNTER — TELEPHONE (OUTPATIENT)
Dept: HEMATOLOGY/ONCOLOGY | Facility: CLINIC | Age: 35
End: 2025-04-23
Payer: MEDICAID

## 2025-04-23 ENCOUNTER — INFUSION (OUTPATIENT)
Dept: INFUSION THERAPY | Facility: HOSPITAL | Age: 35
End: 2025-04-23
Attending: INTERNAL MEDICINE
Payer: MEDICAID

## 2025-04-23 VITALS
SYSTOLIC BLOOD PRESSURE: 145 MMHG | HEART RATE: 77 BPM | TEMPERATURE: 98 F | RESPIRATION RATE: 18 BRPM | DIASTOLIC BLOOD PRESSURE: 88 MMHG | OXYGEN SATURATION: 98 %

## 2025-04-23 DIAGNOSIS — D70.1 CHEMOTHERAPY-INDUCED NEUTROPENIA: ICD-10-CM

## 2025-04-23 DIAGNOSIS — C79.51 SECONDARY MALIGNANCY OF LUMBAR VERTEBRAL COLUMN: ICD-10-CM

## 2025-04-23 DIAGNOSIS — C79.51 SECONDARY MALIGNANCY OF THORACIC VERTEBRAL COLUMN: ICD-10-CM

## 2025-04-23 DIAGNOSIS — C50.911 INVASIVE DUCTAL CARCINOMA OF BREAST, RIGHT: ICD-10-CM

## 2025-04-23 DIAGNOSIS — C50.912 CARCINOMA OF LEFT BREAST METASTATIC TO BONE: Primary | ICD-10-CM

## 2025-04-23 DIAGNOSIS — C79.51 CARCINOMA OF LEFT BREAST METASTATIC TO BONE: Primary | ICD-10-CM

## 2025-04-23 DIAGNOSIS — T45.1X5A IMMUNODEFICIENCY DUE TO CHEMOTHERAPY: ICD-10-CM

## 2025-04-23 DIAGNOSIS — Z79.69 IMMUNODEFICIENCY DUE TO CHEMOTHERAPY: ICD-10-CM

## 2025-04-23 DIAGNOSIS — C50.912 INFLAMMATORY CARCINOMA OF LEFT BREAST: ICD-10-CM

## 2025-04-23 DIAGNOSIS — D84.821 IMMUNODEFICIENCY DUE TO CHEMOTHERAPY: ICD-10-CM

## 2025-04-23 DIAGNOSIS — T45.1X5A CHEMOTHERAPY-INDUCED NEUTROPENIA: ICD-10-CM

## 2025-04-23 DIAGNOSIS — C77.3 SECONDARY MALIGNANT NEOPLASM OF AXILLARY LYMPH NODES: ICD-10-CM

## 2025-04-23 LAB
ALBUMIN SERPL-MCNC: 3.1 G/DL (ref 3.5–5)
ALBUMIN/GLOB SERPL: 0.6 RATIO (ref 1.1–2)
ALP SERPL-CCNC: 54 UNIT/L (ref 40–150)
ALT SERPL-CCNC: 44 UNIT/L (ref 0–55)
ANION GAP SERPL CALC-SCNC: 8 MEQ/L
AST SERPL-CCNC: 32 UNIT/L (ref 11–45)
BASOPHILS # BLD AUTO: 0.11 X10(3)/MCL
BASOPHILS NFR BLD AUTO: 2.4 %
BILIRUB SERPL-MCNC: 0.4 MG/DL
BUN SERPL-MCNC: 16.4 MG/DL (ref 7–18.7)
CALCIUM SERPL-MCNC: 9 MG/DL (ref 8.4–10.2)
CHLORIDE SERPL-SCNC: 105 MMOL/L (ref 98–107)
CO2 SERPL-SCNC: 25 MMOL/L (ref 22–29)
CREAT SERPL-MCNC: 1.01 MG/DL (ref 0.55–1.02)
CREAT/UREA NIT SERPL: 16
EOSINOPHIL # BLD AUTO: 0.31 X10(3)/MCL (ref 0–0.9)
EOSINOPHIL NFR BLD AUTO: 6.8 %
ERYTHROCYTE [DISTWIDTH] IN BLOOD BY AUTOMATED COUNT: 13.4 % (ref 11.5–17)
GFR SERPLBLD CREATININE-BSD FMLA CKD-EPI: >60 ML/MIN/1.73/M2
GLOBULIN SER-MCNC: 4.8 GM/DL (ref 2.4–3.5)
GLUCOSE SERPL-MCNC: 204 MG/DL (ref 74–100)
HCT VFR BLD AUTO: 35 % (ref 37–47)
HGB BLD-MCNC: 11.3 G/DL (ref 12–16)
IMM GRANULOCYTES # BLD AUTO: 0.01 X10(3)/MCL (ref 0–0.04)
IMM GRANULOCYTES NFR BLD AUTO: 0.2 %
LYMPHOCYTES # BLD AUTO: 1.66 X10(3)/MCL (ref 0.6–4.6)
LYMPHOCYTES NFR BLD AUTO: 36.2 %
MAGNESIUM SERPL-MCNC: 2 MG/DL (ref 1.6–2.6)
MCH RBC QN AUTO: 32.8 PG (ref 27–31)
MCHC RBC AUTO-ENTMCNC: 32.3 G/DL (ref 33–36)
MCV RBC AUTO: 101.7 FL (ref 80–94)
MONOCYTES # BLD AUTO: 0.27 X10(3)/MCL (ref 0.1–1.3)
MONOCYTES NFR BLD AUTO: 5.9 %
NEUTROPHILS # BLD AUTO: 2.22 X10(3)/MCL (ref 2.1–9.2)
NEUTROPHILS NFR BLD AUTO: 48.5 %
NRBC BLD AUTO-RTO: 0 %
PLATELET # BLD AUTO: 233 X10(3)/MCL (ref 130–400)
PMV BLD AUTO: 10.4 FL (ref 7.4–10.4)
POTASSIUM SERPL-SCNC: 4.5 MMOL/L (ref 3.5–5.1)
PROT SERPL-MCNC: 7.9 GM/DL (ref 6.4–8.3)
RBC # BLD AUTO: 3.44 X10(6)/MCL (ref 4.2–5.4)
SODIUM SERPL-SCNC: 138 MMOL/L (ref 136–145)
WBC # BLD AUTO: 4.58 X10(3)/MCL (ref 4.5–11.5)

## 2025-04-23 PROCEDURE — 96372 THER/PROPH/DIAG INJ SC/IM: CPT

## 2025-04-23 PROCEDURE — 80053 COMPREHEN METABOLIC PANEL: CPT

## 2025-04-23 PROCEDURE — 85025 COMPLETE CBC W/AUTO DIFF WBC: CPT

## 2025-04-23 PROCEDURE — 63600175 PHARM REV CODE 636 W HCPCS: Mod: JZ,TB

## 2025-04-23 PROCEDURE — 83735 ASSAY OF MAGNESIUM: CPT

## 2025-04-23 RX ADMIN — DENOSUMAB 120 MG: 120 INJECTION SUBCUTANEOUS at 09:04

## 2025-04-23 NOTE — PLAN OF CARE
C5 Xgeva given RUE tolerated well  Pt verbalizes taking calcium supplements  Reminded to report if any issues with her jaw develops

## 2025-04-28 ENCOUNTER — HOSPITAL ENCOUNTER (OUTPATIENT)
Dept: RADIOLOGY | Facility: HOSPITAL | Age: 35
Discharge: HOME | End: 2025-04-28
Attending: INTERNAL MEDICINE
Payer: MEDICAID

## 2025-04-28 DIAGNOSIS — C50.912 INFLAMMATORY CARCINOMA OF LEFT BREAST: ICD-10-CM

## 2025-04-28 DIAGNOSIS — C50.912 INVASIVE DUCTAL CARCINOMA OF LEFT BREAST: ICD-10-CM

## 2025-04-28 DIAGNOSIS — C78.7 ADENOCARCINOMA OF BREAST METASTATIC TO LIVER, UNSPECIFIED LATERALITY: ICD-10-CM

## 2025-04-28 DIAGNOSIS — C50.919 ADENOCARCINOMA OF BREAST METASTATIC TO LIVER, UNSPECIFIED LATERALITY: ICD-10-CM

## 2025-04-28 PROBLEM — R59.0 PERIPORTAL LYMPHADENOPATHY: Status: ACTIVE | Noted: 2025-04-28

## 2025-04-28 PROBLEM — Z17.0: Status: ACTIVE | Noted: 2025-04-28

## 2025-04-28 PROBLEM — Z17.31 HER2-POSITIVE CARCINOMA OF LEFT BREAST: Status: ACTIVE | Noted: 2025-04-28

## 2025-04-28 PROCEDURE — A9503 TC99M MEDRONATE: HCPCS | Performed by: INTERNAL MEDICINE

## 2025-04-28 PROCEDURE — 25500020 PHARM REV CODE 255

## 2025-04-28 PROCEDURE — 74177 CT ABD & PELVIS W/CONTRAST: CPT | Mod: TC

## 2025-04-28 PROCEDURE — 78306 BONE IMAGING WHOLE BODY: CPT | Mod: TC

## 2025-04-28 RX ORDER — PROCHLORPERAZINE EDISYLATE 5 MG/ML
10 INJECTION INTRAMUSCULAR; INTRAVENOUS ONCE AS NEEDED
Start: 2025-05-20

## 2025-04-28 RX ORDER — SODIUM CHLORIDE 0.9 % (FLUSH) 0.9 %
10 SYRINGE (ML) INJECTION
OUTPATIENT
Start: 2025-05-20

## 2025-04-28 RX ORDER — SODIUM CHLORIDE 0.9 % (FLUSH) 0.9 %
10 SYRINGE (ML) INJECTION
Status: CANCELLED | OUTPATIENT
Start: 2025-04-29

## 2025-04-28 RX ORDER — PROCHLORPERAZINE EDISYLATE 5 MG/ML
10 INJECTION INTRAMUSCULAR; INTRAVENOUS ONCE AS NEEDED
Status: CANCELLED
Start: 2025-04-29

## 2025-04-28 RX ORDER — HEPARIN 100 UNIT/ML
500 SYRINGE INTRAVENOUS
Status: CANCELLED | OUTPATIENT
Start: 2025-04-29

## 2025-04-28 RX ORDER — EPINEPHRINE 0.3 MG/.3ML
0.3 INJECTION SUBCUTANEOUS ONCE AS NEEDED
OUTPATIENT
Start: 2025-05-20

## 2025-04-28 RX ORDER — HEPARIN 100 UNIT/ML
500 SYRINGE INTRAVENOUS
OUTPATIENT
Start: 2025-05-20

## 2025-04-28 RX ORDER — EPINEPHRINE 0.3 MG/.3ML
0.3 INJECTION SUBCUTANEOUS ONCE AS NEEDED
Status: CANCELLED | OUTPATIENT
Start: 2025-04-29

## 2025-04-28 RX ORDER — DIPHENHYDRAMINE HYDROCHLORIDE 50 MG/ML
50 INJECTION, SOLUTION INTRAMUSCULAR; INTRAVENOUS ONCE AS NEEDED
OUTPATIENT
Start: 2025-05-20

## 2025-04-28 RX ORDER — TC 99M MEDRONATE 20 MG/10ML
27 INJECTION, POWDER, LYOPHILIZED, FOR SOLUTION INTRAVENOUS
Status: COMPLETED | OUTPATIENT
Start: 2025-04-28 | End: 2025-04-28

## 2025-04-28 RX ORDER — DIPHENHYDRAMINE HYDROCHLORIDE 50 MG/ML
50 INJECTION, SOLUTION INTRAMUSCULAR; INTRAVENOUS ONCE AS NEEDED
Status: CANCELLED | OUTPATIENT
Start: 2025-04-29

## 2025-04-28 RX ADMIN — IOHEXOL 100 ML: 350 INJECTION, SOLUTION INTRAVENOUS at 10:04

## 2025-04-28 RX ADMIN — TECHNETIUM TC 99M MEDRONATE 27.2 MILLICURIE: 20 INJECTION, POWDER, LYOPHILIZED, FOR SOLUTION INTRAVENOUS at 09:04

## 2025-04-29 ENCOUNTER — LAB VISIT (OUTPATIENT)
Dept: HEMATOLOGY/ONCOLOGY | Facility: CLINIC | Age: 35
End: 2025-04-29
Payer: MEDICAID

## 2025-04-29 ENCOUNTER — RESULTS FOLLOW-UP (OUTPATIENT)
Dept: HEMATOLOGY/ONCOLOGY | Facility: CLINIC | Age: 35
End: 2025-04-29

## 2025-04-29 ENCOUNTER — OFFICE VISIT (OUTPATIENT)
Dept: HEMATOLOGY/ONCOLOGY | Facility: CLINIC | Age: 35
End: 2025-04-29
Attending: INTERNAL MEDICINE
Payer: MEDICAID

## 2025-04-29 ENCOUNTER — INFUSION (OUTPATIENT)
Dept: INFUSION THERAPY | Facility: HOSPITAL | Age: 35
End: 2025-04-29
Attending: INTERNAL MEDICINE
Payer: MEDICAID

## 2025-04-29 VITALS
SYSTOLIC BLOOD PRESSURE: 120 MMHG | HEIGHT: 66 IN | BODY MASS INDEX: 37.45 KG/M2 | OXYGEN SATURATION: 97 % | TEMPERATURE: 98 F | RESPIRATION RATE: 18 BRPM | DIASTOLIC BLOOD PRESSURE: 78 MMHG | WEIGHT: 233 LBS | HEART RATE: 86 BPM

## 2025-04-29 VITALS
OXYGEN SATURATION: 95 % | TEMPERATURE: 98 F | RESPIRATION RATE: 20 BRPM | SYSTOLIC BLOOD PRESSURE: 130 MMHG | WEIGHT: 232.81 LBS | BODY MASS INDEX: 37.41 KG/M2 | DIASTOLIC BLOOD PRESSURE: 97 MMHG | HEIGHT: 66 IN | HEART RATE: 85 BPM

## 2025-04-29 DIAGNOSIS — Z17.0 CARCINOMA OF BREAST, STAGE 4, ESTROGEN RECEPTOR POSITIVE, LEFT: ICD-10-CM

## 2025-04-29 DIAGNOSIS — C79.51 SECONDARY MALIGNANCY OF THORACIC VERTEBRAL COLUMN: ICD-10-CM

## 2025-04-29 DIAGNOSIS — C78.7 ADENOCARCINOMA OF BREAST METASTATIC TO LIVER, UNSPECIFIED LATERALITY: Primary | ICD-10-CM

## 2025-04-29 DIAGNOSIS — Z15.01 BRCA1 GENE MUTATION POSITIVE: ICD-10-CM

## 2025-04-29 DIAGNOSIS — C78.7 ADENOCARCINOMA OF BREAST METASTATIC TO LIVER, UNSPECIFIED LATERALITY: ICD-10-CM

## 2025-04-29 DIAGNOSIS — C50.912 INVASIVE DUCTAL CARCINOMA OF LEFT BREAST: ICD-10-CM

## 2025-04-29 DIAGNOSIS — C50.812 MALIGNANT NEOPLASM OF OVERLAPPING SITES OF LEFT BREAST IN FEMALE, ESTROGEN RECEPTOR POSITIVE: ICD-10-CM

## 2025-04-29 DIAGNOSIS — Z15.09 BRCA1 GENE MUTATION POSITIVE: ICD-10-CM

## 2025-04-29 DIAGNOSIS — C50.912 INFLAMMATORY CARCINOMA OF LEFT BREAST: ICD-10-CM

## 2025-04-29 DIAGNOSIS — C50.912 INVASIVE DUCTAL CARCINOMA OF LEFT BREAST: Primary | ICD-10-CM

## 2025-04-29 DIAGNOSIS — R59.0 PERIPORTAL LYMPHADENOPATHY: ICD-10-CM

## 2025-04-29 DIAGNOSIS — C79.51 SECONDARY MALIGNANCY OF LUMBAR VERTEBRAL COLUMN: ICD-10-CM

## 2025-04-29 DIAGNOSIS — C79.51 CARCINOMA OF BREAST METASTATIC TO BONE, UNSPECIFIED LATERALITY: ICD-10-CM

## 2025-04-29 DIAGNOSIS — Z17.31 HER2-POSITIVE CARCINOMA OF LEFT BREAST: ICD-10-CM

## 2025-04-29 DIAGNOSIS — T45.1X5A IMMUNODEFICIENCY DUE TO CHEMOTHERAPY: ICD-10-CM

## 2025-04-29 DIAGNOSIS — Z15.09 MONOALLELIC MUTATION OF BRCA1 GENE: ICD-10-CM

## 2025-04-29 DIAGNOSIS — Z15.01 MONOALLELIC MUTATION OF BRCA1 GENE: ICD-10-CM

## 2025-04-29 DIAGNOSIS — C50.911 INVASIVE DUCTAL CARCINOMA OF BREAST, RIGHT: ICD-10-CM

## 2025-04-29 DIAGNOSIS — C50.919 CARCINOMA OF BREAST METASTATIC TO BONE, UNSPECIFIED LATERALITY: ICD-10-CM

## 2025-04-29 DIAGNOSIS — C77.3 SECONDARY MALIGNANT NEOPLASM OF AXILLARY LYMPH NODES: ICD-10-CM

## 2025-04-29 DIAGNOSIS — Z17.0 MALIGNANT NEOPLASM OF OVERLAPPING SITES OF LEFT BREAST IN FEMALE, ESTROGEN RECEPTOR POSITIVE: ICD-10-CM

## 2025-04-29 DIAGNOSIS — C50.912 CARCINOMA OF BREAST, STAGE 4, ESTROGEN RECEPTOR POSITIVE, LEFT: ICD-10-CM

## 2025-04-29 DIAGNOSIS — Z51.11 CHEMOTHERAPY MANAGEMENT, ENCOUNTER FOR: ICD-10-CM

## 2025-04-29 DIAGNOSIS — Z80.49 FAMILY HISTORY OF CERVICAL CANCER: ICD-10-CM

## 2025-04-29 DIAGNOSIS — Z79.69 IMMUNODEFICIENCY DUE TO CHEMOTHERAPY: ICD-10-CM

## 2025-04-29 DIAGNOSIS — Z80.3 FAMILY HISTORY OF BREAST CANCER: ICD-10-CM

## 2025-04-29 DIAGNOSIS — N95.9 PREMENOPAUSAL PATIENT: ICD-10-CM

## 2025-04-29 DIAGNOSIS — C50.919 ADENOCARCINOMA OF BREAST METASTATIC TO LIVER, UNSPECIFIED LATERALITY: Primary | ICD-10-CM

## 2025-04-29 DIAGNOSIS — C79.51: ICD-10-CM

## 2025-04-29 DIAGNOSIS — N95.9 PREMENOPAUSAL PATIENT: Primary | ICD-10-CM

## 2025-04-29 DIAGNOSIS — D84.821 IMMUNODEFICIENCY DUE TO CHEMOTHERAPY: ICD-10-CM

## 2025-04-29 DIAGNOSIS — Z80.41 FAMILY HISTORY OF OVARIAN CANCER: ICD-10-CM

## 2025-04-29 DIAGNOSIS — C77.3 MALIGNANT NEOPLASM METASTATIC TO LYMPH NODE OF AXILLA: ICD-10-CM

## 2025-04-29 DIAGNOSIS — Z80.0 FAMILY HISTORY OF STOMACH CANCER: ICD-10-CM

## 2025-04-29 DIAGNOSIS — C50.912 HER2-POSITIVE CARCINOMA OF LEFT BREAST: ICD-10-CM

## 2025-04-29 DIAGNOSIS — C50.919 ADENOCARCINOMA OF BREAST METASTATIC TO LIVER, UNSPECIFIED LATERALITY: ICD-10-CM

## 2025-04-29 DIAGNOSIS — C79.51 SECONDARY MALIGNANCY OF SACRUM: ICD-10-CM

## 2025-04-29 DIAGNOSIS — Z80.0 FAMILY HISTORY OF COLON CANCER: ICD-10-CM

## 2025-04-29 LAB
ABS NEUT CALC (OHS): 1.78 X10(3)/MCL (ref 2.1–9.2)
ALBUMIN SERPL-MCNC: 3.3 G/DL (ref 3.5–5)
ALBUMIN/GLOB SERPL: 0.7 RATIO (ref 1.1–2)
ALP SERPL-CCNC: 55 UNIT/L (ref 40–150)
ALT SERPL-CCNC: 114 UNIT/L (ref 0–55)
ANION GAP SERPL CALC-SCNC: 7 MEQ/L
ANISOCYTOSIS BLD QL SMEAR: SLIGHT
AST SERPL-CCNC: 47 UNIT/L (ref 11–45)
B-HCG UR QL: NEGATIVE
BASOPHILS NFR BLD MANUAL: 0.09 X10(3)/MCL (ref 0–0.2)
BASOPHILS NFR BLD MANUAL: 2 % (ref 0–2)
BILIRUB SERPL-MCNC: 0.4 MG/DL
BUN SERPL-MCNC: 13.3 MG/DL (ref 7–18.7)
CALCIUM SERPL-MCNC: 9 MG/DL (ref 8.4–10.2)
CHLORIDE SERPL-SCNC: 103 MMOL/L (ref 98–107)
CO2 SERPL-SCNC: 27 MMOL/L (ref 22–29)
CREAT SERPL-MCNC: 0.96 MG/DL (ref 0.55–1.02)
CREAT/UREA NIT SERPL: 14
CTP QC/QA: YES
EOSINOPHIL NFR BLD MANUAL: 0.17 X10(3)/MCL (ref 0–0.9)
EOSINOPHIL NFR BLD MANUAL: 4 % (ref 0–8)
ERYTHROCYTE [DISTWIDTH] IN BLOOD BY AUTOMATED COUNT: 13.3 % (ref 11.5–17)
GFR SERPLBLD CREATININE-BSD FMLA CKD-EPI: >60 ML/MIN/1.73/M2
GLOBULIN SER-MCNC: 4.5 GM/DL (ref 2.4–3.5)
GLUCOSE SERPL-MCNC: 229 MG/DL (ref 74–100)
HCT VFR BLD AUTO: 34.8 % (ref 37–47)
HGB BLD-MCNC: 11.4 G/DL (ref 12–16)
LYMPH ABN # BLD MANUAL: 2 %
LYMPHOCYTES NFR BLD MANUAL: 2.04 X10(3)/MCL (ref 0.6–4.6)
LYMPHOCYTES NFR BLD MANUAL: 48 % (ref 13–40)
MAGNESIUM SERPL-MCNC: 2 MG/DL (ref 1.6–2.6)
MCH RBC QN AUTO: 32.3 PG (ref 27–31)
MCHC RBC AUTO-ENTMCNC: 32.8 G/DL (ref 33–36)
MCV RBC AUTO: 98.6 FL (ref 80–94)
MONOCYTES NFR BLD MANUAL: 0.09 X10(3)/MCL (ref 0.1–1.3)
MONOCYTES NFR BLD MANUAL: 2 % (ref 2–11)
NEUTROPHILS NFR BLD MANUAL: 42 % (ref 47–80)
NRBC BLD AUTO-RTO: 0 %
PLATELET # BLD AUTO: 395 X10(3)/MCL (ref 130–400)
PLATELET # BLD EST: ADEQUATE 10*3/UL
PMV BLD AUTO: 8.8 FL (ref 7.4–10.4)
POTASSIUM SERPL-SCNC: 3.8 MMOL/L (ref 3.5–5.1)
PROT SERPL-MCNC: 7.8 GM/DL (ref 6.4–8.3)
RBC # BLD AUTO: 3.53 X10(6)/MCL (ref 4.2–5.4)
SODIUM SERPL-SCNC: 137 MMOL/L (ref 136–145)
WBC # BLD AUTO: 4.25 X10(3)/MCL (ref 4.5–11.5)

## 2025-04-29 PROCEDURE — 83735 ASSAY OF MAGNESIUM: CPT

## 2025-04-29 PROCEDURE — 81025 URINE PREGNANCY TEST: CPT

## 2025-04-29 PROCEDURE — 63600175 PHARM REV CODE 636 W HCPCS: Mod: TB | Performed by: INTERNAL MEDICINE

## 2025-04-29 PROCEDURE — A4216 STERILE WATER/SALINE, 10 ML: HCPCS | Performed by: INTERNAL MEDICINE

## 2025-04-29 PROCEDURE — 99214 OFFICE O/P EST MOD 30 MIN: CPT | Mod: PBBFAC,25 | Performed by: INTERNAL MEDICINE

## 2025-04-29 PROCEDURE — 85007 BL SMEAR W/DIFF WBC COUNT: CPT

## 2025-04-29 PROCEDURE — 96413 CHEMO IV INFUSION 1 HR: CPT

## 2025-04-29 PROCEDURE — 80053 COMPREHEN METABOLIC PANEL: CPT

## 2025-04-29 PROCEDURE — 25000003 PHARM REV CODE 250: Performed by: INTERNAL MEDICINE

## 2025-04-29 RX ORDER — PROCHLORPERAZINE EDISYLATE 5 MG/ML
10 INJECTION INTRAMUSCULAR; INTRAVENOUS ONCE AS NEEDED
Status: DISCONTINUED | OUTPATIENT
Start: 2025-04-29 | End: 2025-04-29 | Stop reason: HOSPADM

## 2025-04-29 RX ORDER — DIPHENHYDRAMINE HYDROCHLORIDE 50 MG/ML
50 INJECTION, SOLUTION INTRAMUSCULAR; INTRAVENOUS ONCE AS NEEDED
Status: DISCONTINUED | OUTPATIENT
Start: 2025-04-29 | End: 2025-04-29 | Stop reason: HOSPADM

## 2025-04-29 RX ORDER — SODIUM CHLORIDE 0.9 % (FLUSH) 0.9 %
10 SYRINGE (ML) INJECTION
Status: DISCONTINUED | OUTPATIENT
Start: 2025-04-29 | End: 2025-04-29 | Stop reason: HOSPADM

## 2025-04-29 RX ORDER — HEPARIN 100 UNIT/ML
500 SYRINGE INTRAVENOUS
Status: DISCONTINUED | OUTPATIENT
Start: 2025-04-29 | End: 2025-04-29 | Stop reason: HOSPADM

## 2025-04-29 RX ORDER — EPINEPHRINE 1 MG/ML
0.3 INJECTION INTRAMUSCULAR; INTRAVENOUS; SUBCUTANEOUS ONCE AS NEEDED
Status: DISCONTINUED | OUTPATIENT
Start: 2025-04-29 | End: 2025-04-29 | Stop reason: HOSPADM

## 2025-04-29 RX ADMIN — HEPARIN 500 UNITS: 100 SYRINGE at 12:04

## 2025-04-29 RX ADMIN — Medication 10 ML: at 12:04

## 2025-04-29 RX ADMIN — TRASTUZUMAB-ANNS 653 MG: 420 INJECTION, POWDER, LYOPHILIZED, FOR SOLUTION INTRAVENOUS at 11:04

## 2025-04-29 RX ADMIN — SODIUM CHLORIDE: 9 INJECTION, SOLUTION INTRAVENOUS at 11:04

## 2025-04-29 NOTE — NURSING
1026  Pt did labs, saw Dr. Subramanian, and is here for C 10 kanjinti.  Pt denies any pain.  Pt reports constipation and fatigue.  1035  Negative UPT.

## 2025-04-29 NOTE — Clinical Note
Orders for 04/29/2025:  Continue Herceptin +Lupron +anastrazole +ribociclib Re-stage with contrast-enhanced CT scans of C/A/P end of July Need the report of bone scan  Follow-up with gyn for ovarian cancer surveillance because of BRCA1 mutation positive status  Check the status of GI referral for history of hematochezia Continue Xgeva  Calcium and vitamin-D supplements  DEXA scan in September 2026  Echocardiogram middle of June Follow-up with NP in 1 month

## 2025-04-29 NOTE — PROGRESS NOTES
History:  Past Medical History:   Diagnosis Date    BRCA1 gene mutation positive 09/09/2024    BRCA1 [id number] ([id number]) - [facility] BRCA1 and BRCA2 gene sequence and deletion/duplication and 85-gene CustomNext-Cancer Panel (85 genes), VUS in NF1    Cancer     Hypertension     Pregnancy      Past Surgical History:   Procedure Laterality Date    INSERTION OF TUNNELED CENTRAL VENOUS CATHETER (CVC) WITH SUBCUTANEOUS PORT N/A 08/14/2024    Procedure: NOFIAMXOI-WIBX-B-CATH;  Surgeon: Jc Chauhan Jr., MD;  Location: [facility];  Service: General;  Laterality: N/A;      Social History     Socioeconomic History    Marital status: Other   Tobacco Use    Smoking status: Never     Passive exposure: Never    Smokeless tobacco: Never   Substance and Sexual Activity    Alcohol use: Never    Drug use: Never    Sexual activity: Yes     Partners: Male     Social Drivers of Health     Financial Resource Strain: Low Risk  (11/2/2020)    [facility] [facility]    Overall Financial Resource Strain (CARDIA)     Difficulty of Paying Living Expenses: Not hard at all   Food Insecurity: No Food Insecurity (11/2/2020)    Received from [facility] [facility]    Hunger Vital Sign     Worried About Running Out of Food in the Last Year: Never true     Ran Out of Food in the Last Year: Never true   Transportation Needs: No Transportation Needs (11/2/2020)    Received from [facility] [facility] and [facility]    PRAPARE - Transportation     Lack of Transportation (Medical): No     Lack of Transportation (Non-Medical): No   Physical Activity: Inactive (11/2/2020)    Received from [facility] [facility]    Exercise Vital Sign     Days of Exercise per Week: 0 days     Minutes of Exercise per  Session: 0 min   Stress: No Stress Concern Present (11/2/2020)    Received from [facility]sidi[facility] and Affiliates    [place] - Occupational Stress Questionnaire     Feeling of Stress : Not at all      Family History   Problem Relation Name Age of Onset    Cervical cancer Mother Pao Zacarias 50    Hypertension Mother Pao Zacarias     Diabetes Father Robinson Bland     Hypertension Father Robinson Bland     Colon cancer Maternal Grandfather Robert Chang Jr 70    Cancer Maternal Grandfather Robert Chang Jr     Hypertension Paternal Grandmother Libertad Zacarias     Autism spectrum disorder Son Gilmar 3    Cervical cancer Other Conxavia 25    Kidney failure Other Robinson Jr     Cervical cancer Maternal Aunt Pao 50    Cervical cancer Maternal Aunt Yaima 44    Kidney failure Maternal Aunt Ghada 45    Cervical cancer Other      Stomach cancer Other          recurrence    Liver cancer Other Pedro Pablo 54    Hypertension Other [place]     Heart attack Paternal Uncle      BRCA1 Positive Paternal Cousin      Breast cancer Paternal Cousin  40    BRCA1 Positive Paternal Cousin      Breast cancer Paternal Cousin  42        BL mastect    Breast cancer Other      Hypertension Maternal Aunt Yaima Brown     Thyroid disease Maternal Aunt Yaima Brown     Cervical cancer Maternal Aunt Yaima Brown     Hypertension Maternal Aunt Mirta Brown     Hypertension Sister Evelio Brink     Breast cancer Maternal Aunt Anupama Damon         Breast Cancer    Cervical cancer Sister Victorina Barajas       Reason for Follow-up:  -bilateral breast cancer, diagnosed concurrently   -IDC right breast:  IDC, grade 2, lymph node biopsy negative, ER positive, UT positive, HER2 negative; S/P core biopsy 07/17/2024; cT1c cN0 MX, AJCC anatomic stage at least IA, clinical prognostic stage IA  -inflammatory carcinoma left breast, grade 3, lymph node biopsy positive, ER  positive, DC negative, HER2 positive; S/P core biopsy 07/16/2024; cT4d cN2 M1, stage IV  -Thoracolumbar vertebrae and sacrum metastatic involvement.   -FDG PET-CT for staging 09/30/2024: Sites of disease:  Multifocal, left breast; left axillary and subpectoral lymphadenopathy; small focus upper right breast; bilobar liver metastases; portal caval lymph node; multifocal bone metastases  -brain MRI 12/24/2024:  9 mm metastases anterior aspect of C4 vertebral body  -Monoallelic mutation of BRCA1 gene   -premenopausal   -family history of breast cancer, ovarian cancer, cervical cancer, stomach cancer, colon cancer    History of Present Illness:   Invasive ductal carcinoma of breast, right      Oncologic/Hematologic History:  Oncology History   Malignant neoplasm of overlapping sites of left breast in female, estrogen receptor positive   8/8/2024 Initial Diagnosis    Malignant neoplasm of overlapping sites of left breast in female, estrogen receptor positive     8/8/2024 Cancer Staged    Staging form: Breast, AJCC 8th Edition  - Clinical stage from 8/8/2024: Stage IV (cT4d, cN1(f), pM1, G3, ER+, DC-, HER2+)     Malignant neoplasm of overlapping sites of right breast in female, estrogen receptor positive   8/8/2024 Initial Diagnosis    Malignant neoplasm of overlapping sites of right breast in female, estrogen receptor positive     8/8/2024 Cancer Staged    Staging form: Breast, AJCC 8th Edition  - Clinical stage from 8/8/2024: Stage IIA (cT2, cN0(f), cM0, G2, ER+, DC-, HER2-)     Invasive ductal carcinoma of breast, right   7/17/2024 Cancer Staged    Staging form: Breast, AJCC 8th Edition  - Clinical stage from 7/17/2024: Stage IA (cT1c, cN0, cM0, G2, ER+, DC+, HER2-)     8/29/2024 Initial Diagnosis    Invasive ductal carcinoma of breast, right     Invasive ductal carcinoma of left breast   8/29/2024 Initial Diagnosis    Invasive ductal carcinoma of left breast     9/16/2024 Cancer Staged    Staging form: Breast, AJCC  8th Edition  - Clinical stage from 9/16/2024: Stage IV (cT4d, cN2, pM1, G3, ER+, MI-, HER2+)     10/8/2024 -  Chemotherapy    Treatment Summary   Plan Name: OP BREAST TRASTUZUMAB Q3W  Treatment Goal: Palliative  Status: Active  Start Date: 10/8/2024  End Date: 9/23/2025 (Planned)  Provider: Kevon Subramanian MD  Chemotherapy: trastuzumab-anns (KANJINTI) 871 mg in 0.9% NaCl 326 mL chemo infusion, 8 mg/kg = 871 mg, Intravenous, Clinic/HOD 1 time, 9 of 17 cycles  Administration: 871 mg (10/8/2024), 636 mg (10/29/2024), 653 mg (11/19/2024), 653 mg (12/10/2024), 653 mg (12/31/2024), 653 mg (1/28/2025), 653 mg (2/18/2025), 653 mg (3/18/2025), 653 mg (4/8/2025)     Past medical history: Hypertension  Surgical/procedure history:  MediPort placement 08/14/2024    Social history: .  Lives in [place].  Has a 3-year-old son.  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  Denies history of tobacco, alcohol, or illicit drug abuse.  Family history:   -Maternal great aunt # 1: Breast cancer, in her 50s   -sister: Ovarian cancer, age 50  -sister: Cervical cancer, age 25  -mother: Ovarian cancer, age 50  -Maternal aunt: Cervical cancer, age 50  -maternal grandfather: Stomach cancer, age 70 (smoker)  -maternal great aunt # 2: Colon cancer, age 60  Health maintenance: PCP at [facility].  No screening colonoscopy ever.  Menstrual/Ob gyn history: Menarche, age 11; 1 pregnancy, 1 child; gave birth to her child at age 30; no abortions or miscarriages premenopausal; OCP at age 16, received Depo shots from age [age]; no contraception from age [age] when she became pregnant; Mirena IUD after pregnancy at age 30; experienced hot flashes.  No menstrual cycles after Mirena was placed.      34-year-old lady, referred from [facility] surgery, with invasive ductal carcinoma of breast.  We are following her for metastatic breast cancer.    Please see assessment and plan section for details.    08/29/2024:   Pleasant lady who presents  for initial medical oncology consultation.  In no acute discomfort.  Hot flashes since May 2024.  Left breast is painful; gets sharp pains intermittently, 9/10 severity; also, pain in left arm but no swelling.  Pain is intermittent.  Left breast tumor is getting bigger.  No ulceration.  Since yesterday, 08/28/2024, has a experienced painless diarrhea with blood on toilet wipes as well as in toilet bowl; no weakness or dizziness; 5 loose stools yesterday, 2 loose stools today; no nausea, vomiting, hematemesis, or melena.  Never experienced GI bleeding before.  No unusual headaches, focal neurological symptoms, chest pain, cough, dyspnea, abdominal pain, nausea or vomiting.  No urinary problems.  No bone pains.  Appetite is okay.  No unintentional weight loss.    Interval History:  PROSTATE LEUPROLIDE (LUPRON) 3 MONTH   OP BREAST TRASTUZUMAB Q3W     04/29/2025:   -Mirena was removed 09/18/2024  -continues on Herceptin every 3 weeks  -Lupron 22.5 mg IM every 3 months (for OFS), started 02/18/2025  -continues on Xgeva 120 mg subcu every 4 weeks to prevent skeletal events from bone metastases  -03/03/2025:  Bilateral diagnostic mammogram: BI-RADS: 6-known biopsy-proven malignancy  -03/10/2025: Surveillance TTE:  LVEF 60%  -03/14/2025:  Bilateral breast MRI with and without contrast:  BI-RADS: 6-known biopsy-proven malignancy  -Arimidex/ribociclib started 03/19/2025  -04/28/2025: Restaging CTs C/A/P with contrast (comparison: 01/27/2025):  Left axillary lymph node and asymmetric areas of soft tissue in the left breast show decreased size since 01/27/2025.   No significant change in hypodense right hepatic lesion.   New areas of sclerosis in the axial and appendicular skeleton likely treated bone lesions.  -04/28/2025: Restaging whole-body nuclear medicine bone scan: Pending  -04/29/2025:  WBC 4.25, hemoglobin 11.4 stable, platelets normal, ANC 1.78 K, CMP reviewed, glucose 229, AST 47,  (just over> 2 X ULN),  bilirubin normal, alk-phos normal  Presents for a follow-up visit.  Compliant with Arimidex, Ki scaly, and calcium and vitamin-D supplements.  Denies any problem with chemotherapy.  No undue weakness, fatigue, malaise, any new lumps or lymphadenopathy, anorexia, unintentional weight loss, bone pains, unusual headaches, focal neurological symptoms, chest pain, cough, dyspnea, abdominal pain, nausea, vomiting, etc..  No jaundice.  ECOG 0.  No more episodes of hematochezia.  No allergic reaction with Herceptin.  No signs of congestive heart failure.  Latest echocardiogram showed adequate LVEF.  GI referral for evaluation of history of hematochezia, is pending.  No jaw pain with the Xgeva; no signs and symptoms of osteonecrosis.    Immunization History   Administered Date(s) Administered    Pneumococcal Conjugate - 20 Valent 09/19/2024     Review of patient's allergies indicates:  No Known Allergies    Medications:  Current Outpatient Medications on File Prior to Visit   Medication Sig Dispense Refill    anastrozole (ARIMIDEX) 1 mg Tab Take 1 tablet (1 mg total) by mouth once daily. 30 tablet 3    anastrozole (ARIMIDEX) 1 mg Tab Take 1 tablet (1 mg total) by mouth once daily. 30 tablet 2    HYDROcodone-acetaminophen (NORCO)  mg per tablet Take 1 tablet by mouth every 6 (six) hours as needed for Pain. 90 tablet 0    losartan-hydrochlorothiazide 100-25 mg (HYZAAR) 100-25 mg per tablet       ondansetron (ZOFRAN) 4 MG tablet Take 1 tablet (4 mg total) by mouth 2 (two) times daily. 10 tablet 1    potassium chloride SA (K-DUR,KLOR-CON) 20 MEQ tablet Take 1 tablet (20 mEq total) by mouth once daily. for 14 days 14 tablet 0    prochlorperazine (COMPAZINE) 5 MG tablet Take 2 tablets (10 mg total) by mouth every 6 (six) hours as needed (nausea). 40 tablet 11    ribociclib (KISQALI) 600 mg/day (200 mg x 3) Tab Take 600 mg by mouth once daily for 21 DAYS followed by a 7 day rest; to complete a 28 day treatment cycle. 63  tablet 2    venlafaxine (EFFEXOR XR) 37.5 MG 24 hr capsule Take 1 capsule (37.5 mg total) by mouth once daily. for 21 days 21 capsule 0     No current facility-administered medications on file prior to visit.     Review of Systems:   All systems reviewed and found to be negative except for the symptoms detailed above    Physical Examination:   VITAL SIGNS:   Vitals:    04/29/25 0932   BP: 120/78   Pulse: 86   Resp: 18   Temp: 98 °F (36.7 °C)       GENERAL:  In no apparent distress.    HEAD:  No signs of head trauma.  EYES:  Pupils are equal.  Extraocular motions intact.    EARS:  Hearing grossly intact.  MOUTH:  Oropharynx is normal.   NECK:  No adenopathy, no JVD.     CHEST:  Chest with clear breath sounds bilaterally.  No wheezes, rales, rhonchi.    CARDIAC:  Regular rate and rhythm.  S1 and S2, without murmurs, gallops, rubs.  VASCULAR:  No Edema.  Peripheral pulses normal and equal in all extremities.  ABDOMEN:  Soft, without detectable tenderness.  No sign of distention.  No   rebound or guarding, and no masses palpated.   Bowel Sounds normal.  MUSCULOSKELETAL:  Good range of motion of all major joints. Extremities without clubbing, cyanosis or edema.    NEUROLOGIC EXAM:  Alert and oriented x 3.  No focal sensory or strength deficits.   Speech normal.  Follows commands.  PSYCHIATRIC:  Mood normal.  08/29/2024:  Breast examination was performed with a verbal consent, in the presence of Zulma Woo LPN:  # right breast:  About 4 cm nontender mobile tumor palpable at 11:00 a.m. position in the right breast, several cm from nipple, without overlying skin ulceration/satellite nodules/fixation; no nipple discharge; no palpable regional lymphadenopathy   # left breast: Entire left breast is involved with the hard, nontender, freely mobile tumor; orange peel appearance of skin is noted with faint erythema; hard, immobile lymph node is palpable in the left axilla.  No swelling of left upper  extremity.      Assessment:  Problem List Items Addressed This Visit       Malignant neoplasm of overlapping sites of left breast in female, estrogen receptor positive    Overview   Characteristics of Inflammatory breast cancer  1. LEFT BREAST, 2 O'CLOCK, 10 CM FN: 2.0 cm  - INVASIVE DUCTAL CARCINOMA, GRADE 3.   Comment #1: Carcinoma is present on all three core biopsies, comprising    95% of the tissue and measuring at least 1.2 cm.   2. LEFT AXILLA:   - INVASIVE DUCTAL CARCINOMA, GRADE 3.   Comment #2: No lymph node is present. However, this could represent    metastatic carcinoma completely replacing a lymph node.   ER: POSITIVE, AR: NEGATIVE, HER2**: POSITIVE  3. LEFT BREAST, 1 O'CLOCK 12 CM FN - 2.3 cm         Metastasis to lymph nodes - Left axilla    Overview   1. LEFT BREAST, 2 O'CLOCK, 10 CM FN:   - INVASIVE DUCTAL CARCINOMA, GRADE 3.   Comment #1: Carcinoma is present on all three core biopsies, comprising    95% of the tissue and measuring at least 1.2 cm.   2. LEFT AXILLA:   - INVASIVE DUCTAL CARCINOMA, GRADE 3.   Comment #2: No lymph node is present. However, this could represent    metastatic carcinoma completely replacing a lymph node.   ER: POSITIVE, AR: NEGATIVE, HER2**: POSITIVE          Secondary malignant neoplasm of cervical vertebral column    Invasive ductal carcinoma of breast, right    Invasive ductal carcinoma of left breast    Secondary malignant neoplasm of axillary lymph nodes    Secondary malignancy of lumbar vertebral column    Secondary malignancy of sacrum    Family history of breast cancer    Family history of cervical cancer    Premenopausal patient - Primary    Family history of colon cancer    Family history of ovarian cancer    Family history of stomach cancer    Inflammatory carcinoma of left breast    Secondary malignancy of thoracic vertebral column    Monoallelic mutation of BRCA1 gene    Breast cancer metastasized to bone    Adenocarcinoma of breast metastatic to liver     BRCA1 gene mutation positive    Immunodeficiency due to chemotherapy    HER2-positive carcinoma of left breast    Carcinoma of breast, stage 4, estrogen receptor positive, left    Periportal lymphadenopathy     Orders for 04/29/2025:   Continue Herceptin +Lupron +anastrazole +ribociclib  Re-stage with contrast-enhanced CT scans of C/A/P end of July  Need the report of bone scan   Follow-up with gyn for ovarian cancer surveillance because of BRCA1 mutation positive status   Check the status of GI referral for history of hematochezia  Continue Xgeva   Calcium and vitamin-D supplements   DEXA scan in September 2026   Echocardiogram middle of June  Follow-up with NP in 1 month    Above discussed with her.  All questions answered.    Discussed labs and scans and gave her copies of relevant results.  Plan of management discussed once again.    She understands and agrees with this plan.  ====================================    # IDC both breasts, diagnosed concurrently:  #Inflammatory carcinoma left breast:  -presentation:  Pain and swelling left breast 05/2024  -Mirena IUD in place; removed 09/19/2024   -extensive family history of breast cancer and cervical cancer  -physical exam: Right breast: 3 cm mass 12 o'clock position, 4 in above nipple  -physical exam: Left breast: Swollen, hard, large mass to o'clock position, 1 hard immobile lymph node in left axilla, breast and axilla tender to palpation (inflammatory carcinoma of left breast)  -mammogram and ultrasound 07/01/2024  -multiple masses left breast on mammogram and ultrasound  -concurrently diagnosed bilateral breast cancer   -IDC right breast:  IDC, grade 2, lymph node biopsy negative, ER positive, IN positive, HER2 negative; cT1c cN0 MX, AJCC anatomic stage at least IA, clinical prognostic stage IA; S/P needle biopsy of right breast and right axilla (S/P core biopsy 07/17/2024)  -inflammatory carcinoma left breast, grade 3, lymph node biopsy positive, ER positive,  GA negative, HER2 positive; cT4d cN2 M1, stage IV; S/P needle biopsy left breast and left axilla (S/P core biopsy 07/16/2024) (By virtue of palpable, fixed lymph node in the left axilla, cN2)  -genetic testing 08/22/2024:  BRCA1 mutation positive, heterozygous  -premenopausal per labs 08/29/2024  -baseline TTE 09/05/2024:  LVEF 60-65%   -DEXA scan 09/16/2024:  Normal BMD of lumbar vertebrae end of femoral necks  -baseline staging CTs C/A/P 0 09/16/2024:  Multiple thoracolumbar vertebrae and sacral lytic metastases   -baseline staging whole-body nuclear medicine bone scan 09/16/2024:  Thoracolumbar vertebrae and sacrum metastases  -09/16/2024: Patient referred to IR for biopsy of suspicious bone lesions  -liquid biopsy 09/19/2024:  BRCA1 positive; HER2 positive;TMB could not be calculated due to insufficient circulating tumor DNA; MSI-high not detected  -09/19/2024: Baseline tumor markers: CEA 19.74, elevated; CA 27.29, CA 15-3 levels normal  -brain MRI for staging 09/23/2024: No intracranial metastases  -FDG PET-CT for staging 09/30/2024: Sites of disease:  Multifocal, left breast; left axillary and subpectoral lymphadenopathy; small focus upper right breast; bilobar liver metastases; portal caval lymph node; multifocal bone metastases  -no visceral metastases  -tamoxifen plus trastuzumab started 10/08/2024  -CT-guided biopsy left iliac crest 10/17/2024:  Metastases from breast carcinoma  -germline testing: BRCA1 mutation positive  -tissue NGS testing (left breast, collected 07/16/2024):  BRCA1 mutation positive; HER2 positive; MSI stable; TMB 2.1 m/MB (low); fusion negative; PD-L1 negative (PD-L1 CPS 2; PD-L1 TPS 1%); PIK3CA negative; ESR1 negative  -cycle 2 of trastuzumab on 10/29/2024  -Mirena IUD removed 09/18/2024   -surveillance TTE 12/19/2024: LVEF 67%  -pelvic ultrasound 12/23/2024:  Bilateral ovarian cysts, largest 1.5 cm  -brain MRI 12/24/2024: Headaches:  9 mm ovoid metastases anterior aspect of C4  vertebral body  -dental clearance obtained 11/29/2024 (dated 11/26/2024)  -Xgeva for bone metastases, 120 mg subQ every 4 weeks, started 12/31/2024  -01/13/2025:  Gyn consultation/follow-up:  Patient would like to proceed with ParaGard IUD insertion (nonhormonal contraception); annual transvaginal ultrasound and CA-125 level for surveillance in view of BRCA1 mutation status until risk reduction HUA-BSO recommended at age 35-40 or when done with childbearing; given stage IV breast cancer, plan to delay surgery until further prognosis can be made  >>>  # Disease progression on tamoxifen/Herceptin:  -restaging CTs and bone scan 01/27/2025:  Progression of metastases  (Failed tamoxifen +Herceptin)  -new Plan:  Continue Herceptin +start OFS with Lupron every 3 months + continue tamoxifen for a couple of months with Lupron, then switch to Arimidex/ribociclib  -no visceral metastases, therefore, planned Herceptin +second-line endocrine therapy  -Lupron 22.5 mg IM every 3 months (for OFS), started 02/18/2025  -bilateral diagnostic mammogram 03/03/2025 (will discontinue)  -bilateral breast MRI 03/14/2025 (will discontinue)  -surveillance TTE 03/10/2025: LVEF 60%  -Arimidex/ribociclib started 03/19/2025  -restaging CTs and bone scan 04/28/2025:  Some improvement on CTs; bone scan pending  >>>  Plan:  -Continue Herceptin + Lupron every 12 weeks + anastrazole +ribociclib  -Lupron started 02/18/2025  -Arimidex/ribociclib started 03/19/2025  -possible positive response on restaging CTs 04/28/2025  >>>  re-stage with CTs C/A/P with contrast in 3 months (end of July)  Restaging bone scan is pending  ER positive, MT negative, HER2 positive  Follow-up with gyn for ovarian cancer surveillance (BRCA1 mutation positive)  GI referral for hematochezia  Continue Xgeva   Calcium and vitamin-D supplements to continue  DEXA scan in 2 years (September 2026)  Echocardiogram in 3 months (mid June)    If, at anytime, develops visceral crisis or  endocrine refractory disease, then, treatment options:  # first-line:    Pertuzumab +trastuzumab +docetaxel (category 1, Preferred);   pertuzumab +trastuzumab +paclitaxel (Preferred)  (after response, maintenance trastuzumab/pertuzumab + concurrent endocrine therapy)   # second-line:  -Fam trastuzumab deruxtecan (category 1, Preferred)  -tucatinib +trastuzumab +capecitabine is preferred in patients with both systemic and CNS progression in the 3rd line setting and beyond; it may also be given in the second-line setting, etc.    Palliative systemic therapy options:  -systemic therapy +HER2 targeted therapy; or  -endocrine therapy +/-HER2 targeted therapy +ovarian suppression in premenopausal patient  -for premenopausal patients, tamoxifen alone (without ovarian ablation/suppression) + HER2 targeted therapy is also an option  >>>  -no visceral crisis, therefore, we decided to treat with tamoxifen + trastuzumab as first-line palliative systemic therapy    Herceptin:  -watch for cardiomyopathy, infusion reactions and pulmonary toxicity  -adverse reactions:> 10%:  Decreased LVEF, skin rash, abdominal pain, anorexia, infusion related reactions, asthenia, chills, back pain, dyspnea, etc.  -warnings/precautions:  Cardiomyopathy; infusion reactions; pulmonary toxicity; renal toxicity  -monitoring: Assess left ventricular ejection fraction (by ECG or MUGA scan) at baseline (immediately prior to trastuzumab initiation), every 3 months during trastuzumab therapy, every 4 weeks if trastuzumab is withheld for significant left ventricular cardiac dysfunction, and every 6 months for at least 2 years following completion of adjuvant trastuzumab therapy. Monitor vital signs during infusion; monitor for hypersensitivity or infusion reaction; if a reaction occurs, monitor carefully until symptoms resolve completely.   Monitor for signs/symptoms of cardiac dysfunction or pulmonary toxicity. If pregnancy inadvertently occurs during  treatment, monitor amniotic fluid volume.    Monitoring with ribociclib:  -CBC: Baseline, every 2 weeks for first 2 cycles, at the beginning of the subsequent 4 cycles, and as clinically necessary  LFTs: Baseline, every 2 weeks for the first 2 cycles, at the beginning of the subsequent 4 cycles, and as clinically necessary  -Electrolytes: Prior to treatment, at the beginning of first 6 cycles, and as needed indicated  -EKG: Prior to treatment initiation, repeat on day 14 of cycle 1, at the beginning of cycle 2, and last week indicated    Anastrozole:   1 mg p.o. q.day    Ribociclib:  600 mg p.o. daily for 21 days, followed by a 7 day rest to complete a 28 day treatment cycle; continue until disease progression or unacceptable toxicity  With AI: 600 mg daily for 21 days, followed by 7-day rest period to complete a 28-day treatment cycle; continue until disease progression or unacceptable toxicity  -Dose reduction depending upon kidney impairment  -Dose reduction depending upon moderate or severe liver impairment  -Dose reductions: 600 mg daily, 400 mg daily, 200 mg daily  -Dose management depending upon toxicities: Hematologic toxicity (neutropenia), cardiovascular toxicity (QT prolonging agent), dermatologic toxicity, pulmonary toxicity  Dosage forms: 200 mg, 400 mg, 600 mg  Administration: With or without food  Moderate or high emetic potential  Warnings/precautions with ribociclib:  -Bone marrow suppression: Neutropenia  -Dermatologic toxicity  -Hepatobiliary toxicity  -QT prolongation  -Pulmonary toxicity  Adverse reactions with ribociclib:  Peripheral edema, alopecia, pruritus, skin rash, abdominal pain, constipation, anorexia, UTIs, anemia, leukopenia, neutropenia, increased ALT, increased AST, etc.    Fertility and birth control issues:  Discussion about fertility and contraception:  -Informed her about the potential impact of chemotherapy on fertility  -Made her aware of the option of referral to fertility  specialist before chemotherapy and/or endocrine therapy to discuss options in case she desires future pregnancies.  Fertility preservation options include oocyte and embryo cryopreservation, etc.  -Amenorrhea frequently occurs during or after chemotherapy.  The majority of women younger than 35 years to resume menses within 2 years of finishing adjuvant chemotherapy  -Informed that menses and fertility are not necessarily linked. Absence of regular menses does not necessarily imply lack of fertility.  Conversely, the presence of menses does not guarantee fertility.  -There are limited data regarding continued fertility after chemotherapy.  -Cautioned her to avoid becoming pregnant during treatment with radiation therapy, chemotherapy, or endocrine therapy  -Hormone-based birth control is discouraged regardless of the harmless of the hormone receptor status of the patient's cancer  -Alternative methods of birth control, including IUD, barrier method, tubal ligation, vasectomy for the partner, etc.   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, HER2 positive, cT4d cN2 M1, stage IV  -FDG PET-CT for staging 09/30/2024: Sites of disease:  Multifocal, left breast; left axillary and subpectoral lymphadenopathy; small focus upper right breast; bilobar liver metastases; portal caval lymph node; multifocal bone metastases  -brain MRI 12/24/2024:  9 mm metastases anterior aspect of C4 vertebral body (no brain metastases)    # Molecular markers:  -liquid biopsy:  BRCA1 positive; HER2  positive;TMB could not be calculated due to insufficient circulating tumor DNA; MSI-high not detected  -CT-guided biopsy left iliac crest 10/17/2024:  Metastases from breast carcinoma  -germline testing: BRCA1 mutation positive  -tissue NGS testing (left breast, collected 07/16/2024):  BRCA1 mutation positive; HER2 positive; MSI stable; TMB 2.1 m/MB (low); fusion negative; PD-L1 negative (PD-L1 CPS 2; PD-L1 TPS 1%); PIK3CA negative; ESR1 negative  -genetic testing 08/22/2024:  BRCA1 mutation positive, heterozygous    # Hematochezia:   Since yesterday, 08/28/2024, has a experienced painless diarrhea with blood on toilet wipes as well as in toilet bowl; no weakness or dizziness; 5 loose stools yesterday, 2 loose stools today; no nausea, vomiting, hematemesis, or melena.  Never experienced GI bleeding before.  >>>   -08/29/2024: sent an urgent referral to GI for evaluation    # Family history of breast cancer, ovarian cancer, cervical cancer, stomach cancer, colon cancer:  -Maternal great aunt # 1: Breast cancer, in her 50s   -sister: Ovarian cancer, age 50  -sister: Cervical cancer, age 25  -mother: Ovarian cancer, age 50  -Maternal aunt: Cervical cancer, age 50  -maternal grandfather: Stomach cancer, age 70 (smoker)  -maternal great aunt # 2: Colon cancer, age 60  >>>  -genetic testing 08/22/2024:  BRCA1 mutation positive, heterozygous      Follow-up:  No follow-ups on file.  Answers submitted by the patient for this visit:  Review of Systems Questionnaire (Submitted on 4/23/2025)  appetite change : No  unexpected weight change: No  mouth sores: No  visual disturbance: No  cough: No  shortness of breath: No  chest pain: No  abdominal pain: No  diarrhea: No  frequency: No  back pain: No  rash: No  headaches: No  adenopathy: No  nervous/ anxious: No

## 2025-04-30 NOTE — PROGRESS NOTES
No progression of bone metastases.  ------------------------------------------------      Impression:     There is similar radiotracer uptake within the spine, ribs, and pelvis compared to the prior study from 01/27/2025        Electronically signed by:Manjinder Bolivar MD  Date:                                            04/29/2025  Time:                                           16:57

## 2025-05-04 ENCOUNTER — PATIENT MESSAGE (OUTPATIENT)
Dept: ADMINISTRATIVE | Facility: OTHER | Age: 35
End: 2025-05-04
Payer: MEDICAID

## 2025-05-07 DIAGNOSIS — C78.7 ADENOCARCINOMA OF BREAST METASTATIC TO LIVER, UNSPECIFIED LATERALITY: Primary | ICD-10-CM

## 2025-05-07 DIAGNOSIS — C50.919 ADENOCARCINOMA OF BREAST METASTATIC TO LIVER, UNSPECIFIED LATERALITY: Primary | ICD-10-CM

## 2025-05-13 ENCOUNTER — INFUSION (OUTPATIENT)
Dept: INFUSION THERAPY | Facility: HOSPITAL | Age: 35
End: 2025-05-13
Attending: INTERNAL MEDICINE
Payer: MEDICAID

## 2025-05-13 ENCOUNTER — OFFICE VISIT (OUTPATIENT)
Dept: HEMATOLOGY/ONCOLOGY | Facility: CLINIC | Age: 35
End: 2025-05-13
Payer: MEDICAID

## 2025-05-13 ENCOUNTER — LAB VISIT (OUTPATIENT)
Dept: HEMATOLOGY/ONCOLOGY | Facility: CLINIC | Age: 35
End: 2025-05-13
Payer: MEDICAID

## 2025-05-13 VITALS
TEMPERATURE: 98 F | BODY MASS INDEX: 37.57 KG/M2 | OXYGEN SATURATION: 99 % | HEART RATE: 66 BPM | DIASTOLIC BLOOD PRESSURE: 103 MMHG | HEIGHT: 66 IN | WEIGHT: 233.81 LBS | SYSTOLIC BLOOD PRESSURE: 136 MMHG | RESPIRATION RATE: 18 BRPM

## 2025-05-13 VITALS
HEIGHT: 66 IN | WEIGHT: 233.81 LBS | OXYGEN SATURATION: 99 % | BODY MASS INDEX: 37.57 KG/M2 | RESPIRATION RATE: 18 BRPM | HEART RATE: 82 BPM | SYSTOLIC BLOOD PRESSURE: 124 MMHG | TEMPERATURE: 98 F | DIASTOLIC BLOOD PRESSURE: 83 MMHG

## 2025-05-13 DIAGNOSIS — Z15.01 BRCA1 GENE MUTATION POSITIVE: ICD-10-CM

## 2025-05-13 DIAGNOSIS — C50.919 CARCINOMA OF BREAST METASTATIC TO BONE, UNSPECIFIED LATERALITY: ICD-10-CM

## 2025-05-13 DIAGNOSIS — C50.919 ADENOCARCINOMA OF BREAST METASTATIC TO LIVER, UNSPECIFIED LATERALITY: ICD-10-CM

## 2025-05-13 DIAGNOSIS — C78.7 ADENOCARCINOMA OF BREAST METASTATIC TO LIVER, UNSPECIFIED LATERALITY: ICD-10-CM

## 2025-05-13 DIAGNOSIS — T45.1X5A IMMUNODEFICIENCY DUE TO CHEMOTHERAPY: ICD-10-CM

## 2025-05-13 DIAGNOSIS — Z79.811 LONG TERM CURRENT USE OF AROMATASE INHIBITOR: ICD-10-CM

## 2025-05-13 DIAGNOSIS — Z79.69 IMMUNODEFICIENCY DUE TO CHEMOTHERAPY: ICD-10-CM

## 2025-05-13 DIAGNOSIS — R23.2 HOT FLASHES RELATED TO AROMATASE INHIBITOR THERAPY: ICD-10-CM

## 2025-05-13 DIAGNOSIS — C50.919 ADENOCARCINOMA OF BREAST METASTATIC TO LIVER, UNSPECIFIED LATERALITY: Primary | ICD-10-CM

## 2025-05-13 DIAGNOSIS — E87.6 HYPOKALEMIA: Primary | ICD-10-CM

## 2025-05-13 DIAGNOSIS — C79.51 CARCINOMA OF BREAST METASTATIC TO BONE, UNSPECIFIED LATERALITY: ICD-10-CM

## 2025-05-13 DIAGNOSIS — Z15.09 BRCA1 GENE MUTATION POSITIVE: ICD-10-CM

## 2025-05-13 DIAGNOSIS — D84.821 IMMUNODEFICIENCY DUE TO CHEMOTHERAPY: ICD-10-CM

## 2025-05-13 DIAGNOSIS — C77.3 MALIGNANT NEOPLASM METASTATIC TO LYMPH NODE OF AXILLA: ICD-10-CM

## 2025-05-13 DIAGNOSIS — C50.912 INVASIVE DUCTAL CARCINOMA OF LEFT BREAST: ICD-10-CM

## 2025-05-13 DIAGNOSIS — C79.51 SECONDARY MALIGNANCY OF SACRUM: ICD-10-CM

## 2025-05-13 DIAGNOSIS — C79.51: ICD-10-CM

## 2025-05-13 DIAGNOSIS — C79.51 SECONDARY MALIGNANCY OF THORACIC VERTEBRAL COLUMN: ICD-10-CM

## 2025-05-13 DIAGNOSIS — C78.7 ADENOCARCINOMA OF BREAST METASTATIC TO LIVER, UNSPECIFIED LATERALITY: Primary | ICD-10-CM

## 2025-05-13 DIAGNOSIS — C79.51 SECONDARY MALIGNANCY OF LUMBAR VERTEBRAL COLUMN: ICD-10-CM

## 2025-05-13 DIAGNOSIS — T45.1X5A HOT FLASHES RELATED TO AROMATASE INHIBITOR THERAPY: ICD-10-CM

## 2025-05-13 DIAGNOSIS — C50.911 INVASIVE DUCTAL CARCINOMA OF BREAST, RIGHT: ICD-10-CM

## 2025-05-13 LAB
ALBUMIN SERPL-MCNC: 3.4 G/DL (ref 3.5–5)
ALBUMIN/GLOB SERPL: 0.7 RATIO (ref 1.1–2)
ALP SERPL-CCNC: 54 UNIT/L (ref 40–150)
ALT SERPL-CCNC: 52 UNIT/L (ref 0–55)
ANION GAP SERPL CALC-SCNC: 6 MEQ/L
AST SERPL-CCNC: 24 UNIT/L (ref 11–45)
BASOPHILS # BLD AUTO: 0.1 X10(3)/MCL
BASOPHILS NFR BLD AUTO: 1.8 %
BILIRUB SERPL-MCNC: 0.2 MG/DL
BUN SERPL-MCNC: 16.2 MG/DL (ref 7–18.7)
CALCIUM SERPL-MCNC: 9.3 MG/DL (ref 8.4–10.2)
CHLORIDE SERPL-SCNC: 104 MMOL/L (ref 98–107)
CO2 SERPL-SCNC: 30 MMOL/L (ref 22–29)
CREAT SERPL-MCNC: 0.91 MG/DL (ref 0.55–1.02)
CREAT/UREA NIT SERPL: 18
EOSINOPHIL # BLD AUTO: 0.77 X10(3)/MCL (ref 0–0.9)
EOSINOPHIL NFR BLD AUTO: 14.1 %
ERYTHROCYTE [DISTWIDTH] IN BLOOD BY AUTOMATED COUNT: 14.4 % (ref 11.5–17)
GFR SERPLBLD CREATININE-BSD FMLA CKD-EPI: >60 ML/MIN/1.73/M2
GLOBULIN SER-MCNC: 4.6 GM/DL (ref 2.4–3.5)
GLUCOSE SERPL-MCNC: 151 MG/DL (ref 74–100)
HCT VFR BLD AUTO: 35.4 % (ref 37–47)
HGB BLD-MCNC: 11.6 G/DL (ref 12–16)
IMM GRANULOCYTES # BLD AUTO: 0.01 X10(3)/MCL (ref 0–0.04)
IMM GRANULOCYTES NFR BLD AUTO: 0.2 %
LYMPHOCYTES # BLD AUTO: 3.04 X10(3)/MCL (ref 0.6–4.6)
LYMPHOCYTES NFR BLD AUTO: 55.6 %
MAGNESIUM SERPL-MCNC: 1.9 MG/DL (ref 1.6–2.6)
MCH RBC QN AUTO: 34 PG (ref 27–31)
MCHC RBC AUTO-ENTMCNC: 32.8 G/DL (ref 33–36)
MCV RBC AUTO: 103.8 FL (ref 80–94)
MONOCYTES # BLD AUTO: 0.55 X10(3)/MCL (ref 0.1–1.3)
MONOCYTES NFR BLD AUTO: 10.1 %
NEUTROPHILS # BLD AUTO: 1 X10(3)/MCL (ref 2.1–9.2)
NEUTROPHILS NFR BLD AUTO: 18.2 %
NRBC BLD AUTO-RTO: 0 %
PLATELET # BLD AUTO: 233 X10(3)/MCL (ref 130–400)
PMV BLD AUTO: 8.8 FL (ref 7.4–10.4)
POTASSIUM SERPL-SCNC: 3.4 MMOL/L (ref 3.5–5.1)
PROT SERPL-MCNC: 8 GM/DL (ref 6.4–8.3)
RBC # BLD AUTO: 3.41 X10(6)/MCL (ref 4.2–5.4)
SODIUM SERPL-SCNC: 140 MMOL/L (ref 136–145)
WBC # BLD AUTO: 5.47 X10(3)/MCL (ref 4.5–11.5)

## 2025-05-13 PROCEDURE — 3074F SYST BP LT 130 MM HG: CPT | Mod: CPTII,,,

## 2025-05-13 PROCEDURE — 96402 CHEMO HORMON ANTINEOPL SQ/IM: CPT

## 2025-05-13 PROCEDURE — 85025 COMPLETE CBC W/AUTO DIFF WBC: CPT

## 2025-05-13 PROCEDURE — 99214 OFFICE O/P EST MOD 30 MIN: CPT | Mod: PBBFAC,25

## 2025-05-13 PROCEDURE — 63600175 PHARM REV CODE 636 W HCPCS: Mod: JZ,TB | Performed by: INTERNAL MEDICINE

## 2025-05-13 PROCEDURE — 3008F BODY MASS INDEX DOCD: CPT | Mod: CPTII,,,

## 2025-05-13 PROCEDURE — 3079F DIAST BP 80-89 MM HG: CPT | Mod: CPTII,,,

## 2025-05-13 PROCEDURE — 83735 ASSAY OF MAGNESIUM: CPT

## 2025-05-13 PROCEDURE — 99215 OFFICE O/P EST HI 40 MIN: CPT | Mod: S$PBB,,,

## 2025-05-13 PROCEDURE — 80053 COMPREHEN METABOLIC PANEL: CPT

## 2025-05-13 RX ORDER — POTASSIUM CHLORIDE 20 MEQ/1
20 TABLET, EXTENDED RELEASE ORAL DAILY
Qty: 30 TABLET | Refills: 0 | Status: SHIPPED | OUTPATIENT
Start: 2025-05-13 | End: 2025-06-12

## 2025-05-13 RX ADMIN — LEUPROLIDE ACETATE 22.5 MG: KIT at 01:05

## 2025-05-13 NOTE — PROGRESS NOTES
[facility] Hematology/Oncology  PROGRESS NOTE    Subjective:       Patient ID: Jeannie Greene is a 34 y.o. female.    Diagnosis:   -IDC right breast:  IDC, grade 2, lymph node biopsy negative, ER positive, ME positive, HER2 negative; S/P core biopsy 07/17/2024; cT1c cN0 MX, AJCC anatomic stage at least IA, clinical prognostic stage IA  -inflammatory carcinoma left breast, grade 3, lymph node biopsy positive, ER positive, ME negative, HER2 positive;   cT4d cN2 M1, stage IV; S/P core biopsy 07/16/2024  -Thoracolumbar vertebrae and sacrum metastatic involvement.   -FDG PET-CT for staging 09/30/2024: Sites of disease:  Multifocal, left breast; left axillary and subpectoral lymphadenopathy; small focus upper right breast; bilobar liver metastases; portal caval lymph node; multifocal bone metastases  -Monoallelic mutation of BRCA1 gene   -premenopausal   -family history of breast cancer, ovarian cancer, cervical cancer, stomach cancer, colon cancer    Current Treatment:  -trastuzumab 8 mg/kg IV day 1 cycle 1; followed by 6 mg/kg IV day 1 beginning with cycle 2  start 10/8/24     xgeva 120mg SQ every 28 days   start 12/31/24     Lupron 22.5 mg IM every 12 weeks started on 2/18/2025    Arimidex and Ribociclib started 03/19/2025    Treatment History:  -MediPort placed 08/14/2024   -Mirena removed on 9/18/24    Tamoxifen stopped 03/18/2025    Chief Complaint: malignant neoplasm of overlapping sites of left breast (C/o heartburn, fatigue, hotflashes, constipation)    HPI:  34-year-old lady, referred from [facility] surgery, with invasive ductal carcinoma of breast.  We are following her for metastatic breast cancer.    Please see assessment and plan section for details.     08/29/2024:   Pleasant lady who presents for initial medical oncology consultation.  In no acute discomfort.  Hot flashes since May 2024.  Left breast is painful; gets sharp pains intermittently, 9/10 severity; also, pain in left arm but no swelling.  Pain is  intermittent.  Left breast tumor is getting bigger.  No ulceration.  Since yesterday, 08/28/2024, has a experienced painless diarrhea with blood on toilet wipes as well as in toilet bowl; no weakness or dizziness; 5 loose stools yesterday, 2 loose stools today; no nausea, vomiting, hematemesis, or melena.  Never experienced GI bleeding before.  No unusual headaches, focal neurological symptoms, chest pain, cough, dyspnea, abdominal pain, nausea or vomiting.  No urinary problems.  No bone pains.  Appetite is okay.  No unintentional weight loss.    08/09/2024:  [facility] surgery Office note:   CC: b/l breast IDC     HPI: Jeannie Greene is a 34 y.o. female with PMHx of HTN, R breast IDC Grade 2, and L breast/axillary ICD Grade 3 presents to clinic today with L breast and arm pain.   She first noticed the pain and swelling in L breast in May where she presented to the ED and received antibiotics. Pain did not resolve so patient followed up with her OB/Gyn who ordered the imaging and biopsy and was subsequently referred to our clinic. Today, patient reports swelling and pain in left breast, axilla, and down her arm. No pain in right breast, but has had some milky discharge in the past from R nipple. She states her pain as a 6/10 that was at its worse in July and has improved some since. She takes ibuprofen 4x daily without relief.    Patient had first menarche at 11. One pregnancy with child and not gone through menopause. She was on OCP at 17yo, received Depo shot from [age]. No contraception from [age] when she got pregnant. Got Murina IUD after pregnancy at 30  that is still in place.  Pt has an extensive family history of breast and cervical cancer. Her mother and sister had cervical cancer. Her mother had a hysterectomy. She also believes her maternal aunt had breast cancer. She only takes losartan for HTN. No prior surgeries.  Physical exam:   # right breast: 3 cm mass 12 o'clock, 4 in above nipple, no skin changes,  no nipple retraction or discharge, no palpable right axillary lymphadenopathy  # left breast: Swollen and hard; large mass appreciated two o'clock position right above breast, 1 hard immobile lymph node in left axilla, breast and axilla tender to palpation (inflammatory carcinoma left breast)    Past medical history: Hypertension  Surgical/procedure history:  MediPort placement 08/14/2024    Social history: .  Lives in [place].  Has a 3-year-old son.  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  Denies history of tobacco, alcohol, or illicit drug abuse.  Family history:   -Maternal great aunt # 1: Breast cancer, in her 50s   -sister: Ovarian cancer, age 50  -sister: Cervical cancer, age 25  -mother: Ovarian cancer, age 50  -Maternal aunt: Cervical cancer, age 50  -maternal grandfather: Stomach cancer, age 70 (smoker)  -maternal great aunt # 2: Colon cancer, age 60  Health maintenance: PCP at [facility].  No screening colonoscopy ever.  Menstrual/Ob gyn history: Menarche, age 11; 1 pregnancy, 1 child; gave birth to her child at age 30; no abortions or miscarriages premenopausal; OCP at age 16, received Depo shots from age [age]; no contraception from age [age] when she became pregnant; Mirena IUD after pregnancy at age 30; experienced hot flashes.  No menstrual cycles after Mirena was placed.        Interval History:  Patient presents to clinic for follow-up and . She continues on arimidex daily and ribociclib 600 mg on days 1-21. New cycle of ribociclib to start on 4/16/2025 until 05/06/2025. She does report hot flashes that have gotten worse. She is also reporting fatigue while on Ribociclib. Denies any joint stiffness, mood changes, vaginal dryness, chest pain, shortness of breath.  She is also taking calcium and vitamin-D daily.  She is due for Xgeva on 4/23/2025.  She received Lupron 22.5 mg on 2/18/2025, next due 05/13/2025 .She continues on Norco 10 mg every 6 hours as needed for pain.   Echo on 03/10/2025 with ejection fraction of 60%. Labs reviewed in detail with patient and we discussed plan of care, she verbalized understanding    PMX/PSHx  Past Medical History:   Diagnosis Date    BRCA1 gene mutation positive 09/09/2024    BRCA1 [id number] ([id number]) - [facility] BRCA1 and BRCA2 gene sequence and deletion/duplication and 85-gene CustomNext-Cancer Panel (85 genes), VUS in NF1    Cancer     Hypertension     Pregnancy       Past Surgical History:   Procedure Laterality Date    INSERTION OF TUNNELED CENTRAL VENOUS CATHETER (CVC) WITH SUBCUTANEOUS PORT N/A 08/14/2024    Procedure: [id number];  Surgeon: Jc Chauhan Jr., MD;  Location: [facility];  Service: General;  Laterality: N/A;     Allergies:  Review of patient's allergies indicates:  No Known Allergies   Social History:   Social History[1]  Medications:  Current Outpatient Medications   Medication Instructions    anastrozole (ARIMIDEX) 1 mg, Oral, Daily    anastrozole (ARIMIDEX) 1 mg, Oral, Daily    HYDROcodone-acetaminophen (NORCO)  mg per tablet 1 tablet, Oral, Every 6 hours PRN    KISQALI 600 mg, Oral, Daily, for 21 DAYS followed by a 7 day rest; to complete a 28 day treatment cycle    losartan-hydrochlorothiazide 100-25 mg (HYZAAR) 100-25 mg per tablet     ondansetron (ZOFRAN) 4 mg, Oral, 2 times daily    potassium chloride SA (K-DUR,KLOR-CON) 20 MEQ tablet 20 mEq, Oral, Daily    prochlorperazine (COMPAZINE) 10 mg, Oral, Every 6 hours PRN    venlafaxine (EFFEXOR XR) 37.5 mg, Oral, Daily     ROS:  Review of Systems   Constitutional:  Positive for fatigue. Negative for appetite change, chills, fever and unexpected weight change.   HENT:  Negative for facial swelling, mouth sores, nosebleeds, sinus pressure/congestion and sore throat.    Eyes:  Negative for photophobia, pain and visual disturbance.   Respiratory:  Negative for cough, chest tightness, shortness of breath and wheezing.    Cardiovascular:  Negative for  chest pain, palpitations and leg swelling.   Gastrointestinal:  Negative for abdominal pain, blood in stool, change in bowel habit, constipation, diarrhea, nausea and vomiting.   Genitourinary:  Positive for hot flashes. Negative for dysuria, frequency, hematuria, pelvic pain, urgency and vaginal pain.   Musculoskeletal:  Negative for arthralgias, back pain, gait problem, joint swelling and myalgias.   Integumentary:  Negative for pallor, rash, wound, breast mass, breast discharge and breast tenderness.   Allergic/Immunologic: Negative.  Negative for immunocompromised state.   Neurological:  Negative for dizziness, vertigo, syncope, weakness, numbness and headaches.   Hematological:  Negative for adenopathy. Does not bruise/bleed easily.   Psychiatric/Behavioral:  Negative for behavioral problems and dysphoric mood. The patient is not nervous/anxious.    All other systems reviewed and are negative.  Breast: Negative for mass and tenderness      Objective:   Vitals:  Vitals:    05/13/25 1318   BP: 124/83   Pulse: 82   Resp: 18   Temp: 98.1 °F (36.7 °C)        Wt Readings from Last 3 Encounters:   05/13/25 1349 106.1 kg (233 lb 12.8 oz)   05/13/25 1318 106.1 kg (233 lb 12.8 oz)   04/29/25 1026 105.6 kg (232 lb 12.9 oz)      Physical Examination:  Physical Exam  Vitals and nursing note reviewed.   HENT:      Head: Normocephalic and atraumatic.      Mouth/Throat:      Mouth: Mucous membranes are moist.      Pharynx: Oropharynx is clear.   Eyes:      Extraocular Movements: Extraocular movements intact.      Conjunctiva/sclera: Conjunctivae normal.      Pupils: Pupils are equal, round, and reactive to light.   Cardiovascular:      Rate and Rhythm: Normal rate and regular rhythm.   Pulmonary:      Effort: Pulmonary effort is normal.      Breath sounds: Normal breath sounds.   Abdominal:      General: Abdomen is flat. Bowel sounds are normal.      Palpations: Abdomen is soft.   Musculoskeletal:         General: Normal range  of motion.      Cervical back: Normal range of motion and neck supple.   Skin:     General: Skin is warm and dry.   Neurological:      General: No focal deficit present.      Mental Status: She is alert.   Psychiatric:         Mood and Affect: Mood normal.           5/13/2025   [facility] ENDOCRINE THERAPY   I have hot flashes/hot flushes 3   I have vaginal discharge 0   I have vaginal itching/irritation 1   I have vaginal bleeding or spotting 0   I have vaginal dryness 1   I have pain or discomfort with intercourse 0   I have lost interest in sex 0   I have gained weight 1   I have mood swings 3   I am irritable 0   I have pain in my joints 0       ECOG SCORE           Labs:     Lab Visit on 05/13/2025   Component Date Value Ref Range Status    Sodium 05/13/2025 140  136 - 145 mmol/L Final    Potassium 05/13/2025 3.4 (L)  3.5 - 5.1 mmol/L Final    Chloride 05/13/2025 104  98 - 107 mmol/L Final    CO2 05/13/2025 30 (H)  22 - 29 mmol/L Final    Glucose 05/13/2025 151 (H)  74 - 100 mg/dL Final    Blood Urea Nitrogen 05/13/2025 16.2  7.0 - 18.7 mg/dL Final    Creatinine 05/13/2025 0.91  0.55 - 1.02 mg/dL Final    Calcium 05/13/2025 9.3  8.4 - 10.2 mg/dL Final    Protein Total 05/13/2025 8.0  6.4 - 8.3 gm/dL Final    Albumin 05/13/2025 3.4 (L)  3.5 - 5.0 g/dL Final    Globulin 05/13/2025 4.6 (H)  2.4 - 3.5 gm/dL Final    Albumin/Globulin Ratio 05/13/2025 0.7 (L)  1.1 - 2.0 ratio Final    Bilirubin Total 05/13/2025 0.2  <=1.5 mg/dL Final    ALP 05/13/2025 54  40 - 150 unit/L Final    ALT 05/13/2025 52  0 - 55 unit/L Final    AST 05/13/2025 24  11 - 45 unit/L Final    eGFR 05/13/2025 >60  mL/min/1.73/m2 Final    Estimated GFR calculated using the CKD-EPI creatinine (2021) equation.    Anion Gap 05/13/2025 6.0  mEq/L Final    BUN/Creatinine Ratio 05/13/2025 18   Final    Magnesium Level 05/13/2025 1.90  1.60 - 2.60 mg/dL Final    WBC 05/13/2025 5.47  4.50 - 11.50 x10(3)/mcL Final    RBC 05/13/2025 3.41 (L)  4.20 - 5.40  x10(6)/mcL Final    Hgb 05/13/2025 11.6 (L)  12.0 - 16.0 g/dL Final    Hct 05/13/2025 35.4 (L)  37.0 - 47.0 % Final    MCV 05/13/2025 103.8 (H)  80.0 - 94.0 fL Final    MCH 05/13/2025 34.0 (H)  27.0 - 31.0 pg Final    MCHC 05/13/2025 32.8 (L)  33.0 - 36.0 g/dL Final    RDW 05/13/2025 14.4  11.5 - 17.0 % Final    Platelet 05/13/2025 233  130 - 400 x10(3)/mcL Final    MPV 05/13/2025 8.8  7.4 - 10.4 fL Final    Neut % 05/13/2025 18.2  % Final    Lymph % 05/13/2025 55.6  % Final    Mono % 05/13/2025 10.1  % Final    Eos % 05/13/2025 14.1  % Final    Basophil % 05/13/2025 1.8  % Final    Imm Grans % 05/13/2025 0.2  % Final    Neut # 05/13/2025 1.00 (L)  2.1 - 9.2 x10(3)/mcL Final    Lymph # 05/13/2025 3.04  0.6 - 4.6 x10(3)/mcL Final    Mono # 05/13/2025 0.55  0.1 - 1.3 x10(3)/mcL Final    Eos # 05/13/2025 0.77  0 - 0.9 x10(3)/mcL Final    Baso # 05/13/2025 0.10  <=0.2 x10(3)/mcL Final    Imm Gran # 05/13/2025 0.01  0.00 - 0.04 x10(3)/mcL Final    NRBC% 05/13/2025 0.0  % Final           Pathology:  -IDC right breast:  IDC, grade 2, lymph node biopsy negative, ER positive, WA positive, HER2 negative; S/P core biopsy 07/17/2024; cT1c cN0 MX, AJCC anatomic stage at least IA, clinical prognostic stage IA  -inflammatory carcinoma left breast, grade 3, lymph node biopsy positive, ER positive, WA negative, HER2 positive; S/P core biopsy 07/16/2024; cT4d cN2 M1, stage IV    -09/16/2024: Patient referred to IR for biopsy of suspicious bone lesions  -liquid biopsy 09/19/2024:  BRCA1 positive; HER2 positive;TMB could not be calculated due to insufficient circulating tumor DNA; MSI-high not detected    -CT-guided biopsy left iliac crest 10/17/2024:  Metastases from breast carcinoma  -germline testing: BRCA1 mutation positive  -tissue NGS testing (left breast, collected 07/16/2024):  BRCA1 mutation positive; HER2 positive; MSI stable; TMB 2.1 m/MB (low); fusion negative; PD-L1 negative (PD-L1 CPS 2; PD-L1 TPS 1%); PIK3CA negative;  ESR1 negative    Radiology/Diagnostics:  -baseline TTE 09/05/2024:  LVEF 60-65%   -DEXA scan 09/16/2024:  Normal BMD of lumbar vertebrae end of femoral necks  -baseline staging CTs C/A/P 0 09/16/2024:  Multiple thoracolumbar vertebrae and sacral lytic metastases   -baseline staging whole-body nuclear medicine bone scan 09/16/2024:  Thoracolumbar vertebrae and sacrum metastases  -09/16/2024: Patient referred to IR for biopsy of suspicious bone lesions  -FDG PET-CT for staging 09/30/2024: Sites of disease:  Multifocal, left breast; left axillary and subpectoral lymphadenopathy; small focus upper right breast; bilobar liver metastases; portal caval lymph node; multifocal bone metastases  -brain MRI 12/24/2024:  9 mm metastases anterior aspect of C4 vertebral body  -12/19/2024: Surveillance echocardiogram:  LVEF 67%  -12/23/2024:  Pelvic ultrasound (encounter for non procreative screening for genetic disease carrier status):  Small fibroid; cyst in the right and left ovaries (right ovary cyst 1.5 cm and 1.4 cm; left ovary cyst 1.1 cm and 7 mm)  -restaging CTs C/A/P 01/27/2025:  Overall progression of disease with multiple new and enlarging osseous metastases as well as a new metastatic lesion right liver lobe; the majority of the left axillary metastatic lymphadenopathy has decreased in size; however, there is a single left axillary lymph node which has enlarged; the left breast skin thickening and underlying asymmetry density in the left breast parenchyma is grossly unchanged (2.5 x 2.3 cm left axillary lymph node has increased in size from 2.1 x 1.7 cm previously; suggestion of a peripherally enhancing nodule dome of the right lobe of the liver 1.9 cm; multiple lucent lytic metastatic lesions throughout the visualized spine which have progressed from the prior exam with multiple new and enlarging lesions, representative 8 mm lucent lesion T11 vertebral body is new, T7 lesion has been increased in size 16 x 12 mm  previously 13 x 11 mm)  -restaging whole-body nuclear medicine bone scan 01/27/2025:  Similar uptake in the spine, ribs, and pelvis compared to prior study  -03/03/2025 bilateral diagnostic mammogram and ultrasound:   Bilateral Diagnostic Mammogram:   In the right breast 12:00 axis middle depth, there is an irregular high density spiculated mass with an associated T4-butterfly clip, correlating with outside prior malignant ultrasound-guided core needle biopsy that diagnosed grade 2 invasive ductal carcinoma.  There has been an increase in the size and density of the malignant mass, particularly the anterior and lateral component.     No other new suspicious mammographic finding in the right breast.     In the right axilla, there is a lymph node with an associated mini-T3-coil clip, correlating with outside prior benign ultrasound-guided core needle biopsy.  There has been no significant interval change of this lymph node.     There is an implanted MediPort in the right anterior chest wall which partially obscures evaluation of the right axilla.  No suspicious mammographic finding in the right axilla.     The left breast is contracted and significantly smaller compared to the most recent prior mammogram dated 07/01/2024 (8 months ago).  This contraction is related to a large irregular high density spiculated mass occupying all 4 quadrants posterior to anterior depths with spiculations extending out to the skin, with severe thickening and retraction of the same.  The mass extends into and engulfs the nipple-areolar complex, with retraction and destruction of the same.  These findings are compatible with the shrunken breast sign due to locally advanced breast cancer.  The irregular high density spiculated mass has its most significant component in the upper-outer quadrant, and there is a T4-butterfly clip in the 2:00 axis middle depth related to outside prior malignant ultrasound-guided core needle biopsy that  diagnosed grade 3 invasive ductal carcinoma.  Overall, the mammographic appearance of the malignant process throughout the left breast is worse.       In the left axilla, there is an irregular high density spiculated mass with an associated mini-T3-coil clip, correlating with outside prior malignant ultrasound-guided core needle biopsy that diagnosed grade 3 invasive ductal carcinoma.  Given the location of this malignant mass, it is most compatible with an axillary lymph node that has been entirely replaced by metastatic carcinoma.  This malignant lymph node has significantly increased in size compared to the most recent prior mammogram dated 07/01/2024 (8 months ago).        Bilateral Complete Breast Ultrasound:  Sonographic evaluation of both complete breasts (all 4 quadrants, subareolar, axilla) was performed.       In the right breast 12:00 axis 9 cm FN position, there is a 1.8 x 1.8 x 2.2 cm irregular nonparallel hypoechoic vascular mass with angular margins, correlating with biopsy-proven grade 2 invasive ductal carcinoma.  This has increased in size compared to outside prior ultrasound on 07/01/2024, at which time it measured 1.3 x 1.1 x 1.6 cm.  There is also been an increase in the diameter and hypoechoic intraductal material within the ductal system extending laterally away from the malignant mass, suggestive for extension of malignancy into this ductal system.  Taken together, these findings correlate with the increase in size and density of the right breast 12:00 axis middle depth malignant mass, particularly the anterior and lateral component, as seen on today's mammogram.     No other new suspicious sonographic finding in the right breast.       No suspicious right axillary adenopathy.  One of the right axillary level 1 lymph nodes has an associated biopsy clip (mini-T3-coil), correlating with the biopsy-proven benign right axillary lymph node.     In the left breast all 4 quadrants subareolar-12 cm  FN, there is an irregular hypoechoic spiculated mass with larger confluent portions of discrete masses interconnected by hypoechoic tracts of non-mass lesions.  The most confluent discrete masses are at the 1:00 axis 7 cm FN (5.6 x 4.1 x 5.5 cm), 2:00 axis 9 cm FN (2.5 x 1.3 x 1.4 cm), and 1:00 axis 12 cm FN (0.5 x 0.7 x 0.9 cm) positions.  The malignancy extends into and destroys the nipple-areolar complex.  There is diffuse severe skin thickening, maximal thickness 0.8 cm as measured in the 9:00 axis 1 cm FN position.  Comparison to the outside prior ultrasound on 07/01/2024 is suboptimal due to differences in positioning and technique as well as overall contraction/global decrease in size of the breast in the interval since that prior exam.  Given those limitations, the overall change in sonographic appearance of the malignant process throughout the left breast is worse.   In the left axilla, there is a 3 x 2.3 x 2.6 cm irregular nonparallel hypoechoic spiculated vascular mass with a hyperechoic rind, correlating with biopsy-proven grade 3 invasive ductal carcinoma.  This has increased in size compared to the outside prior ultrasound on 07/01/2024, at which time it measured 1.2 x 1.5 x 1.6 cm.   In the deep aspect of the left axilla level 1 andra station, there has been decrease in size and conspicuity of a morphologically abnormal lymph node identified on the outside prior ultrasound at [facility] on 07/01/2024.      IMPRESSION: KNOWN BIOPSY PROVEN MALIGNANCY, ULTRASOUND KNOWN BIOPSY PROVEN MALIGNANCY   1) Poor response to neoadjuvant chemotherapy.  The mammographic and sonographic appearance of the malignant process throughout the left breast and in the left axilla is worse compared to the outside prior mammogram and ultrasound at [facility] on 07/01/2024 (8 months ago).  BI-RADS 6: Known biopsy-proven malignancy.   2) Poor response to neoadjuvant chemotherapy.  The right breast 12:00 axis  9 cm FN malignant mass has increased in size, with new involvement of the ductal system extending laterally away from the malignant mass.  BI-RADS 6: Known biopsy-proven malignancy.   3) Review of the patient's restaging CT C/A/P and Tc-99m-MDP bone scan 01/27/2025 revealed progression of metastatic disease, which correlates with today's mammographic and sonographic findings showing progression of disease in both breasts and the left axilla.    -3/10/2025 ECHO: EF 60%  -3/18/2025 EKG: Qtc 457 ms    -04/28/2025: Restaging CTs C/A/P with contrast (comparison: 01/27/2025):  Left axillary lymph node and asymmetric areas of soft tissue in the left breast show decreased size since 01/27/2025.   No significant change in hypodense right hepatic lesion.   New areas of sclerosis in the axial and appendicular skeleton likely treated bone lesions.  -04/28/2025: Restaging whole-body nuclear medicine bone scan:There is similar radiotracer uptake within the spine, ribs, and pelvis compared to the prior study from 01/27/2025     I have reviewed all available lab results and radiology reports.  Assessment:   Invasive ductal carcinoma of right breast/inflammatory carcinoma of left breast  Cycle 3 of Ribociclib to start on 05/14/2025  Arimidex 1 mg p.o. q.day   Ribociclib 600 mg p.o. daily X 21 days, then 7 days off to complete 28 day cycle; to continue until disease progression or unacceptable toxicity  Re-stage with contrast-enhanced CT scans of C/A/P and whole-body nuclear medicine bone scan every 3 months- scheduled on 7/28/2025  Echocardiogram every 3 months, due in 6/2025  Lupron 22.5 mg every 12 weeks due 7/5/2025  2. Breast cancer metastasized to bone   Xgeva 120 mg every 4 weeks, next due 45/20/2025  Calcium and vitamin-D supplements to continue   DEXA scan September 2025, 9/15/2025  3. Hypokalemia   Start potassium chloride 20 meq daily   4. 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    Invasive ductal carcinoma of breast, right    Invasive ductal carcinoma of left breast    [Secondary malignant neoplasm of axillary lymph nodes]    Secondary malignancy of lumbar vertebral column    Secondary malignancy of sacrum    Secondary malignancy of thoracic vertebral column    Adenocarcinoma of breast metastatic to liver    BRCA1 gene mutation positive    Secondary malignant neoplasm of cervical vertebral column    Immunodeficiency due to chemotherapy    Hypokalemia - Primary    Relevant Medications    potassium chloride SA (K-DUR,KLOR-CON) 20 MEQ tablet    Hot flashes related to aromatase inhibitor therapy    Long term current use of aromatase inhibitor               Plan:   05/13/2025  Labs and exam stable  Start potassium chloride 20 meq daily   Continue Vitamin E for hot flashes   Continue Armidex + Ribociclib, new cycle to start on 05/14/2025  Arimidex 1 mg p.o. q.day   Ribociclib 600 mg p.o. daily X 21 days, then 7 days off to complete 28 day cycle; to continue until disease progression or unacceptable toxicity  Re-stage with contrast-enhanced CT scans of C/A/P and whole-body nuclear medicine bone scan every 3 months- 7/2025  Follow-up with gyn for ovarian cancer surveillance  Follow up with GI for evaluation of hematochezia, 6/9/2025  Xgeva 120 mg every 4 weeks, next due 5/20/2025  Calcium and vitamin-D supplements to continue   DEXA scan September 2025, 9/15/2025  Echocardiogram every 3 months, due in 6/2025  Lupron 22.5 mg every 12 weeks, given on 5/13/2025, next due 7/05/2025  RTC on 05/20/205 labs/NP/Kanjinti and xgeva  CBC CMP MG      Providers:           Orders for 04/29/2025:   Continue Herceptin +Lupron +anastrazole +ribociclib  Re-stage with contrast-enhanced CT scans of C/A/P end of July  Need the report of bone scan   Follow-up with gyn for ovarian cancer surveillance because of BRCA1 mutation  positive status   Check the status of GI referral for history of hematochezia  Continue Xgeva   Calcium and vitamin-D supplements   DEXA scan in September 2026   Echocardiogram middle of June  Follow-up with NP in 1 month     Above discussed with her.  All questions answered.    Discussed labs and scans and gave her copies of relevant results.  Plan of management discussed once again.    She understands and agrees with this plan.  ====================================     # IDC both breasts, diagnosed concurrently:  #Inflammatory carcinoma left breast:  -presentation:  Pain and swelling left breast 05/2024  -Mirena IUD in place; removed 09/19/2024   -extensive family history of breast cancer and cervical cancer  -physical exam: Right breast: 3 cm mass 12 o'clock position, 4 in above nipple  -physical exam: Left breast: Swollen, hard, large mass to o'clock position, 1 hard immobile lymph node in left axilla, breast and axilla tender to palpation (inflammatory carcinoma of left breast)  -mammogram and ultrasound 07/01/2024  -multiple masses left breast on mammogram and ultrasound  -concurrently diagnosed bilateral breast cancer   -IDC right breast:  IDC, grade 2, lymph node biopsy negative, ER positive, [place] positive, HER2 negative; cT1c cN0 MX, AJCC anatomic stage at least IA, clinical prognostic stage IA; S/P needle biopsy of right breast and right axilla (S/P core biopsy 07/17/2024)  -inflammatory carcinoma left breast, grade 3, lymph node biopsy positive, ER positive, KY negative, HER2 positive; cT4d cN2 M1, stage IV; S/P needle biopsy left breast and left axilla (S/P core biopsy 07/16/2024) (By virtue of palpable, fixed lymph node in the left axilla, cN2)  -genetic testing 08/22/2024:  BRCA1 mutation positive, heterozygous  -premenopausal per labs 08/29/2024  -baseline TTE 09/05/2024:  LVEF 60-65%   -DEXA scan 09/16/2024:  Normal BMD of lumbar vertebrae end of femoral necks  -baseline staging CTs C/A/P 0 09/16/2024:   Multiple thoracolumbar vertebrae and sacral lytic metastases   -baseline staging whole-body nuclear medicine bone scan 09/16/2024:  Thoracolumbar vertebrae and sacrum metastases  -09/16/2024: Patient referred to IR for biopsy of suspicious bone lesions  -liquid biopsy 09/19/2024:  BRCA1 positive; HER2 positive;TMB could not be calculated due to insufficient circulating tumor DNA; MSI-high not detected  -09/19/2024: Baseline tumor markers: CEA 19.74, elevated; CA 27.29, CA 15-3 levels normal  -brain MRI for staging 09/23/2024: No intracranial metastases  -FDG PET-CT for staging 09/30/2024: Sites of disease:  Multifocal, left breast; left axillary and subpectoral lymphadenopathy; small focus upper right breast; bilobar liver metastases; portal caval lymph node; multifocal bone metastases  -no visceral metastases  -tamoxifen plus trastuzumab started 10/08/2024  -CT-guided biopsy left iliac crest 10/17/2024:  Metastases from breast carcinoma  -germline testing: BRCA1 mutation positive  -tissue NGS testing (left breast, collected 07/16/2024):  BRCA1 mutation positive; HER2 positive; MSI stable; TMB 2.1 m/MB (low); fusion negative; PD-L1 negative (PD-L1 CPS 2; PD-L1 TPS 1%); PIK3CA negative; ESR1 negative  -cycle 2 of trastuzumab on 10/29/2024  -Mirena IUD removed 09/18/2024   -surveillance TTE 12/19/2024: LVEF 67%  -pelvic ultrasound 12/23/2024:  Bilateral ovarian cysts, largest 1.5 cm  -brain MRI 12/24/2024: Headaches:  9 mm ovoid metastases anterior aspect of C4 vertebral body  -dental clearance obtained 11/29/2024 (dated 11/26/2024)  -Xgeva for bone metastases, 120 mg subQ every 4 weeks, started 12/31/2024  -01/13/2025:  Gyn consultation/follow-up:  Patient would like to proceed with ParaGard IUD insertion (nonhormonal contraception); annual transvaginal ultrasound and CA-125 level for surveillance in view of BRCA1 mutation status until risk reduction HUA-BSO recommended at age 35-40 or when done with childbearing;  given stage IV breast cancer, plan to delay surgery until further prognosis can be made  >>>  # Disease progression on tamoxifen/Herceptin:  -restaging CTs and bone scan 01/27/2025:  Progression of metastases  (Failed tamoxifen +Herceptin)  -new Plan:  Continue Herceptin +start OFS with Lupron every 3 months + continue tamoxifen for a couple of months with Lupron, then switch to Arimidex/ribociclib  -no visceral metastases, therefore, planned Herceptin +second-line endocrine therapy  -Lupron 22.5 mg IM every 3 months (for OFS), started 02/18/2025  -bilateral diagnostic mammogram 03/03/2025 (will discontinue)  -bilateral breast MRI 03/14/2025 (will discontinue)  -surveillance TTE 03/10/2025: LVEF 60%  -Arimidex/ribociclib started 03/19/2025  -restaging CTs and bone scan 04/28/2025:  Some improvement on CTs; bone scan pending  >>>  Plan:  -Continue Herceptin + Lupron every 12 weeks + anastrazole +ribociclib  -Lupron started 02/18/2025  -Arimidex/ribociclib started 03/19/2025  -possible positive response on restaging CTs 04/28/2025  >>>  re-stage with CTs C/A/P with contrast in 3 months (end of July)  Restaging bone scan is pending  ER positive, MD negative, HER2 positive  Follow-up with gyn for ovarian cancer surveillance (BRCA1 mutation positive)  GI referral for hematochezia  Continue Xgeva   Calcium and vitamin-D supplements to continue  DEXA scan in 2 years (September 2026)  Echocardiogram in 3 months (mid June)     If, at anytime, develops visceral crisis or endocrine refractory disease, then, treatment options:  # first-line:    Pertuzumab +trastuzumab +docetaxel (category 1, Preferred);   pertuzumab +trastuzumab +paclitaxel (Preferred)  (after response, maintenance trastuzumab/pertuzumab + concurrent endocrine therapy)   # second-line:  -Fam trastuzumab deruxtecan (category 1, Preferred)  -tucatinib +trastuzumab +capecitabine is preferred in patients with both systemic and CNS progression in the 3rd line  setting and beyond; it may also be given in the second-line setting, etc.     Palliative systemic therapy options:  -systemic therapy +HER2 targeted therapy; or  -endocrine therapy +/-HER2 targeted therapy +ovarian suppression in premenopausal patient  -for premenopausal patients, tamoxifen alone (without ovarian ablation/suppression) + HER2 targeted therapy is also an option  >>>  -no visceral crisis, therefore, we decided to treat with tamoxifen + trastuzumab as first-line palliative systemic therapy     Herceptin:  -watch for cardiomyopathy, infusion reactions and pulmonary toxicity  -adverse reactions:> 10%:  Decreased LVEF, skin rash, abdominal pain, anorexia, infusion related reactions, asthenia, chills, back pain, dyspnea, etc.  -warnings/precautions:  Cardiomyopathy; infusion reactions; pulmonary toxicity; renal toxicity  -monitoring: Assess left ventricular ejection fraction (by ECG or MUGA scan) at baseline (immediately prior to trastuzumab initiation), every 3 months during trastuzumab therapy, every 4 weeks if trastuzumab is withheld for significant left ventricular cardiac dysfunction, and every 6 months for at least 2 years following completion of adjuvant trastuzumab therapy. Monitor vital signs during infusion; monitor for hypersensitivity or infusion reaction; if a reaction occurs, monitor carefully until symptoms resolve completely.   Monitor for signs/symptoms of cardiac dysfunction or pulmonary toxicity. If pregnancy inadvertently occurs during treatment, monitor amniotic fluid volume.     Monitoring with ribociclib:  -CBC: Baseline, every 2 weeks for first 2 cycles, at the beginning of the subsequent 4 cycles, and as clinically necessary  LFTs: Baseline, every 2 weeks for the first 2 cycles, at the beginning of the subsequent 4 cycles, and as clinically necessary  -Electrolytes: Prior to treatment, at the beginning of first 6 cycles, and as needed indicated  -EKG: Prior to treatment initiation,  repeat on day 14 of cycle 1, at the beginning of cycle 2, and last week indicated     Anastrozole:   1 mg p.o. q.day     Ribociclib:  600 mg p.o. daily for 21 days, followed by a 7 day rest to complete a 28 day treatment cycle; continue until disease progression or unacceptable toxicity  With AI: 600 mg daily for 21 days, followed by 7-day rest period to complete a 28-day treatment cycle; continue until disease progression or unacceptable toxicity  -Dose reduction depending upon kidney impairment  -Dose reduction depending upon moderate or severe liver impairment  -Dose reductions: 600 mg daily, 400 mg daily, 200 mg daily  -Dose management depending upon toxicities: Hematologic toxicity (neutropenia), cardiovascular toxicity (QT prolonging agent), dermatologic toxicity, pulmonary toxicity  Dosage forms: 200 mg, 400 mg, 600 mg  Administration: With or without food  Moderate or high emetic potential  Warnings/precautions with ribociclib:  -Bone marrow suppression: Neutropenia  -Dermatologic toxicity  -Hepatobiliary toxicity  -QT prolongation  -Pulmonary toxicity  Adverse reactions with ribociclib:  Peripheral edema, alopecia, pruritus, skin rash, abdominal pain, constipation, anorexia, UTIs, anemia, leukopenia, neutropenia, increased ALT, increased AST, etc.     Fertility and birth control issues:  Discussion about fertility and contraception:  -Informed her about the potential impact of chemotherapy on fertility  -Made her aware of the option of referral to fertility specialist before chemotherapy and/or endocrine therapy to discuss options in case she desires future pregnancies.  Fertility preservation options include oocyte and embryo cryopreservation, etc.  -Amenorrhea frequently occurs during or after chemotherapy.  The majority of women younger than 35 years to resume menses within 2 years of finishing adjuvant chemotherapy  -Informed that menses and fertility are not necessarily linked. Absence of regular  menses does not necessarily imply lack of fertility.  Conversely, the presence of menses does not guarantee fertility.  -There are limited data regarding continued fertility after chemotherapy.  -Cautioned her to avoid becoming pregnant during treatment with radiation therapy, chemotherapy, or endocrine therapy  -Hormone-based birth control is discouraged regardless of the harmless of the hormone receptor status of the patient's cancer  -Alternative methods of birth control, including IUD, barrier method, tubal ligation, vasectomy for the partner, etc.   >>>  -Mirena IUD removed 09/18/2024  -follow-up with gyn for ParaGard insertion as nonhormonal methods of contraception  -fertility specialist consultation for fertility preservation: she declined  -cautioned her not to become pregnant during treatment  -referred to gyn for consultation regarding nonhormonal methods of contraception     # Sites of disease:   -IDC right breast, ER positive, [place] positive, HER2 negative, cT1c cN0 MX  -inflammatory carcinoma left breast, ER positive, [place] negative, HER2 positive, cT4d cN2 M1, stage IV  -FDG PET-CT for staging 09/30/2024: Sites of disease:  Multifocal, left breast; left axillary and subpectoral lymphadenopathy; small focus upper right breast; bilobar liver metastases; portal caval lymph node; multifocal bone metastases  -brain MRI 12/24/2024:  9 mm metastases anterior aspect of C4 vertebral body (no brain metastases)     # Molecular markers:  -liquid biopsy:  BRCA1 positive; HER2 positive;TMB could not be calculated due to insufficient circulating tumor DNA; MSI-high not detected  -CT-guided biopsy left iliac crest 10/17/2024:  Metastases from breast carcinoma  -germline testing: BRCA1 mutation positive  -tissue NGS testing (left breast, collected 07/16/2024):  BRCA1 mutation positive; HER2 positive; MSI stable; TMB 2.1 m/MB (low); fusion negative; PD-L1 negative (PD-L1 CPS 2; PD-L1 TPS 1%); PIK3CA negative; ESR1  negative  -genetic testing 08/22/2024:  BRCA1 mutation positive, heterozygous     # Hematochezia:   Since yesterday, 08/28/2024, has a experienced painless diarrhea with blood on toilet wipes as well as in toilet bowl; no weakness or dizziness; 5 loose stools yesterday, 2 loose stools today; no nausea, vomiting, hematemesis, or melena.  Never experienced GI bleeding before.  >>>   -08/29/2024: sent an urgent referral to GI for evaluation     # Family history of breast cancer, ovarian cancer, cervical cancer, stomach cancer, colon cancer:  -Maternal great aunt # 1: Breast cancer, in her 50s   -sister: Ovarian cancer, age 50  -sister: Cervical cancer, age 25  -mother: Ovarian cancer, age 50  -Maternal aunt: Cervical cancer, age 50  -maternal grandfather: Stomach cancer, age 70 (smoker)  -maternal great aunt # 2: Colon cancer, age 60  >>>  -genetic testing 08/22/2024:  BRCA1 mutation positive, heterozygous           I have explained all of the above in detail and the patient understands all of the current recommendation(s). I have answered all of their questions to the best of my ability and to their complete satisfaction.       Ana Allen, AMANDAP-C  Oncology/Hematology   [facility]                     [1]   Social History  Tobacco Use    Smoking status: Never     Passive exposure: Never    Smokeless tobacco: Never   Substance Use Topics    Alcohol use: Never    Drug use: Never

## 2025-05-20 ENCOUNTER — INFUSION (OUTPATIENT)
Dept: INFUSION THERAPY | Facility: HOSPITAL | Age: 35
End: 2025-05-20
Attending: INTERNAL MEDICINE
Payer: MEDICAID

## 2025-05-20 ENCOUNTER — OFFICE VISIT (OUTPATIENT)
Dept: HEMATOLOGY/ONCOLOGY | Facility: CLINIC | Age: 35
End: 2025-05-20
Payer: MEDICAID

## 2025-05-20 ENCOUNTER — LAB VISIT (OUTPATIENT)
Dept: HEMATOLOGY/ONCOLOGY | Facility: CLINIC | Age: 35
End: 2025-05-20
Payer: MEDICAID

## 2025-05-20 VITALS
BODY MASS INDEX: 37.64 KG/M2 | SYSTOLIC BLOOD PRESSURE: 120 MMHG | HEIGHT: 66 IN | WEIGHT: 234.19 LBS | HEART RATE: 93 BPM | RESPIRATION RATE: 16 BRPM | TEMPERATURE: 99 F | DIASTOLIC BLOOD PRESSURE: 90 MMHG | OXYGEN SATURATION: 96 %

## 2025-05-20 DIAGNOSIS — D84.821 IMMUNODEFICIENCY DUE TO CHEMOTHERAPY: ICD-10-CM

## 2025-05-20 DIAGNOSIS — R23.2 HOT FLASHES RELATED TO AROMATASE INHIBITOR THERAPY: ICD-10-CM

## 2025-05-20 DIAGNOSIS — C79.51: ICD-10-CM

## 2025-05-20 DIAGNOSIS — K92.1 HEMATOCHEZIA: ICD-10-CM

## 2025-05-20 DIAGNOSIS — C79.51 SECONDARY MALIGNANCY OF THORACIC VERTEBRAL COLUMN: ICD-10-CM

## 2025-05-20 DIAGNOSIS — Z79.69 IMMUNODEFICIENCY DUE TO CHEMOTHERAPY: ICD-10-CM

## 2025-05-20 DIAGNOSIS — T45.1X5A HOT FLASHES RELATED TO AROMATASE INHIBITOR THERAPY: ICD-10-CM

## 2025-05-20 DIAGNOSIS — Z15.09 BRCA1 GENE MUTATION POSITIVE: ICD-10-CM

## 2025-05-20 DIAGNOSIS — Z79.811 LONG TERM CURRENT USE OF AROMATASE INHIBITOR: ICD-10-CM

## 2025-05-20 DIAGNOSIS — C50.912 INVASIVE DUCTAL CARCINOMA OF LEFT BREAST: ICD-10-CM

## 2025-05-20 DIAGNOSIS — C50.911 INVASIVE DUCTAL CARCINOMA OF BREAST, RIGHT: ICD-10-CM

## 2025-05-20 DIAGNOSIS — C50.919 CARCINOMA OF BREAST METASTATIC TO BONE, UNSPECIFIED LATERALITY: ICD-10-CM

## 2025-05-20 DIAGNOSIS — C50.912 CARCINOMA OF LEFT BREAST METASTATIC TO BONE: ICD-10-CM

## 2025-05-20 DIAGNOSIS — C79.51 SECONDARY MALIGNANCY OF LUMBAR VERTEBRAL COLUMN: ICD-10-CM

## 2025-05-20 DIAGNOSIS — C79.51 CARCINOMA OF BREAST METASTATIC TO BONE, UNSPECIFIED LATERALITY: ICD-10-CM

## 2025-05-20 DIAGNOSIS — C78.7 ADENOCARCINOMA OF BREAST METASTATIC TO LIVER, UNSPECIFIED LATERALITY: ICD-10-CM

## 2025-05-20 DIAGNOSIS — C50.912 INVASIVE DUCTAL CARCINOMA OF LEFT BREAST: Primary | ICD-10-CM

## 2025-05-20 DIAGNOSIS — E87.6 HYPOKALEMIA: ICD-10-CM

## 2025-05-20 DIAGNOSIS — C50.919 ADENOCARCINOMA OF BREAST METASTATIC TO LIVER, UNSPECIFIED LATERALITY: ICD-10-CM

## 2025-05-20 DIAGNOSIS — C79.51 SECONDARY MALIGNANCY OF SACRUM: ICD-10-CM

## 2025-05-20 DIAGNOSIS — C50.912 INFLAMMATORY CARCINOMA OF LEFT BREAST: Primary | ICD-10-CM

## 2025-05-20 DIAGNOSIS — C79.51 CARCINOMA OF LEFT BREAST METASTATIC TO BONE: ICD-10-CM

## 2025-05-20 DIAGNOSIS — T45.1X5A IMMUNODEFICIENCY DUE TO CHEMOTHERAPY: ICD-10-CM

## 2025-05-20 DIAGNOSIS — Z15.01 BRCA1 GENE MUTATION POSITIVE: ICD-10-CM

## 2025-05-20 DIAGNOSIS — C77.3 MALIGNANT NEOPLASM METASTATIC TO LYMPH NODE OF AXILLA: ICD-10-CM

## 2025-05-20 LAB
ALBUMIN SERPL-MCNC: 3.4 G/DL (ref 3.5–5)
ALBUMIN/GLOB SERPL: 0.8 RATIO (ref 1.1–2)
ALP SERPL-CCNC: 54 UNIT/L (ref 40–150)
ALT SERPL-CCNC: 56 UNIT/L (ref 0–55)
ANION GAP SERPL CALC-SCNC: 8 MEQ/L
AST SERPL-CCNC: 25 UNIT/L (ref 11–45)
BASOPHILS # BLD AUTO: 0.08 X10(3)/MCL
BASOPHILS NFR BLD AUTO: 1.2 %
BILIRUB SERPL-MCNC: 0.4 MG/DL
BUN SERPL-MCNC: 20.3 MG/DL (ref 7–18.7)
CALCIUM SERPL-MCNC: 9.7 MG/DL (ref 8.4–10.2)
CHLORIDE SERPL-SCNC: 103 MMOL/L (ref 98–107)
CO2 SERPL-SCNC: 27 MMOL/L (ref 22–29)
CREAT SERPL-MCNC: 1.02 MG/DL (ref 0.55–1.02)
CREAT/UREA NIT SERPL: 20
EOSINOPHIL # BLD AUTO: 0.79 X10(3)/MCL (ref 0–0.9)
EOSINOPHIL NFR BLD AUTO: 12.3 %
ERYTHROCYTE [DISTWIDTH] IN BLOOD BY AUTOMATED COUNT: 13.8 % (ref 11.5–17)
GFR SERPLBLD CREATININE-BSD FMLA CKD-EPI: >60 ML/MIN/1.73/M2
GLOBULIN SER-MCNC: 4.5 GM/DL (ref 2.4–3.5)
GLUCOSE SERPL-MCNC: 256 MG/DL (ref 74–100)
HCT VFR BLD AUTO: 34 % (ref 37–47)
HGB BLD-MCNC: 11.4 G/DL (ref 12–16)
IMM GRANULOCYTES # BLD AUTO: 0.01 X10(3)/MCL (ref 0–0.04)
IMM GRANULOCYTES NFR BLD AUTO: 0.2 %
LYMPHOCYTES # BLD AUTO: 2.24 X10(3)/MCL (ref 0.6–4.6)
LYMPHOCYTES NFR BLD AUTO: 34.9 %
MAGNESIUM SERPL-MCNC: 1.8 MG/DL (ref 1.6–2.6)
MCH RBC QN AUTO: 34.2 PG (ref 27–31)
MCHC RBC AUTO-ENTMCNC: 33.5 G/DL (ref 33–36)
MCV RBC AUTO: 102.1 FL (ref 80–94)
MONOCYTES # BLD AUTO: 0.25 X10(3)/MCL (ref 0.1–1.3)
MONOCYTES NFR BLD AUTO: 3.9 %
NEUTROPHILS # BLD AUTO: 3.05 X10(3)/MCL (ref 2.1–9.2)
NEUTROPHILS NFR BLD AUTO: 47.5 %
NRBC BLD AUTO-RTO: 0 %
PLATELET # BLD AUTO: 285 X10(3)/MCL (ref 130–400)
PMV BLD AUTO: 9.2 FL (ref 7.4–10.4)
POTASSIUM SERPL-SCNC: 3.7 MMOL/L (ref 3.5–5.1)
PROT SERPL-MCNC: 7.9 GM/DL (ref 6.4–8.3)
RBC # BLD AUTO: 3.33 X10(6)/MCL (ref 4.2–5.4)
SODIUM SERPL-SCNC: 138 MMOL/L (ref 136–145)
WBC # BLD AUTO: 6.42 X10(3)/MCL (ref 4.5–11.5)

## 2025-05-20 PROCEDURE — 25000003 PHARM REV CODE 250: Performed by: INTERNAL MEDICINE

## 2025-05-20 PROCEDURE — 1160F RVW MEDS BY RX/DR IN RCRD: CPT | Mod: CPTII,,,

## 2025-05-20 PROCEDURE — 3008F BODY MASS INDEX DOCD: CPT | Mod: CPTII,,,

## 2025-05-20 PROCEDURE — 85025 COMPLETE CBC W/AUTO DIFF WBC: CPT

## 2025-05-20 PROCEDURE — 80053 COMPREHEN METABOLIC PANEL: CPT

## 2025-05-20 PROCEDURE — 3074F SYST BP LT 130 MM HG: CPT | Mod: CPTII,,,

## 2025-05-20 PROCEDURE — 96413 CHEMO IV INFUSION 1 HR: CPT

## 2025-05-20 PROCEDURE — 3078F DIAST BP <80 MM HG: CPT | Mod: CPTII,,,

## 2025-05-20 PROCEDURE — 1159F MED LIST DOCD IN RCRD: CPT | Mod: CPTII,,,

## 2025-05-20 PROCEDURE — 83735 ASSAY OF MAGNESIUM: CPT

## 2025-05-20 PROCEDURE — 99214 OFFICE O/P EST MOD 30 MIN: CPT | Mod: PBBFAC,25

## 2025-05-20 PROCEDURE — 96372 THER/PROPH/DIAG INJ SC/IM: CPT | Mod: 59

## 2025-05-20 PROCEDURE — 99215 OFFICE O/P EST HI 40 MIN: CPT | Mod: S$PBB,,,

## 2025-05-20 PROCEDURE — 63600175 PHARM REV CODE 636 W HCPCS: Mod: JZ,TB | Performed by: INTERNAL MEDICINE

## 2025-05-20 RX ORDER — HEPARIN 100 UNIT/ML
500 SYRINGE INTRAVENOUS
Status: DISCONTINUED | OUTPATIENT
Start: 2025-05-20 | End: 2025-05-20 | Stop reason: HOSPADM

## 2025-05-20 RX ORDER — EPINEPHRINE 1 MG/ML
0.3 INJECTION INTRAMUSCULAR; INTRAVENOUS; SUBCUTANEOUS ONCE AS NEEDED
Status: DISCONTINUED | OUTPATIENT
Start: 2025-05-20 | End: 2025-05-20 | Stop reason: HOSPADM

## 2025-05-20 RX ORDER — CITALOPRAM 10 MG/1
10 TABLET ORAL DAILY
Qty: 30 TABLET | Refills: 0 | Status: SHIPPED | OUTPATIENT
Start: 2025-05-20

## 2025-05-20 RX ORDER — SODIUM CHLORIDE 0.9 % (FLUSH) 0.9 %
10 SYRINGE (ML) INJECTION
Status: DISCONTINUED | OUTPATIENT
Start: 2025-05-20 | End: 2025-05-20 | Stop reason: HOSPADM

## 2025-05-20 RX ORDER — PROCHLORPERAZINE EDISYLATE 5 MG/ML
10 INJECTION INTRAMUSCULAR; INTRAVENOUS ONCE AS NEEDED
Status: DISCONTINUED | OUTPATIENT
Start: 2025-05-20 | End: 2025-05-20 | Stop reason: HOSPADM

## 2025-05-20 RX ORDER — DIPHENHYDRAMINE HYDROCHLORIDE 50 MG/ML
50 INJECTION, SOLUTION INTRAMUSCULAR; INTRAVENOUS ONCE AS NEEDED
Status: DISCONTINUED | OUTPATIENT
Start: 2025-05-20 | End: 2025-05-20 | Stop reason: HOSPADM

## 2025-05-20 RX ADMIN — DENOSUMAB 120 MG: 120 INJECTION SUBCUTANEOUS at 12:05

## 2025-05-20 RX ADMIN — HEPARIN 500 UNITS: 100 SYRINGE at 12:05

## 2025-05-20 RX ADMIN — TRASTUZUMAB-ANNS 653 MG: 420 INJECTION, POWDER, LYOPHILIZED, FOR SOLUTION INTRAVENOUS at 12:05

## 2025-05-20 NOTE — Clinical Note
Proceed with Kinjinti plus Xgeva today in infusion RTC with MAYA Allen on Gema 10 with labs prior (CBC/CMP/Mag level) followed by Kinjintreasure plus Xgeva in infusion

## 2025-05-20 NOTE — NURSING
Patient here for kanjinti and xgeva C11. Patient's glucose 256. Ok to treat per Greg Sam NP. Gave patient instructed on foods low in sugar. Patient verbalizes understanding. Patient tolerated treatment.

## 2025-05-27 DIAGNOSIS — C50.912 INVASIVE DUCTAL CARCINOMA OF LEFT BREAST: ICD-10-CM

## 2025-05-27 RX ORDER — RIBOCICLIB 200 MG/1
600 TABLET, FILM COATED ORAL DAILY
Qty: 63 TABLET | Refills: 2 | Status: ACTIVE | OUTPATIENT
Start: 2025-05-27

## 2025-06-09 ENCOUNTER — OFFICE VISIT (OUTPATIENT)
Dept: GASTROENTEROLOGY | Facility: CLINIC | Age: 35
End: 2025-06-09
Payer: MEDICAID

## 2025-06-09 VITALS
SYSTOLIC BLOOD PRESSURE: 119 MMHG | DIASTOLIC BLOOD PRESSURE: 86 MMHG | TEMPERATURE: 99 F | HEART RATE: 101 BPM | WEIGHT: 234 LBS | BODY MASS INDEX: 37.61 KG/M2 | HEIGHT: 66 IN | RESPIRATION RATE: 16 BRPM | OXYGEN SATURATION: 94 %

## 2025-06-09 DIAGNOSIS — K59.04 CHRONIC IDIOPATHIC CONSTIPATION: ICD-10-CM

## 2025-06-09 DIAGNOSIS — D53.9 MACROCYTIC ANEMIA: ICD-10-CM

## 2025-06-09 DIAGNOSIS — K92.1 HEMATOCHEZIA: Primary | ICD-10-CM

## 2025-06-09 PROCEDURE — 3074F SYST BP LT 130 MM HG: CPT | Mod: CPTII,,, | Performed by: NURSE PRACTITIONER

## 2025-06-09 PROCEDURE — 3008F BODY MASS INDEX DOCD: CPT | Mod: CPTII,,, | Performed by: NURSE PRACTITIONER

## 2025-06-09 PROCEDURE — 1159F MED LIST DOCD IN RCRD: CPT | Mod: CPTII,,, | Performed by: NURSE PRACTITIONER

## 2025-06-09 PROCEDURE — 1160F RVW MEDS BY RX/DR IN RCRD: CPT | Mod: CPTII,,, | Performed by: NURSE PRACTITIONER

## 2025-06-09 PROCEDURE — 99214 OFFICE O/P EST MOD 30 MIN: CPT | Mod: PBBFAC | Performed by: NURSE PRACTITIONER

## 2025-06-09 PROCEDURE — 3079F DIAST BP 80-89 MM HG: CPT | Mod: CPTII,,, | Performed by: NURSE PRACTITIONER

## 2025-06-09 PROCEDURE — 99204 OFFICE O/P NEW MOD 45 MIN: CPT | Mod: S$PBB,,, | Performed by: NURSE PRACTITIONER

## 2025-06-09 NOTE — ASSESSMENT & PLAN NOTE
Resolved  Recommend soluble fiber supplementation  Avoid straining or sitting on the toilet for long periods of time  Start Linzess 145 mcg daily  CBC, iron profile, ferritin, folate, B12  Colonoscopy  Hemorrhoidal banding discussed   ER precautions provided   Call with updates   Follow-up clinic visit with NP in 6 months (after date of scheduled procedure)

## 2025-06-09 NOTE — PROGRESS NOTES
Subjective:       Patient ID: Jeannie Greene is a 34 y.o. female.    Chief Complaint: Anemia (Pt reports feeling fatigued at times. Symptoms fluctuate. Denies rectal bleeding or seeing blood in stool.   )    This 34-year-old  female with breast cancer with metastasis to bone on chemotherapy and hypertension is referred for hematochezia.  She presents unaccompanied.  She reports approximately 3 days of red blood with bowel movements noted on the tissue after wiping and in the toilet bowl with almost every bowel movement in September 2024.  She has not had any further bleeding since that time.  She reports a longstanding history of constipation and going up to a few days without a bowel movement.  She will have formed regular stools in between episodes of constipation.  She takes stool softeners with minimal relief noted.  She will typically have a bowel movement 3 times per week.  She has occasional straining.  She does not always feel completely evacuated.  She denies melena, fecal urgency, fecal incontinence, or pain with defecation.  Her appetite is good and her weight is stable.  She denies fever, chills, nausea, vomiting, hematemesis, odynophagia, dysphagia, acid reflux, pyrosis, early satiety, or abdominal pain.  She does endorse fatigue which she associates with Kisqali.    She denies ever having an EGD or colonoscopy done. She denies regular NSAID use or use of blood thinners. She denies tobacco or alcohol use. She denies illicit drug use. Her maternal grandfather has a history of colon cancer.  She has an extensive family history of breast cancer, ovarian cancer, cervical cancer, stomach cancer, and colon cancer.    Review of patient's allergies indicates:  No Known Allergies    Past Medical History:   Diagnosis Date    BRCA1 gene mutation positive 09/09/2024    BRCA1 [id number] ([id number]) - [facility] BRCA1 and BRCA2 gene sequence and deletion/duplication and 85-gene  [employer]-Cancer Panel (85 genes), VUS in NF1    Cancer     Hypertension     Pregnancy      Past Surgical History:   Procedure Laterality Date    INSERTION OF TUNNELED CENTRAL VENOUS CATHETER (CVC) WITH SUBCUTANEOUS PORT N/A 08/14/2024    Procedure: [id number];  Surgeon: Jc Chauhan Jr., MD;  Location: [facility];  Service: General;  Laterality: N/A;     Family History:   family history includes Autism spectrum disorder (age of onset: 3) in her son; BRCA1 Positive in her paternal cousin and paternal cousin; Breast cancer in her maternal aunt and another family member; Breast cancer (age of onset: 40) in her paternal cousin; Breast cancer (age of onset: 42) in her paternal cousin; Cancer in her maternal grandfather; Cervical cancer in her maternal aunt, sister, and another family member; Cervical cancer (age of onset: 25) in an other family member; Cervical cancer (age of onset: 44) in her maternal aunt; Cervical cancer (age of onset: 50) in her maternal aunt and mother; Colon cancer (age of onset: 70) in her maternal grandfather; Diabetes in her father; Heart attack in her paternal uncle; Hypertension in her father, maternal aunt, maternal aunt, mother, paternal grandmother, sister, and another family member; Kidney failure in an other family member; Kidney failure (age of onset: 45) in her maternal aunt; Liver cancer (age of onset: 54) in an other family member; Stomach cancer in an other family member; Thyroid disease in her maternal aunt.    Social History:    reports that she has never smoked. She has never been exposed to tobacco smoke. She has never used smokeless tobacco. She reports that she does not drink alcohol and does not use drugs.    Review of Systems  Negative except as noted in the HPI.      Objective:      Physical Exam  Constitutional:       Appearance: Normal appearance.   HENT:      Head: Normocephalic.      Mouth/Throat:      Mouth: Mucous membranes are moist.   Eyes:       Extraocular Movements: Extraocular movements intact.      Conjunctiva/sclera: Conjunctivae normal.      Pupils: Pupils are equal, round, and reactive to light.   Cardiovascular:      Rate and Rhythm: Normal rate and regular rhythm.      Pulses: Normal pulses.      Heart sounds: Normal heart sounds.   Pulmonary:      Effort: Pulmonary effort is normal.      Breath sounds: Normal breath sounds.   Abdominal:      General: Bowel sounds are normal.      Palpations: Abdomen is soft.   Musculoskeletal:         General: Normal range of motion.      Cervical back: Normal range of motion and neck supple.   Skin:     General: Skin is warm and dry.   Neurological:      General: No focal deficit present.      Mental Status: She is alert and oriented to person, place, and time.   Psychiatric:         Mood and Affect: Mood normal.         Behavior: Behavior normal.         Thought Content: Thought content normal.         Judgment: Judgment normal.         Home Medications:     Current Outpatient Medications   Medication Sig    anastrozole (ARIMIDEX) 1 mg Tab Take 1 tablet (1 mg total) by mouth once daily.    HYDROcodone-acetaminophen (NORCO)  mg per tablet Take 1 tablet by mouth every 6 (six) hours as needed for Pain.    losartan-hydrochlorothiazide 100-25 mg (HYZAAR) 100-25 mg per tablet     prochlorperazine (COMPAZINE) 5 MG tablet Take 2 tablets (10 mg total) by mouth every 6 (six) hours as needed (nausea).    anastrozole (ARIMIDEX) 1 mg Tab Take 1 tablet (1 mg total) by mouth once daily. (Patient not taking: Reported on 5/13/2025.)    citalopram (CELEXA) 10 MG tablet Take 1 tablet (10 mg total) by mouth once daily. (Patient not taking: Reported on 6/9/2025)    linaCLOtide (LINZESS) 145 mcg Cap capsule Take 1 capsule (145 mcg total) by mouth before breakfast.    ondansetron (ZOFRAN) 4 MG tablet Take 1 tablet (4 mg total) by mouth 2 (two) times daily. (Patient not taking: Reported on 6/9/2025)    potassium chloride SA  (K-DUR,KLOR-CON) 20 MEQ tablet Take 1 tablet (20 mEq total) by mouth once daily. (Patient not taking: Reported on 6/9/2025)    ribociclib (KISQALI) 600 mg/day (200 mg x 3) Tab Take 3 tablets (600 mg) by mouth once daily for 21 DAYS followed by a 7 day rest; to complete a 28 day treatment cycle. (Patient not taking: Reported on 6/9/2025)     No current facility-administered medications for this visit.     Laboratory Results:     Recent Results (from the past 12 weeks)   Comprehensive Metabolic Panel    Collection Time: 03/18/25  9:02 AM   Result Value Ref Range    Sodium 140 136 - 145 mmol/L    Potassium 3.7 3.5 - 5.1 mmol/L    Chloride 105 98 - 107 mmol/L    CO2 26 22 - 29 mmol/L    Glucose 179 (H) 74 - 100 mg/dL    Blood Urea Nitrogen 22.3 (H) 7.0 - 18.7 mg/dL    Creatinine 0.75 0.55 - 1.02 mg/dL    Calcium 9.1 8.4 - 10.2 mg/dL    Protein Total 7.7 6.4 - 8.3 gm/dL    Albumin 3.5 3.5 - 5.0 g/dL    Globulin 4.2 (H) 2.4 - 3.5 gm/dL    Albumin/Globulin Ratio 0.8 (L) 1.1 - 2.0 ratio    Bilirubin Total 0.4 <=1.5 mg/dL    ALP 47 40 - 150 unit/L    ALT 23 0 - 55 unit/L    AST 19 5 - 34 unit/L    eGFR >60 mL/min/1.73/m2    Anion Gap 9.0 mEq/L    BUN/Creatinine Ratio 30    Magnesium    Collection Time: 03/18/25  9:02 AM   Result Value Ref Range    Magnesium Level 1.90 1.60 - 2.60 mg/dL   CBC with Differential    Collection Time: 03/18/25  9:02 AM   Result Value Ref Range    WBC 7.62 4.50 - 11.50 x10(3)/mcL    RBC 3.68 (L) 4.20 - 5.40 x10(6)/mcL    Hgb 11.5 (L) 12.0 - 16.0 g/dL    Hct 36.3 (L) 37.0 - 47.0 %    MCV 98.6 (H) 80.0 - 94.0 fL    MCH 31.3 (H) 27.0 - 31.0 pg    MCHC 31.7 (L) 33.0 - 36.0 g/dL    RDW 12.4 11.5 - 17.0 %    Platelet 262 130 - 400 x10(3)/mcL    MPV 9.9 7.4 - 10.4 fL    Neut % 43.8 %    Lymph % 38.7 %    Mono % 6.7 %    Eos % 10.0 %    Basophil % 0.5 %    Imm Grans % 0.3 %    Neut # 3.34 2.1 - 9.2 x10(3)/mcL    Lymph # 2.95 0.6 - 4.6 x10(3)/mcL    Mono # 0.51 0.1 - 1.3 x10(3)/mcL    Eos # 0.76 0 - 0.9  x10(3)/mcL    Baso # 0.04 <=0.2 x10(3)/mcL    Imm Gran # 0.02 0.00 - 0.04 x10(3)/mcL    NRBC% 0.0 %   EKG 12-lead    Collection Time: 03/18/25  9:44 AM   Result Value Ref Range    QRS Duration 84 ms    [facility] QTC Calculation 457 ms   POCT urine pregnancy    Collection Time: 03/18/25 10:33 AM   Result Value Ref Range    POC Preg Test, Ur Negative Negative     Acceptable Yes    Comprehensive Metabolic Panel    Collection Time: 03/26/25  8:35 AM   Result Value Ref Range    Sodium 137 136 - 145 mmol/L    Potassium 3.4 (L) 3.5 - 5.1 mmol/L    Chloride 103 98 - 107 mmol/L    CO2 27 22 - 29 mmol/L    Glucose 211 (H) 74 - 100 mg/dL    Blood Urea Nitrogen 18.9 (H) 7.0 - 18.7 mg/dL    Creatinine 0.94 0.55 - 1.02 mg/dL    Calcium 8.7 8.4 - 10.2 mg/dL    Protein Total 7.9 6.4 - 8.3 gm/dL    Albumin 3.5 3.5 - 5.0 g/dL    Globulin 4.4 (H) 2.4 - 3.5 gm/dL    Albumin/Globulin Ratio 0.8 (L) 1.1 - 2.0 ratio    Bilirubin Total 0.6 <=1.5 mg/dL    ALP 48 40 - 150 unit/L    ALT 21 0 - 55 unit/L    AST 19 11 - 45 unit/L    eGFR >60 mL/min/1.73/m2    Anion Gap 7.0 mEq/L    BUN/Creatinine Ratio 20    Magnesium    Collection Time: 03/26/25  8:35 AM   Result Value Ref Range    Magnesium Level 1.90 1.60 - 2.60 mg/dL   CBC with Differential    Collection Time: 03/26/25  8:35 AM   Result Value Ref Range    WBC 4.55 4.50 - 11.50 x10(3)/mcL    RBC 3.52 (L) 4.20 - 5.40 x10(6)/mcL    Hgb 11.1 (L) 12.0 - 16.0 g/dL    Hct 34.5 (L) 37.0 - 47.0 %    MCV 98.0 (H) 80.0 - 94.0 fL    MCH 31.5 (H) 27.0 - 31.0 pg    MCHC 32.2 (L) 33.0 - 36.0 g/dL    RDW 12.6 11.5 - 17.0 %    Platelet 298 130 - 400 x10(3)/mcL    MPV 9.7 7.4 - 10.4 fL    Neut % 48.4 %    Lymph % 39.3 %    Mono % 3.5 %    Eos % 7.9 %    Basophil % 0.7 %    Imm Grans % 0.2 %    Neut # 2.20 2.1 - 9.2 x10(3)/mcL    Lymph # 1.79 0.6 - 4.6 x10(3)/mcL    Mono # 0.16 0.1 - 1.3 x10(3)/mcL    Eos # 0.36 0 - 0.9 x10(3)/mcL    Baso # 0.03 <=0.2 x10(3)/mcL    Imm Gran # 0.01 0.00 - 0.04  x10(3)/mcL    NRBC% 0.0 %   Comprehensive Metabolic Panel    Collection Time: 04/01/25 12:58 PM   Result Value Ref Range    Sodium 138 136 - 145 mmol/L    Potassium 3.6 3.5 - 5.1 mmol/L    Chloride 106 98 - 107 mmol/L    CO2 31 (H) 22 - 29 mmol/L    Glucose 146 (H) 74 - 100 mg/dL    Blood Urea Nitrogen 17.2 7.0 - 18.7 mg/dL    Creatinine 0.95 0.55 - 1.02 mg/dL    Calcium 9.2 8.4 - 10.2 mg/dL    Protein Total 7.8 6.4 - 8.3 gm/dL    Albumin 3.5 3.5 - 5.0 g/dL    Globulin 4.3 (H) 2.4 - 3.5 gm/dL    Albumin/Globulin Ratio 0.8 (L) 1.1 - 2.0 ratio    Bilirubin Total 0.2 <=1.5 mg/dL    ALP 54 40 - 150 unit/L    ALT 22 0 - 55 unit/L    AST 19 11 - 45 unit/L    eGFR >60 mL/min/1.73/m2    Anion Gap 1.0 mEq/L    BUN/Creatinine Ratio 18    Magnesium    Collection Time: 04/01/25 12:58 PM   Result Value Ref Range    Magnesium Level 1.90 1.60 - 2.60 mg/dL   CBC with Differential    Collection Time: 04/01/25 12:58 PM   Result Value Ref Range    WBC 3.43 (L) 4.50 - 11.50 x10(3)/mcL    RBC 3.35 (L) 4.20 - 5.40 x10(6)/mcL    Hgb 10.8 (L) 12.0 - 16.0 g/dL    Hct 33.4 (L) 37.0 - 47.0 %    MCV 99.7 (H) 80.0 - 94.0 fL    MCH 32.2 (H) 27.0 - 31.0 pg    MCHC 32.3 (L) 33.0 - 36.0 g/dL    RDW 13.3 11.5 - 17.0 %    Platelet 244 130 - 400 x10(3)/mcL    MPV 8.8 7.4 - 10.4 fL    NRBC% 0.0 %   Manual Differential    Collection Time: 04/01/25 12:58 PM   Result Value Ref Range    Neutrophils % 43 (L) 47 - 80 %    Lymphs % 34 13 - 40 %    Monocytes % 1 (L) 2 - 11 %    Eosinophils % 19 (H) 0 - 8 %    Basophils % 3 (H) 0 - 2 %    Neutrophils Abs Calc 1.4749 (L) 2.1 - 9.2 x10(3)/mcL    Basophils Abs 0.1029 0 - 0.2 x10(3)/mcL    Lymphs Abs 1.1662 0.6 - 4.6 x10(3)/mcL    Eosinophils Abs 0.6517 0 - 0.9 x10(3)/mcL    Monocytes Abs 0.0343 (L) 0.1 - 1.3 x10(3)/mcL    Platelets Adequate Normal, Adequate    RBC Morph Normal Normal   EKG 12-lead    Collection Time: 04/01/25  1:30 PM   Result Value Ref Range    QRS Duration 84 ms    [facility]HS QTC Calculation 448 ms    Magnesium    Collection Time: 04/08/25  8:36 AM   Result Value Ref Range    Magnesium Level 2.00 1.60 - 2.60 mg/dL   Comprehensive Metabolic Panel    Collection Time: 04/08/25  8:36 AM   Result Value Ref Range    Sodium 136 136 - 145 mmol/L    Potassium 3.6 3.5 - 5.1 mmol/L    Chloride 103 98 - 107 mmol/L    CO2 28 22 - 29 mmol/L    Glucose 237 (H) 74 - 100 mg/dL    Blood Urea Nitrogen 16.2 7.0 - 18.7 mg/dL    Creatinine 1.15 (H) 0.55 - 1.02 mg/dL    Calcium 8.9 8.4 - 10.2 mg/dL    Protein Total 7.9 6.4 - 8.3 gm/dL    Albumin 3.4 (L) 3.5 - 5.0 g/dL    Globulin 4.5 (H) 2.4 - 3.5 gm/dL    Albumin/Globulin Ratio 0.8 (L) 1.1 - 2.0 ratio    Bilirubin Total 0.6 <=1.5 mg/dL    ALP 63 40 - 150 unit/L    ALT 24 0 - 55 unit/L    AST 17 11 - 45 unit/L    eGFR >60 mL/min/1.73/m2    Anion Gap 5.0 mEq/L    BUN/Creatinine Ratio 14    CBC with Differential    Collection Time: 04/08/25  8:36 AM   Result Value Ref Range    WBC 4.25 (L) 4.50 - 11.50 x10(3)/mcL    RBC 3.35 (L) 4.20 - 5.40 x10(6)/mcL    Hgb 10.7 (L) 12.0 - 16.0 g/dL    Hct 33.0 (L) 37.0 - 47.0 %    MCV 98.5 (H) 80.0 - 94.0 fL    MCH 31.9 (H) 27.0 - 31.0 pg    MCHC 32.4 (L) 33.0 - 36.0 g/dL    RDW 13.7 11.5 - 17.0 %    Platelet 300 130 - 400 x10(3)/mcL    MPV 8.5 7.4 - 10.4 fL    NRBC% 0.0 %   Manual Differential    Collection Time: 04/08/25  8:36 AM   Result Value Ref Range    Neutrophils % 54 47 - 80 %    Lymphs % 36 13 - 40 %    Monocytes % 6 2 - 11 %    Eosinophils % 3 0 - 8 %    Basophils % 1 0 - 2 %    Neutrophils Abs Calc 2.295 2.1 - 9.2 x10(3)/mcL    Basophils Abs 0.0425 0 - 0.2 x10(3)/mcL    Lymphs Abs 1.53 0.6 - 4.6 x10(3)/mcL    Eosinophils Abs 0.1275 0 - 0.9 x10(3)/mcL    Monocytes Abs 0.255 0.1 - 1.3 x10(3)/mcL    Platelets Adequate Normal, Adequate    RBC Morph Normal Normal   POCT urine pregnancy    Collection Time: 04/08/25  9:18 AM   Result Value Ref Range    POC Preg Test, Ur Negative Negative     Acceptable Yes    Comprehensive  Metabolic Panel    Collection Time: 04/15/25  9:30 AM   Result Value Ref Range    Sodium 140 136 - 145 mmol/L    Potassium 3.3 (L) 3.5 - 5.1 mmol/L    Chloride 101 98 - 107 mmol/L    CO2 29 22 - 29 mmol/L    Glucose 226 (H) 74 - 100 mg/dL    Blood Urea Nitrogen 18.1 7.0 - 18.7 mg/dL    Creatinine 0.83 0.55 - 1.02 mg/dL    Calcium 8.8 8.4 - 10.2 mg/dL    Protein Total 7.8 6.4 - 8.3 gm/dL    Albumin 3.2 (L) 3.5 - 5.0 g/dL    Globulin 4.6 (H) 2.4 - 3.5 gm/dL    Albumin/Globulin Ratio 0.7 (L) 1.1 - 2.0 ratio    Bilirubin Total 0.2 <=1.5 mg/dL    ALP 60 40 - 150 unit/L    ALT 18 0 - 55 unit/L    AST 12 11 - 45 unit/L    eGFR >60 mL/min/1.73/m2    Anion Gap 10.0 mEq/L    BUN/Creatinine Ratio 22    Magnesium    Collection Time: 04/15/25  9:30 AM   Result Value Ref Range    Magnesium Level 1.80 1.60 - 2.60 mg/dL   CBC with Differential    Collection Time: 04/15/25  9:30 AM   Result Value Ref Range    WBC 4.29 (L) 4.50 - 11.50 x10(3)/mcL    RBC 3.46 (L) 4.20 - 5.40 x10(6)/mcL    Hgb 11.3 (L) 12.0 - 16.0 g/dL    Hct 34.2 (L) 37.0 - 47.0 %    MCV 98.8 (H) 80.0 - 94.0 fL    MCH 32.7 (H) 27.0 - 31.0 pg    MCHC 33.0 33.0 - 36.0 g/dL    RDW 14.1 11.5 - 17.0 %    Platelet 424 (H) 130 - 400 x10(3)/mcL    MPV 8.3 7.4 - 10.4 fL    Neut % 32.1 %    Lymph % 44.1 %    Mono % 11.0 %    Eos % 11.2 %    Basophil % 1.4 %    Imm Grans % 0.2 %    Neut # 1.38 (L) 2.1 - 9.2 x10(3)/mcL    Lymph # 1.89 0.6 - 4.6 x10(3)/mcL    Mono # 0.47 0.1 - 1.3 x10(3)/mcL    Eos # 0.48 0 - 0.9 x10(3)/mcL    Baso # 0.06 <=0.2 x10(3)/mcL    Imm Gran # 0.01 0.00 - 0.04 x10(3)/mcL    NRBC% 0.0 %   Comprehensive Metabolic Panel    Collection Time: 04/23/25  8:39 AM   Result Value Ref Range    Sodium 138 136 - 145 mmol/L    Potassium 4.5 3.5 - 5.1 mmol/L    Chloride 105 98 - 107 mmol/L    CO2 25 22 - 29 mmol/L    Glucose 204 (H) 74 - 100 mg/dL    Blood Urea Nitrogen 16.4 7.0 - 18.7 mg/dL    Creatinine 1.01 0.55 - 1.02 mg/dL    Calcium 9.0 8.4 - 10.2 mg/dL    Protein  Total 7.9 6.4 - 8.3 gm/dL    Albumin 3.1 (L) 3.5 - 5.0 g/dL    Globulin 4.8 (H) 2.4 - 3.5 gm/dL    Albumin/Globulin Ratio 0.6 (L) 1.1 - 2.0 ratio    Bilirubin Total 0.4 <=1.5 mg/dL    ALP 54 40 - 150 unit/L    ALT 44 0 - 55 unit/L    AST 32 11 - 45 unit/L    eGFR >60 mL/min/1.73/m2    Anion Gap 8.0 mEq/L    BUN/Creatinine Ratio 16    Magnesium    Collection Time: 04/23/25  8:39 AM   Result Value Ref Range    Magnesium Level 2.00 1.60 - 2.60 mg/dL   CBC with Differential    Collection Time: 04/23/25  8:39 AM   Result Value Ref Range    WBC 4.58 4.50 - 11.50 x10(3)/mcL    RBC 3.44 (L) 4.20 - 5.40 x10(6)/mcL    Hgb 11.3 (L) 12.0 - 16.0 g/dL    Hct 35.0 (L) 37.0 - 47.0 %    .7 (H) 80.0 - 94.0 fL    MCH 32.8 (H) 27.0 - 31.0 pg    MCHC 32.3 (L) 33.0 - 36.0 g/dL    RDW 13.4 11.5 - 17.0 %    Platelet 233 130 - 400 x10(3)/mcL    MPV 10.4 7.4 - 10.4 fL    Neut % 48.5 %    Lymph % 36.2 %    Mono % 5.9 %    Eos % 6.8 %    Basophil % 2.4 %    Imm Grans % 0.2 %    Neut # 2.22 2.1 - 9.2 x10(3)/mcL    Lymph # 1.66 0.6 - 4.6 x10(3)/mcL    Mono # 0.27 0.1 - 1.3 x10(3)/mcL    Eos # 0.31 0 - 0.9 x10(3)/mcL    Baso # 0.11 <=0.2 x10(3)/mcL    Imm Gran # 0.01 0.00 - 0.04 x10(3)/mcL    NRBC% 0.0 %   Comprehensive Metabolic Panel    Collection Time: 04/29/25  9:03 AM   Result Value Ref Range    Sodium 137 136 - 145 mmol/L    Potassium 3.8 3.5 - 5.1 mmol/L    Chloride 103 98 - 107 mmol/L    CO2 27 22 - 29 mmol/L    Glucose 229 (H) 74 - 100 mg/dL    Blood Urea Nitrogen 13.3 7.0 - 18.7 mg/dL    Creatinine 0.96 0.55 - 1.02 mg/dL    Calcium 9.0 8.4 - 10.2 mg/dL    Protein Total 7.8 6.4 - 8.3 gm/dL    Albumin 3.3 (L) 3.5 - 5.0 g/dL    Globulin 4.5 (H) 2.4 - 3.5 gm/dL    Albumin/Globulin Ratio 0.7 (L) 1.1 - 2.0 ratio    Bilirubin Total 0.4 <=1.5 mg/dL    ALP 55 40 - 150 unit/L     (H) 0 - 55 unit/L    AST 47 (H) 11 - 45 unit/L    eGFR >60 mL/min/1.73/m2    Anion Gap 7.0 mEq/L    BUN/Creatinine Ratio 14    Magnesium    Collection  Time: 04/29/25  9:03 AM   Result Value Ref Range    Magnesium Level 2.00 1.60 - 2.60 mg/dL   CBC with Differential    Collection Time: 04/29/25  9:03 AM   Result Value Ref Range    WBC 4.25 (L) 4.50 - 11.50 x10(3)/mcL    RBC 3.53 (L) 4.20 - 5.40 x10(6)/mcL    Hgb 11.4 (L) 12.0 - 16.0 g/dL    Hct 34.8 (L) 37.0 - 47.0 %    MCV 98.6 (H) 80.0 - 94.0 fL    MCH 32.3 (H) 27.0 - 31.0 pg    MCHC 32.8 (L) 33.0 - 36.0 g/dL    RDW 13.3 11.5 - 17.0 %    Platelet 395 130 - 400 x10(3)/mcL    MPV 8.8 7.4 - 10.4 fL    NRBC% 0.0 %   Manual Differential    Collection Time: 04/29/25  9:03 AM   Result Value Ref Range    Neutrophils % 42 (L) 47 - 80 %    Lymphs % 48 (H) 13 - 40 %    Monocytes % 2 2 - 11 %    Eosinophils % 4 0 - 8 %    Basophils % 2 0 - 2 %    Abnormal Lymphs % 2 %    Neutrophils Abs Calc 1.785 (L) 2.1 - 9.2 x10(3)/mcL    Basophils Abs 0.085 0 - 0.2 x10(3)/mcL    Lymphs Abs 2.04 0.6 - 4.6 x10(3)/mcL    Eosinophils Abs 0.17 0 - 0.9 x10(3)/mcL    Monocytes Abs 0.085 (L) 0.1 - 1.3 x10(3)/mcL    Platelets Adequate Normal, Adequate    Anisocytosis Slight (A) (none)   POCT urine pregnancy    Collection Time: 04/29/25 10:31 AM   Result Value Ref Range    POC Preg Test, Ur Negative Negative     Acceptable Yes    Comprehensive Metabolic Panel    Collection Time: 05/13/25 12:22 PM   Result Value Ref Range    Sodium 140 136 - 145 mmol/L    Potassium 3.4 (L) 3.5 - 5.1 mmol/L    Chloride 104 98 - 107 mmol/L    CO2 30 (H) 22 - 29 mmol/L    Glucose 151 (H) 74 - 100 mg/dL    Blood Urea Nitrogen 16.2 7.0 - 18.7 mg/dL    Creatinine 0.91 0.55 - 1.02 mg/dL    Calcium 9.3 8.4 - 10.2 mg/dL    Protein Total 8.0 6.4 - 8.3 gm/dL    Albumin 3.4 (L) 3.5 - 5.0 g/dL    Globulin 4.6 (H) 2.4 - 3.5 gm/dL    Albumin/Globulin Ratio 0.7 (L) 1.1 - 2.0 ratio    Bilirubin Total 0.2 <=1.5 mg/dL    ALP 54 40 - 150 unit/L    ALT 52 0 - 55 unit/L    AST 24 11 - 45 unit/L    eGFR >60 mL/min/1.73/m2    Anion Gap 6.0 mEq/L    BUN/Creatinine Ratio 18     Magnesium    Collection Time: 05/13/25 12:22 PM   Result Value Ref Range    Magnesium Level 1.90 1.60 - 2.60 mg/dL   CBC with Differential    Collection Time: 05/13/25 12:22 PM   Result Value Ref Range    WBC 5.47 4.50 - 11.50 x10(3)/mcL    RBC 3.41 (L) 4.20 - 5.40 x10(6)/mcL    Hgb 11.6 (L) 12.0 - 16.0 g/dL    Hct 35.4 (L) 37.0 - 47.0 %    .8 (H) 80.0 - 94.0 fL    MCH 34.0 (H) 27.0 - 31.0 pg    MCHC 32.8 (L) 33.0 - 36.0 g/dL    RDW 14.4 11.5 - 17.0 %    Platelet 233 130 - 400 x10(3)/mcL    MPV 8.8 7.4 - 10.4 fL    Neut % 18.2 %    Lymph % 55.6 %    Mono % 10.1 %    Eos % 14.1 %    Basophil % 1.8 %    Imm Grans % 0.2 %    Neut # 1.00 (L) 2.1 - 9.2 x10(3)/mcL    Lymph # 3.04 0.6 - 4.6 x10(3)/mcL    Mono # 0.55 0.1 - 1.3 x10(3)/mcL    Eos # 0.77 0 - 0.9 x10(3)/mcL    Baso # 0.10 <=0.2 x10(3)/mcL    Imm Gran # 0.01 0.00 - 0.04 x10(3)/mcL    NRBC% 0.0 %   Comprehensive Metabolic Panel    Collection Time: 05/20/25 10:14 AM   Result Value Ref Range    Sodium 138 136 - 145 mmol/L    Potassium 3.7 3.5 - 5.1 mmol/L    Chloride 103 98 - 107 mmol/L    CO2 27 22 - 29 mmol/L    Glucose 256 (H) 74 - 100 mg/dL    Blood Urea Nitrogen 20.3 (H) 7.0 - 18.7 mg/dL    Creatinine 1.02 0.55 - 1.02 mg/dL    Calcium 9.7 8.4 - 10.2 mg/dL    Protein Total 7.9 6.4 - 8.3 gm/dL    Albumin 3.4 (L) 3.5 - 5.0 g/dL    Globulin 4.5 (H) 2.4 - 3.5 gm/dL    Albumin/Globulin Ratio 0.8 (L) 1.1 - 2.0 ratio    Bilirubin Total 0.4 <=1.5 mg/dL    ALP 54 40 - 150 unit/L    ALT 56 (H) 0 - 55 unit/L    AST 25 11 - 45 unit/L    eGFR >60 mL/min/1.73/m2    Anion Gap 8.0 mEq/L    BUN/Creatinine Ratio 20    Magnesium    Collection Time: 05/20/25 10:14 AM   Result Value Ref Range    Magnesium Level 1.80 1.60 - 2.60 mg/dL   CBC with Differential    Collection Time: 05/20/25 10:14 AM   Result Value Ref Range    WBC 6.42 4.50 - 11.50 x10(3)/mcL    RBC 3.33 (L) 4.20 - 5.40 x10(6)/mcL    Hgb 11.4 (L) 12.0 - 16.0 g/dL    Hct 34.0 (L) 37.0 - 47.0 %    .1 (H)  80.0 - 94.0 fL    MCH 34.2 (H) 27.0 - 31.0 pg    MCHC 33.5 33.0 - 36.0 g/dL    RDW 13.8 11.5 - 17.0 %    Platelet 285 130 - 400 x10(3)/mcL    MPV 9.2 7.4 - 10.4 fL    Neut % 47.5 %    Lymph % 34.9 %    Mono % 3.9 %    Eos % 12.3 %    Basophil % 1.2 %    Imm Grans % 0.2 %    Neut # 3.05 2.1 - 9.2 x10(3)/mcL    Lymph # 2.24 0.6 - 4.6 x10(3)/mcL    Mono # 0.25 0.1 - 1.3 x10(3)/mcL    Eos # 0.79 0 - 0.9 x10(3)/mcL    Baso # 0.08 <=0.2 x10(3)/mcL    Imm Gran # 0.01 0.00 - 0.04 x10(3)/mcL    NRBC% 0.0 %   POCT Urine Pregnancy    Collection Time: 05/20/25 12:12 PM   Result Value Ref Range    POC Preg Test, Ur Negative Negative     Acceptable Yes      Imaging Results:     Narrative & Impression  EXAMINATION:  CT CHEST ABDOMEN PELVIS WITH IV CONTRAST (XPD)     CLINICAL HISTORY:  restaging; Malignant neoplasm of unspecified site of left female breast     TECHNIQUE:  CT imaging from the chest through the pelvis after IV contrast.  Axial, coronal and sagittal reformatted images reviewed. Dose length product 2075 mGycm. Automatic exposure control, adjustment of mA/kV or iterative reconstruction technique used to limit radiation dose.     COMPARISON:  CT 01/27/2025     FINDINGS:  Lung and large airways: Very mild atelectasis in the lower left lung.  No suspicious pulmonary nodule identified.     Pleura: No significant pleural fluid.     Mediastinum and flako: Right-sided MediPort catheter tip in the lower SVC.  No pathologically enlarged lymph node identified.     Chest wall/axilla and lower neck: Similar left breast skin thickening.  Reference left axillary lymph node measures 14 mm, previously 21 mm.  Asymmetric soft tissue in the left breast mildly smaller in overall volume.     Liver/biliary: Mild hepatic steatosis.  Somewhat ill-defined hypodense lesion in the superior right liver measures 15 mm, not significantly changed.  Similar adjacent perfusional changes.  No new liver lesion appreciated.  No biliary  dilatation.     Pancreas: Normal.     Spleen: Normal.     Adrenals: Normal.     Genitourinary: Symmetric renal enhancement. No hydronephrosis. Bladder within normal limits. No defined pelvic mass.     Stomach/Bowel: Small hiatal hernia.  Normal caliber small bowel and colon.  No discernible sites of bowel inflammation.     Abdominal/pelvic lymph nodes and peritoneum: No pathologically enlarged lymph node identified. No ascites.     Abdominal/pelvic vasculature: Normal caliber of the abdominal aorta.     Abdominal wall: Normal.     Musculoskeletal: Lytic lesions have similar appearance since January.  Increased number of sclerotic foci during the interval.     Impression:     Left axillary lymph node and asymmetric areas of soft tissue in the left breast show decreased size since 01/27/2025.     No significant change in hypodense right hepatic lesion.     New areas of sclerosis in the axial and appendicular skeleton likely treated bone lesions.      Electronically signed by:Galindo Rankin  Date:                                            04/28/2025  Time:                                           14:55    Assessment/Plan:     Problem List Items Addressed This Visit          Oncology    Macrocytic anemia    See plan for primary diagnosis         Relevant Orders    CBC Auto Differential    Ferritin    Folate    Iron and TIBC    Vitamin B12    Case Request Endoscopy: COLONOSCOPY (Completed)       GI    Hematochezia - Primary    Resolved  Recommend soluble fiber supplementation  Avoid straining or sitting on the toilet for long periods of time  Start Linzess 145 mcg daily  CBC, iron profile, ferritin, folate, B12  Colonoscopy  Hemorrhoidal banding discussed   ER precautions provided   Call with updates   Follow-up clinic visit with NP in 6 months (after date of scheduled procedure)         Relevant Orders    CBC Auto Differential    Ferritin    Folate    Iron and TIBC    Vitamin B12    Case Request Endoscopy: COLONOSCOPY  (Completed)    Chronic idiopathic constipation    See above  Recommend soluble fiber supplementation  Avoid straining or sitting on the toilet for long periods of time  Start Linzess 145 mcg daily         Relevant Medications    linaCLOtide (LINZESS) 145 mcg Cap capsule    Other Relevant Orders    Case Request Endoscopy: COLONOSCOPY (Completed)

## 2025-06-09 NOTE — ASSESSMENT & PLAN NOTE
See above  Recommend soluble fiber supplementation  Avoid straining or sitting on the toilet for long periods of time  Start Linzess 145 mcg daily

## 2025-06-10 ENCOUNTER — LAB VISIT (OUTPATIENT)
Dept: HEMATOLOGY/ONCOLOGY | Facility: CLINIC | Age: 35
End: 2025-06-10
Payer: MEDICAID

## 2025-06-10 ENCOUNTER — OFFICE VISIT (OUTPATIENT)
Dept: HEMATOLOGY/ONCOLOGY | Facility: CLINIC | Age: 35
End: 2025-06-10
Payer: MEDICAID

## 2025-06-10 ENCOUNTER — INFUSION (OUTPATIENT)
Dept: INFUSION THERAPY | Facility: HOSPITAL | Age: 35
End: 2025-06-10
Attending: INTERNAL MEDICINE
Payer: MEDICAID

## 2025-06-10 VITALS
OXYGEN SATURATION: 96 % | WEIGHT: 233.19 LBS | RESPIRATION RATE: 16 BRPM | SYSTOLIC BLOOD PRESSURE: 115 MMHG | TEMPERATURE: 99 F | BODY MASS INDEX: 37.48 KG/M2 | HEART RATE: 94 BPM | HEIGHT: 66 IN | DIASTOLIC BLOOD PRESSURE: 76 MMHG

## 2025-06-10 VITALS
TEMPERATURE: 98 F | SYSTOLIC BLOOD PRESSURE: 122 MMHG | OXYGEN SATURATION: 96 % | HEART RATE: 97 BPM | RESPIRATION RATE: 20 BRPM | DIASTOLIC BLOOD PRESSURE: 83 MMHG

## 2025-06-10 DIAGNOSIS — T45.1X5A HOT FLASHES RELATED TO AROMATASE INHIBITOR THERAPY: ICD-10-CM

## 2025-06-10 DIAGNOSIS — T45.1X5A IMMUNODEFICIENCY DUE TO CHEMOTHERAPY: ICD-10-CM

## 2025-06-10 DIAGNOSIS — C79.51 SECONDARY MALIGNANCY OF LUMBAR VERTEBRAL COLUMN: ICD-10-CM

## 2025-06-10 DIAGNOSIS — C50.912 INVASIVE DUCTAL CARCINOMA OF LEFT BREAST: Primary | ICD-10-CM

## 2025-06-10 DIAGNOSIS — C78.7 ADENOCARCINOMA OF BREAST METASTATIC TO LIVER, UNSPECIFIED LATERALITY: ICD-10-CM

## 2025-06-10 DIAGNOSIS — C50.911 INVASIVE DUCTAL CARCINOMA OF BREAST, RIGHT: ICD-10-CM

## 2025-06-10 DIAGNOSIS — C50.919 ADENOCARCINOMA OF BREAST METASTATIC TO LIVER, UNSPECIFIED LATERALITY: ICD-10-CM

## 2025-06-10 DIAGNOSIS — C79.51 SECONDARY MALIGNANCY OF THORACIC VERTEBRAL COLUMN: ICD-10-CM

## 2025-06-10 DIAGNOSIS — D84.821 IMMUNODEFICIENCY DUE TO CHEMOTHERAPY: ICD-10-CM

## 2025-06-10 DIAGNOSIS — Z79.811 LONG TERM CURRENT USE OF AROMATASE INHIBITOR: ICD-10-CM

## 2025-06-10 DIAGNOSIS — R23.2 HOT FLASHES RELATED TO AROMATASE INHIBITOR THERAPY: ICD-10-CM

## 2025-06-10 DIAGNOSIS — Z79.69 IMMUNODEFICIENCY DUE TO CHEMOTHERAPY: ICD-10-CM

## 2025-06-10 LAB
ALBUMIN SERPL-MCNC: 3.5 G/DL (ref 3.5–5)
ALBUMIN/GLOB SERPL: 0.7 RATIO (ref 1.1–2)
ALP SERPL-CCNC: 56 UNIT/L (ref 40–150)
ALT SERPL-CCNC: 21 UNIT/L (ref 0–55)
ANION GAP SERPL CALC-SCNC: 8 MEQ/L
AST SERPL-CCNC: 18 UNIT/L (ref 11–45)
B-HCG UR QL: NEGATIVE
BASOPHILS # BLD AUTO: 0.1 X10(3)/MCL
BASOPHILS NFR BLD AUTO: 1.5 %
BILIRUB SERPL-MCNC: 0.4 MG/DL
BUN SERPL-MCNC: 18.1 MG/DL (ref 7–18.7)
CALCIUM SERPL-MCNC: 9.9 MG/DL (ref 8.4–10.2)
CHLORIDE SERPL-SCNC: 102 MMOL/L (ref 98–107)
CO2 SERPL-SCNC: 29 MMOL/L (ref 22–29)
CREAT SERPL-MCNC: 1 MG/DL (ref 0.55–1.02)
CREAT/UREA NIT SERPL: 18
CTP QC/QA: YES
EOSINOPHIL # BLD AUTO: 0.52 X10(3)/MCL (ref 0–0.9)
EOSINOPHIL NFR BLD AUTO: 8 %
ERYTHROCYTE [DISTWIDTH] IN BLOOD BY AUTOMATED COUNT: 14 % (ref 11.5–17)
GFR SERPLBLD CREATININE-BSD FMLA CKD-EPI: >60 ML/MIN/1.73/M2
GLOBULIN SER-MCNC: 5.2 GM/DL (ref 2.4–3.5)
GLUCOSE SERPL-MCNC: 201 MG/DL (ref 74–100)
HCT VFR BLD AUTO: 35.5 % (ref 37–47)
HGB BLD-MCNC: 11.9 G/DL (ref 12–16)
IMM GRANULOCYTES # BLD AUTO: 0.02 X10(3)/MCL (ref 0–0.04)
IMM GRANULOCYTES NFR BLD AUTO: 0.3 %
LYMPHOCYTES # BLD AUTO: 2.69 X10(3)/MCL (ref 0.6–4.6)
LYMPHOCYTES NFR BLD AUTO: 41.1 %
MAGNESIUM SERPL-MCNC: 2.1 MG/DL (ref 1.6–2.6)
MCH RBC QN AUTO: 35.1 PG (ref 27–31)
MCHC RBC AUTO-ENTMCNC: 33.5 G/DL (ref 33–36)
MCV RBC AUTO: 104.7 FL (ref 80–94)
MONOCYTES # BLD AUTO: 0.72 X10(3)/MCL (ref 0.1–1.3)
MONOCYTES NFR BLD AUTO: 11 %
NEUTROPHILS # BLD AUTO: 2.49 X10(3)/MCL (ref 2.1–9.2)
NEUTROPHILS NFR BLD AUTO: 38.1 %
NRBC BLD AUTO-RTO: 0 %
PLATELET # BLD AUTO: 317 X10(3)/MCL (ref 130–400)
PMV BLD AUTO: 8.6 FL (ref 7.4–10.4)
POTASSIUM SERPL-SCNC: 3.7 MMOL/L (ref 3.5–5.1)
PROT SERPL-MCNC: 8.7 GM/DL (ref 6.4–8.3)
RBC # BLD AUTO: 3.39 X10(6)/MCL (ref 4.2–5.4)
SODIUM SERPL-SCNC: 139 MMOL/L (ref 136–145)
WBC # BLD AUTO: 6.54 X10(3)/MCL (ref 4.5–11.5)

## 2025-06-10 PROCEDURE — 63600175 PHARM REV CODE 636 W HCPCS: Mod: TB

## 2025-06-10 PROCEDURE — 25000003 PHARM REV CODE 250

## 2025-06-10 PROCEDURE — 83735 ASSAY OF MAGNESIUM: CPT

## 2025-06-10 PROCEDURE — 96413 CHEMO IV INFUSION 1 HR: CPT

## 2025-06-10 PROCEDURE — 85025 COMPLETE CBC W/AUTO DIFF WBC: CPT

## 2025-06-10 PROCEDURE — 1159F MED LIST DOCD IN RCRD: CPT | Mod: CPTII,,,

## 2025-06-10 PROCEDURE — 3008F BODY MASS INDEX DOCD: CPT | Mod: CPTII,,,

## 2025-06-10 PROCEDURE — 99215 OFFICE O/P EST HI 40 MIN: CPT | Mod: S$PBB,,,

## 2025-06-10 PROCEDURE — 1160F RVW MEDS BY RX/DR IN RCRD: CPT | Mod: CPTII,,,

## 2025-06-10 PROCEDURE — 3074F SYST BP LT 130 MM HG: CPT | Mod: CPTII,,,

## 2025-06-10 PROCEDURE — A4216 STERILE WATER/SALINE, 10 ML: HCPCS

## 2025-06-10 PROCEDURE — 99214 OFFICE O/P EST MOD 30 MIN: CPT | Mod: PBBFAC

## 2025-06-10 PROCEDURE — 81025 URINE PREGNANCY TEST: CPT

## 2025-06-10 PROCEDURE — 3078F DIAST BP <80 MM HG: CPT | Mod: CPTII,,,

## 2025-06-10 PROCEDURE — 80053 COMPREHEN METABOLIC PANEL: CPT

## 2025-06-10 RX ORDER — PROCHLORPERAZINE EDISYLATE 5 MG/ML
10 INJECTION INTRAMUSCULAR; INTRAVENOUS ONCE AS NEEDED
Status: CANCELLED
Start: 2025-06-10

## 2025-06-10 RX ORDER — EPINEPHRINE 0.3 MG/.3ML
0.3 INJECTION SUBCUTANEOUS ONCE AS NEEDED
Status: CANCELLED | OUTPATIENT
Start: 2025-06-10

## 2025-06-10 RX ORDER — HEPARIN 100 UNIT/ML
500 SYRINGE INTRAVENOUS
Status: DISCONTINUED | OUTPATIENT
Start: 2025-06-10 | End: 2025-06-10 | Stop reason: HOSPADM

## 2025-06-10 RX ORDER — PROCHLORPERAZINE EDISYLATE 5 MG/ML
10 INJECTION INTRAMUSCULAR; INTRAVENOUS ONCE AS NEEDED
Status: DISCONTINUED | OUTPATIENT
Start: 2025-06-10 | End: 2025-06-10 | Stop reason: HOSPADM

## 2025-06-10 RX ORDER — DIPHENHYDRAMINE HYDROCHLORIDE 50 MG/ML
50 INJECTION, SOLUTION INTRAMUSCULAR; INTRAVENOUS ONCE AS NEEDED
Status: DISCONTINUED | OUTPATIENT
Start: 2025-06-10 | End: 2025-06-10 | Stop reason: HOSPADM

## 2025-06-10 RX ORDER — HEPARIN 100 UNIT/ML
500 SYRINGE INTRAVENOUS
Status: CANCELLED | OUTPATIENT
Start: 2025-06-10

## 2025-06-10 RX ORDER — SODIUM CHLORIDE 0.9 % (FLUSH) 0.9 %
10 SYRINGE (ML) INJECTION
Status: DISCONTINUED | OUTPATIENT
Start: 2025-06-10 | End: 2025-06-10 | Stop reason: HOSPADM

## 2025-06-10 RX ORDER — DIPHENHYDRAMINE HYDROCHLORIDE 50 MG/ML
50 INJECTION, SOLUTION INTRAMUSCULAR; INTRAVENOUS ONCE AS NEEDED
Status: CANCELLED | OUTPATIENT
Start: 2025-06-10

## 2025-06-10 RX ORDER — EPINEPHRINE 1 MG/ML
0.3 INJECTION INTRAMUSCULAR; INTRAVENOUS; SUBCUTANEOUS ONCE AS NEEDED
Status: DISCONTINUED | OUTPATIENT
Start: 2025-06-10 | End: 2025-06-10 | Stop reason: HOSPADM

## 2025-06-10 RX ORDER — SODIUM CHLORIDE 0.9 % (FLUSH) 0.9 %
10 SYRINGE (ML) INJECTION
Status: CANCELLED | OUTPATIENT
Start: 2025-06-10

## 2025-06-10 RX ADMIN — SODIUM CHLORIDE: 9 INJECTION, SOLUTION INTRAVENOUS at 11:06

## 2025-06-10 RX ADMIN — TRASTUZUMAB-ANNS 653 MG: 420 INJECTION, POWDER, LYOPHILIZED, FOR SOLUTION INTRAVENOUS at 11:06

## 2025-06-10 RX ADMIN — Medication 10 ML: at 12:06

## 2025-06-10 RX ADMIN — HEPARIN 500 UNITS: 100 SYRINGE at 12:06

## 2025-06-10 NOTE — NURSING
MAYA Allen NP escorted pt to [facility] for C12 [place], no complaints, UPT negative and labs meet parameters; right port accessed & proceded with treatment.

## 2025-06-10 NOTE — PROGRESS NOTES
[facility] Hematology/Oncology  PROGRESS NOTE    Subjective:       Patient ID: Jeannie Greene is a 34 y.o. female.    Diagnosis:   -IDC right breast:  IDC, grade 2, lymph node biopsy negative, ER positive, CA positive, HER2 negative; S/P core biopsy 07/17/2024; cT1c cN0 MX, AJCC anatomic stage at least IA, clinical prognostic stage IA  -inflammatory carcinoma left breast, grade 3, lymph node biopsy positive, ER positive, CA negative, HER2 positive;   cT4d cN2 M1, stage IV; S/P core biopsy 07/16/2024  -Thoracolumbar vertebrae and sacrum metastatic involvement.   -FDG PET-CT for staging 09/30/2024: Sites of disease:  Multifocal, left breast; left axillary and subpectoral lymphadenopathy; small focus upper right breast; bilobar liver metastases; portal caval lymph node; multifocal bone metastases  -Monoallelic mutation of BRCA1 gene   -premenopausal   -family history of breast cancer, ovarian cancer, cervical cancer, stomach cancer, colon cancer    Current Treatment:  -trastuzumab 8 mg/kg IV day 1 cycle 1; followed by 6 mg/kg IV day 1 beginning with cycle 2  start 10/8/24     xgeva 120mg SQ every 28 days   start 12/31/24     Lupron 22.5 mg IM every 12 weeks started on 2/18/2025    Arimidex and Ribociclib started 03/19/2025    Treatment History:  -MediPort placed 08/14/2024   -Mirena removed on 9/18/24    Tamoxifen stopped 03/18/2025    Chief Complaint: Follow-up (Patient reports no new concerns as of today.)    HPI:  34-year-old lady, referred from [facility] surgery, with invasive ductal carcinoma of breast.  We are following her for metastatic breast cancer.    Please see assessment and plan section for details.     08/29/2024:   Pleasant lady who presents for initial medical oncology consultation.  In no acute discomfort.  Hot flashes since May 2024.  Left breast is painful; gets sharp pains intermittently, 9/10 severity; also, pain in left arm but no swelling.  Pain is intermittent.  Left breast tumor is getting bigger.  No  ulceration.  Since yesterday, 08/28/2024, has a experienced painless diarrhea with blood on toilet wipes as well as in toilet bowl; no weakness or dizziness; 5 loose stools yesterday, 2 loose stools today; no nausea, vomiting, hematemesis, or melena.  Never experienced GI bleeding before.  No unusual headaches, focal neurological symptoms, chest pain, cough, dyspnea, abdominal pain, nausea or vomiting.  No urinary problems.  No bone pains.  Appetite is okay.  No unintentional weight loss.    08/09/2024:  [facility] surgery Office note:   CC: b/l breast IDC     HPI: Jeannie Greene is a 34 y.o. female with PMHx of HTN, R breast IDC Grade 2, and L breast/axillary ICD Grade 3 presents to clinic today with L breast and arm pain.   She first noticed the pain and swelling in L breast in May where she presented to the ED and received antibiotics. Pain did not resolve so patient followed up with her OB/Gyn who ordered the imaging and biopsy and was subsequently referred to our clinic. Today, patient reports swelling and pain in left breast, axilla, and down her arm. No pain in right breast, but has had some milky discharge in the past from R nipple. She states her pain as a 6/10 that was at its worse in July and has improved some since. She takes ibuprofen 4x daily without relief.    Patient had first menarche at 11. One pregnancy with child and not gone through menopause. She was on OCP at 15yo, received Depo shot from [age]. No contraception from [age] when she got pregnant. Got Murina IUD after pregnancy at 30  that is still in place.  Pt has an extensive family history of breast and cervical cancer. Her mother and sister had cervical cancer. Her mother had a hysterectomy. She also believes her maternal aunt had breast cancer. She only takes losartan for HTN. No prior surgeries.  Physical exam:   # right breast: 3 cm mass 12 o'clock, 4 in above nipple, no skin changes, no nipple retraction or discharge, no palpable right  axillary lymphadenopathy  # left breast: Swollen and hard; large mass appreciated two o'clock position right above breast, 1 hard immobile lymph node in left axilla, breast and axilla tender to palpation (inflammatory carcinoma left breast)    Past medical history: Hypertension  Surgical/procedure history:  MediPort placement 08/14/2024    Social history: .  Lives in [place].  Has a 3-year-old son.  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  Denies history of tobacco, alcohol, or illicit drug abuse.  Family history:   -Maternal great aunt # 1: Breast cancer, in her 50s   -sister: Ovarian cancer, age 50  -sister: Cervical cancer, age 25  -mother: Ovarian cancer, age 50  -Maternal aunt: Cervical cancer, age 50  -maternal grandfather: Stomach cancer, age 70 (smoker)  -maternal great aunt # 2: Colon cancer, age 60  Health maintenance: PCP at [facility].  No screening colonoscopy ever.  Menstrual/Ob gyn history: Menarche, age 11; 1 pregnancy, 1 child; gave birth to her child at age 30; no abortions or miscarriages premenopausal; OCP at age 16, received Depo shots from age [age]; no contraception from age [age] when she became pregnant; Mirena IUD after pregnancy at age 30; experienced hot flashes.  No menstrual cycles after Mirena was placed.        Interval History:  Patient presents to clinic for follow-up and . She continues on arimidex daily and ribociclib 600 mg on days 1-21. New cycle of ribociclib to start on 6/11/2025 until 7/01//2025. She does report hot flashes that have improved with Vitamin E. She is also reporting fatigue while on Ribociclib. Denies any joint stiffness, mood changes, vaginal dryness, chest pain, shortness of breath.  She is also taking calcium and vitamin-D daily.  She is due for Xgeva on 6/17/2025.  She received Lupron 22.5 mg on 5/13/2025, next due 8/5/2025 .She continues on Norco 10 mg every 6 hours as needed for pain.  Echo on 03/10/2025 with ejection fraction of  60%, next due 6/16/2025. She was seen by GI clinic on yesterday, started on Linzess for constipation and colonoscopy ordered. Labs reviewed in detail with patient and we discussed plan of care, she verbalized understanding    PMX/PSHx  Past Medical History:   Diagnosis Date    BRCA1 gene mutation positive 09/09/2024    BRCA1 [id number] ([id number]) - [facility] BRCA1 and BRCA2 gene sequence and deletion/duplication and 85-gene CustomNext-Cancer Panel (85 genes), VUS in NF1    Cancer     Hypertension     Pregnancy       Past Surgical History:   Procedure Laterality Date    INSERTION OF TUNNELED CENTRAL VENOUS CATHETER (CVC) WITH SUBCUTANEOUS PORT N/A 08/14/2024    Procedure: [id number];  Surgeon: Jc Chauhan Jr., MD;  Location: [facility];  Service: General;  Laterality: N/A;     Allergies:  Review of patient's allergies indicates:  No Known Allergies   Social History:   Social History[1]  Medications:  Current Outpatient Medications   Medication Instructions    anastrozole (ARIMIDEX) 1 mg, Oral, Daily    anastrozole (ARIMIDEX) 1 mg, Oral, Daily    citalopram (CELEXA) 10 mg, Oral, Daily    HYDROcodone-acetaminophen (NORCO)  mg per tablet 1 tablet, Oral, Every 6 hours PRN    linaCLOtide (LINZESS) 145 mcg, Oral, Before breakfast    losartan-hydrochlorothiazide 100-25 mg (HYZAAR) 100-25 mg per tablet     ondansetron (ZOFRAN) 4 mg, Oral, 2 times daily    potassium chloride SA (K-DUR,KLOR-CON) 20 MEQ tablet 20 mEq, Oral, Daily    prochlorperazine (COMPAZINE) 10 mg, Oral, Every 6 hours PRN    ribociclib (KISQALI) 600 mg/day (200 mg x 3) Tab Take 3 tablets (600 mg) by mouth once daily for 21 DAYS followed by a 7 day rest; to complete a 28 day treatment cycle.     ROS:  Review of Systems   Constitutional:  Positive for fatigue. Negative for appetite change, chills, fever and unexpected weight change.   HENT:  Negative for facial swelling, mouth sores, nosebleeds, sinus pressure/congestion and sore  throat.    Eyes:  Negative for photophobia, pain and visual disturbance.   Respiratory:  Negative for cough, chest tightness, shortness of breath and wheezing.    Cardiovascular:  Negative for chest pain, palpitations and leg swelling.   Gastrointestinal:  Negative for abdominal pain, blood in stool, change in bowel habit, constipation, diarrhea, nausea and vomiting.   Genitourinary:  Positive for hot flashes. Negative for dysuria, frequency, hematuria, pelvic pain, urgency and vaginal pain.   Musculoskeletal:  Negative for arthralgias, back pain, gait problem, joint swelling and myalgias.   Integumentary:  Negative for pallor, rash, wound, breast mass, breast discharge and breast tenderness.   Allergic/Immunologic: Negative.  Negative for immunocompromised state.   Neurological:  Negative for dizziness, vertigo, syncope, weakness, numbness and headaches.   Hematological:  Negative for adenopathy. Does not bruise/bleed easily.   Psychiatric/Behavioral:  Negative for behavioral problems and dysphoric mood. The patient is not nervous/anxious.    All other systems reviewed and are negative.  Breast: Negative for mass and tenderness      Objective:   Vitals:  Vitals:    06/10/25 1019   BP: 115/76   Pulse: 94   Resp: 16   Temp: 98.6 °F (37 °C)        Wt Readings from Last 3 Encounters:   06/10/25 1019 105.8 kg (233 lb 3.2 oz)   06/09/25 1247 106.1 kg (234 lb)   05/20/25 1119 106.2 kg (234 lb 3.2 oz)      Physical Examination:  Physical Exam  Vitals and nursing note reviewed.   HENT:      Head: Normocephalic and atraumatic.      Mouth/Throat:      Mouth: Mucous membranes are moist.      Pharynx: Oropharynx is clear.   Eyes:      Extraocular Movements: Extraocular movements intact.      Conjunctiva/sclera: Conjunctivae normal.      Pupils: Pupils are equal, round, and reactive to light.   Cardiovascular:      Rate and Rhythm: Normal rate and regular rhythm.   Pulmonary:      Effort: Pulmonary effort is normal.      Breath  sounds: Normal breath sounds.   Abdominal:      General: Abdomen is flat. Bowel sounds are normal.      Palpations: Abdomen is soft.   Musculoskeletal:         General: Normal range of motion.      Cervical back: Normal range of motion and neck supple.   Skin:     General: Skin is warm and dry.   Neurological:      General: No focal deficit present.      Mental Status: She is alert.   Psychiatric:         Mood and Affect: Mood normal.           5/13/2025 5/20/2025   [facility] ENDOCRINE THERAPY   I have hot flashes/hot flushes 3 2   I have vaginal discharge 0 0   I have vaginal itching/irritation 1 0   I have vaginal bleeding or spotting 0 0   I have vaginal dryness 1 2   I have pain or discomfort with intercourse 0 0   I have lost interest in sex 0 0   I have gained weight 1 1   I have mood swings 3 3   I am irritable 0 0   I have pain in my joints 0 0       ECOG SCORE           Labs:     Infusion on 06/10/2025   Component Date Value Ref Range Status    POC Preg Test, Ur 06/10/2025 Negative  Negative Final     Acceptable 06/10/2025 Yes   Final   Lab Visit on 06/10/2025   Component Date Value Ref Range Status    Sodium 06/10/2025 139  136 - 145 mmol/L Final    Potassium 06/10/2025 3.7  3.5 - 5.1 mmol/L Final    Chloride 06/10/2025 102  98 - 107 mmol/L Final    CO2 06/10/2025 29  22 - 29 mmol/L Final    Glucose 06/10/2025 201 (H)  74 - 100 mg/dL Final    Blood Urea Nitrogen 06/10/2025 18.1  7.0 - 18.7 mg/dL Final    Creatinine 06/10/2025 1.00  0.55 - 1.02 mg/dL Final    Calcium 06/10/2025 9.9  8.4 - 10.2 mg/dL Final    Protein Total 06/10/2025 8.7 (H)  6.4 - 8.3 gm/dL Final    Albumin 06/10/2025 3.5  3.5 - 5.0 g/dL Final    Globulin 06/10/2025 5.2 (H)  2.4 - 3.5 gm/dL Final    Albumin/Globulin Ratio 06/10/2025 0.7 (L)  1.1 - 2.0 ratio Final    Bilirubin Total 06/10/2025 0.4  <=1.5 mg/dL Final    ALP 06/10/2025 56  40 - 150 unit/L Final    ALT 06/10/2025 21  0 - 55 unit/L Final    AST 06/10/2025 18  11 -  45 unit/L Final    eGFR 06/10/2025 >60  mL/min/1.73/m2 Final    Estimated GFR calculated using the CKD-EPI creatinine (2021) equation.    Anion Gap 06/10/2025 8.0  mEq/L Final    BUN/Creatinine Ratio 06/10/2025 18   Final    Magnesium Level 06/10/2025 2.10  1.60 - 2.60 mg/dL Final    WBC 06/10/2025 6.54  4.50 - 11.50 x10(3)/mcL Final    RBC 06/10/2025 3.39 (L)  4.20 - 5.40 x10(6)/mcL Final    Hgb 06/10/2025 11.9 (L)  12.0 - 16.0 g/dL Final    Hct 06/10/2025 35.5 (L)  37.0 - 47.0 % Final    MCV 06/10/2025 104.7 (H)  80.0 - 94.0 fL Final    MCH 06/10/2025 35.1 (H)  27.0 - 31.0 pg Final    MCHC 06/10/2025 33.5  33.0 - 36.0 g/dL Final    RDW 06/10/2025 14.0  11.5 - 17.0 % Final    Platelet 06/10/2025 317  130 - 400 x10(3)/mcL Final    MPV 06/10/2025 8.6  7.4 - 10.4 fL Final    Neut % 06/10/2025 38.1  % Final    Lymph % 06/10/2025 41.1  % Final    Mono % 06/10/2025 11.0  % Final    Eos % 06/10/2025 8.0  % Final    Basophil % 06/10/2025 1.5  % Final    Imm Grans % 06/10/2025 0.3  % Final    Neut # 06/10/2025 2.49  2.1 - 9.2 x10(3)/mcL Final    Lymph # 06/10/2025 2.69  0.6 - 4.6 x10(3)/mcL Final    Mono # 06/10/2025 0.72  0.1 - 1.3 x10(3)/mcL Final    Eos # 06/10/2025 0.52  0 - 0.9 x10(3)/mcL Final    Baso # 06/10/2025 0.10  <=0.2 x10(3)/mcL Final    Imm Gran # 06/10/2025 0.02  0.00 - 0.04 x10(3)/mcL Final    NRBC% 06/10/2025 0.0  % Final           Pathology:  -IDC right breast:  IDC, grade 2, lymph node biopsy negative, ER positive, SD positive, HER2 negative; S/P core biopsy 07/17/2024; cT1c cN0 MX, AJCC anatomic stage at least IA, clinical prognostic stage IA  -inflammatory carcinoma left breast, grade 3, lymph node biopsy positive, ER positive, SD negative, HER2 positive; S/P core biopsy 07/16/2024; cT4d cN2 M1, stage IV    -09/16/2024: Patient referred to IR for biopsy of suspicious bone lesions  -liquid biopsy 09/19/2024:  BRCA1 positive; HER2 positive;TMB could not be calculated due to insufficient circulating tumor  DNA; MSI-high not detected    -CT-guided biopsy left iliac crest 10/17/2024:  Metastases from breast carcinoma  -germline testing: BRCA1 mutation positive  -tissue NGS testing (left breast, collected 07/16/2024):  BRCA1 mutation positive; HER2 positive; MSI stable; TMB 2.1 m/MB (low); fusion negative; PD-L1 negative (PD-L1 CPS 2; PD-L1 TPS 1%); PIK3CA negative; ESR1 negative    Radiology/Diagnostics:  -baseline TTE 09/05/2024:  LVEF 60-65%   -DEXA scan 09/16/2024:  Normal BMD of lumbar vertebrae end of femoral necks  -baseline staging CTs C/A/P 0 09/16/2024:  Multiple thoracolumbar vertebrae and sacral lytic metastases   -baseline staging whole-body nuclear medicine bone scan 09/16/2024:  Thoracolumbar vertebrae and sacrum metastases  -09/16/2024: Patient referred to IR for biopsy of suspicious bone lesions  -FDG PET-CT for staging 09/30/2024: Sites of disease:  Multifocal, left breast; left axillary and subpectoral lymphadenopathy; small focus upper right breast; bilobar liver metastases; portal caval lymph node; multifocal bone metastases  -brain MRI 12/24/2024:  9 mm metastases anterior aspect of C4 vertebral body  -12/19/2024: Surveillance echocardiogram:  LVEF 67%  -12/23/2024:  Pelvic ultrasound (encounter for non procreative screening for genetic disease carrier status):  Small fibroid; cyst in the right and left ovaries (right ovary cyst 1.5 cm and 1.4 cm; left ovary cyst 1.1 cm and 7 mm)  -restaging CTs C/A/P 01/27/2025:  Overall progression of disease with multiple new and enlarging osseous metastases as well as a new metastatic lesion right liver lobe; the majority of the left axillary metastatic lymphadenopathy has decreased in size; however, there is a single left axillary lymph node which has enlarged; the left breast skin thickening and underlying asymmetry density in the left breast parenchyma is grossly unchanged (2.5 x 2.3 cm left axillary lymph node has increased in size from 2.1 x 1.7 cm  previously; suggestion of a peripherally enhancing nodule dome of the right lobe of the liver 1.9 cm; multiple lucent lytic metastatic lesions throughout the visualized spine which have progressed from the prior exam with multiple new and enlarging lesions, representative 8 mm lucent lesion T11 vertebral body is new, T7 lesion has been increased in size 16 x 12 mm previously 13 x 11 mm)  -restaging whole-body nuclear medicine bone scan 01/27/2025:  Similar uptake in the spine, ribs, and pelvis compared to prior study  -03/03/2025 bilateral diagnostic mammogram and ultrasound:   Bilateral Diagnostic Mammogram:   In the right breast 12:00 axis middle depth, there is an irregular high density spiculated mass with an associated T4-butterfly clip, correlating with outside prior malignant ultrasound-guided core needle biopsy that diagnosed grade 2 invasive ductal carcinoma.  There has been an increase in the size and density of the malignant mass, particularly the anterior and lateral component.     No other new suspicious mammographic finding in the right breast.     In the right axilla, there is a lymph node with an associated mini-T3-coil clip, correlating with outside prior benign ultrasound-guided core needle biopsy.  There has been no significant interval change of this lymph node.     There is an implanted MediPort in the right anterior chest wall which partially obscures evaluation of the right axilla.  No suspicious mammographic finding in the right axilla.     The left breast is contracted and significantly smaller compared to the most recent prior mammogram dated 07/01/2024 (8 months ago).  This contraction is related to a large irregular high density spiculated mass occupying all 4 quadrants posterior to anterior depths with spiculations extending out to the skin, with severe thickening and retraction of the same.  The mass extends into and engulfs the nipple-areolar complex, with retraction and destruction of  the same.  These findings are compatible with the shrunken breast sign due to locally advanced breast cancer.  The irregular high density spiculated mass has its most significant component in the upper-outer quadrant, and there is a T4-butterfly clip in the 2:00 axis middle depth related to outside prior malignant ultrasound-guided core needle biopsy that diagnosed grade 3 invasive ductal carcinoma.  Overall, the mammographic appearance of the malignant process throughout the left breast is worse.       In the left axilla, there is an irregular high density spiculated mass with an associated mini-T3-coil clip, correlating with outside prior malignant ultrasound-guided core needle biopsy that diagnosed grade 3 invasive ductal carcinoma.  Given the location of this malignant mass, it is most compatible with an axillary lymph node that has been entirely replaced by metastatic carcinoma.  This malignant lymph node has significantly increased in size compared to the most recent prior mammogram dated 07/01/2024 (8 months ago).        Bilateral Complete Breast Ultrasound:  Sonographic evaluation of both complete breasts (all 4 quadrants, subareolar, axilla) was performed.       In the right breast 12:00 axis 9 cm FN position, there is a 1.8 x 1.8 x 2.2 cm irregular nonparallel hypoechoic vascular mass with angular margins, correlating with biopsy-proven grade 2 invasive ductal carcinoma.  This has increased in size compared to outside prior ultrasound on 07/01/2024, at which time it measured 1.3 x 1.1 x 1.6 cm.  There is also been an increase in the diameter and hypoechoic intraductal material within the ductal system extending laterally away from the malignant mass, suggestive for extension of malignancy into this ductal system.  Taken together, these findings correlate with the increase in size and density of the right breast 12:00 axis middle depth malignant mass, particularly the anterior and lateral component, as  seen on today's mammogram.     No other new suspicious sonographic finding in the right breast.       No suspicious right axillary adenopathy.  One of the right axillary level 1 lymph nodes has an associated biopsy clip (mini-T3-coil), correlating with the biopsy-proven benign right axillary lymph node.     In the left breast all 4 quadrants subareolar-12 cm FN, there is an irregular hypoechoic spiculated mass with larger confluent portions of discrete masses interconnected by hypoechoic tracts of non-mass lesions.  The most confluent discrete masses are at the 1:00 axis 7 cm FN (5.6 x 4.1 x 5.5 cm), 2:00 axis 9 cm FN (2.5 x 1.3 x 1.4 cm), and 1:00 axis 12 cm FN (0.5 x 0.7 x 0.9 cm) positions.  The malignancy extends into and destroys the nipple-areolar complex.  There is diffuse severe skin thickening, maximal thickness 0.8 cm as measured in the 9:00 axis 1 cm FN position.  Comparison to the outside prior ultrasound on 07/01/2024 is suboptimal due to differences in positioning and technique as well as overall contraction/global decrease in size of the breast in the interval since that prior exam.  Given those limitations, the overall change in sonographic appearance of the malignant process throughout the left breast is worse.   In the left axilla, there is a 3 x 2.3 x 2.6 cm irregular nonparallel hypoechoic spiculated vascular mass with a hyperechoic rind, correlating with biopsy-proven grade 3 invasive ductal carcinoma.  This has increased in size compared to the outside prior ultrasound on 07/01/2024, at which time it measured 1.2 x 1.5 x 1.6 cm.   In the deep aspect of the left axilla level 1 andra station, there has been decrease in size and conspicuity of a morphologically abnormal lymph node identified on the outside prior ultrasound at [facility] on 07/01/2024.      IMPRESSION: KNOWN BIOPSY PROVEN MALIGNANCY, ULTRASOUND KNOWN BIOPSY PROVEN MALIGNANCY   1) Poor response to neoadjuvant  chemotherapy.  The mammographic and sonographic appearance of the malignant process throughout the left breast and in the left axilla is worse compared to the outside prior mammogram and ultrasound at [facility] on 07/01/2024 (8 months ago).  BI-RADS 6: Known biopsy-proven malignancy.   2) Poor response to neoadjuvant chemotherapy.  The right breast 12:00 axis 9 cm FN malignant mass has increased in size, with new involvement of the ductal system extending laterally away from the malignant mass.  BI-RADS 6: Known biopsy-proven malignancy.   3) Review of the patient's restaging CT C/A/P and Tc-99m-MDP bone scan 01/27/2025 revealed progression of metastatic disease, which correlates with today's mammographic and sonographic findings showing progression of disease in both breasts and the left axilla.    -3/10/2025 ECHO: EF 60%  -3/18/2025 EKG: Qtc 457 ms    -04/28/2025: Restaging CTs C/A/P with contrast (comparison: 01/27/2025):  Left axillary lymph node and asymmetric areas of soft tissue in the left breast show decreased size since 01/27/2025.   No significant change in hypodense right hepatic lesion.   New areas of sclerosis in the axial and appendicular skeleton likely treated bone lesions.  -04/28/2025: Restaging whole-body nuclear medicine bone scan:There is similar radiotracer uptake within the spine, ribs, and pelvis compared to the prior study from 01/27/2025     I have reviewed all available lab results and radiology reports.  Assessment:   Invasive ductal carcinoma of right breast/inflammatory carcinoma of left breast  Cycle 4 of Ribociclib to start on 6/11/2025  Proceed with Cycle 12 Kanjinti today   Arimidex 1 mg p.o. q.day   Ribociclib 600 mg p.o. daily X 21 days, then 7 days off to complete 28 day cycle; to continue until disease progression or unacceptable toxicity  Re-stage with contrast-enhanced CT scans of C/A/P and whole-body nuclear medicine bone scan every 3 months- scheduled on  7/28/2025  Echocardiogram every 3 months, due in 6/2025  Lupron 22.5 mg every 12 weeks due 8/5/2025  2. Breast cancer metastasized to bone   Xgeva 120 mg every 4 weeks, next due 6/17/2025  Calcium and vitamin-D supplements to continue   DEXA scan September 2025, 9/15/2025  3.  Hot flashes due to aromatase inhibitor therapy   Continue Vitamin E for hot flashes  Problem List Items Addressed This Visit       Invasive ductal carcinoma of breast, right    Relevant Orders    Comprehensive Metabolic Panel    CBC Auto Differential    Comprehensive Metabolic Panel    Magnesium    CBC Auto Differential    Comprehensive Metabolic Panel    Magnesium    Invasive ductal carcinoma of left breast - Primary    Relevant Orders    Comprehensive Metabolic Panel    CBC Auto Differential    Comprehensive Metabolic Panel    Magnesium    CBC Auto Differential    Comprehensive Metabolic Panel    Magnesium    [Secondary malignant neoplasm of axillary lymph nodes]    Secondary malignancy of lumbar vertebral column    Relevant Orders    Comprehensive Metabolic Panel    CBC Auto Differential    Comprehensive Metabolic Panel    Magnesium    CBC Auto Differential    Comprehensive Metabolic Panel    Magnesium    [Secondary malignancy of sacrum]    Secondary malignancy of thoracic vertebral column    Relevant Orders    Comprehensive Metabolic Panel    CBC Auto Differential    Comprehensive Metabolic Panel    Magnesium    CBC Auto Differential    Comprehensive Metabolic Panel    Magnesium    Immunodeficiency due to chemotherapy    Relevant Orders    Comprehensive Metabolic Panel    CBC Auto Differential    Comprehensive Metabolic Panel    Magnesium    CBC Auto Differential    Comprehensive Metabolic Panel    Magnesium    Hot flashes related to aromatase inhibitor therapy    Relevant Orders    Comprehensive Metabolic Panel    CBC Auto Differential    Comprehensive Metabolic Panel    Magnesium    CBC Auto Differential    Comprehensive Metabolic Panel     Magnesium    Long term current use of aromatase inhibitor    Relevant Orders    Comprehensive Metabolic Panel    CBC Auto Differential    Comprehensive Metabolic Panel    Magnesium    CBC Auto Differential    Comprehensive Metabolic Panel    Magnesium                 Plan:   06/10/2025  Labs and exam stable  Proceed with Cycle 12 Kanjinti  Continue Vitamin E for hot flashes   Continue Armidex + Ribociclib, new cycle to start on 06/11/2025  Arimidex 1 mg p.o. q.day   Ribociclib 600 mg p.o. daily X 21 days, then 7 days off to complete 28 day cycle; to continue until disease progression or unacceptable toxicity  Re-stage with contrast-enhanced CT scans of C/A/P and whole-body nuclear medicine bone scan every 3 months- 7/2025  Follow-up with gyn for ovarian cancer surveillance, 1/05/2026  Follow up with GI for evaluation of hematochezia, 1/5/2025  Xgeva 120 mg every 4 weeks due 6/17/2025 with CMP   Calcium and vitamin-D supplements to continue   DEXA scan September 2025, 9/15/2025  Echocardiogram every 3 months, 6/16/2025  Lupron 22.5 mg every 12 weeks, given on 5/13/2025, next due 8/05/2025  Cycle 13 Kanjint and labs due 7/1/2025  RTC on 7/8/2025 labs/NP/Cycle 5 Ribiciclib   CBC CMP MG      Providers:           Orders for 04/29/2025:   Continue Herceptin +Lupron +anastrazole +ribociclib  Re-stage with contrast-enhanced CT scans of C/A/P end of July  Need the report of bone scan   Follow-up with gyn for ovarian cancer surveillance because of BRCA1 mutation positive status   Check the status of GI referral for history of hematochezia  Continue Xgeva   Calcium and vitamin-D supplements   DEXA scan in September 2026   Echocardiogram middle of June  Follow-up with NP in 1 month     Above discussed with her.  All questions answered.    Discussed labs and scans and gave her copies of relevant results.  Plan of management discussed once again.    She understands and agrees with this  plan.  ====================================     # IDC both breasts, diagnosed concurrently:  #Inflammatory carcinoma left breast:  -presentation:  Pain and swelling left breast 05/2024  -Mirena IUD in place; removed 09/19/2024   -extensive family history of breast cancer and cervical cancer  -physical exam: Right breast: 3 cm mass 12 o'clock position, 4 in above nipple  -physical exam: Left breast: Swollen, hard, large mass to o'clock position, 1 hard immobile lymph node in left axilla, breast and axilla tender to palpation (inflammatory carcinoma of left breast)  -mammogram and ultrasound 07/01/2024  -multiple masses left breast on mammogram and ultrasound  -concurrently diagnosed bilateral breast cancer   -IDC right breast:  IDC, grade 2, lymph node biopsy negative, ER positive, DC positive, HER2 negative; cT1c cN0 MX, AJCC anatomic stage at least IA, clinical prognostic stage IA; S/P needle biopsy of right breast and right axilla (S/P core biopsy 07/17/2024)  -inflammatory carcinoma left breast, grade 3, lymph node biopsy positive, ER positive, DC negative, HER2 positive; cT4d cN2 M1, stage IV; S/P needle biopsy left breast and left axilla (S/P core biopsy 07/16/2024) (By virtue of palpable, fixed lymph node in the left axilla, cN2)  -genetic testing 08/22/2024:  BRCA1 mutation positive, heterozygous  -premenopausal per labs 08/29/2024  -baseline TTE 09/05/2024:  LVEF 60-65%   -DEXA scan 09/16/2024:  Normal BMD of lumbar vertebrae end of femoral necks  -baseline staging CTs C/A/P 0 09/16/2024:  Multiple thoracolumbar vertebrae and sacral lytic metastases   -baseline staging whole-body nuclear medicine bone scan 09/16/2024:  Thoracolumbar vertebrae and sacrum metastases  -09/16/2024: Patient referred to IR for biopsy of suspicious bone lesions  -liquid biopsy 09/19/2024:  BRCA1 positive; HER2 positive;TMB could not be calculated due to insufficient circulating tumor DNA; MSI-high not detected  -09/19/2024:  Baseline tumor markers: CEA 19.74, elevated; CA 27.29, CA 15-3 levels normal  -brain MRI for staging 09/23/2024: No intracranial metastases  -FDG PET-CT for staging 09/30/2024: Sites of disease:  Multifocal, left breast; left axillary and subpectoral lymphadenopathy; small focus upper right breast; bilobar liver metastases; portal caval lymph node; multifocal bone metastases  -no visceral metastases  -tamoxifen plus trastuzumab started 10/08/2024  -CT-guided biopsy left iliac crest 10/17/2024:  Metastases from breast carcinoma  -germline testing: BRCA1 mutation positive  -tissue NGS testing (left breast, collected 07/16/2024):  BRCA1 mutation positive; HER2 positive; MSI stable; TMB 2.1 m/MB (low); fusion negative; PD-L1 negative (PD-L1 CPS 2; PD-L1 TPS 1%); PIK3CA negative; ESR1 negative  -cycle 2 of trastuzumab on 10/29/2024  -Mirena IUD removed 09/18/2024   -surveillance TTE 12/19/2024: LVEF 67%  -pelvic ultrasound 12/23/2024:  Bilateral ovarian cysts, largest 1.5 cm  -brain MRI 12/24/2024: Headaches:  9 mm ovoid metastases anterior aspect of C4 vertebral body  -dental clearance obtained 11/29/2024 (dated 11/26/2024)  -Xgeva for bone metastases, 120 mg subQ every 4 weeks, started 12/31/2024  -01/13/2025:  Gyn consultation/follow-up:  Patient would like to proceed with ParaGard IUD insertion (nonhormonal contraception); annual transvaginal ultrasound and CA-125 level for surveillance in view of BRCA1 mutation status until risk reduction HUA-BSO recommended at age 35-40 or when done with childbearing; given stage IV breast cancer, plan to delay surgery until further prognosis can be made  >>>  # Disease progression on tamoxifen/Herceptin:  -restaging CTs and bone scan 01/27/2025:  Progression of metastases  (Failed tamoxifen +Herceptin)  -new Plan:  Continue Herceptin +start OFS with Lupron every 3 months + continue tamoxifen for a couple of months with Lupron, then switch to Arimidex/ribociclib  -no visceral  metastases, therefore, planned Herceptin +second-line endocrine therapy  -Lupron 22.5 mg IM every 3 months (for OFS), started 02/18/2025  -bilateral diagnostic mammogram 03/03/2025 (will discontinue)  -bilateral breast MRI 03/14/2025 (will discontinue)  -surveillance TTE 03/10/2025: LVEF 60%  -Arimidex/ribociclib started 03/19/2025  -restaging CTs and bone scan 04/28/2025:  Some improvement on CTs; bone scan pending  >>>  Plan:  -Continue Herceptin + Lupron every 12 weeks + anastrazole +ribociclib  -Lupron started 02/18/2025  -Arimidex/ribociclib started 03/19/2025  -possible positive response on restaging CTs 04/28/2025  >>>  re-stage with CTs C/A/P with contrast in 3 months (end of July)  Restaging bone scan is pending  ER positive, ID negative, HER2 positive  Follow-up with gyn for ovarian cancer surveillance (BRCA1 mutation positive)  GI referral for hematochezia  Continue Xgeva   Calcium and vitamin-D supplements to continue  DEXA scan in 2 years (September 2026)  Echocardiogram in 3 months (mid June)     If, at anytime, develops visceral crisis or endocrine refractory disease, then, treatment options:  # first-line:    Pertuzumab +trastuzumab +docetaxel (category 1, Preferred);   pertuzumab +trastuzumab +paclitaxel (Preferred)  (after response, maintenance trastuzumab/pertuzumab + concurrent endocrine therapy)   # second-line:  -Fam trastuzumab deruxtecan (category 1, Preferred)  -tucatinib +trastuzumab +capecitabine is preferred in patients with both systemic and CNS progression in the 3rd line setting and beyond; it may also be given in the second-line setting, etc.     Palliative systemic therapy options:  -systemic therapy +HER2 targeted therapy; or  -endocrine therapy +/-HER2 targeted therapy +ovarian suppression in premenopausal patient  -for premenopausal patients, tamoxifen alone (without ovarian ablation/suppression) + HER2 targeted therapy is also an option  >>>  -no visceral crisis, therefore, we  decided to treat with tamoxifen + trastuzumab as first-line palliative systemic therapy     Herceptin:  -watch for cardiomyopathy, infusion reactions and pulmonary toxicity  -adverse reactions:> 10%:  Decreased LVEF, skin rash, abdominal pain, anorexia, infusion related reactions, asthenia, chills, back pain, dyspnea, etc.  -warnings/precautions:  Cardiomyopathy; infusion reactions; pulmonary toxicity; renal toxicity  -monitoring: Assess left ventricular ejection fraction (by ECG or MUGA scan) at baseline (immediately prior to trastuzumab initiation), every 3 months during trastuzumab therapy, every 4 weeks if trastuzumab is withheld for significant left ventricular cardiac dysfunction, and every 6 months for at least 2 years following completion of adjuvant trastuzumab therapy. Monitor vital signs during infusion; monitor for hypersensitivity or infusion reaction; if a reaction occurs, monitor carefully until symptoms resolve completely.   Monitor for signs/symptoms of cardiac dysfunction or pulmonary toxicity. If pregnancy inadvertently occurs during treatment, monitor amniotic fluid volume.     Monitoring with ribociclib:  -CBC: Baseline, every 2 weeks for first 2 cycles, at the beginning of the subsequent 4 cycles, and as clinically necessary  LFTs: Baseline, every 2 weeks for the first 2 cycles, at the beginning of the subsequent 4 cycles, and as clinically necessary  -Electrolytes: Prior to treatment, at the beginning of first 6 cycles, and as needed indicated  -EKG: Prior to treatment initiation, repeat on day 14 of cycle 1, at the beginning of cycle 2, and last week indicated     Anastrozole:   1 mg p.o. q.day     Ribociclib:  600 mg p.o. daily for 21 days, followed by a 7 day rest to complete a 28 day treatment cycle; continue until disease progression or unacceptable toxicity  With AI: 600 mg daily for 21 days, followed by 7-day rest period to complete a 28-day treatment cycle; continue until disease  progression or unacceptable toxicity  -Dose reduction depending upon kidney impairment  -Dose reduction depending upon moderate or severe liver impairment  -Dose reductions: 600 mg daily, 400 mg daily, 200 mg daily  -Dose management depending upon toxicities: Hematologic toxicity (neutropenia), cardiovascular toxicity (QT prolonging agent), dermatologic toxicity, pulmonary toxicity  Dosage forms: 200 mg, 400 mg, 600 mg  Administration: With or without food  Moderate or high emetic potential  Warnings/precautions with ribociclib:  -Bone marrow suppression: Neutropenia  -Dermatologic toxicity  -Hepatobiliary toxicity  -QT prolongation  -Pulmonary toxicity  Adverse reactions with ribociclib:  Peripheral edema, alopecia, pruritus, skin rash, abdominal pain, constipation, anorexia, UTIs, anemia, leukopenia, neutropenia, increased ALT, increased AST, etc.     Fertility and birth control issues:  Discussion about fertility and contraception:  -Informed her about the potential impact of chemotherapy on fertility  -Made her aware of the option of referral to fertility specialist before chemotherapy and/or endocrine therapy to discuss options in case she desires future pregnancies.  Fertility preservation options include oocyte and embryo cryopreservation, etc.  -Amenorrhea frequently occurs during or after chemotherapy.  The majority of women younger than 35 years to resume menses within 2 years of finishing adjuvant chemotherapy  -Informed that menses and fertility are not necessarily linked. Absence of regular menses does not necessarily imply lack of fertility.  Conversely, the presence of menses does not guarantee fertility.  -There are limited data regarding continued fertility after chemotherapy.  -Cautioned her to avoid becoming pregnant during treatment with radiation therapy, chemotherapy, or endocrine therapy  -Hormone-based birth control is discouraged regardless of the harmless of the hormone receptor status of  the patient's cancer  -Alternative methods of birth control, including IUD, barrier method, tubal ligation, vasectomy for the partner, etc.   >>>  -Mirena IUD removed 09/18/2024  -follow-up with gyn for ParaGard insertion as nonhormonal methods of contraception  -fertility specialist consultation for fertility preservation: she declined  -cautioned her not to become pregnant during treatment  -referred to gyn for consultation regarding nonhormonal methods of contraception     # Sites of disease:   -IDC right breast, ER positive, IL positive, HER2 negative, cT1c cN0 MX  -inflammatory carcinoma left breast, ER positive, IL negative, HER2 positive, cT4d cN2 M1, stage IV  -FDG PET-CT for staging 09/30/2024: Sites of disease:  Multifocal, left breast; left axillary and subpectoral lymphadenopathy; small focus upper right breast; bilobar liver metastases; portal caval lymph node; multifocal bone metastases  -brain MRI 12/24/2024:  9 mm metastases anterior aspect of C4 vertebral body (no brain metastases)     # Molecular markers:  -liquid biopsy:  BRCA1 positive; HER2 positive;TMB could not be calculated due to insufficient circulating tumor DNA; MSI-high not detected  -CT-guided biopsy left iliac crest 10/17/2024:  Metastases from breast carcinoma  -germline testing: BRCA1 mutation positive  -tissue NGS testing (left breast, collected 07/16/2024):  BRCA1 mutation positive; HER2 positive; MSI stable; TMB 2.1 m/MB (low); fusion negative; PD-L1 negative (PD-L1 CPS 2; PD-L1 TPS 1%); PIK3CA negative; ESR1 negative  -genetic testing 08/22/2024:  BRCA1 mutation positive, heterozygous     # Hematochezia:   Since yesterday, 08/28/2024, has a experienced painless diarrhea with blood on toilet wipes as well as in toilet bowl; no weakness or dizziness; 5 loose stools yesterday, 2 loose stools today; no nausea, vomiting, hematemesis, or melena.  Never experienced GI bleeding before.  >>>   -08/29/2024: sent an urgent referral to GI  for evaluation     # Family history of breast cancer, ovarian cancer, cervical cancer, stomach cancer, colon cancer:  -Maternal great aunt # 1: Breast cancer, in her 50s   -sister: Ovarian cancer, age 50  -sister: Cervical cancer, age 25  -mother: Ovarian cancer, age 50  -Maternal aunt: Cervical cancer, age 50  -maternal grandfather: Stomach cancer, age 70 (smoker)  -maternal great aunt # 2: Colon cancer, age 60  >>>  -genetic testing 08/22/2024:  BRCA1 mutation positive, heterozygous           I have explained all of the above in detail and the patient understands all of the current recommendation(s). I have answered all of their questions to the best of my ability and to their complete satisfaction.       Ana Allen, FNP-C  Oncology/Hematology   [facility]                       [1]   Social History  Tobacco Use    Smoking status: Never     Passive exposure: Never    Smokeless tobacco: Never   Substance Use Topics    Alcohol use: Never    Drug use: Never

## 2025-06-11 ENCOUNTER — PATIENT MESSAGE (OUTPATIENT)
Dept: SURGERY | Facility: CLINIC | Age: 35
End: 2025-06-11
Payer: MEDICAID

## 2025-06-16 ENCOUNTER — HOSPITAL ENCOUNTER (OUTPATIENT)
Dept: CARDIOLOGY | Facility: HOSPITAL | Age: 35
Discharge: HOME | End: 2025-06-16
Attending: INTERNAL MEDICINE
Payer: MEDICAID

## 2025-06-16 VITALS
BODY MASS INDEX: 37.45 KG/M2 | WEIGHT: 233 LBS | HEIGHT: 66 IN | SYSTOLIC BLOOD PRESSURE: 132 MMHG | DIASTOLIC BLOOD PRESSURE: 84 MMHG

## 2025-06-16 DIAGNOSIS — C50.919 ADENOCARCINOMA OF BREAST METASTATIC TO LIVER, UNSPECIFIED LATERALITY: ICD-10-CM

## 2025-06-16 DIAGNOSIS — C78.7 ADENOCARCINOMA OF BREAST METASTATIC TO LIVER, UNSPECIFIED LATERALITY: ICD-10-CM

## 2025-06-16 DIAGNOSIS — C79.51 CARCINOMA OF BREAST METASTATIC TO BONE, UNSPECIFIED LATERALITY: ICD-10-CM

## 2025-06-16 DIAGNOSIS — Z15.01 BRCA1 GENE MUTATION POSITIVE: ICD-10-CM

## 2025-06-16 DIAGNOSIS — C50.919 CARCINOMA OF BREAST METASTATIC TO BONE, UNSPECIFIED LATERALITY: ICD-10-CM

## 2025-06-16 DIAGNOSIS — Z15.09 BRCA1 GENE MUTATION POSITIVE: ICD-10-CM

## 2025-06-16 LAB
APICAL FOUR CHAMBER EJECTION FRACTION: 65 %
APICAL TWO CHAMBER EJECTION FRACTION: 68 %
BSA FOR ECHO PROCEDURE: 2.22 M2
CV ECHO LV RWT: 0.45 CM
DOP CALC LVOT AREA: 3.5 CM2
DOP CALC LVOT DIAMETER: 2.1 CM
ECHO LV POSTERIOR WALL: 0.9 CM (ref 0.6–1.1)
FRACTIONAL SHORTENING: 35 % (ref 28–44)
GLOBAL LONGITUIDAL STRAIN: 17.1 %
INTERVENTRICULAR SEPTUM: 0.9 CM (ref 0.6–1.1)
LEFT ATRIUM AREA SYSTOLIC (APICAL 2 CHAMBER): 9.02 CM2
LEFT ATRIUM AREA SYSTOLIC (APICAL 4 CHAMBER): 11.62 CM2
LEFT ATRIUM SIZE: 3.8 CM
LEFT ATRIUM VOLUME INDEX MOD: 10 ML/M2
LEFT ATRIUM VOLUME MOD: 21 ML
LEFT INTERNAL DIMENSION IN SYSTOLE: 2.6 CM (ref 2.1–4)
LEFT VENTRICLE DIASTOLIC VOLUME INDEX: 31.92 ML/M2
LEFT VENTRICLE DIASTOLIC VOLUME: 68 ML
LEFT VENTRICLE END DIASTOLIC VOLUME APICAL 2 CHAMBER: 61.03 ML
LEFT VENTRICLE END DIASTOLIC VOLUME APICAL 4 CHAMBER: 63.81 ML
LEFT VENTRICLE END SYSTOLIC VOLUME APICAL 2 CHAMBER: 16.62 ML
LEFT VENTRICLE END SYSTOLIC VOLUME APICAL 4 CHAMBER: 22.56 ML
LEFT VENTRICLE MASS INDEX: 51.5 G/M2
LEFT VENTRICLE SYSTOLIC VOLUME INDEX: 10.8 ML/M2
LEFT VENTRICLE SYSTOLIC VOLUME: 23 ML
LEFT VENTRICULAR INTERNAL DIMENSION IN DIASTOLE: 4 CM (ref 3.5–6)
LEFT VENTRICULAR MASS: 109.7 G
LVED V (TEICH): 68.41 ML
LVES V (TEICH): 23.34 ML
OHS LV EJECTION FRACTION SIMPSONS BIPLANE MOD: 65 %
PISA TR MAX VEL: 2.8 M/S
RA MAJOR: 3.31 CM
RA PRESSURE ESTIMATED: 0 MMHG
RV TB RVSP: 3 MMHG
TDI LATERAL: 0.07 M/S
TDI SEPTAL: 0.07 M/S
TDI: 0.07 M/S
TR MAX PG: 30 MMHG
TRICUSPID ANNULAR PLANE SYSTOLIC EXCURSION: 2.5 CM
TV REST PULMONARY ARTERY PRESSURE: 31 MMHG
Z-SCORE OF LEFT VENTRICULAR DIMENSION IN END DIASTOLE: -5.29
Z-SCORE OF LEFT VENTRICULAR DIMENSION IN END SYSTOLE: -3.68

## 2025-06-16 PROCEDURE — 93356 MYOCRD STRAIN IMG SPCKL TRCK: CPT

## 2025-06-17 ENCOUNTER — LAB VISIT (OUTPATIENT)
Dept: HEMATOLOGY/ONCOLOGY | Facility: CLINIC | Age: 35
End: 2025-06-17
Payer: MEDICAID

## 2025-06-17 ENCOUNTER — INFUSION (OUTPATIENT)
Dept: INFUSION THERAPY | Facility: HOSPITAL | Age: 35
End: 2025-06-17
Attending: INTERNAL MEDICINE
Payer: MEDICAID

## 2025-06-17 VITALS
TEMPERATURE: 98 F | DIASTOLIC BLOOD PRESSURE: 85 MMHG | SYSTOLIC BLOOD PRESSURE: 134 MMHG | HEART RATE: 88 BPM | RESPIRATION RATE: 18 BRPM | OXYGEN SATURATION: 96 %

## 2025-06-17 DIAGNOSIS — Z79.69 IMMUNODEFICIENCY DUE TO CHEMOTHERAPY: ICD-10-CM

## 2025-06-17 DIAGNOSIS — T45.1X5A IMMUNODEFICIENCY DUE TO CHEMOTHERAPY: ICD-10-CM

## 2025-06-17 DIAGNOSIS — T45.1X5A HOT FLASHES RELATED TO AROMATASE INHIBITOR THERAPY: ICD-10-CM

## 2025-06-17 DIAGNOSIS — C79.51 SECONDARY MALIGNANCY OF THORACIC VERTEBRAL COLUMN: ICD-10-CM

## 2025-06-17 DIAGNOSIS — R23.2 HOT FLASHES RELATED TO AROMATASE INHIBITOR THERAPY: ICD-10-CM

## 2025-06-17 DIAGNOSIS — C79.51 SECONDARY MALIGNANCY OF LUMBAR VERTEBRAL COLUMN: ICD-10-CM

## 2025-06-17 DIAGNOSIS — C50.912 INVASIVE DUCTAL CARCINOMA OF LEFT BREAST: ICD-10-CM

## 2025-06-17 DIAGNOSIS — Z79.811 LONG TERM CURRENT USE OF AROMATASE INHIBITOR: ICD-10-CM

## 2025-06-17 DIAGNOSIS — C50.912 CARCINOMA OF LEFT BREAST METASTATIC TO BONE: Primary | ICD-10-CM

## 2025-06-17 DIAGNOSIS — C79.51 CARCINOMA OF LEFT BREAST METASTATIC TO BONE: Primary | ICD-10-CM

## 2025-06-17 DIAGNOSIS — C50.911 INVASIVE DUCTAL CARCINOMA OF BREAST, RIGHT: ICD-10-CM

## 2025-06-17 DIAGNOSIS — D84.821 IMMUNODEFICIENCY DUE TO CHEMOTHERAPY: ICD-10-CM

## 2025-06-17 LAB
ALBUMIN SERPL-MCNC: 3.3 G/DL (ref 3.5–5)
ALBUMIN/GLOB SERPL: 0.7 RATIO (ref 1.1–2)
ALP SERPL-CCNC: 55 UNIT/L (ref 40–150)
ALT SERPL-CCNC: 24 UNIT/L (ref 0–55)
ANION GAP SERPL CALC-SCNC: 6 MEQ/L
AST SERPL-CCNC: 18 UNIT/L (ref 11–45)
BILIRUB SERPL-MCNC: 0.5 MG/DL
BUN SERPL-MCNC: 18.9 MG/DL (ref 7–18.7)
CALCIUM SERPL-MCNC: 9.3 MG/DL (ref 8.4–10.2)
CHLORIDE SERPL-SCNC: 104 MMOL/L (ref 98–107)
CO2 SERPL-SCNC: 29 MMOL/L (ref 22–29)
CREAT SERPL-MCNC: 1.12 MG/DL (ref 0.55–1.02)
CREAT/UREA NIT SERPL: 17
GFR SERPLBLD CREATININE-BSD FMLA CKD-EPI: >60 ML/MIN/1.73/M2
GLOBULIN SER-MCNC: 4.5 GM/DL (ref 2.4–3.5)
GLUCOSE SERPL-MCNC: 255 MG/DL (ref 74–100)
POTASSIUM SERPL-SCNC: 3.5 MMOL/L (ref 3.5–5.1)
PROT SERPL-MCNC: 7.8 GM/DL (ref 6.4–8.3)
SODIUM SERPL-SCNC: 139 MMOL/L (ref 136–145)

## 2025-06-17 PROCEDURE — 63600175 PHARM REV CODE 636 W HCPCS: Mod: JZ,TB

## 2025-06-17 PROCEDURE — 80053 COMPREHEN METABOLIC PANEL: CPT

## 2025-06-17 PROCEDURE — 96372 THER/PROPH/DIAG INJ SC/IM: CPT

## 2025-06-17 RX ADMIN — DENOSUMAB 120 MG: 120 INJECTION SUBCUTANEOUS at 10:06

## 2025-06-20 DIAGNOSIS — C50.919 ADENOCARCINOMA OF BREAST METASTATIC TO LIVER, UNSPECIFIED LATERALITY: ICD-10-CM

## 2025-06-20 DIAGNOSIS — C78.7 ADENOCARCINOMA OF BREAST METASTATIC TO LIVER, UNSPECIFIED LATERALITY: ICD-10-CM

## 2025-06-20 DIAGNOSIS — C50.912 INFLAMMATORY CARCINOMA OF LEFT BREAST: ICD-10-CM

## 2025-06-20 DIAGNOSIS — C79.51 CARCINOMA OF BREAST METASTATIC TO BONE, UNSPECIFIED LATERALITY: ICD-10-CM

## 2025-06-20 DIAGNOSIS — C50.919 CARCINOMA OF BREAST METASTATIC TO BONE, UNSPECIFIED LATERALITY: ICD-10-CM

## 2025-06-23 DIAGNOSIS — C78.7 ADENOCARCINOMA OF BREAST METASTATIC TO LIVER, UNSPECIFIED LATERALITY: ICD-10-CM

## 2025-06-23 DIAGNOSIS — C50.919 CARCINOMA OF BREAST METASTATIC TO BONE, UNSPECIFIED LATERALITY: ICD-10-CM

## 2025-06-23 DIAGNOSIS — C50.919 ADENOCARCINOMA OF BREAST METASTATIC TO LIVER, UNSPECIFIED LATERALITY: ICD-10-CM

## 2025-06-23 DIAGNOSIS — C50.912 INFLAMMATORY CARCINOMA OF LEFT BREAST: ICD-10-CM

## 2025-06-23 DIAGNOSIS — C79.51 CARCINOMA OF BREAST METASTATIC TO BONE, UNSPECIFIED LATERALITY: ICD-10-CM

## 2025-06-23 RX ORDER — ANASTROZOLE 1 MG/1
TABLET ORAL
Qty: 30 TABLET | Refills: 3 | OUTPATIENT
Start: 2025-06-23

## 2025-06-23 RX ORDER — ANASTROZOLE 1 MG/1
1 TABLET ORAL DAILY
Qty: 30 TABLET | Refills: 3 | Status: SHIPPED | OUTPATIENT
Start: 2025-06-23 | End: 2026-06-23

## 2025-07-01 ENCOUNTER — INFUSION (OUTPATIENT)
Dept: INFUSION THERAPY | Facility: HOSPITAL | Age: 35
End: 2025-07-01
Attending: INTERNAL MEDICINE
Payer: MEDICAID

## 2025-07-01 ENCOUNTER — LAB VISIT (OUTPATIENT)
Dept: HEMATOLOGY/ONCOLOGY | Facility: CLINIC | Age: 35
End: 2025-07-01
Payer: MEDICAID

## 2025-07-01 VITALS
HEIGHT: 66 IN | RESPIRATION RATE: 20 BRPM | HEART RATE: 76 BPM | OXYGEN SATURATION: 96 % | DIASTOLIC BLOOD PRESSURE: 96 MMHG | BODY MASS INDEX: 37.95 KG/M2 | WEIGHT: 236.13 LBS | SYSTOLIC BLOOD PRESSURE: 148 MMHG | TEMPERATURE: 98 F

## 2025-07-01 DIAGNOSIS — C79.51 SECONDARY MALIGNANCY OF THORACIC VERTEBRAL COLUMN: ICD-10-CM

## 2025-07-01 DIAGNOSIS — Z79.811 LONG TERM CURRENT USE OF AROMATASE INHIBITOR: ICD-10-CM

## 2025-07-01 DIAGNOSIS — C50.912 CARCINOMA OF LEFT BREAST METASTATIC TO BONE: Primary | ICD-10-CM

## 2025-07-01 DIAGNOSIS — E87.6 HYPOKALEMIA: Primary | ICD-10-CM

## 2025-07-01 DIAGNOSIS — Z79.69 IMMUNODEFICIENCY DUE TO CHEMOTHERAPY: ICD-10-CM

## 2025-07-01 DIAGNOSIS — R23.2 HOT FLASHES RELATED TO AROMATASE INHIBITOR THERAPY: ICD-10-CM

## 2025-07-01 DIAGNOSIS — C50.912 INVASIVE DUCTAL CARCINOMA OF LEFT BREAST: ICD-10-CM

## 2025-07-01 DIAGNOSIS — C79.51 CARCINOMA OF LEFT BREAST METASTATIC TO BONE: Primary | ICD-10-CM

## 2025-07-01 DIAGNOSIS — T45.1X5A HOT FLASHES RELATED TO AROMATASE INHIBITOR THERAPY: ICD-10-CM

## 2025-07-01 DIAGNOSIS — D84.821 IMMUNODEFICIENCY DUE TO CHEMOTHERAPY: ICD-10-CM

## 2025-07-01 DIAGNOSIS — C50.911 INVASIVE DUCTAL CARCINOMA OF BREAST, RIGHT: ICD-10-CM

## 2025-07-01 DIAGNOSIS — T45.1X5A IMMUNODEFICIENCY DUE TO CHEMOTHERAPY: ICD-10-CM

## 2025-07-01 DIAGNOSIS — C79.51 SECONDARY MALIGNANCY OF LUMBAR VERTEBRAL COLUMN: ICD-10-CM

## 2025-07-01 LAB
ABS NEUT CALC (OHS): 1.33 X10(3)/MCL (ref 2.1–9.2)
ALBUMIN SERPL-MCNC: 3.4 G/DL (ref 3.5–5)
ALBUMIN/GLOB SERPL: 0.8 RATIO (ref 1.1–2)
ALP SERPL-CCNC: 56 UNIT/L (ref 40–150)
ALT SERPL-CCNC: 28 UNIT/L (ref 0–55)
ANION GAP SERPL CALC-SCNC: 7 MEQ/L
AST SERPL-CCNC: 20 UNIT/L (ref 11–45)
B-HCG UR QL: NEGATIVE
BASOPHILS NFR BLD MANUAL: 0.08 X10(3)/MCL (ref 0–0.2)
BASOPHILS NFR BLD MANUAL: 2 % (ref 0–2)
BILIRUB SERPL-MCNC: 0.6 MG/DL
BUN SERPL-MCNC: 19.5 MG/DL (ref 7–18.7)
CALCIUM SERPL-MCNC: 9 MG/DL (ref 8.4–10.2)
CHLORIDE SERPL-SCNC: 104 MMOL/L (ref 98–107)
CO2 SERPL-SCNC: 29 MMOL/L (ref 22–29)
CREAT SERPL-MCNC: 1.14 MG/DL (ref 0.55–1.02)
CREAT/UREA NIT SERPL: 17
CTP QC/QA: YES
EOSINOPHIL NFR BLD MANUAL: 0.44 X10(3)/MCL (ref 0–0.9)
EOSINOPHIL NFR BLD MANUAL: 11 % (ref 0–8)
ERYTHROCYTE [DISTWIDTH] IN BLOOD BY AUTOMATED COUNT: 13.6 % (ref 11.5–17)
GFR SERPLBLD CREATININE-BSD FMLA CKD-EPI: >60 ML/MIN/1.73/M2
GLOBULIN SER-MCNC: 4.5 GM/DL (ref 2.4–3.5)
GLUCOSE SERPL-MCNC: 299 MG/DL (ref 74–100)
HCT VFR BLD AUTO: 32.4 % (ref 37–47)
HGB BLD-MCNC: 10.7 G/DL (ref 12–16)
LYMPHOCYTES NFR BLD MANUAL: 2.02 X10(3)/MCL (ref 0.6–4.6)
LYMPHOCYTES NFR BLD MANUAL: 50 % (ref 13–40)
MAGNESIUM SERPL-MCNC: 1.9 MG/DL (ref 1.6–2.6)
MCH RBC QN AUTO: 35.4 PG (ref 27–31)
MCHC RBC AUTO-ENTMCNC: 33 G/DL (ref 33–36)
MCV RBC AUTO: 107.3 FL (ref 80–94)
MONOCYTES NFR BLD MANUAL: 0.16 X10(3)/MCL (ref 0.1–1.3)
MONOCYTES NFR BLD MANUAL: 4 % (ref 2–11)
NEUTROPHILS NFR BLD MANUAL: 33 % (ref 47–80)
NRBC BLD AUTO-RTO: 0 %
PLATELET # BLD AUTO: 220 X10(3)/MCL (ref 130–400)
PLATELET # BLD EST: NORMAL 10*3/UL
PMV BLD AUTO: 9.1 FL (ref 7.4–10.4)
POCT GLUCOSE: 270 MG/DL (ref 70–110)
POTASSIUM SERPL-SCNC: 3.4 MMOL/L (ref 3.5–5.1)
PROT SERPL-MCNC: 7.9 GM/DL (ref 6.4–8.3)
RBC # BLD AUTO: 3.02 X10(6)/MCL (ref 4.2–5.4)
RBC MORPH BLD: NORMAL
SODIUM SERPL-SCNC: 140 MMOL/L (ref 136–145)
WBC # BLD AUTO: 4.03 X10(3)/MCL (ref 4.5–11.5)

## 2025-07-01 PROCEDURE — 80053 COMPREHEN METABOLIC PANEL: CPT

## 2025-07-01 PROCEDURE — 63600175 PHARM REV CODE 636 W HCPCS: Mod: TB

## 2025-07-01 PROCEDURE — 96413 CHEMO IV INFUSION 1 HR: CPT

## 2025-07-01 PROCEDURE — 25000003 PHARM REV CODE 250

## 2025-07-01 PROCEDURE — 85025 COMPLETE CBC W/AUTO DIFF WBC: CPT

## 2025-07-01 PROCEDURE — 83735 ASSAY OF MAGNESIUM: CPT

## 2025-07-01 PROCEDURE — 81025 URINE PREGNANCY TEST: CPT

## 2025-07-01 RX ORDER — HEPARIN 100 UNIT/ML
500 SYRINGE INTRAVENOUS
Status: CANCELLED | OUTPATIENT
Start: 2025-07-01

## 2025-07-01 RX ORDER — PROCHLORPERAZINE EDISYLATE 5 MG/ML
10 INJECTION INTRAMUSCULAR; INTRAVENOUS ONCE AS NEEDED
Status: CANCELLED
Start: 2025-07-01

## 2025-07-01 RX ORDER — SODIUM CHLORIDE 0.9 % (FLUSH) 0.9 %
10 SYRINGE (ML) INJECTION
Status: DISCONTINUED | OUTPATIENT
Start: 2025-07-01 | End: 2025-07-01 | Stop reason: HOSPADM

## 2025-07-01 RX ORDER — EPINEPHRINE 1 MG/ML
0.3 INJECTION INTRAMUSCULAR; INTRAVENOUS; SUBCUTANEOUS ONCE AS NEEDED
Status: DISCONTINUED | OUTPATIENT
Start: 2025-07-01 | End: 2025-07-01 | Stop reason: HOSPADM

## 2025-07-01 RX ORDER — DIPHENHYDRAMINE HYDROCHLORIDE 50 MG/ML
50 INJECTION, SOLUTION INTRAMUSCULAR; INTRAVENOUS ONCE AS NEEDED
Status: CANCELLED | OUTPATIENT
Start: 2025-07-01

## 2025-07-01 RX ORDER — HEPARIN 100 UNIT/ML
500 SYRINGE INTRAVENOUS
Status: DISCONTINUED | OUTPATIENT
Start: 2025-07-01 | End: 2025-07-01 | Stop reason: HOSPADM

## 2025-07-01 RX ORDER — POTASSIUM CHLORIDE 20 MEQ/1
20 TABLET, EXTENDED RELEASE ORAL DAILY
Qty: 30 TABLET | Refills: 0 | Status: SHIPPED | OUTPATIENT
Start: 2025-07-01 | End: 2025-07-31

## 2025-07-01 RX ORDER — PROCHLORPERAZINE EDISYLATE 5 MG/ML
10 INJECTION INTRAMUSCULAR; INTRAVENOUS ONCE AS NEEDED
Status: DISCONTINUED | OUTPATIENT
Start: 2025-07-01 | End: 2025-07-01 | Stop reason: HOSPADM

## 2025-07-01 RX ORDER — SODIUM CHLORIDE 0.9 % (FLUSH) 0.9 %
10 SYRINGE (ML) INJECTION
Status: CANCELLED | OUTPATIENT
Start: 2025-07-01

## 2025-07-01 RX ORDER — EPINEPHRINE 0.3 MG/.3ML
0.3 INJECTION SUBCUTANEOUS ONCE AS NEEDED
Status: CANCELLED | OUTPATIENT
Start: 2025-07-01

## 2025-07-01 RX ORDER — DIPHENHYDRAMINE HYDROCHLORIDE 50 MG/ML
50 INJECTION, SOLUTION INTRAMUSCULAR; INTRAVENOUS ONCE AS NEEDED
Status: DISCONTINUED | OUTPATIENT
Start: 2025-07-01 | End: 2025-07-01 | Stop reason: HOSPADM

## 2025-07-01 RX ADMIN — SODIUM CHLORIDE: 9 INJECTION, SOLUTION INTRAVENOUS at 11:07

## 2025-07-01 RX ADMIN — TRASTUZUMAB-ANNS 653 MG: 420 INJECTION, POWDER, LYOPHILIZED, FOR SOLUTION INTRAVENOUS at 11:07

## 2025-07-01 RX ADMIN — HEPARIN 500 UNITS: 100 SYRINGE at 11:07

## 2025-07-01 NOTE — NURSING
Patient her for kanjinti C13D1. Patient's labs today: ANC 1329, glucose 299, K 3.4.  mg/dl. Ana Allen NP, aware of above. Proceed with treatment. She will call in a prescription of kcl. Patient instructed to eat low sugar diet. Patient verbalizes understanding. Patient tolerated well.

## 2025-07-01 NOTE — PROGRESS NOTES
Potassium 3.4 today, oral potassium 20 meq daily sent to pharmacy  , recheck CBG   Okay to proceed with treatment today

## 2025-07-07 NOTE — PROGRESS NOTES
[facility] Hematology/Oncology  PROGRESS NOTE    Subjective:       Patient ID: Jeannie Greene is a 34 y.o. female.    Diagnosis:   -IDC right breast:  IDC, grade 2, lymph node biopsy negative, ER positive, LA positive, HER2 negative; S/P core biopsy 07/17/2024; cT1c cN0 MX, AJCC anatomic stage at least IA, clinical prognostic stage IA  -inflammatory carcinoma left breast, grade 3, lymph node biopsy positive, ER positive, LA negative, HER2 positive;   cT4d cN2 M1, stage IV; S/P core biopsy 07/16/2024  -Thoracolumbar vertebrae and sacrum metastatic involvement.   -FDG PET-CT for staging 09/30/2024: Sites of disease:  Multifocal, left breast; left axillary and subpectoral lymphadenopathy; small focus upper right breast; bilobar liver metastases; portal caval lymph node; multifocal bone metastases  -Monoallelic mutation of BRCA1 gene   -premenopausal   -family history of breast cancer, ovarian cancer, cervical cancer, stomach cancer, colon cancer    Current Treatment:  -trastuzumab 8 mg/kg IV day 1 cycle 1; followed by 6 mg/kg IV day 1 beginning with cycle 2  start 10/8/24     xgeva 120mg SQ every 28 days   start 12/31/24     Lupron 22.5 mg IM every 12 weeks started on 2/18/2025    Arimidex and Ribociclib started 03/19/2025    Treatment History:  -MediPort placed 08/14/2024   -Mirena removed on 9/18/24    Tamoxifen stopped 03/18/2025    Chief Complaint: Follow-up (Patient reports no new concerns as of today.)    HPI:  34-year-old lady, referred from [facility] surgery, with invasive ductal carcinoma of breast.  We are following her for metastatic breast cancer.    Please see assessment and plan section for details.     08/29/2024:   Pleasant lady who presents for initial medical oncology consultation.  In no acute discomfort.  Hot flashes since May 2024.  Left breast is painful; gets sharp pains intermittently, 9/10 severity; also, pain in left arm but no swelling.  Pain is intermittent.  Left breast tumor is getting bigger.  No  ulceration.  Since yesterday, 08/28/2024, has a experienced painless diarrhea with blood on toilet wipes as well as in toilet bowl; no weakness or dizziness; 5 loose stools yesterday, 2 loose stools today; no nausea, vomiting, hematemesis, or melena.  Never experienced GI bleeding before.  No unusual headaches, focal neurological symptoms, chest pain, cough, dyspnea, abdominal pain, nausea or vomiting.  No urinary problems.  No bone pains.  Appetite is okay.  No unintentional weight loss.    08/09/2024:  [facility] surgery Office note:   CC: b/l breast IDC     HPI: Jeannie Greene is a 34 y.o. female with PMHx of HTN, R breast IDC Grade 2, and L breast/axillary ICD Grade 3 presents to clinic today with L breast and arm pain.   She first noticed the pain and swelling in L breast in May where she presented to the ED and received antibiotics. Pain did not resolve so patient followed up with her OB/Gyn who ordered the imaging and biopsy and was subsequently referred to our clinic. Today, patient reports swelling and pain in left breast, axilla, and down her arm. No pain in right breast, but has had some milky discharge in the past from R nipple. She states her pain as a 6/10 that was at its worse in July and has improved some since. She takes ibuprofen 4x daily without relief.    Patient had first menarche at 11. One pregnancy with child and not gone through menopause. She was on OCP at 15yo, received Depo shot from [age]. No contraception from [age] when she got pregnant. Got Murina IUD after pregnancy at 30  that is still in place.  Pt has an extensive family history of breast and cervical cancer. Her mother and sister had cervical cancer. Her mother had a hysterectomy. She also believes her maternal aunt had breast cancer. She only takes losartan for HTN. No prior surgeries.  Physical exam:   # right breast: 3 cm mass 12 o'clock, 4 in above nipple, no skin changes, no nipple retraction or discharge, no palpable right  axillary lymphadenopathy  # left breast: Swollen and hard; large mass appreciated two o'clock position right above breast, 1 hard immobile lymph node in left axilla, breast and axilla tender to palpation (inflammatory carcinoma left breast)    Past medical history: Hypertension  Surgical/procedure history:  MediPort placement 08/14/2024    Social history: .  Lives in [place].  Has a 3-year-old son.  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  Denies history of tobacco, alcohol, or illicit drug abuse.  Family history:   -Maternal great aunt # 1: Breast cancer, in her 50s   -sister: Ovarian cancer, age 50  -sister: Cervical cancer, age 25  -mother: Ovarian cancer, age 50  -Maternal aunt: Cervical cancer, age 50  -maternal grandfather: Stomach cancer, age 70 (smoker)  -maternal great aunt # 2: Colon cancer, age 60  Health maintenance: PCP at [facility].  No screening colonoscopy ever.  Menstrual/Ob gyn history: Menarche, age 11; 1 pregnancy, 1 child; gave birth to her child at age 30; no abortions or miscarriages premenopausal; OCP at age 16, received Depo shots from age [age]; no contraception from age [age] when she became pregnant; Mirena IUD after pregnancy at age 30; experienced hot flashes.  No menstrual cycles after Mirena was placed.        Interval History:  Patient presents to clinic for follow-up and . She continues on arimidex daily and ribociclib 600 mg on days 1-21. New cycle of ribociclib to start on 7/9/2025. She does report hot flashes that have improved with Vitamin E. She is also reporting fatigue while on Ribociclib. Denies any joint stiffness, mood changes, vaginal dryness, chest pain, shortness of breath.  She is also taking calcium and vitamin-D daily.  She is due for Xgeva on 7/15/2025.  She received Lupron 22.5 mg on 5/13/2025, next due 8/5/2025 .She continues on Norco 10 mg every 6 hours as needed for pain.  Echo on 06/16/2025 with ejection fraction of 65%, next due  9/2025. She was seen by GI clinic, started on Linzess for constipation and colonoscopy ordered. Labs reviewed in detail with patient and we discussed plan of care, she verbalized understanding    PMX/PSHx  Past Medical History:   Diagnosis Date    BRCA1 gene mutation positive 09/09/2024    BRCA1 [id number] ([id number]) - [employer] Multi Cancer Panel (70 genes)    Cancer     Hypertension     Pregnancy       Past Surgical History:   Procedure Laterality Date    INSERTION OF TUNNELED CENTRAL VENOUS CATHETER (CVC) WITH SUBCUTANEOUS PORT N/A 08/14/2024    Procedure: [id number];  Surgeon: Jc Chauhan Jr., MD;  [facility];  Service: General;  Laterality: N/A;     Allergies:  Review of patient's allergies indicates:  No Known Allergies   Social History:   Social History[1]  Medications:  Current Outpatient Medications   Medication Instructions    anastrozole (ARIMIDEX) 1 mg, Oral, Daily    anastrozole (ARIMIDEX) 1 mg, Oral, Daily    citalopram (CELEXA) 10 mg, Oral, Daily    HYDROcodone-acetaminophen (NORCO)  mg per tablet 1 tablet, Oral, Every 6 hours PRN    linaCLOtide (LINZESS) 145 mcg, Oral, Before breakfast    losartan-hydrochlorothiazide 100-25 mg (HYZAAR) 100-25 mg per tablet     ondansetron (ZOFRAN) 4 mg, Oral, 2 times daily    potassium chloride SA (K-DUR,KLOR-CON) 20 MEQ tablet 20 mEq, Oral, Daily    prochlorperazine (COMPAZINE) 10 mg, Oral, Every 6 hours PRN    ribociclib (KISQALI) 600 mg/day (200 mg x 3) Tab Take 3 tablets (600 mg) by mouth once daily for 21 DAYS followed by a 7 day rest; to complete a 28 day treatment cycle.     ROS:  Review of Systems   Constitutional:  Positive for fatigue. Negative for appetite change, chills, fever and unexpected weight change.   HENT:  Negative for facial swelling, mouth sores, nosebleeds, sinus pressure/congestion and sore throat.    Eyes:  Negative for photophobia, pain and visual disturbance.   Respiratory:  Negative for cough, chest  tightness, shortness of breath and wheezing.    Cardiovascular:  Negative for chest pain, palpitations and leg swelling.   Gastrointestinal:  Negative for abdominal pain, blood in stool, change in bowel habit, constipation, diarrhea, nausea and vomiting.   Genitourinary:  Positive for hot flashes. Negative for dysuria, frequency, hematuria, pelvic pain, urgency and vaginal pain.   Musculoskeletal:  Negative for arthralgias, back pain, gait problem, joint swelling and myalgias.   Integumentary:  Negative for pallor, rash, wound, breast mass, breast discharge and breast tenderness.   Allergic/Immunologic: Negative.  Negative for immunocompromised state.   Neurological:  Negative for dizziness, vertigo, syncope, weakness, numbness and headaches.   Hematological:  Negative for adenopathy. Does not bruise/bleed easily.   Psychiatric/Behavioral:  Negative for behavioral problems and dysphoric mood. The patient is not nervous/anxious.    All other systems reviewed and are negative.  Breast: Negative for mass and tenderness      Objective:   Vitals:  Vitals:    07/08/25 1012   BP: 121/86   Pulse: 73   Resp: 18   Temp: 97.9 °F (36.6 °C)          Wt Readings from Last 3 Encounters:   07/08/25 1012 106.9 kg (235 lb 9.6 oz)   07/01/25 1016 107.1 kg (236 lb 1.6 oz)   06/16/25 1018 105.7 kg (233 lb)      Physical Examination:  Physical Exam  Vitals and nursing note reviewed.   HENT:      Head: Normocephalic and atraumatic.      Mouth/Throat:      Mouth: Mucous membranes are moist.      Pharynx: Oropharynx is clear.   Eyes:      Extraocular Movements: Extraocular movements intact.      Conjunctiva/sclera: Conjunctivae normal.      Pupils: Pupils are equal, round, and reactive to light.   Cardiovascular:      Rate and Rhythm: Normal rate and regular rhythm.   Pulmonary:      Effort: Pulmonary effort is normal.      Breath sounds: Normal breath sounds.   Abdominal:      General: Abdomen is flat. Bowel sounds are normal.       Palpations: Abdomen is soft.   Musculoskeletal:         General: Normal range of motion.      Cervical back: Normal range of motion and neck supple.   Skin:     General: Skin is warm and dry.   Neurological:      General: No focal deficit present.      Mental Status: She is alert.   Psychiatric:         Mood and Affect: Mood normal.           5/13/2025 5/20/2025   [facility] ENDOCRINE THERAPY   I have hot flashes/hot flushes 3 2   I have vaginal discharge 0 0   I have vaginal itching/irritation 1 0   I have vaginal bleeding or spotting 0 0   I have vaginal dryness 1 2   I have pain or discomfort with intercourse 0 0   I have lost interest in sex 0 0   I have gained weight 1 1   I have mood swings 3 3   I am irritable 0 0   I have pain in my joints 0 0       ECOG SCORE           Labs:     Lab Visit on 07/08/2025   Component Date Value Ref Range Status    Sodium 07/08/2025 139  136 - 145 mmol/L Final    Potassium 07/08/2025 3.7  3.5 - 5.1 mmol/L Final    Chloride 07/08/2025 104  98 - 107 mmol/L Final    CO2 07/08/2025 30 (H)  22 - 29 mmol/L Final    Glucose 07/08/2025 180 (H)  74 - 100 mg/dL Final    Blood Urea Nitrogen 07/08/2025 17.2  7.0 - 18.7 mg/dL Final    Creatinine 07/08/2025 0.92  0.55 - 1.02 mg/dL Final    Calcium 07/08/2025 9.2  8.4 - 10.2 mg/dL Final    Protein Total 07/08/2025 7.9  6.4 - 8.3 gm/dL Final    Albumin 07/08/2025 3.4 (L)  3.5 - 5.0 g/dL Final    Globulin 07/08/2025 4.5 (H)  2.4 - 3.5 gm/dL Final    Albumin/Globulin Ratio 07/08/2025 0.8 (L)  1.1 - 2.0 ratio Final    Bilirubin Total 07/08/2025 0.3  <=1.5 mg/dL Final    ALP 07/08/2025 53  40 - 150 unit/L Final    ALT 07/08/2025 26  0 - 55 unit/L Final    AST 07/08/2025 19  11 - 45 unit/L Final    eGFR 07/08/2025 >60  mL/min/1.73/m2 Final    Estimated GFR calculated using the CKD-EPI creatinine (2021) equation.    Anion Gap 07/08/2025 5.0  mEq/L Final    BUN/Creatinine Ratio 07/08/2025 19   Final    Magnesium Level 07/08/2025 1.80  1.60 - 2.60 mg/dL  Final    WBC 07/08/2025 4.01 (L)  4.50 - 11.50 x10(3)/mcL Final    RBC 07/08/2025 3.21 (L)  4.20 - 5.40 x10(6)/mcL Final    Hgb 07/08/2025 11.4 (L)  12.0 - 16.0 g/dL Final    Hct 07/08/2025 34.5 (L)  37.0 - 47.0 % Final    MCV 07/08/2025 107.5 (H)  80.0 - 94.0 fL Final    MCH 07/08/2025 35.5 (H)  27.0 - 31.0 pg Final    MCHC 07/08/2025 33.0  33.0 - 36.0 g/dL Final    RDW 07/08/2025 13.7  11.5 - 17.0 % Final    Platelet 07/08/2025 239  130 - 400 x10(3)/mcL Final    MPV 07/08/2025 8.6  7.4 - 10.4 fL Final    Neut % 07/08/2025 23.3  % Final    Lymph % 07/08/2025 51.6  % Final    Mono % 07/08/2025 14.2  % Final    Eos % 07/08/2025 8.7  % Final    Basophil % 07/08/2025 2.0  % Final    Imm Grans % 07/08/2025 0.2  % Final    Neut # 07/08/2025 0.93 (L)  2.1 - 9.2 x10(3)/mcL Final    Lymph # 07/08/2025 2.07  0.6 - 4.6 x10(3)/mcL Final    Mono # 07/08/2025 0.57  0.1 - 1.3 x10(3)/mcL Final    Eos # 07/08/2025 0.35  0 - 0.9 x10(3)/mcL Final    Baso # 07/08/2025 0.08  <=0.2 x10(3)/mcL Final    Imm Gran # 07/08/2025 0.01  0.00 - 0.04 x10(3)/mcL Final    NRBC% 07/08/2025 0.0  % Final             Pathology:  -IDC right breast:  IDC, grade 2, lymph node biopsy negative, ER positive, TN positive, HER2 negative; S/P core biopsy 07/17/2024; cT1c cN0 MX, AJCC anatomic stage at least IA, clinical prognostic stage IA  -inflammatory carcinoma left breast, grade 3, lymph node biopsy positive, ER positive, TN negative, HER2 positive; S/P core biopsy 07/16/2024; cT4d cN2 M1, stage IV    -09/16/2024: Patient referred to IR for biopsy of suspicious bone lesions  -liquid biopsy 09/19/2024:  BRCA1 positive; HER2 positive;TMB could not be calculated due to insufficient circulating tumor DNA; MSI-high not detected    -CT-guided biopsy left iliac crest 10/17/2024:  Metastases from breast carcinoma  -germline testing: BRCA1 mutation positive  -tissue NGS testing (left breast, collected 07/16/2024):  BRCA1 mutation positive; HER2 positive; MSI stable;  TMB 2.1 m/MB (low); fusion negative; PD-L1 negative (PD-L1 CPS 2; PD-L1 TPS 1%); PIK3CA negative; ESR1 negative    Radiology/Diagnostics:  -baseline TTE 09/05/2024:  LVEF 60-65%   -DEXA scan 09/16/2024:  Normal BMD of lumbar vertebrae end of femoral necks  -baseline staging CTs C/A/P 0 09/16/2024:  Multiple thoracolumbar vertebrae and sacral lytic metastases   -baseline staging whole-body nuclear medicine bone scan 09/16/2024:  Thoracolumbar vertebrae and sacrum metastases  -09/16/2024: Patient referred to IR for biopsy of suspicious bone lesions  -FDG PET-CT for staging 09/30/2024: Sites of disease:  Multifocal, left breast; left axillary and subpectoral lymphadenopathy; small focus upper right breast; bilobar liver metastases; portal caval lymph node; multifocal bone metastases  -brain MRI 12/24/2024:  9 mm metastases anterior aspect of C4 vertebral body  -12/19/2024: Surveillance echocardiogram:  LVEF 67%  -12/23/2024:  Pelvic ultrasound (encounter for non procreative screening for genetic disease carrier status):  Small fibroid; cyst in the right and left ovaries (right ovary cyst 1.5 cm and 1.4 cm; left ovary cyst 1.1 cm and 7 mm)  -restaging CTs C/A/P 01/27/2025:  Overall progression of disease with multiple new and enlarging osseous metastases as well as a new metastatic lesion right liver lobe; the majority of the left axillary metastatic lymphadenopathy has decreased in size; however, there is a single left axillary lymph node which has enlarged; the left breast skin thickening and underlying asymmetry density in the left breast parenchyma is grossly unchanged (2.5 x 2.3 cm left axillary lymph node has increased in size from 2.1 x 1.7 cm previously; suggestion of a peripherally enhancing nodule dome of the right lobe of the liver 1.9 cm; multiple lucent lytic metastatic lesions throughout the visualized spine which have progressed from the prior exam with multiple new and enlarging lesions, representative 8  mm lucent lesion T11 vertebral body is new, T7 lesion has been increased in size 16 x 12 mm previously 13 x 11 mm)  -restaging whole-body nuclear medicine bone scan 01/27/2025:  Similar uptake in the spine, ribs, and pelvis compared to prior study  -03/03/2025 bilateral diagnostic mammogram and ultrasound:   Bilateral Diagnostic Mammogram:   In the right breast 12:00 axis middle depth, there is an irregular high density spiculated mass with an associated T4-butterfly clip, correlating with outside prior malignant ultrasound-guided core needle biopsy that diagnosed grade 2 invasive ductal carcinoma.  There has been an increase in the size and density of the malignant mass, particularly the anterior and lateral component.     No other new suspicious mammographic finding in the right breast.     In the right axilla, there is a lymph node with an associated mini-T3-coil clip, correlating with outside prior benign ultrasound-guided core needle biopsy.  There has been no significant interval change of this lymph node.     There is an implanted MediPort in the right anterior chest wall which partially obscures evaluation of the right axilla.  No suspicious mammographic finding in the right axilla.     The left breast is contracted and significantly smaller compared to the most recent prior mammogram dated 07/01/2024 (8 months ago).  This contraction is related to a large irregular high density spiculated mass occupying all 4 quadrants posterior to anterior depths with spiculations extending out to the skin, with severe thickening and retraction of the same.  The mass extends into and engulfs the nipple-areolar complex, with retraction and destruction of the same.  These findings are compatible with the shrunken breast sign due to locally advanced breast cancer.  The irregular high density spiculated mass has its most significant component in the upper-outer quadrant, and there is a T4-butterfly clip in the 2:00 axis  middle depth related to outside prior malignant ultrasound-guided core needle biopsy that diagnosed grade 3 invasive ductal carcinoma.  Overall, the mammographic appearance of the malignant process throughout the left breast is worse.       In the left axilla, there is an irregular high density spiculated mass with an associated mini-T3-coil clip, correlating with outside prior malignant ultrasound-guided core needle biopsy that diagnosed grade 3 invasive ductal carcinoma.  Given the location of this malignant mass, it is most compatible with an axillary lymph node that has been entirely replaced by metastatic carcinoma.  This malignant lymph node has significantly increased in size compared to the most recent prior mammogram dated 07/01/2024 (8 months ago).        Bilateral Complete Breast Ultrasound:  Sonographic evaluation of both complete breasts (all 4 quadrants, subareolar, axilla) was performed.       In the right breast 12:00 axis 9 cm FN position, there is a 1.8 x 1.8 x 2.2 cm irregular nonparallel hypoechoic vascular mass with angular margins, correlating with biopsy-proven grade 2 invasive ductal carcinoma.  This has increased in size compared to outside prior ultrasound on 07/01/2024, at which time it measured 1.3 x 1.1 x 1.6 cm.  There is also been an increase in the diameter and hypoechoic intraductal material within the ductal system extending laterally away from the malignant mass, suggestive for extension of malignancy into this ductal system.  Taken together, these findings correlate with the increase in size and density of the right breast 12:00 axis middle depth malignant mass, particularly the anterior and lateral component, as seen on today's mammogram.     No other new suspicious sonographic finding in the right breast.       No suspicious right axillary adenopathy.  One of the right axillary level 1 lymph nodes has an associated biopsy clip (mini-T3-coil), correlating with the biopsy-proven  benign right axillary lymph node.     In the left breast all 4 quadrants subareolar-12 cm FN, there is an irregular hypoechoic spiculated mass with larger confluent portions of discrete masses interconnected by hypoechoic tracts of non-mass lesions.  The most confluent discrete masses are at the 1:00 axis 7 cm FN (5.6 x 4.1 x 5.5 cm), 2:00 axis 9 cm FN (2.5 x 1.3 x 1.4 cm), and 1:00 axis 12 cm FN (0.5 x 0.7 x 0.9 cm) positions.  The malignancy extends into and destroys the nipple-areolar complex.  There is diffuse severe skin thickening, maximal thickness 0.8 cm as measured in the 9:00 axis 1 cm FN position.  Comparison to the outside prior ultrasound on 07/01/2024 is suboptimal due to differences in positioning and technique as well as overall contraction/global decrease in size of the breast in the interval since that prior exam.  Given those limitations, the overall change in sonographic appearance of the malignant process throughout the left breast is worse.   In the left axilla, there is a 3 x 2.3 x 2.6 cm irregular nonparallel hypoechoic spiculated vascular mass with a hyperechoic rind, correlating with biopsy-proven grade 3 invasive ductal carcinoma.  This has increased in size compared to the outside prior ultrasound on 07/01/2024, at which time it measured 1.2 x 1.5 x 1.6 cm.   In the deep aspect of the left axilla level 1 andra station, there has been decrease in size and conspicuity of a morphologically abnormal lymph node identified on the outside prior ultrasound at [facility] on 07/01/2024.      IMPRESSION: KNOWN BIOPSY PROVEN MALIGNANCY, ULTRASOUND KNOWN BIOPSY PROVEN MALIGNANCY   1) Poor response to neoadjuvant chemotherapy.  The mammographic and sonographic appearance of the malignant process throughout the left breast and in the left axilla is worse compared to the outside prior mammogram and ultrasound at [facility] on 07/01/2024 (8 months ago).  BI-RADS 6: Known  biopsy-proven malignancy.   2) Poor response to neoadjuvant chemotherapy.  The right breast 12:00 axis 9 cm FN malignant mass has increased in size, with new involvement of the ductal system extending laterally away from the malignant mass.  BI-RADS 6: Known biopsy-proven malignancy.   3) Review of the patient's restaging CT C/A/P and Tc-99m-MDP bone scan 01/27/2025 revealed progression of metastatic disease, which correlates with today's mammographic and sonographic findings showing progression of disease in both breasts and the left axilla.    -3/10/2025 ECHO: EF 60%  -3/18/2025 EKG: Qtc 457 ms    -04/28/2025: Restaging CTs C/A/P with contrast (comparison: 01/27/2025):  Left axillary lymph node and asymmetric areas of soft tissue in the left breast show decreased size since 01/27/2025.   No significant change in hypodense right hepatic lesion.   New areas of sclerosis in the axial and appendicular skeleton likely treated bone lesions.  -04/28/2025: Restaging whole-body nuclear medicine bone scan:There is similar radiotracer uptake within the spine, ribs, and pelvis compared to the prior study from 01/27/2025   -TTE 6/16/2025: EF 65%    I have reviewed all available lab results and radiology reports.  Assessment:   Invasive ductal carcinoma of right breast/inflammatory carcinoma of left breast  Cycle 5 of Ribociclib due on 7/09/2025, hold due to ANC of 930  Proceed with Cycle 14 [facility] on 7/22 pending lab work  Arimidex 1 mg p.o. q.day   Ribociclib 600 mg p.o. daily X 21 days, then 7 days off to complete 28 day cycle; to continue until disease progression or unacceptable toxicity  Re-stage with contrast-enhanced CT scans of C/A/P and whole-body nuclear medicine bone scan every 3 months- scheduled on 7/28/2025  Echocardiogram every 3 months, due in 9/2025  Lupron 22.5 mg every 12 weeks due 8/5/2025  2. Breast cancer metastasized to bone   Xgeva 120 mg every 4 weeks, next due 7/15/2025  Calcium and vitamin-D  supplements to continue   DEXA scan September 2025, 9/15/2025  3.  Hot flashes due to aromatase inhibitor therapy   Continue Vitamin E for hot flashes  Problem List Items Addressed This Visit       Invasive ductal carcinoma of left breast - Primary    [Secondary malignant neoplasm of axillary lymph nodes]    Secondary malignancy of lumbar vertebral column    [Secondary malignancy of sacrum]    Secondary malignancy of thoracic vertebral column    Immunodeficiency due to chemotherapy    Hypokalemia    Relevant Medications    potassium chloride SA (K-DUR,KLOR-CON) 20 MEQ tablet    Hot flashes related to aromatase inhibitor therapy    Long term current use of aromatase inhibitor                   Plan:   07/08/2025  exam stable  Hold Ribociclib due to ANC of 930, recheck labs on 7/15/2025  Proceed with Cycle 14 Kanjinti on 7/22 pending lab work  Continue Vitamin E for hot flashes   Continue Armidex + Ribociclib, new cycle to start on 07/09/2025  Arimidex 1 mg p.o. q.day   Ribociclib 600 mg p.o. daily X 21 days, then 7 days off to complete 28 day cycle; to continue until disease progression or unacceptable toxicity  Re-stage with contrast-enhanced CT scans of C/A/P and whole-body nuclear medicine bone scan every 3 months- 7/2025  Follow-up with gyn for ovarian cancer surveillance, 1/05/2026  Follow up with GI for evaluation of hematochezia, 1/5/2025  Xgeva 120 mg every 4 weeks due 7/15/2025  Calcium and vitamin-D supplements to continue   DEXA scan September 2025, 9/15/2025  Echocardiogram every 3 months, 9/16/2025  Lupron 22.5 mg every 12 weeks, given on 5/13/2025, next due 8/05/2025  Continue oral potassium 20 meq daily   Cycle 14 Kanjint and labs due 7/22/2025  RTC on 8/5/2025 labs/MD for scan review/lupron  CBC CMP MG      Providers:           Orders for 04/29/2025:   Continue Herceptin +Lupron +anastrazole +ribociclib  Re-stage with contrast-enhanced CT scans of C/A/P end of July  Need the report of bone scan    Follow-up with gyn for ovarian cancer surveillance because of BRCA1 mutation positive status   Check the status of GI referral for history of hematochezia  Continue Xgeva   Calcium and vitamin-D supplements   DEXA scan in September 2026   Echocardiogram middle of June  Follow-up with NP in 1 month     Above discussed with her.  All questions answered.    Discussed labs and scans and gave her copies of relevant results.  Plan of management discussed once again.    She understands and agrees with this plan.  ====================================     # IDC both breasts, diagnosed concurrently:  #Inflammatory carcinoma left breast:  -presentation:  Pain and swelling left breast 05/2024  -Mirena IUD in place; removed 09/19/2024   -extensive family history of breast cancer and cervical cancer  -physical exam: Right breast: 3 cm mass 12 o'clock position, 4 in above nipple  -physical exam: Left breast: Swollen, hard, large mass to o'clock position, 1 hard immobile lymph node in left axilla, breast and axilla tender to palpation (inflammatory carcinoma of left breast)  -mammogram and ultrasound 07/01/2024  -multiple masses left breast on mammogram and ultrasound  -concurrently diagnosed bilateral breast cancer   -IDC right breast:  IDC, grade 2, lymph node biopsy negative, ER positive, VA positive, HER2 negative; cT1c cN0 MX, AJCC anatomic stage at least IA, clinical prognostic stage IA; S/P needle biopsy of right breast and right axilla (S/P core biopsy 07/17/2024)  -inflammatory carcinoma left breast, grade 3, lymph node biopsy positive, ER positive, VA negative, HER2 positive; cT4d cN2 M1, stage IV; S/P needle biopsy left breast and left axilla (S/P core biopsy 07/16/2024) (By virtue of palpable, fixed lymph node in the left axilla, cN2)  -genetic testing 08/22/2024:  BRCA1 mutation positive, heterozygous  -premenopausal per labs 08/29/2024  -baseline TTE 09/05/2024:  LVEF 60-65%   -DEXA scan 09/16/2024:  Normal BMD of  lumbar vertebrae end of femoral necks  -baseline staging CTs C/A/P 0 09/16/2024:  Multiple thoracolumbar vertebrae and sacral lytic metastases   -baseline staging whole-body nuclear medicine bone scan 09/16/2024:  Thoracolumbar vertebrae and sacrum metastases  -09/16/2024: Patient referred to IR for biopsy of suspicious bone lesions  -liquid biopsy 09/19/2024:  BRCA1 positive; HER2 positive;TMB could not be calculated due to insufficient circulating tumor DNA; MSI-high not detected  -09/19/2024: Baseline tumor markers: CEA 19.74, elevated; CA 27.29, CA 15-3 levels normal  -brain MRI for staging 09/23/2024: No intracranial metastases  -FDG PET-CT for staging 09/30/2024: Sites of disease:  Multifocal, left breast; left axillary and subpectoral lymphadenopathy; small focus upper right breast; bilobar liver metastases; portal caval lymph node; multifocal bone metastases  -no visceral metastases  -tamoxifen plus trastuzumab started 10/08/2024  -CT-guided biopsy left iliac crest 10/17/2024:  Metastases from breast carcinoma  -germline testing: BRCA1 mutation positive  -tissue NGS testing (left breast, collected 07/16/2024):  BRCA1 mutation positive; HER2 positive; MSI stable; TMB 2.1 m/MB (low); fusion negative; PD-L1 negative (PD-L1 CPS 2; PD-L1 TPS 1%); PIK3CA negative; ESR1 negative  -cycle 2 of trastuzumab on 10/29/2024  -Mirena IUD removed 09/18/2024   -surveillance TTE 12/19/2024: LVEF 67%  -pelvic ultrasound 12/23/2024:  Bilateral ovarian cysts, largest 1.5 cm  -brain MRI 12/24/2024: Headaches:  9 mm ovoid metastases anterior aspect of C4 vertebral body  -dental clearance obtained 11/29/2024 (dated 11/26/2024)  -Xgeva for bone metastases, 120 mg subQ every 4 weeks, started 12/31/2024  -01/13/2025:  Gyn consultation/follow-up:  Patient would like to proceed with ParaGard IUD insertion (nonhormonal contraception); annual transvaginal ultrasound and CA-125 level for surveillance in view of BRCA1 mutation status until  risk reduction HUA-BSO recommended at age 35-40 or when done with childbearing; given stage IV breast cancer, plan to delay surgery until further prognosis can be made  >>>  # Disease progression on tamoxifen/Herceptin:  -restaging CTs and bone scan 01/27/2025:  Progression of metastases  (Failed tamoxifen +Herceptin)  -new Plan:  Continue Herceptin +start OFS with Lupron every 3 months + continue tamoxifen for a couple of months with Lupron, then switch to Arimidex/ribociclib  -no visceral metastases, therefore, planned Herceptin +second-line endocrine therapy  -Lupron 22.5 mg IM every 3 months (for OFS), started 02/18/2025  -bilateral diagnostic mammogram 03/03/2025 (will discontinue)  -bilateral breast MRI 03/14/2025 (will discontinue)  -surveillance TTE 03/10/2025: LVEF 60%  -Arimidex/ribociclib started 03/19/2025  -restaging CTs and bone scan 04/28/2025:  Some improvement on CTs; bone scan pending  >>>  Plan:  -Continue Herceptin + Lupron every 12 weeks + anastrazole +ribociclib  -Lupron started 02/18/2025  -Arimidex/ribociclib started 03/19/2025  -possible positive response on restaging CTs 04/28/2025  >>>  re-stage with CTs C/A/P with contrast in 3 months (end of July)  Restaging bone scan is pending  ER positive, CO negative, HER2 positive  Follow-up with gyn for ovarian cancer surveillance (BRCA1 mutation positive)  GI referral for hematochezia  Continue Xgeva   Calcium and vitamin-D supplements to continue  DEXA scan in 2 years (September 2026)  Echocardiogram in 3 months (mid June)     If, at anytime, develops visceral crisis or endocrine refractory disease, then, treatment options:  # first-line:    Pertuzumab +trastuzumab +docetaxel (category 1, Preferred);   pertuzumab +trastuzumab +paclitaxel (Preferred)  (after response, maintenance trastuzumab/pertuzumab + concurrent endocrine therapy)   # second-line:  -Fam trastuzumab deruxtecan (category 1, Preferred)  -tucatinib +trastuzumab +capecitabine is  preferred in patients with both systemic and CNS progression in the 3rd line setting and beyond; it may also be given in the second-line setting, etc.     Palliative systemic therapy options:  -systemic therapy +HER2 targeted therapy; or  -endocrine therapy +/-HER2 targeted therapy +ovarian suppression in premenopausal patient  -for premenopausal patients, tamoxifen alone (without ovarian ablation/suppression) + HER2 targeted therapy is also an option  >>>  -no visceral crisis, therefore, we decided to treat with tamoxifen + trastuzumab as first-line palliative systemic therapy     Herceptin:  -watch for cardiomyopathy, infusion reactions and pulmonary toxicity  -adverse reactions:> 10%:  Decreased LVEF, skin rash, abdominal pain, anorexia, infusion related reactions, asthenia, chills, back pain, dyspnea, etc.  -warnings/precautions:  Cardiomyopathy; infusion reactions; pulmonary toxicity; renal toxicity  -monitoring: Assess left ventricular ejection fraction (by ECG or MUGA scan) at baseline (immediately prior to trastuzumab initiation), every 3 months during trastuzumab therapy, every 4 weeks if trastuzumab is withheld for significant left ventricular cardiac dysfunction, and every 6 months for at least 2 years following completion of adjuvant trastuzumab therapy. Monitor vital signs during infusion; monitor for hypersensitivity or infusion reaction; if a reaction occurs, monitor carefully until symptoms resolve completely.   Monitor for signs/symptoms of cardiac dysfunction or pulmonary toxicity. If pregnancy inadvertently occurs during treatment, monitor amniotic fluid volume.     Monitoring with ribociclib:  -CBC: Baseline, every 2 weeks for first 2 cycles, at the beginning of the subsequent 4 cycles, and as clinically necessary  LFTs: Baseline, every 2 weeks for the first 2 cycles, at the beginning of the subsequent 4 cycles, and as clinically necessary  -Electrolytes: Prior to treatment, at the beginning of  first 6 cycles, and as needed indicated  -EKG: Prior to treatment initiation, repeat on day 14 of cycle 1, at the beginning of cycle 2, and last week indicated     Anastrozole:   1 mg p.o. q.day     Ribociclib:  600 mg p.o. daily for 21 days, followed by a 7 day rest to complete a 28 day treatment cycle; continue until disease progression or unacceptable toxicity  With AI: 600 mg daily for 21 days, followed by 7-day rest period to complete a 28-day treatment cycle; continue until disease progression or unacceptable toxicity  -Dose reduction depending upon kidney impairment  -Dose reduction depending upon moderate or severe liver impairment  -Dose reductions: 600 mg daily, 400 mg daily, 200 mg daily  -Dose management depending upon toxicities: Hematologic toxicity (neutropenia), cardiovascular toxicity (QT prolonging agent), dermatologic toxicity, pulmonary toxicity  Dosage forms: 200 mg, 400 mg, 600 mg  Administration: With or without food  Moderate or high emetic potential  Warnings/precautions with ribociclib:  -Bone marrow suppression: Neutropenia  -Dermatologic toxicity  -Hepatobiliary toxicity  -QT prolongation  -Pulmonary toxicity  Adverse reactions with ribociclib:  Peripheral edema, alopecia, pruritus, skin rash, abdominal pain, constipation, anorexia, UTIs, anemia, leukopenia, neutropenia, increased ALT, increased AST, etc.     Fertility and birth control issues:  Discussion about fertility and contraception:  -Informed her about the potential impact of chemotherapy on fertility  -Made her aware of the option of referral to fertility specialist before chemotherapy and/or endocrine therapy to discuss options in case she desires future pregnancies.  Fertility preservation options include oocyte and embryo cryopreservation, etc.  -Amenorrhea frequently occurs during or after chemotherapy.  The majority of women younger than 35 years to resume menses within 2 years of finishing adjuvant chemotherapy  -Informed  that menses and fertility are not necessarily linked. Absence of regular menses does not necessarily imply lack of fertility.  Conversely, the presence of menses does not guarantee fertility.  -There are limited data regarding continued fertility after chemotherapy.  -Cautioned her to avoid becoming pregnant during treatment with radiation therapy, chemotherapy, or endocrine therapy  -Hormone-based birth control is discouraged regardless of the harmless of the hormone receptor status of the patient's cancer  -Alternative methods of birth control, including IUD, barrier method, tubal ligation, vasectomy for the partner, etc.   >>>  -Mirena IUD removed 09/18/2024  -follow-up with gyn for ParaGard insertion as nonhormonal methods of contraception  -fertility specialist consultation for fertility preservation: she declined  -cautioned her not to become pregnant during treatment  -referred to gyn for consultation regarding nonhormonal methods of contraception     # Sites of disease:   -IDC right breast, ER positive, OR positive, HER2 negative, cT1c cN0 MX  -inflammatory carcinoma left breast, ER positive, OR negative, HER2 positive, cT4d cN2 M1, stage IV  -FDG PET-CT for staging 09/30/2024: Sites of disease:  Multifocal, left breast; left axillary and subpectoral lymphadenopathy; small focus upper right breast; bilobar liver metastases; portal caval lymph node; multifocal bone metastases  -brain MRI 12/24/2024:  9 mm metastases anterior aspect of C4 vertebral body (no brain metastases)     # Molecular markers:  -liquid biopsy:  BRCA1 positive; HER2 positive;TMB could not be calculated due to insufficient circulating tumor DNA; MSI-high not detected  -CT-guided biopsy left iliac crest 10/17/2024:  Metastases from breast carcinoma  -germline testing: BRCA1 mutation positive  -tissue NGS testing (left breast, collected 07/16/2024):  BRCA1 mutation positive; HER2 positive; MSI stable; TMB 2.1 m/MB (low); fusion negative;  PD-L1 negative (PD-L1 CPS 2; PD-L1 TPS 1%); PIK3CA negative; ESR1 negative  -genetic testing 08/22/2024:  BRCA1 mutation positive, heterozygous     # Hematochezia:   Since yesterday, 08/28/2024, has a experienced painless diarrhea with blood on toilet wipes as well as in toilet bowl; no weakness or dizziness; 5 loose stools yesterday, 2 loose stools today; no nausea, vomiting, hematemesis, or melena.  Never experienced GI bleeding before.  >>>   -08/29/2024: sent an urgent referral to GI for evaluation     # Family history of breast cancer, ovarian cancer, cervical cancer, stomach cancer, colon cancer:  -Maternal great aunt # 1: Breast cancer, in her 50s   -sister: Ovarian cancer, age 50  -sister: Cervical cancer, age 25  -mother: Ovarian cancer, age 50  -Maternal aunt: Cervical cancer, age 50  -maternal grandfather: Stomach cancer, age 70 (smoker)  -maternal great aunt # 2: Colon cancer, age 60  >>>  -genetic testing 08/22/2024:  BRCA1 mutation positive, heterozygous           I have explained all of the above in detail and the patient understands all of the current recommendation(s). I have answered all of their questions to the best of my ability and to their complete satisfaction.       Ana Allen, AMANDAP-C  Oncology/Hematology   [facility]                         [1]   Social History  Tobacco Use    Smoking status: Never     Passive exposure: Never    Smokeless tobacco: Never   Substance Use Topics    Alcohol use: Never    Drug use: Never

## 2025-07-08 ENCOUNTER — OFFICE VISIT (OUTPATIENT)
Dept: HEMATOLOGY/ONCOLOGY | Facility: CLINIC | Age: 35
End: 2025-07-08
Payer: MEDICAID

## 2025-07-08 ENCOUNTER — LAB VISIT (OUTPATIENT)
Dept: HEMATOLOGY/ONCOLOGY | Facility: CLINIC | Age: 35
End: 2025-07-08
Payer: MEDICAID

## 2025-07-08 VITALS
DIASTOLIC BLOOD PRESSURE: 86 MMHG | OXYGEN SATURATION: 98 % | HEART RATE: 73 BPM | BODY MASS INDEX: 37.87 KG/M2 | WEIGHT: 235.63 LBS | HEIGHT: 66 IN | RESPIRATION RATE: 18 BRPM | TEMPERATURE: 98 F | SYSTOLIC BLOOD PRESSURE: 121 MMHG

## 2025-07-08 DIAGNOSIS — C50.912 INVASIVE DUCTAL CARCINOMA OF LEFT BREAST: ICD-10-CM

## 2025-07-08 DIAGNOSIS — Z79.69 IMMUNODEFICIENCY DUE TO CHEMOTHERAPY: ICD-10-CM

## 2025-07-08 DIAGNOSIS — C79.51 SECONDARY MALIGNANCY OF THORACIC VERTEBRAL COLUMN: ICD-10-CM

## 2025-07-08 DIAGNOSIS — E87.6 HYPOKALEMIA: ICD-10-CM

## 2025-07-08 DIAGNOSIS — Z79.811 LONG TERM CURRENT USE OF AROMATASE INHIBITOR: ICD-10-CM

## 2025-07-08 DIAGNOSIS — R23.2 HOT FLASHES RELATED TO AROMATASE INHIBITOR THERAPY: ICD-10-CM

## 2025-07-08 DIAGNOSIS — T45.1X5A HOT FLASHES RELATED TO AROMATASE INHIBITOR THERAPY: ICD-10-CM

## 2025-07-08 DIAGNOSIS — D84.821 IMMUNODEFICIENCY DUE TO CHEMOTHERAPY: ICD-10-CM

## 2025-07-08 DIAGNOSIS — C50.911 INVASIVE DUCTAL CARCINOMA OF BREAST, RIGHT: ICD-10-CM

## 2025-07-08 DIAGNOSIS — C50.912 INVASIVE DUCTAL CARCINOMA OF LEFT BREAST: Primary | ICD-10-CM

## 2025-07-08 DIAGNOSIS — C79.51 SECONDARY MALIGNANCY OF LUMBAR VERTEBRAL COLUMN: ICD-10-CM

## 2025-07-08 DIAGNOSIS — T45.1X5A IMMUNODEFICIENCY DUE TO CHEMOTHERAPY: ICD-10-CM

## 2025-07-08 LAB
ALBUMIN SERPL-MCNC: 3.4 G/DL (ref 3.5–5)
ALBUMIN/GLOB SERPL: 0.8 RATIO (ref 1.1–2)
ALP SERPL-CCNC: 53 UNIT/L (ref 40–150)
ALT SERPL-CCNC: 26 UNIT/L (ref 0–55)
ANION GAP SERPL CALC-SCNC: 5 MEQ/L
AST SERPL-CCNC: 19 UNIT/L (ref 11–45)
BASOPHILS # BLD AUTO: 0.08 X10(3)/MCL
BASOPHILS NFR BLD AUTO: 2 %
BILIRUB SERPL-MCNC: 0.3 MG/DL
BUN SERPL-MCNC: 17.2 MG/DL (ref 7–18.7)
CALCIUM SERPL-MCNC: 9.2 MG/DL (ref 8.4–10.2)
CHLORIDE SERPL-SCNC: 104 MMOL/L (ref 98–107)
CO2 SERPL-SCNC: 30 MMOL/L (ref 22–29)
CREAT SERPL-MCNC: 0.92 MG/DL (ref 0.55–1.02)
CREAT/UREA NIT SERPL: 19
EOSINOPHIL # BLD AUTO: 0.35 X10(3)/MCL (ref 0–0.9)
EOSINOPHIL NFR BLD AUTO: 8.7 %
ERYTHROCYTE [DISTWIDTH] IN BLOOD BY AUTOMATED COUNT: 13.7 % (ref 11.5–17)
GFR SERPLBLD CREATININE-BSD FMLA CKD-EPI: >60 ML/MIN/1.73/M2
GLOBULIN SER-MCNC: 4.5 GM/DL (ref 2.4–3.5)
GLUCOSE SERPL-MCNC: 180 MG/DL (ref 74–100)
HCT VFR BLD AUTO: 34.5 % (ref 37–47)
HGB BLD-MCNC: 11.4 G/DL (ref 12–16)
IMM GRANULOCYTES # BLD AUTO: 0.01 X10(3)/MCL (ref 0–0.04)
IMM GRANULOCYTES NFR BLD AUTO: 0.2 %
LYMPHOCYTES # BLD AUTO: 2.07 X10(3)/MCL (ref 0.6–4.6)
LYMPHOCYTES NFR BLD AUTO: 51.6 %
MAGNESIUM SERPL-MCNC: 1.8 MG/DL (ref 1.6–2.6)
MCH RBC QN AUTO: 35.5 PG (ref 27–31)
MCHC RBC AUTO-ENTMCNC: 33 G/DL (ref 33–36)
MCV RBC AUTO: 107.5 FL (ref 80–94)
MONOCYTES # BLD AUTO: 0.57 X10(3)/MCL (ref 0.1–1.3)
MONOCYTES NFR BLD AUTO: 14.2 %
NEUTROPHILS # BLD AUTO: 0.93 X10(3)/MCL (ref 2.1–9.2)
NEUTROPHILS NFR BLD AUTO: 23.3 %
NRBC BLD AUTO-RTO: 0 %
PLATELET # BLD AUTO: 239 X10(3)/MCL (ref 130–400)
PMV BLD AUTO: 8.6 FL (ref 7.4–10.4)
POTASSIUM SERPL-SCNC: 3.7 MMOL/L (ref 3.5–5.1)
PROT SERPL-MCNC: 7.9 GM/DL (ref 6.4–8.3)
RBC # BLD AUTO: 3.21 X10(6)/MCL (ref 4.2–5.4)
SODIUM SERPL-SCNC: 139 MMOL/L (ref 136–145)
WBC # BLD AUTO: 4.01 X10(3)/MCL (ref 4.5–11.5)

## 2025-07-08 PROCEDURE — 3008F BODY MASS INDEX DOCD: CPT | Mod: CPTII,,,

## 2025-07-08 PROCEDURE — 99214 OFFICE O/P EST MOD 30 MIN: CPT | Mod: PBBFAC

## 2025-07-08 PROCEDURE — 1159F MED LIST DOCD IN RCRD: CPT | Mod: CPTII,,,

## 2025-07-08 PROCEDURE — 85025 COMPLETE CBC W/AUTO DIFF WBC: CPT

## 2025-07-08 PROCEDURE — 99215 OFFICE O/P EST HI 40 MIN: CPT | Mod: S$PBB,,,

## 2025-07-08 PROCEDURE — 3074F SYST BP LT 130 MM HG: CPT | Mod: CPTII,,,

## 2025-07-08 PROCEDURE — 1160F RVW MEDS BY RX/DR IN RCRD: CPT | Mod: CPTII,,,

## 2025-07-08 PROCEDURE — 80053 COMPREHEN METABOLIC PANEL: CPT

## 2025-07-08 PROCEDURE — 83735 ASSAY OF MAGNESIUM: CPT

## 2025-07-08 PROCEDURE — 3079F DIAST BP 80-89 MM HG: CPT | Mod: CPTII,,,

## 2025-07-08 RX ORDER — POTASSIUM CHLORIDE 20 MEQ/1
20 TABLET, EXTENDED RELEASE ORAL DAILY
Qty: 30 TABLET | Refills: 0 | Status: SHIPPED | OUTPATIENT
Start: 2025-07-08 | End: 2025-08-07

## 2025-07-15 ENCOUNTER — LAB VISIT (OUTPATIENT)
Dept: HEMATOLOGY/ONCOLOGY | Facility: CLINIC | Age: 35
End: 2025-07-15
Payer: MEDICAID

## 2025-07-15 ENCOUNTER — INFUSION (OUTPATIENT)
Dept: INFUSION THERAPY | Facility: HOSPITAL | Age: 35
End: 2025-07-15
Attending: INTERNAL MEDICINE
Payer: MEDICAID

## 2025-07-15 VITALS
HEIGHT: 66 IN | HEART RATE: 89 BPM | RESPIRATION RATE: 20 BRPM | BODY MASS INDEX: 38.25 KG/M2 | OXYGEN SATURATION: 99 % | TEMPERATURE: 98 F | WEIGHT: 238 LBS | SYSTOLIC BLOOD PRESSURE: 127 MMHG | DIASTOLIC BLOOD PRESSURE: 98 MMHG

## 2025-07-15 DIAGNOSIS — R23.2 HOT FLASHES RELATED TO AROMATASE INHIBITOR THERAPY: ICD-10-CM

## 2025-07-15 DIAGNOSIS — Z79.811 LONG TERM CURRENT USE OF AROMATASE INHIBITOR: ICD-10-CM

## 2025-07-15 DIAGNOSIS — C50.912 INVASIVE DUCTAL CARCINOMA OF LEFT BREAST: ICD-10-CM

## 2025-07-15 DIAGNOSIS — C79.51 CARCINOMA OF LEFT BREAST METASTATIC TO BONE: Primary | ICD-10-CM

## 2025-07-15 DIAGNOSIS — E87.6 HYPOKALEMIA: ICD-10-CM

## 2025-07-15 DIAGNOSIS — D84.821 IMMUNODEFICIENCY DUE TO CHEMOTHERAPY: ICD-10-CM

## 2025-07-15 DIAGNOSIS — T45.1X5A IMMUNODEFICIENCY DUE TO CHEMOTHERAPY: ICD-10-CM

## 2025-07-15 DIAGNOSIS — C79.51 SECONDARY MALIGNANCY OF LUMBAR VERTEBRAL COLUMN: ICD-10-CM

## 2025-07-15 DIAGNOSIS — C50.912 CARCINOMA OF LEFT BREAST METASTATIC TO BONE: Primary | ICD-10-CM

## 2025-07-15 DIAGNOSIS — T45.1X5A HOT FLASHES RELATED TO AROMATASE INHIBITOR THERAPY: ICD-10-CM

## 2025-07-15 DIAGNOSIS — Z79.69 IMMUNODEFICIENCY DUE TO CHEMOTHERAPY: ICD-10-CM

## 2025-07-15 DIAGNOSIS — C79.51 SECONDARY MALIGNANCY OF THORACIC VERTEBRAL COLUMN: ICD-10-CM

## 2025-07-15 LAB
ALBUMIN SERPL-MCNC: 3.5 G/DL (ref 3.5–5)
ALBUMIN/GLOB SERPL: 0.8 RATIO (ref 1.1–2)
ALP SERPL-CCNC: 56 UNIT/L (ref 40–150)
ALT SERPL-CCNC: 34 UNIT/L (ref 0–55)
ANION GAP SERPL CALC-SCNC: 5 MEQ/L
AST SERPL-CCNC: 20 UNIT/L (ref 11–45)
BASOPHILS # BLD AUTO: 0.12 X10(3)/MCL
BASOPHILS NFR BLD AUTO: 1.6 %
BILIRUB SERPL-MCNC: 0.3 MG/DL
BUN SERPL-MCNC: 17.1 MG/DL (ref 7–18.7)
CALCIUM SERPL-MCNC: 9.3 MG/DL (ref 8.4–10.2)
CHLORIDE SERPL-SCNC: 104 MMOL/L (ref 98–107)
CO2 SERPL-SCNC: 29 MMOL/L (ref 22–29)
CREAT SERPL-MCNC: 1.09 MG/DL (ref 0.55–1.02)
CREAT/UREA NIT SERPL: 16
EOSINOPHIL # BLD AUTO: 1.43 X10(3)/MCL (ref 0–0.9)
EOSINOPHIL NFR BLD AUTO: 18.5 %
ERYTHROCYTE [DISTWIDTH] IN BLOOD BY AUTOMATED COUNT: 13.1 % (ref 11.5–17)
GFR SERPLBLD CREATININE-BSD FMLA CKD-EPI: >60 ML/MIN/1.73/M2
GLOBULIN SER-MCNC: 4.6 GM/DL (ref 2.4–3.5)
GLUCOSE SERPL-MCNC: 291 MG/DL (ref 74–100)
HCT VFR BLD AUTO: 36 % (ref 37–47)
HGB BLD-MCNC: 11.7 G/DL (ref 12–16)
IMM GRANULOCYTES # BLD AUTO: 0.03 X10(3)/MCL (ref 0–0.04)
IMM GRANULOCYTES NFR BLD AUTO: 0.4 %
LYMPHOCYTES # BLD AUTO: 3.18 X10(3)/MCL (ref 0.6–4.6)
LYMPHOCYTES NFR BLD AUTO: 41.1 %
MCH RBC QN AUTO: 34.9 PG (ref 27–31)
MCHC RBC AUTO-ENTMCNC: 32.5 G/DL (ref 33–36)
MCV RBC AUTO: 107.5 FL (ref 80–94)
MONOCYTES # BLD AUTO: 0.48 X10(3)/MCL (ref 0.1–1.3)
MONOCYTES NFR BLD AUTO: 6.2 %
NEUTROPHILS # BLD AUTO: 2.5 X10(3)/MCL (ref 2.1–9.2)
NEUTROPHILS NFR BLD AUTO: 32.2 %
NRBC BLD AUTO-RTO: 0 %
PLATELET # BLD AUTO: 320 X10(3)/MCL (ref 130–400)
PMV BLD AUTO: 8.9 FL (ref 7.4–10.4)
POTASSIUM SERPL-SCNC: 3.7 MMOL/L (ref 3.5–5.1)
PROT SERPL-MCNC: 8.1 GM/DL (ref 6.4–8.3)
RBC # BLD AUTO: 3.35 X10(6)/MCL (ref 4.2–5.4)
SODIUM SERPL-SCNC: 138 MMOL/L (ref 136–145)
WBC # BLD AUTO: 7.74 X10(3)/MCL (ref 4.5–11.5)

## 2025-07-15 PROCEDURE — 85025 COMPLETE CBC W/AUTO DIFF WBC: CPT

## 2025-07-15 PROCEDURE — 80053 COMPREHEN METABOLIC PANEL: CPT

## 2025-07-15 PROCEDURE — 63600175 PHARM REV CODE 636 W HCPCS: Mod: JZ,TB

## 2025-07-15 PROCEDURE — 96372 THER/PROPH/DIAG INJ SC/IM: CPT

## 2025-07-15 RX ADMIN — DENOSUMAB 120 MG: 120 INJECTION SUBCUTANEOUS at 11:07

## 2025-07-15 NOTE — NURSING
Patient here for lonnie. Ana Allen, MITESH, reviewed patient's labs. She spoke to patient at bedside. She told patient to start her Kisquali po. Patient verbalizes understanding.   Calcium 9.3.Patient denies CVA or MI in past year. Patient denies on any bisphospates taking currently. Patient states she is taking calcium  and vitamin D supplements daily. Patient has not had a invasive dental procedure in the past 3 months. Patient verbalizes understanding not to have invasive dental procedure in the next 3 months.   Patient's glucose 291. Patient instructed on low sugar diet.

## 2025-07-22 ENCOUNTER — INFUSION (OUTPATIENT)
Dept: INFUSION THERAPY | Facility: HOSPITAL | Age: 35
End: 2025-07-22
Attending: INTERNAL MEDICINE
Payer: MEDICAID

## 2025-07-22 ENCOUNTER — LAB VISIT (OUTPATIENT)
Dept: HEMATOLOGY/ONCOLOGY | Facility: CLINIC | Age: 35
End: 2025-07-22
Payer: MEDICAID

## 2025-07-22 VITALS
OXYGEN SATURATION: 99 % | BODY MASS INDEX: 38.41 KG/M2 | WEIGHT: 239 LBS | SYSTOLIC BLOOD PRESSURE: 124 MMHG | HEART RATE: 89 BPM | HEIGHT: 66 IN | TEMPERATURE: 98 F | DIASTOLIC BLOOD PRESSURE: 84 MMHG | RESPIRATION RATE: 18 BRPM

## 2025-07-22 DIAGNOSIS — C78.7 ADENOCARCINOMA OF BREAST METASTATIC TO LIVER, UNSPECIFIED LATERALITY: ICD-10-CM

## 2025-07-22 DIAGNOSIS — C50.912 INVASIVE DUCTAL CARCINOMA OF LEFT BREAST: ICD-10-CM

## 2025-07-22 DIAGNOSIS — C50.919 ADENOCARCINOMA OF BREAST METASTATIC TO LIVER, UNSPECIFIED LATERALITY: ICD-10-CM

## 2025-07-22 DIAGNOSIS — C50.912 CARCINOMA OF LEFT BREAST METASTATIC TO BONE: Primary | ICD-10-CM

## 2025-07-22 DIAGNOSIS — C79.51 CARCINOMA OF LEFT BREAST METASTATIC TO BONE: Primary | ICD-10-CM

## 2025-07-22 LAB
ALBUMIN SERPL-MCNC: 3.5 G/DL (ref 3.5–5)
ALBUMIN/GLOB SERPL: 0.8 RATIO (ref 1.1–2)
ALP SERPL-CCNC: 52 UNIT/L (ref 40–150)
ALT SERPL-CCNC: 32 UNIT/L (ref 0–55)
ANION GAP SERPL CALC-SCNC: 7 MEQ/L
AST SERPL-CCNC: 18 UNIT/L (ref 11–45)
B-HCG UR QL: NEGATIVE
BASOPHILS # BLD AUTO: 0.09 X10(3)/MCL
BASOPHILS NFR BLD AUTO: 1.6 %
BILIRUB SERPL-MCNC: 0.6 MG/DL
BUN SERPL-MCNC: 22.3 MG/DL (ref 7–18.7)
CALCIUM SERPL-MCNC: 8.9 MG/DL (ref 8.4–10.2)
CHLORIDE SERPL-SCNC: 104 MMOL/L (ref 98–107)
CO2 SERPL-SCNC: 27 MMOL/L (ref 22–29)
CREAT SERPL-MCNC: 0.96 MG/DL (ref 0.55–1.02)
CREAT/UREA NIT SERPL: 23
CTP QC/QA: YES
EOSINOPHIL # BLD AUTO: 0.61 X10(3)/MCL (ref 0–0.9)
EOSINOPHIL NFR BLD AUTO: 10.6 %
ERYTHROCYTE [DISTWIDTH] IN BLOOD BY AUTOMATED COUNT: 12.3 % (ref 11.5–17)
GFR SERPLBLD CREATININE-BSD FMLA CKD-EPI: >60 ML/MIN/1.73/M2
GLOBULIN SER-MCNC: 4.5 GM/DL (ref 2.4–3.5)
GLUCOSE SERPL-MCNC: 257 MG/DL (ref 74–100)
HCT VFR BLD AUTO: 34.4 % (ref 37–47)
HGB BLD-MCNC: 11.5 G/DL (ref 12–16)
IMM GRANULOCYTES # BLD AUTO: 0.02 X10(3)/MCL (ref 0–0.04)
IMM GRANULOCYTES NFR BLD AUTO: 0.3 %
LYMPHOCYTES # BLD AUTO: 2.3 X10(3)/MCL (ref 0.6–4.6)
LYMPHOCYTES NFR BLD AUTO: 39.8 %
MAGNESIUM SERPL-MCNC: 1.9 MG/DL (ref 1.6–2.6)
MCH RBC QN AUTO: 35 PG (ref 27–31)
MCHC RBC AUTO-ENTMCNC: 33.4 G/DL (ref 33–36)
MCV RBC AUTO: 104.6 FL (ref 80–94)
MONOCYTES # BLD AUTO: 0.21 X10(3)/MCL (ref 0.1–1.3)
MONOCYTES NFR BLD AUTO: 3.6 %
NEUTROPHILS # BLD AUTO: 2.55 X10(3)/MCL (ref 2.1–9.2)
NEUTROPHILS NFR BLD AUTO: 44.1 %
NRBC BLD AUTO-RTO: 0 %
PLATELET # BLD AUTO: 293 X10(3)/MCL (ref 130–400)
PMV BLD AUTO: 9.6 FL (ref 7.4–10.4)
POTASSIUM SERPL-SCNC: 3.5 MMOL/L (ref 3.5–5.1)
PROT SERPL-MCNC: 8 GM/DL (ref 6.4–8.3)
RBC # BLD AUTO: 3.29 X10(6)/MCL (ref 4.2–5.4)
SODIUM SERPL-SCNC: 138 MMOL/L (ref 136–145)
WBC # BLD AUTO: 5.78 X10(3)/MCL (ref 4.5–11.5)

## 2025-07-22 PROCEDURE — 83735 ASSAY OF MAGNESIUM: CPT

## 2025-07-22 PROCEDURE — 81025 URINE PREGNANCY TEST: CPT

## 2025-07-22 PROCEDURE — 25000003 PHARM REV CODE 250

## 2025-07-22 PROCEDURE — 96413 CHEMO IV INFUSION 1 HR: CPT

## 2025-07-22 PROCEDURE — 63600175 PHARM REV CODE 636 W HCPCS: Mod: TB

## 2025-07-22 PROCEDURE — 85025 COMPLETE CBC W/AUTO DIFF WBC: CPT

## 2025-07-22 PROCEDURE — 80053 COMPREHEN METABOLIC PANEL: CPT

## 2025-07-22 RX ORDER — SODIUM CHLORIDE 0.9 % (FLUSH) 0.9 %
10 SYRINGE (ML) INJECTION
Status: DISCONTINUED | OUTPATIENT
Start: 2025-07-22 | End: 2025-07-23 | Stop reason: HOSPADM

## 2025-07-22 RX ORDER — PROCHLORPERAZINE EDISYLATE 5 MG/ML
10 INJECTION INTRAMUSCULAR; INTRAVENOUS ONCE AS NEEDED
Status: DISCONTINUED | OUTPATIENT
Start: 2025-07-22 | End: 2025-07-23 | Stop reason: HOSPADM

## 2025-07-22 RX ORDER — HEPARIN 100 UNIT/ML
500 SYRINGE INTRAVENOUS
Status: CANCELLED | OUTPATIENT
Start: 2025-07-22

## 2025-07-22 RX ORDER — PROCHLORPERAZINE EDISYLATE 5 MG/ML
10 INJECTION INTRAMUSCULAR; INTRAVENOUS ONCE AS NEEDED
Status: CANCELLED
Start: 2025-07-22

## 2025-07-22 RX ORDER — DIPHENHYDRAMINE HYDROCHLORIDE 50 MG/ML
50 INJECTION, SOLUTION INTRAMUSCULAR; INTRAVENOUS ONCE AS NEEDED
Status: CANCELLED | OUTPATIENT
Start: 2025-07-22

## 2025-07-22 RX ORDER — SODIUM CHLORIDE 0.9 % (FLUSH) 0.9 %
10 SYRINGE (ML) INJECTION
Status: CANCELLED | OUTPATIENT
Start: 2025-07-22

## 2025-07-22 RX ORDER — DIPHENHYDRAMINE HYDROCHLORIDE 50 MG/ML
50 INJECTION, SOLUTION INTRAMUSCULAR; INTRAVENOUS ONCE AS NEEDED
Status: DISCONTINUED | OUTPATIENT
Start: 2025-07-22 | End: 2025-07-23 | Stop reason: HOSPADM

## 2025-07-22 RX ORDER — EPINEPHRINE 0.3 MG/.3ML
0.3 INJECTION SUBCUTANEOUS ONCE AS NEEDED
Status: CANCELLED | OUTPATIENT
Start: 2025-07-22

## 2025-07-22 RX ORDER — EPINEPHRINE 1 MG/ML
0.3 INJECTION INTRAMUSCULAR; INTRAVENOUS; SUBCUTANEOUS ONCE AS NEEDED
Status: DISCONTINUED | OUTPATIENT
Start: 2025-07-22 | End: 2025-07-23 | Stop reason: HOSPADM

## 2025-07-22 RX ORDER — HEPARIN 100 UNIT/ML
500 SYRINGE INTRAVENOUS
Status: DISCONTINUED | OUTPATIENT
Start: 2025-07-22 | End: 2025-07-23 | Stop reason: HOSPADM

## 2025-07-22 RX ADMIN — HEPARIN 500 UNITS: 100 SYRINGE at 11:07

## 2025-07-22 RX ADMIN — TRASTUZUMAB-ANNS 653 MG: 420 INJECTION, POWDER, LYOPHILIZED, FOR SOLUTION INTRAVENOUS at 10:07

## 2025-07-22 RX ADMIN — SODIUM CHLORIDE: 9 INJECTION, SOLUTION INTRAVENOUS at 10:07

## 2025-07-22 NOTE — NURSING
0830 Patient is here for labs & C14 Kanjinti q 3 wks. She voices no c/o.   1120 Kanjinti was given via [employer]. Appts reviewed: patient returns on 8/6 for labs, MD visit & Lupron & on 8/12 for labs, Xgeva & Kanmanuelti. She gets appts on her phone.

## 2025-07-28 ENCOUNTER — HOSPITAL ENCOUNTER (OUTPATIENT)
Dept: RADIOLOGY | Facility: HOSPITAL | Age: 35
Discharge: HOME | End: 2025-07-28
Attending: INTERNAL MEDICINE
Payer: MEDICAID

## 2025-07-28 DIAGNOSIS — C50.919 CARCINOMA OF BREAST METASTATIC TO BONE, UNSPECIFIED LATERALITY: ICD-10-CM

## 2025-07-28 DIAGNOSIS — Z15.09 BRCA1 GENE MUTATION POSITIVE: ICD-10-CM

## 2025-07-28 DIAGNOSIS — C79.51 CARCINOMA OF BREAST METASTATIC TO BONE, UNSPECIFIED LATERALITY: ICD-10-CM

## 2025-07-28 DIAGNOSIS — C50.919 ADENOCARCINOMA OF BREAST METASTATIC TO LIVER, UNSPECIFIED LATERALITY: ICD-10-CM

## 2025-07-28 DIAGNOSIS — C78.7 ADENOCARCINOMA OF BREAST METASTATIC TO LIVER, UNSPECIFIED LATERALITY: ICD-10-CM

## 2025-07-28 DIAGNOSIS — Z15.01 BRCA1 GENE MUTATION POSITIVE: ICD-10-CM

## 2025-07-28 PROCEDURE — 74177 CT ABD & PELVIS W/CONTRAST: CPT | Mod: TC

## 2025-07-28 PROCEDURE — 25500020 PHARM REV CODE 255: Performed by: INTERNAL MEDICINE

## 2025-07-28 RX ADMIN — IOHEXOL 100 ML: 350 INJECTION, SOLUTION INTRAVENOUS at 11:07

## 2025-08-05 NOTE — PROGRESS NOTES
History:  Past Medical History:   Diagnosis Date    BRCA1 gene mutation positive 09/09/2024    BRCA1 [id number] ([id number]) - [employer] Multi Cancer Panel (70 genes)    Cancer     GERD (gastroesophageal reflux disease) May2025    Hypertension     Pregnancy      Past Surgical History:   Procedure Laterality Date    INSERTION OF TUNNELED CENTRAL VENOUS CATHETER (CVC) WITH SUBCUTANEOUS PORT N/A 08/14/2024    Procedure: [id number];  Surgeon: Jc Chauhan Jr., MD;  Location: [facility];  Service: General;  Laterality: N/A;      Social History     Socioeconomic History    Marital status: Other   Tobacco Use    Smoking status: Never     Passive exposure: Never    Smokeless tobacco: Never   Substance and Sexual Activity    Alcohol use: Never    Drug use: Never    Sexual activity: Yes     Partners: Male     Birth control/protection: I.U.D.     Social Drivers of Health     Financial Resource Strain: Low Risk  (11/2/2020)    Received from [facility]    Overall Financial Resource Strain (CARDIA)     Difficulty of Paying Living Expenses: Not hard at all   Food Insecurity: No Food Insecurity (11/2/2020)    Received from [facility]    Hunger Vital Sign     Worried About Running Out of Food in the Last Year: Never true     Ran Out of Food in the Last Year: Never true   Transportation Needs: No Transportation Needs (11/2/2020)    Received from [facility]    PRAPARE - Transportation     Lack of Transportation (Medical): No     Lack of Transportation (Non-Medical): No   Physical Activity: Inactive (11/2/2020)    Received from [facility]    Exercise Vital Sign     Days of Exercise per Week: 0 days     Minutes of  Exercise per Session: 0 min   Stress: No Stress Concern Present (11/2/2020)    Received from [facility]    English [place] [facility] [employer] [facility] - Occupational Stress Questionnaire     Feeling of Stress : Not at all      Family History   Problem Relation Name Age of Onset    Cervical cancer Mother Pao Zacarias 50    Hypertension Mother Pao Zacarias     Diabetes Father Robinson Bland     Hypertension Father Robinson Bland     Colon cancer Maternal Grandfather Robert Chang Jr 70    Cancer Maternal Grandfather Robert Chang Jr     Hypertension Paternal Grandmother Libertad Zacarias     Autism spectrum disorder Son Gilmar 3    Cervical cancer Other Conxavia 25    Kidney failure Other Robinson Jr     Cervical cancer Maternal Aunt Pao 50    Cervical cancer Maternal Aunt Yaima 44    Kidney failure Maternal Aunt Ghada 45    Cervical cancer Other      Stomach cancer Other          recurrence    Liver cancer Other Pedro Pablo 54    Hypertension Other [place]     Heart attack Paternal Uncle      BRCA1 Positive Paternal Cousin      Breast cancer Paternal Cousin  40    BRCA1 Positive Paternal Cousin      Breast cancer Paternal Cousin  42        BL mastect    Breast cancer Other      Hypertension Maternal Aunt Yaima Brown     Thyroid disease Maternal Aunt Yaima Brown     Cervical cancer Maternal Aunt Yaima Brown     Hypertension Maternal Aunt Mirta Brown     Hypertension Sister Evelio Brink     Breast cancer Maternal Aunt Anupama Damon         Breast Cancer    Cervical cancer Sister Victorina Barajas       Reason for Follow-up:  -bilateral breast cancer, diagnosed concurrently   -IDC right breast:  IDC, grade 2, lymph node biopsy negative, ER positive, RI positive, HER2 negative; S/P core biopsy 07/17/2024; cT1c cN0 MX, AJCC anatomic stage at least IA, clinical prognostic stage IA  -synchronous inflammatory carcinoma left breast, grade 3, lymph node  biopsy positive, ER positive, NV negative, HER2 positive (different receptor profile then right breast cancer); S/P core biopsy 07/16/2024; cT4d cN2 M1, stage IV  -Thoracolumbar vertebrae and sacrum metastatic involvement.   -FDG PET-CT for staging 09/30/2024: Sites of disease:  Multifocal, left breast; left axillary and subpectoral lymphadenopathy; small focus upper right breast; bilobar liver metastases; portal caval lymph node; multifocal bone metastases  -brain MRI 12/24/2024:  9 mm metastases anterior aspect of C4 vertebral body  -Monoallelic mutation of BRCA1 gene   -premenopausal   -family history of breast cancer, ovarian cancer, cervical cancer, stomach cancer, colon cancer    History of Present Illness:   Invasive ductal carcinoma of left breast      Oncologic/Hematologic History:  Oncology History   Malignant neoplasm of overlapping sites of left breast in female, estrogen receptor positive   8/8/2024 Initial Diagnosis    Malignant neoplasm of overlapping sites of left breast in female, estrogen receptor positive     8/8/2024 Cancer Staged    Staging form: Breast, AJCC 8th Edition  - Clinical stage from 8/8/2024: Stage IV (cT4d, cN1(f), pM1, G3, ER+, NV-, HER2+)     Malignant neoplasm of overlapping sites of right breast in female, estrogen receptor positive   8/8/2024 Initial Diagnosis    Malignant neoplasm of overlapping sites of right breast in female, estrogen receptor positive     8/8/2024 Cancer Staged    Staging form: Breast, AJCC 8th Edition  - Clinical stage from 8/8/2024: Stage IIA (cT2, cN0(f), cM0, G2, ER+, NV-, HER2-)     Invasive ductal carcinoma of breast, right   7/17/2024 Cancer Staged    Staging form: Breast, AJCC 8th Edition  - Clinical stage from 7/17/2024: Stage IA (cT1c, cN0, cM0, G2, ER+, NV+, HER2-)     8/29/2024 Initial Diagnosis    Invasive ductal carcinoma of breast, right     Invasive ductal carcinoma of left breast   8/29/2024 Initial Diagnosis    Invasive ductal carcinoma of  left breast     9/16/2024 Cancer Staged    Staging form: Breast, AJCC 8th Edition  - Clinical stage from 9/16/2024: Stage IV (cT4d, cN2, pM1, G3, ER+, MN-, HER2+)     10/8/2024 -  Chemotherapy    Treatment Summary   Plan Name: OP BREAST TRASTUZUMAB Q3W  Treatment Goal: Palliative  Status: Active  Start Date: 10/8/2024  End Date: 9/23/2025 (Planned)  Provider: Kevon Subramanian MD  Chemotherapy: trastuzumab-anns (KANJINTI) 871 mg in 0.9% NaCl 326 mL chemo infusion, 8 mg/kg = 871 mg, Intravenous, Clinic/HOD 1 time, 14 of 17 cycles  Administration: 871 mg (10/8/2024), 636 mg (10/29/2024), 653 mg (11/19/2024), 653 mg (12/10/2024), 653 mg (12/31/2024), 653 mg (1/28/2025), 653 mg (2/18/2025), 653 mg (3/18/2025), 653 mg (4/8/2025), 653 mg (4/29/2025), 653 mg (5/20/2025), 653 mg (6/10/2025), 653 mg (7/1/2025), 653 mg (7/22/2025)     Past medical history: Hypertension  Surgical/procedure history:  MediPort placement 08/14/2024    Social history: .  Lives in [place].  Has a 3-year-old son.  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  Denies history of tobacco, alcohol, or illicit drug abuse.  Family history:   -Maternal great aunt # 1: Breast cancer, in her 50s   -sister: Ovarian cancer, age 50  -sister: Cervical cancer, age 25  -mother: Ovarian cancer, age 50  -Maternal aunt: Cervical cancer, age 50  -maternal grandfather: Stomach cancer, age 70 (smoker)  -maternal great aunt # 2: Colon cancer, age 60  Health maintenance: PCP at [facility].  No screening colonoscopy ever.  Menstrual/Ob gyn history: Menarche, age 11; 1 pregnancy, 1 child; gave birth to her child at age 30; no abortions or miscarriages premenopausal; OCP at age 16, received Depo shots from age [age]; no contraception from age [age] when she became pregnant; Mirena IUD after pregnancy at age 30; experienced hot flashes.  No menstrual cycles after Mirena was placed.      34-year-old lady, referred from [facility] surgery, with invasive ductal  carcinoma of breast.  We are following her for metastatic breast cancer.    Please see assessment and plan section for details.    08/29/2024:   Pleasant lady who presents for initial medical oncology consultation.  In no acute discomfort.  Hot flashes since May 2024.  Left breast is painful; gets sharp pains intermittently, 9/10 severity; also, pain in left arm but no swelling.  Pain is intermittent.  Left breast tumor is getting bigger.  No ulceration.  Since yesterday, 08/28/2024, has a experienced painless diarrhea with blood on toilet wipes as well as in toilet bowl; no weakness or dizziness; 5 loose stools yesterday, 2 loose stools today; no nausea, vomiting, hematemesis, or melena.  Never experienced GI bleeding before.  No unusual headaches, focal neurological symptoms, chest pain, cough, dyspnea, abdominal pain, nausea or vomiting.  No urinary problems.  No bone pains.  Appetite is okay.  No unintentional weight loss.    Interval History:  PROSTATE LEUPROLIDE (LUPRON) 3 MONTH   OP BREAST TRASTUZUMAB Q3W     08/06/2025:   -04/28/2025: Restaging CTs C/A/P with contrast:  Left axillary lymph node and asymmetric areas of soft tissue in the left breast show decreased size since 01/27/2025.   No significant change in hypodense right hepatic lesion.   New areas of sclerosis in the axial and appendicular skeleton likely treated bone lesions.  -04/28/2025: Restaging whole-body nuclear medicine bone scan:  There is similar radiotracer uptake within the spine, ribs, and pelvis compared to the prior study from 01/27/2025  -06/16/2025: Surveillance TTE: LVEF 65%  -07/28/2025:  Restaging CTs C/A/P with contrast:  1. Postsurgical changes are identified of the left breast without evidence for local recurrence/residual disease.  Skin thickening is not significantly changed.  2. No distribution of osseous metastatic disease is not significantly changed.  Overall increased sclerotic appearance may represent treated  disease.  3. No new focus of disease.  -08/06/2025:  Hemoglobin 11.2, ANC 1.284; CMP reviewed  Presents for a follow-up visit.  Doing well.  Appetite is okay.  Endorses no particular symptoms of concern.  Compliant with Arimidex and ribociclib.  On Herceptin and Lupron.    No unusual headaches, focal neurological symptoms, any new lumps or lymphadenopathy, weakness, fatigue, anorexia, unintentional weight loss, chest pain, cough, dyspnea, abdominal pain, nausea, vomiting, change in bowel habits, GI bleeding, any urinary problems, bone pains, etc..    Occasional pains under left breast.  She takes Norco PRN, approximately twice daily.  Requesting refill.  Compliant with calcium and vitamin-D supplements as well.  No more episodes of hematochezia.  No allergic reaction with Herceptin.  No signs of congestive heart failure.  Latest echocardiogram showed adequate LVEF.  GI referral for evaluation of history of hematochezia, is pending.  No jaw pain with the Xgeva; no signs and symptoms of osteonecrosis.  With ribociclib, has not experienced severe bone marrow suppression, dermatologic toxicity, hepatobiliary toxicity, or pulmonary toxicity.  Has not experienced significant alopecia, skin rash, pruritus, abdominal pain, constipation, UTIs, anemia, leukopenia, neutropenia, significant LFT abnormalities, either.  Has not experienced any allergic reactions with the Herceptin.  ECOG 0.    Immunization History   Administered Date(s) Administered    Pneumococcal Conjugate - 20 Valent 09/19/2024     Review of patient's allergies indicates:  No Known Allergies    Medications:  Current Outpatient Medications on File Prior to Visit   Medication Sig Dispense Refill    anastrozole (ARIMIDEX) 1 mg Tab Take 1 tablet (1 mg total) by mouth once daily. 30 tablet 3    HYDROcodone-acetaminophen (NORCO)  mg per tablet Take 1 tablet by mouth every 6 (six) hours as needed for Pain. 90 tablet 0    linaCLOtide (LINZESS) 145 mcg Cap  capsule Take 1 capsule (145 mcg total) by mouth before breakfast. 30 capsule 5    losartan-hydrochlorothiazide 100-25 mg (HYZAAR) 100-25 mg per tablet       potassium chloride SA (K-DUR,KLOR-CON) 20 MEQ tablet Take 1 tablet (20 mEq total) by mouth once daily. 30 tablet 0    prochlorperazine (COMPAZINE) 5 MG tablet Take 2 tablets (10 mg total) by mouth every 6 (six) hours as needed (nausea). 40 tablet 11    anastrozole (ARIMIDEX) 1 mg Tab Take 1 tablet (1 mg total) by mouth once daily. (Patient not taking: Reported on 8/6/2025) 30 tablet 2    citalopram (CELEXA) 10 MG tablet Take 1 tablet (10 mg total) by mouth once daily. (Patient not taking: Reported on 8/6/2025) 30 tablet 0    ondansetron (ZOFRAN) 4 MG tablet Take 1 tablet (4 mg total) by mouth 2 (two) times daily. (Patient not taking: Reported on 5/20/2025) 10 tablet 1    ribociclib (KISQALI) 600 mg/day (200 mg x 3) Tab Take 3 tablets (600 mg) by mouth once daily for 21 DAYS followed by a 7 day rest; to complete a 28 day treatment cycle. (Patient not taking: Reported on 8/6/2025) 63 tablet 2     No current facility-administered medications on file prior to visit.     Review of Systems:   All systems reviewed and found to be negative except for the symptoms detailed above    Physical Examination:   VITAL SIGNS:   Vitals:    08/06/25 0923   BP: 113/82   Pulse: 83   Resp: 18   Temp: 98.4 °F (36.9 °C)       GENERAL:  In no apparent distress.    HEAD:  No signs of head trauma.  EYES:  Pupils are equal.  Extraocular motions intact.    EARS:  Hearing grossly intact.  MOUTH:  Oropharynx is normal.   NECK:  No adenopathy, no JVD.     CHEST:  Chest with clear breath sounds bilaterally.  No wheezes, rales, rhonchi.    CARDIAC:  Regular rate and rhythm.  S1 and S2, without murmurs, gallops, rubs.  VASCULAR:  No Edema.  Peripheral pulses normal and equal in all extremities.  ABDOMEN:  Soft, without detectable tenderness.  No sign of distention.  No   rebound or guarding, and  no masses palpated.   Bowel Sounds normal.  MUSCULOSKELETAL:  Good range of motion of all major joints. Extremities without clubbing, cyanosis or edema.    NEUROLOGIC EXAM:  Alert and oriented x 3.  No focal sensory or strength deficits.   Speech normal.  Follows commands.  PSYCHIATRIC:  Mood normal.  08/29/2024:  Breast examination was performed with a verbal consent, in the presence of Zulma Woo LPN:  # right breast:  About 4 cm nontender mobile tumor palpable at 11:00 a.m. position in the right breast, several cm from nipple, without overlying skin ulceration/satellite nodules/fixation; no nipple discharge; no palpable regional lymphadenopathy   # left breast: Entire left breast is involved with the hard, nontender, freely mobile tumor; orange peel appearance of skin is noted with faint erythema; hard, immobile lymph node is palpable in the left axilla.  No swelling of left upper extremity.    Assessment:  Problem List Items Addressed This Visit       Malignant neoplasm of overlapping sites of left breast in female, estrogen receptor positive    Overview   Characteristics of Inflammatory breast cancer  1. LEFT BREAST, 2 O'CLOCK, 10 CM FN: 2.0 cm  - INVASIVE DUCTAL CARCINOMA, GRADE 3.   Comment #1: Carcinoma is present on all three core biopsies, comprising    95% of the tissue and measuring at least 1.2 cm.   2. LEFT AXILLA:   - INVASIVE DUCTAL CARCINOMA, GRADE 3.   Comment #2: No lymph node is present. However, this could represent    metastatic carcinoma completely replacing a lymph node.   ER: POSITIVE, FL: NEGATIVE, HER2**: POSITIVE  3. LEFT BREAST, 1 O'CLOCK 12 CM FN - 2.3 cm         Metastasis to lymph nodes - Left axilla    Overview   1. LEFT BREAST, 2 O'CLOCK, 10 CM FN:   - INVASIVE DUCTAL CARCINOMA, GRADE 3.   Comment #1: Carcinoma is present on all three core biopsies, comprising    95% of the tissue and measuring at least 1.2 cm.   2. LEFT AXILLA:   - INVASIVE DUCTAL CARCINOMA, GRADE 3.   Comment  #2: No lymph node is present. However, this could represent    metastatic carcinoma completely replacing a lymph node.   ER: POSITIVE, GA: NEGATIVE, HER2**: POSITIVE          Secondary malignant neoplasm of cervical vertebral column    Invasive ductal carcinoma of breast, right    Invasive ductal carcinoma of left breast    Secondary malignant neoplasm of axillary lymph nodes    Secondary malignancy of lumbar vertebral column    Secondary malignancy of sacrum    Family history of breast cancer    Family history of cervical cancer    Premenopausal patient - Primary    Family history of colon cancer    Family history of ovarian cancer    Family history of stomach cancer    Hematochezia    Inflammatory carcinoma of left breast    Secondary malignancy of thoracic vertebral column    Monoallelic mutation of BRCA1 gene    Breast cancer metastasized to bone    Adenocarcinoma of breast metastatic to liver    BRCA1 gene mutation positive    Immunodeficiency due to chemotherapy    Hot flashes related to aromatase inhibitor therapy    HER2-positive carcinoma of left breast    Carcinoma of breast, stage 4, estrogen receptor positive, left    Periportal lymphadenopathy    Chemotherapy management, encounter for    Long term current use of aromatase inhibitor    Macrocytic anemia     Other Visit Diagnoses         High risk medication use              Orders for 08/06/2025:   Continue Herceptin, Lupron, Arimidex, and ribociclib  Restaging bone scan is pending   Re-stage with contrast-enhanced CT scans of C/A/P and bone scan late October  Follow-up with gyn for ovarian cancer surveillance (patient is BRCA1 mutation positive)   GI referral for evaluation of hematochezia   Continue Xgeva   Calcium and vitamin-D supplements   DEXA scan in September 2026  Echocardiogram mid September  Follow-up with NP in 1 month    Above discussed with her.  All questions answered.    Discussed labs and scans and gave her copies of relevant  results.  Plan of management discussed once again.    She understands and agrees with this plan.  ====================================    # IDC both breasts, diagnosed concurrently:  #Synchronous inflammatory carcinoma left breast:  -presentation:  Pain and swelling left breast 05/2024  -Mirena IUD in place; removed 09/19/2024   -extensive family history of breast cancer and cervical cancer  -physical exam: Right breast: 3 cm mass 12 o'clock position, 4 in above nipple  -physical exam: Left breast: Swollen, hard, large mass to o'clock position, 1 hard immobile lymph node in left axilla, breast and axilla tender to palpation (inflammatory carcinoma of left breast)  -mammogram and ultrasound 07/01/2024  -multiple masses left breast on mammogram and ultrasound  -concurrently diagnosed bilateral breast cancer   -IDC right breast:  IDC, grade 2, lymph node biopsy negative, ER positive, WV positive, HER2 negative; cT1c cN0 MX, AJCC anatomic stage at least IA, clinical prognostic stage IA; S/P needle biopsy of right breast and right axilla (S/P core biopsy 07/17/2024)  -inflammatory carcinoma left breast, grade 3, lymph node biopsy positive, ER positive, WV negative, HER2 positive; cT4d cN2 M1, stage IV; S/P needle biopsy left breast and left axilla (S/P core biopsy 07/16/2024) (By virtue of palpable, fixed lymph node in the left axilla, cN2)  -genetic testing 08/22/2024:  BRCA1 mutation positive, heterozygous  -premenopausal per labs 08/29/2024  -baseline TTE 09/05/2024:  LVEF 60-65%   -DEXA scan 09/16/2024:  Normal BMD of lumbar vertebrae end of femoral necks  -baseline staging CTs C/A/P 0 09/16/2024:  Multiple thoracolumbar vertebrae and sacral lytic metastases   -baseline staging whole-body nuclear medicine bone scan 09/16/2024:  Thoracolumbar vertebrae and sacrum metastases  -09/16/2024: Patient referred to IR for biopsy of suspicious bone lesions  -liquid biopsy 09/19/2024:  BRCA1 positive; HER2 positive;TMB could  not be calculated due to insufficient circulating tumor DNA; MSI-high not detected  -09/19/2024: Baseline tumor markers: CEA 19.74, elevated; CA 27.29, CA 15-3 levels normal  -brain MRI for staging 09/23/2024: No intracranial metastases  -FDG PET-CT for staging 09/30/2024: Sites of disease:  Multifocal, left breast; left axillary and subpectoral lymphadenopathy; small focus upper right breast; bilobar liver metastases; portal caval lymph node; multifocal bone metastases  -no visceral metastases  -tamoxifen plus trastuzumab started 10/08/2024  -CT-guided biopsy left iliac crest 10/17/2024:  Metastases from breast carcinoma  -germline testing: BRCA1 mutation positive  -tissue NGS testing (left breast, collected 07/16/2024):  BRCA1 mutation positive; HER2 positive; MSI stable; TMB 2.1 m/MB (low); fusion negative; PD-L1 negative (PD-L1 CPS 2; PD-L1 TPS 1%); PIK3CA negative; ESR1 negative  -cycle 2 of trastuzumab on 10/29/2024  -Mirena IUD removed 09/18/2024   -surveillance TTE 12/19/2024: LVEF 67%  -pelvic ultrasound 12/23/2024:  Bilateral ovarian cysts, largest 1.5 cm  -brain MRI 12/24/2024: Headaches:  9 mm ovoid metastases anterior aspect of C4 vertebral body  -dental clearance obtained 11/29/2024 (dated 11/26/2024)  -Xgeva for bone metastases, 120 mg subQ every 4 weeks, started 12/31/2024  -01/13/2025:  Gyn consultation/follow-up:  Patient would like to proceed with ParaGard IUD insertion (nonhormonal contraception); annual transvaginal ultrasound and CA-125 level for surveillance in view of BRCA1 mutation status until risk reduction HUA-BSO recommended at age 35-40 or when done with childbearing; given stage IV breast cancer, plan to delay surgery until further prognosis can be made  >>>  # Disease progression on tamoxifen/Herceptin:  -restaging CTs and bone scan 01/27/2025:  Progression of metastases  (Failed tamoxifen +Herceptin)  -new Plan:  Continue Herceptin +start OFS with Lupron every 3 months + continue  tamoxifen for a couple of months with Lupron, then switch to Arimidex/ribociclib  -no visceral metastases, therefore, planned Herceptin +second-line endocrine therapy  -Lupron 22.5 mg IM every 3 months (for OFS), started 02/18/2025  -bilateral diagnostic mammogram 03/03/2025 (will discontinue)  -bilateral breast MRI 03/14/2025 (will discontinue)  -surveillance TTE 03/10/2025: LVEF 60%  -Arimidex/ribociclib started 03/19/2025  -restaging CTs C/A/P 04/28/2025: Positive response  -restaging bone scan 04/28/2025:  No progression  -surveillance TTE 06/16/2025: LVEF 65%  -restaging CTs C/A/P 07/28/2025:  No progression  >>>  Plan:  -Continue Herceptin + Lupron every 12 weeks + anastrazole +ribociclib  -Lupron started 02/18/2025  -Arimidex/ribociclib started 03/19/2025  -restaging bone scan is pending  -re-stage with contrast-enhanced CT scans of C/A/P and bone scan in 3 months (late October 2025)  ER positive, IL negative, HER2 positive  Follow-up with gyn for ovarian cancer surveillance (BRCA1 mutation positive)  GI referral for hematochezia  Continue Xgeva to prevent skeletal events from bone metastases  Calcium and vitamin-D supplements to continue  DEXA scan in 2 years (September 2026)  Surveillance TTE 3 months (mid September)    Discussion:  If, at anytime, develops visceral crisis or endocrine refractory disease, then, treatment options:  # first-line:    Pertuzumab +trastuzumab +docetaxel (category 1, Preferred);   pertuzumab +trastuzumab +paclitaxel (Preferred)  (after response, maintenance trastuzumab/pertuzumab + concurrent endocrine therapy)   # second-line:  -Fam trastuzumab deruxtecan (category 1, Preferred)  -tucatinib +trastuzumab +capecitabine is preferred in patients with both systemic and CNS progression in the 3rd line setting and beyond; it may also be given in the second-line setting, etc.    Palliative systemic therapy options:  -systemic therapy +HER2 targeted therapy; or  -endocrine therapy  +/-HER2 targeted therapy +ovarian suppression in premenopausal patient  -for premenopausal patients, tamoxifen alone (without ovarian ablation/suppression) + HER2 targeted therapy is also an option  >>>  -no visceral crisis, therefore, we decided to treat with tamoxifen + trastuzumab as first-line palliative systemic therapy    Discussion:  Herceptin:  -watch for cardiomyopathy, infusion reactions and pulmonary toxicity  -adverse reactions:> 10%:  Decreased LVEF, skin rash, abdominal pain, anorexia, infusion related reactions, asthenia, chills, back pain, dyspnea, etc.  -warnings/precautions:  Cardiomyopathy; infusion reactions; pulmonary toxicity; renal toxicity  -monitoring: Assess left ventricular ejection fraction (by ECG or MUGA scan) at baseline (immediately prior to trastuzumab initiation), every 3 months during trastuzumab therapy, every 4 weeks if trastuzumab is withheld for significant left ventricular cardiac dysfunction, and every 6 months for at least 2 years following completion of adjuvant trastuzumab therapy. Monitor vital signs during infusion; monitor for hypersensitivity or infusion reaction; if a reaction occurs, monitor carefully until symptoms resolve completely.   Monitor for signs/symptoms of cardiac dysfunction or pulmonary toxicity. If pregnancy inadvertently occurs during treatment, monitor amniotic fluid volume.    Monitoring with ribociclib:  -CBC: Baseline, every 2 weeks for first 2 cycles, at the beginning of the subsequent 4 cycles, and as clinically necessary  LFTs: Baseline, every 2 weeks for the first 2 cycles, at the beginning of the subsequent 4 cycles, and as clinically necessary  -Electrolytes: Prior to treatment, at the beginning of first 6 cycles, and as needed indicated  -EKG: Prior to treatment initiation, repeat on day 14 of cycle 1, at the beginning of cycle 2, and last week indicated    Anastrozole:   1 mg p.o. q.day    Ribociclib:  600 mg p.o. daily for 21 days,  followed by a 7 day rest to complete a 28 day treatment cycle; continue until disease progression or unacceptable toxicity  With AI: 600 mg daily for 21 days, followed by 7-day rest period to complete a 28-day treatment cycle; continue until disease progression or unacceptable toxicity  -Dose reduction depending upon kidney impairment  -Dose reduction depending upon moderate or severe liver impairment  -Dose reductions: 600 mg daily, 400 mg daily, 200 mg daily  -Dose management depending upon toxicities: Hematologic toxicity (neutropenia), cardiovascular toxicity (QT prolonging agent), dermatologic toxicity, pulmonary toxicity  Dosage forms: 200 mg, 400 mg, 600 mg  Administration: With or without food  Moderate or high emetic potential  Warnings/precautions with ribociclib:  -Bone marrow suppression: Neutropenia  -Dermatologic toxicity  -Hepatobiliary toxicity  -QT prolongation  -Pulmonary toxicity  Adverse reactions with ribociclib:  Peripheral edema, alopecia, pruritus, skin rash, abdominal pain, constipation, anorexia, UTIs, anemia, leukopenia, neutropenia, increased ALT, increased AST, etc.    Fertility and birth control issues:  Discussion about fertility and contraception:  -Informed her about the potential impact of chemotherapy on fertility  -Made her aware of the option of referral to fertility specialist before chemotherapy and/or endocrine therapy to discuss options in case she desires future pregnancies.  Fertility preservation options include oocyte and embryo cryopreservation, etc.  -Amenorrhea frequently occurs during or after chemotherapy.  The majority of women younger than 35 years to resume menses within 2 years of finishing adjuvant chemotherapy  -Informed that menses and fertility are not necessarily linked. Absence of regular menses does not necessarily imply lack of fertility.  Conversely, the presence of menses does not guarantee fertility.  -There are limited data regarding continued  fertility after chemotherapy.  -Cautioned her to avoid becoming pregnant during treatment with radiation therapy, chemotherapy, or endocrine therapy  -Hormone-based birth control is discouraged regardless of the harmless of the hormone receptor status of the patient's cancer  -Alternative methods of birth control, including IUD, barrier method, tubal ligation, vasectomy for the partner, etc.   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, HER2 positive, cT4d cN2 M1, stage IV  -FDG PET-CT for staging 09/30/2024: Sites of disease:  Multifocal, left breast; left axillary and subpectoral lymphadenopathy; small focus upper right breast; bilobar liver metastases; portal caval lymph node; multifocal bone metastases  -brain MRI 12/24/2024:  9 mm metastases anterior aspect of C4 vertebral body (no brain metastases)    # Molecular markers:  -liquid biopsy:  BRCA1 positive; HER2 positive;TMB could not be calculated due to insufficient circulating tumor DNA; MSI-high not detected  -CT-guided biopsy left iliac crest 10/17/2024:  Metastases from breast carcinoma  -germline testing: BRCA1 mutation positive  -tissue NGS testing (left breast, collected 07/16/2024):  BRCA1 mutation positive; HER2 positive; MSI stable; TMB 2.1 m/MB (low); fusion negative; PD-L1 negative (PD-L1 CPS 2; PD-L1 TPS 1%); PIK3CA negative; ESR1 negative  -genetic testing 08/22/2024:  BRCA1 mutation positive, heterozygous    # Hematochezia:   Since yesterday, 08/28/2024, has a experienced painless diarrhea with blood  on toilet wipes as well as in toilet bowl; no weakness or dizziness; 5 loose stools yesterday, 2 loose stools today; no nausea, vomiting, hematemesis, or melena.  Never experienced GI bleeding before.  >>>   -08/29/2024: sent an urgent referral to GI for evaluation    # Family history of breast cancer, ovarian cancer, cervical cancer, stomach cancer, colon cancer:  -Maternal great aunt # 1: Breast cancer, in her 50s   -sister: Ovarian cancer, age 50  -sister: Cervical cancer, age 25  -mother: Ovarian cancer, age 50  -Maternal aunt: Cervical cancer, age 50  -maternal grandfather: Stomach cancer, age 70 (smoker)  -maternal great aunt # 2: Colon cancer, age 60  >>>  -genetic testing 08/22/2024:  BRCA1 mutation positive, heterozygous      Follow-up:  No follow-ups on file.  Answers submitted by the patient for this visit:  Review of Systems Questionnaire (Submitted on 7/30/2025)  appetite change : No  unexpected weight change: No  mouth sores: No  visual disturbance: No  cough: No  shortness of breath: No  chest pain: No  abdominal pain: No  diarrhea: No  frequency: No  back pain: No  rash: No  headaches: No  adenopathy: No  nervous/ anxious: No

## 2025-08-06 ENCOUNTER — OFFICE VISIT (OUTPATIENT)
Dept: HEMATOLOGY/ONCOLOGY | Facility: CLINIC | Age: 35
End: 2025-08-06
Attending: INTERNAL MEDICINE
Payer: MEDICAID

## 2025-08-06 ENCOUNTER — INFUSION (OUTPATIENT)
Dept: INFUSION THERAPY | Facility: HOSPITAL | Age: 35
End: 2025-08-06
Attending: INTERNAL MEDICINE
Payer: MEDICAID

## 2025-08-06 VITALS
DIASTOLIC BLOOD PRESSURE: 82 MMHG | TEMPERATURE: 98 F | HEART RATE: 84 BPM | BODY MASS INDEX: 38.23 KG/M2 | OXYGEN SATURATION: 97 % | RESPIRATION RATE: 20 BRPM | HEIGHT: 66 IN | WEIGHT: 237.88 LBS | SYSTOLIC BLOOD PRESSURE: 122 MMHG

## 2025-08-06 VITALS
BODY MASS INDEX: 38.25 KG/M2 | HEIGHT: 66 IN | RESPIRATION RATE: 18 BRPM | WEIGHT: 238 LBS | SYSTOLIC BLOOD PRESSURE: 113 MMHG | DIASTOLIC BLOOD PRESSURE: 82 MMHG | TEMPERATURE: 98 F | HEART RATE: 83 BPM | OXYGEN SATURATION: 98 %

## 2025-08-06 DIAGNOSIS — Z79.899 HIGH RISK MEDICATION USE: ICD-10-CM

## 2025-08-06 DIAGNOSIS — C77.3 SECONDARY MALIGNANT NEOPLASM OF AXILLARY LYMPH NODES: ICD-10-CM

## 2025-08-06 DIAGNOSIS — C79.51 SECONDARY MALIGNANCY OF SACRUM: ICD-10-CM

## 2025-08-06 DIAGNOSIS — Z80.49 FAMILY HISTORY OF CERVICAL CANCER: ICD-10-CM

## 2025-08-06 DIAGNOSIS — Z17.0 CARCINOMA OF BREAST, STAGE 4, ESTROGEN RECEPTOR POSITIVE, LEFT: ICD-10-CM

## 2025-08-06 DIAGNOSIS — C79.51 CARCINOMA OF LEFT BREAST METASTATIC TO BONE: Primary | ICD-10-CM

## 2025-08-06 DIAGNOSIS — C50.912 INFLAMMATORY CARCINOMA OF LEFT BREAST: ICD-10-CM

## 2025-08-06 DIAGNOSIS — C79.51 CARCINOMA OF BREAST METASTATIC TO BONE, UNSPECIFIED LATERALITY: ICD-10-CM

## 2025-08-06 DIAGNOSIS — C79.51 SECONDARY MALIGNANCY OF THORACIC VERTEBRAL COLUMN: ICD-10-CM

## 2025-08-06 DIAGNOSIS — Z17.0 MALIGNANT NEOPLASM OF OVERLAPPING SITES OF LEFT BREAST IN FEMALE, ESTROGEN RECEPTOR POSITIVE: ICD-10-CM

## 2025-08-06 DIAGNOSIS — Z15.09 MONOALLELIC MUTATION OF BRCA1 GENE: ICD-10-CM

## 2025-08-06 DIAGNOSIS — N95.9 PREMENOPAUSAL PATIENT: Primary | ICD-10-CM

## 2025-08-06 DIAGNOSIS — C79.51 SECONDARY MALIGNANCY OF LUMBAR VERTEBRAL COLUMN: ICD-10-CM

## 2025-08-06 DIAGNOSIS — C50.912 CARCINOMA OF BREAST, STAGE 4, ESTROGEN RECEPTOR POSITIVE, LEFT: ICD-10-CM

## 2025-08-06 DIAGNOSIS — Z15.09 BRCA1 GENE MUTATION POSITIVE: ICD-10-CM

## 2025-08-06 DIAGNOSIS — C50.812 MALIGNANT NEOPLASM OF OVERLAPPING SITES OF LEFT BREAST IN FEMALE, ESTROGEN RECEPTOR POSITIVE: ICD-10-CM

## 2025-08-06 DIAGNOSIS — C50.912 HER2-POSITIVE CARCINOMA OF LEFT BREAST: ICD-10-CM

## 2025-08-06 DIAGNOSIS — T45.1X5A HOT FLASHES RELATED TO AROMATASE INHIBITOR THERAPY: ICD-10-CM

## 2025-08-06 DIAGNOSIS — R23.2 HOT FLASHES RELATED TO AROMATASE INHIBITOR THERAPY: ICD-10-CM

## 2025-08-06 DIAGNOSIS — Z79.811 LONG TERM CURRENT USE OF AROMATASE INHIBITOR: ICD-10-CM

## 2025-08-06 DIAGNOSIS — C50.919 ADENOCARCINOMA OF BREAST METASTATIC TO LIVER, UNSPECIFIED LATERALITY: Primary | ICD-10-CM

## 2025-08-06 DIAGNOSIS — Z15.01 BRCA1 GENE MUTATION POSITIVE: ICD-10-CM

## 2025-08-06 DIAGNOSIS — Z79.69 IMMUNOSUPPRESSED DUE TO CHEMOTHERAPY: ICD-10-CM

## 2025-08-06 DIAGNOSIS — Z80.41 FAMILY HISTORY OF OVARIAN CANCER: ICD-10-CM

## 2025-08-06 DIAGNOSIS — C78.7 ADENOCARCINOMA OF BREAST METASTATIC TO LIVER, UNSPECIFIED LATERALITY: ICD-10-CM

## 2025-08-06 DIAGNOSIS — D84.821 IMMUNOSUPPRESSED DUE TO CHEMOTHERAPY: ICD-10-CM

## 2025-08-06 DIAGNOSIS — C77.3 MALIGNANT NEOPLASM METASTATIC TO LYMPH NODE OF AXILLA: ICD-10-CM

## 2025-08-06 DIAGNOSIS — D84.821 IMMUNODEFICIENCY DUE TO CHEMOTHERAPY: ICD-10-CM

## 2025-08-06 DIAGNOSIS — Z80.0 FAMILY HISTORY OF STOMACH CANCER: ICD-10-CM

## 2025-08-06 DIAGNOSIS — Z79.69 IMMUNODEFICIENCY DUE TO CHEMOTHERAPY: ICD-10-CM

## 2025-08-06 DIAGNOSIS — C78.7 ADENOCARCINOMA OF BREAST METASTATIC TO LIVER, UNSPECIFIED LATERALITY: Primary | ICD-10-CM

## 2025-08-06 DIAGNOSIS — C50.919 ADENOCARCINOMA OF BREAST METASTATIC TO LIVER, UNSPECIFIED LATERALITY: ICD-10-CM

## 2025-08-06 DIAGNOSIS — R59.0 PERIPORTAL LYMPHADENOPATHY: ICD-10-CM

## 2025-08-06 DIAGNOSIS — Z15.01 MONOALLELIC MUTATION OF BRCA1 GENE: ICD-10-CM

## 2025-08-06 DIAGNOSIS — T45.1X5A IMMUNODEFICIENCY DUE TO CHEMOTHERAPY: ICD-10-CM

## 2025-08-06 DIAGNOSIS — C50.919 CARCINOMA OF BREAST METASTATIC TO BONE, UNSPECIFIED LATERALITY: ICD-10-CM

## 2025-08-06 DIAGNOSIS — Z17.31 HER2-POSITIVE CARCINOMA OF LEFT BREAST: ICD-10-CM

## 2025-08-06 DIAGNOSIS — Z51.11 CHEMOTHERAPY MANAGEMENT, ENCOUNTER FOR: ICD-10-CM

## 2025-08-06 DIAGNOSIS — Z80.0 FAMILY HISTORY OF COLON CANCER: ICD-10-CM

## 2025-08-06 DIAGNOSIS — C50.912 CARCINOMA OF LEFT BREAST METASTATIC TO BONE: Primary | ICD-10-CM

## 2025-08-06 DIAGNOSIS — C50.912 INVASIVE DUCTAL CARCINOMA OF LEFT BREAST: ICD-10-CM

## 2025-08-06 DIAGNOSIS — D53.9 MACROCYTIC ANEMIA: ICD-10-CM

## 2025-08-06 DIAGNOSIS — Z80.3 FAMILY HISTORY OF BREAST CANCER: ICD-10-CM

## 2025-08-06 DIAGNOSIS — C79.51: ICD-10-CM

## 2025-08-06 DIAGNOSIS — C50.911 INVASIVE DUCTAL CARCINOMA OF BREAST, RIGHT: ICD-10-CM

## 2025-08-06 DIAGNOSIS — K92.1 HEMATOCHEZIA: ICD-10-CM

## 2025-08-06 DIAGNOSIS — T45.1X5A IMMUNOSUPPRESSED DUE TO CHEMOTHERAPY: ICD-10-CM

## 2025-08-06 LAB
B-HCG UR QL: NEGATIVE
CTP QC/QA: YES

## 2025-08-06 PROCEDURE — 99214 OFFICE O/P EST MOD 30 MIN: CPT | Mod: PBBFAC | Performed by: INTERNAL MEDICINE

## 2025-08-06 PROCEDURE — 63600175 PHARM REV CODE 636 W HCPCS: Mod: JZ,TB | Performed by: INTERNAL MEDICINE

## 2025-08-06 PROCEDURE — 96402 CHEMO HORMON ANTINEOPL SQ/IM: CPT

## 2025-08-06 PROCEDURE — 81025 URINE PREGNANCY TEST: CPT | Performed by: INTERNAL MEDICINE

## 2025-08-06 RX ORDER — DIPHENHYDRAMINE HYDROCHLORIDE 50 MG/ML
50 INJECTION, SOLUTION INTRAMUSCULAR; INTRAVENOUS ONCE AS NEEDED
OUTPATIENT
Start: 2025-08-12

## 2025-08-06 RX ORDER — PROCHLORPERAZINE EDISYLATE 5 MG/ML
10 INJECTION INTRAMUSCULAR; INTRAVENOUS ONCE AS NEEDED
Start: 2025-08-12

## 2025-08-06 RX ORDER — HYDROCODONE BITARTRATE AND ACETAMINOPHEN 10; 325 MG/1; MG/1
1 TABLET ORAL EVERY 6 HOURS PRN
Qty: 90 TABLET | Refills: 0 | Status: SHIPPED | OUTPATIENT
Start: 2025-08-06

## 2025-08-06 RX ORDER — HEPARIN 100 UNIT/ML
500 SYRINGE INTRAVENOUS
OUTPATIENT
Start: 2025-08-12

## 2025-08-06 RX ORDER — EPINEPHRINE 0.3 MG/.3ML
0.3 INJECTION SUBCUTANEOUS ONCE AS NEEDED
OUTPATIENT
Start: 2025-08-12

## 2025-08-06 RX ORDER — SODIUM CHLORIDE 0.9 % (FLUSH) 0.9 %
10 SYRINGE (ML) INJECTION
OUTPATIENT
Start: 2025-08-12

## 2025-08-06 RX ADMIN — LEUPROLIDE ACETATE 22.5 MG: KIT at 10:08

## 2025-08-06 NOTE — Clinical Note
Orders for 08/06/2025:  Continue Herceptin, Lupron, Arimidex, and ribociclib Restaging bone scan is pending  Re-stage with contrast-enhanced CT scans of C/A/P and bone scan late October Follow-up with gyn for ovarian cancer surveillance (patient is BRCA1 mutation positive)  GI referral for evaluation of hematochezia  Continue Xgeva  Calcium and vitamin-D supplements  DEXA scan in September 2026 Echocardiogram mid September Follow-up with NP in 1 month

## 2025-08-06 NOTE — NURSING
1037  Pt did labs, saw Dr. Subramanian, and is here for q 3 mon lupron.  Pt denies any pain or complaint.  Negative UPT.

## 2025-08-12 ENCOUNTER — INFUSION (OUTPATIENT)
Dept: INFUSION THERAPY | Facility: HOSPITAL | Age: 35
End: 2025-08-12
Attending: INTERNAL MEDICINE
Payer: MEDICAID

## 2025-08-12 ENCOUNTER — LAB VISIT (OUTPATIENT)
Dept: HEMATOLOGY/ONCOLOGY | Facility: CLINIC | Age: 35
End: 2025-08-12
Payer: MEDICAID

## 2025-08-12 VITALS
TEMPERATURE: 98 F | HEART RATE: 81 BPM | HEIGHT: 66 IN | OXYGEN SATURATION: 96 % | RESPIRATION RATE: 20 BRPM | WEIGHT: 237.88 LBS | SYSTOLIC BLOOD PRESSURE: 126 MMHG | BODY MASS INDEX: 38.23 KG/M2 | DIASTOLIC BLOOD PRESSURE: 86 MMHG

## 2025-08-12 DIAGNOSIS — C50.912 INVASIVE DUCTAL CARCINOMA OF LEFT BREAST: ICD-10-CM

## 2025-08-12 DIAGNOSIS — C79.51 CARCINOMA OF LEFT BREAST METASTATIC TO BONE: Primary | ICD-10-CM

## 2025-08-12 DIAGNOSIS — C50.919 ADENOCARCINOMA OF BREAST METASTATIC TO LIVER, UNSPECIFIED LATERALITY: ICD-10-CM

## 2025-08-12 DIAGNOSIS — C50.912 CARCINOMA OF LEFT BREAST METASTATIC TO BONE: Primary | ICD-10-CM

## 2025-08-12 DIAGNOSIS — C78.7 ADENOCARCINOMA OF BREAST METASTATIC TO LIVER, UNSPECIFIED LATERALITY: ICD-10-CM

## 2025-08-12 LAB
ALBUMIN SERPL-MCNC: 3.4 G/DL (ref 3.5–5)
ALBUMIN/GLOB SERPL: 0.7 RATIO (ref 1.1–2)
ALP SERPL-CCNC: 51 UNIT/L (ref 40–150)
ALT SERPL-CCNC: 31 UNIT/L (ref 0–55)
ANION GAP SERPL CALC-SCNC: 8 MEQ/L
AST SERPL-CCNC: 20 UNIT/L (ref 11–45)
B-HCG UR QL: NEGATIVE
BASOPHILS # BLD AUTO: 0.07 X10(3)/MCL
BASOPHILS NFR BLD AUTO: 1.3 %
BILIRUB SERPL-MCNC: 0.3 MG/DL
BUN SERPL-MCNC: 17.5 MG/DL (ref 7–18.7)
CALCIUM SERPL-MCNC: 9.3 MG/DL (ref 8.4–10.2)
CHLORIDE SERPL-SCNC: 102 MMOL/L (ref 98–107)
CO2 SERPL-SCNC: 29 MMOL/L (ref 22–29)
CREAT SERPL-MCNC: 0.99 MG/DL (ref 0.55–1.02)
CREAT/UREA NIT SERPL: 18
CTP QC/QA: YES
EOSINOPHIL # BLD AUTO: 0.66 X10(3)/MCL (ref 0–0.9)
EOSINOPHIL NFR BLD AUTO: 12.2 %
ERYTHROCYTE [DISTWIDTH] IN BLOOD BY AUTOMATED COUNT: 13.3 % (ref 11.5–17)
GFR SERPLBLD CREATININE-BSD FMLA CKD-EPI: >60 ML/MIN/1.73/M2
GLOBULIN SER-MCNC: 4.6 GM/DL (ref 2.4–3.5)
GLUCOSE SERPL-MCNC: 255 MG/DL (ref 74–100)
HCT VFR BLD AUTO: 34.2 % (ref 37–47)
HGB BLD-MCNC: 11.5 G/DL (ref 12–16)
IMM GRANULOCYTES # BLD AUTO: 0.02 X10(3)/MCL (ref 0–0.04)
IMM GRANULOCYTES NFR BLD AUTO: 0.4 %
LYMPHOCYTES # BLD AUTO: 2.64 X10(3)/MCL (ref 0.6–4.6)
LYMPHOCYTES NFR BLD AUTO: 48.7 %
MAGNESIUM SERPL-MCNC: 1.9 MG/DL (ref 1.6–2.6)
MCH RBC QN AUTO: 36.5 PG (ref 27–31)
MCHC RBC AUTO-ENTMCNC: 33.6 G/DL (ref 33–36)
MCV RBC AUTO: 108.6 FL (ref 80–94)
MONOCYTES # BLD AUTO: 0.42 X10(3)/MCL (ref 0.1–1.3)
MONOCYTES NFR BLD AUTO: 7.7 %
NEUTROPHILS # BLD AUTO: 1.61 X10(3)/MCL (ref 2.1–9.2)
NEUTROPHILS NFR BLD AUTO: 29.7 %
NRBC BLD AUTO-RTO: 0 %
PLATELET # BLD AUTO: 268 X10(3)/MCL (ref 130–400)
PMV BLD AUTO: 8.6 FL (ref 7.4–10.4)
POTASSIUM SERPL-SCNC: 3.6 MMOL/L (ref 3.5–5.1)
PROT SERPL-MCNC: 8 GM/DL (ref 6.4–8.3)
RBC # BLD AUTO: 3.15 X10(6)/MCL (ref 4.2–5.4)
SODIUM SERPL-SCNC: 139 MMOL/L (ref 136–145)
WBC # BLD AUTO: 5.42 X10(3)/MCL (ref 4.5–11.5)

## 2025-08-12 PROCEDURE — A4216 STERILE WATER/SALINE, 10 ML: HCPCS | Performed by: INTERNAL MEDICINE

## 2025-08-12 PROCEDURE — 80053 COMPREHEN METABOLIC PANEL: CPT

## 2025-08-12 PROCEDURE — 96413 CHEMO IV INFUSION 1 HR: CPT

## 2025-08-12 PROCEDURE — 25000003 PHARM REV CODE 250: Performed by: INTERNAL MEDICINE

## 2025-08-12 PROCEDURE — 81025 URINE PREGNANCY TEST: CPT

## 2025-08-12 PROCEDURE — 83735 ASSAY OF MAGNESIUM: CPT

## 2025-08-12 PROCEDURE — 63600175 PHARM REV CODE 636 W HCPCS: Mod: JZ,TB | Performed by: INTERNAL MEDICINE

## 2025-08-12 PROCEDURE — 85025 COMPLETE CBC W/AUTO DIFF WBC: CPT

## 2025-08-12 PROCEDURE — 96372 THER/PROPH/DIAG INJ SC/IM: CPT | Mod: 59

## 2025-08-12 RX ORDER — PROCHLORPERAZINE EDISYLATE 5 MG/ML
10 INJECTION INTRAMUSCULAR; INTRAVENOUS ONCE AS NEEDED
Status: DISCONTINUED | OUTPATIENT
Start: 2025-08-12 | End: 2025-08-12 | Stop reason: HOSPADM

## 2025-08-12 RX ORDER — HEPARIN 100 UNIT/ML
500 SYRINGE INTRAVENOUS
Status: DISCONTINUED | OUTPATIENT
Start: 2025-08-12 | End: 2025-08-12 | Stop reason: HOSPADM

## 2025-08-12 RX ORDER — EPINEPHRINE 1 MG/ML
0.3 INJECTION INTRAMUSCULAR; INTRAVENOUS; SUBCUTANEOUS ONCE AS NEEDED
Status: DISCONTINUED | OUTPATIENT
Start: 2025-08-12 | End: 2025-08-12 | Stop reason: HOSPADM

## 2025-08-12 RX ORDER — DIPHENHYDRAMINE HYDROCHLORIDE 50 MG/ML
50 INJECTION, SOLUTION INTRAMUSCULAR; INTRAVENOUS ONCE AS NEEDED
Status: DISCONTINUED | OUTPATIENT
Start: 2025-08-12 | End: 2025-08-12 | Stop reason: HOSPADM

## 2025-08-12 RX ORDER — SODIUM CHLORIDE 0.9 % (FLUSH) 0.9 %
10 SYRINGE (ML) INJECTION
Status: DISCONTINUED | OUTPATIENT
Start: 2025-08-12 | End: 2025-08-12 | Stop reason: HOSPADM

## 2025-08-12 RX ADMIN — Medication 10 ML: at 12:08

## 2025-08-12 RX ADMIN — TRASTUZUMAB-ANNS 653 MG: 420 INJECTION, POWDER, LYOPHILIZED, FOR SOLUTION INTRAVENOUS at 11:08

## 2025-08-12 RX ADMIN — DENOSUMAB 120 MG: 120 INJECTION SUBCUTANEOUS at 11:08

## 2025-08-12 RX ADMIN — SODIUM CHLORIDE: 9 INJECTION, SOLUTION INTRAVENOUS at 10:08

## 2025-08-12 RX ADMIN — HEPARIN 500 UNITS: 100 SYRINGE at 12:08

## 2025-08-18 ENCOUNTER — HOSPITAL ENCOUNTER (OUTPATIENT)
Dept: RADIOLOGY | Facility: HOSPITAL | Age: 35
Discharge: HOME | End: 2025-08-18
Attending: INTERNAL MEDICINE
Payer: MEDICAID

## 2025-08-18 DIAGNOSIS — C50.919 CARCINOMA OF BREAST METASTATIC TO BONE, UNSPECIFIED LATERALITY: ICD-10-CM

## 2025-08-18 DIAGNOSIS — C50.919 ADENOCARCINOMA OF BREAST METASTATIC TO LIVER, UNSPECIFIED LATERALITY: ICD-10-CM

## 2025-08-18 DIAGNOSIS — Z15.01 BRCA1 GENE MUTATION POSITIVE: ICD-10-CM

## 2025-08-18 DIAGNOSIS — C78.7 ADENOCARCINOMA OF BREAST METASTATIC TO LIVER, UNSPECIFIED LATERALITY: ICD-10-CM

## 2025-08-18 DIAGNOSIS — Z15.09 BRCA1 GENE MUTATION POSITIVE: ICD-10-CM

## 2025-08-18 DIAGNOSIS — C79.51 CARCINOMA OF BREAST METASTATIC TO BONE, UNSPECIFIED LATERALITY: ICD-10-CM

## 2025-08-18 PROCEDURE — 78306 BONE IMAGING WHOLE BODY: CPT | Mod: TC

## 2025-08-18 PROCEDURE — A9503 TC99M MEDRONATE: HCPCS | Performed by: INTERNAL MEDICINE

## 2025-08-18 RX ORDER — TC 99M MEDRONATE 20 MG/10ML
25 INJECTION, POWDER, LYOPHILIZED, FOR SOLUTION INTRAVENOUS
Status: COMPLETED | OUTPATIENT
Start: 2025-08-18 | End: 2025-08-18

## 2025-08-18 RX ADMIN — TECHNETIUM TC 99M MEDRONATE 25.9 MILLICURIE: 20 INJECTION, POWDER, LYOPHILIZED, FOR SOLUTION INTRAVENOUS at 09:08

## 2025-08-29 DIAGNOSIS — C50.912 INVASIVE DUCTAL CARCINOMA OF LEFT BREAST: ICD-10-CM

## 2025-08-29 RX ORDER — RIBOCICLIB 200 MG/1
600 TABLET, FILM COATED ORAL DAILY
Qty: 63 TABLET | Refills: 2 | Status: ACTIVE | OUTPATIENT
Start: 2025-08-29

## 2025-09-02 ENCOUNTER — INFUSION (OUTPATIENT)
Dept: INFUSION THERAPY | Facility: HOSPITAL | Age: 35
End: 2025-09-02
Attending: INTERNAL MEDICINE
Payer: MEDICAID

## 2025-09-02 ENCOUNTER — OFFICE VISIT (OUTPATIENT)
Dept: HEMATOLOGY/ONCOLOGY | Facility: CLINIC | Age: 35
End: 2025-09-02
Payer: MEDICAID

## 2025-09-02 VITALS
SYSTOLIC BLOOD PRESSURE: 126 MMHG | WEIGHT: 238 LBS | HEIGHT: 66 IN | OXYGEN SATURATION: 98 % | BODY MASS INDEX: 38.25 KG/M2 | TEMPERATURE: 99 F | HEART RATE: 81 BPM | DIASTOLIC BLOOD PRESSURE: 90 MMHG | RESPIRATION RATE: 18 BRPM

## 2025-09-02 DIAGNOSIS — D84.821 IMMUNODEFICIENCY DUE TO CHEMOTHERAPY: ICD-10-CM

## 2025-09-02 DIAGNOSIS — Z79.811 LONG TERM CURRENT USE OF AROMATASE INHIBITOR: ICD-10-CM

## 2025-09-02 DIAGNOSIS — C50.912 CARCINOMA OF LEFT BREAST METASTATIC TO BONE: Primary | ICD-10-CM

## 2025-09-02 DIAGNOSIS — N95.9 PREMENOPAUSAL PATIENT: ICD-10-CM

## 2025-09-02 DIAGNOSIS — C79.51 SECONDARY MALIGNANCY OF SACRUM: ICD-10-CM

## 2025-09-02 DIAGNOSIS — C79.51 CARCINOMA OF LEFT BREAST METASTATIC TO BONE: Primary | ICD-10-CM

## 2025-09-02 DIAGNOSIS — C78.7 ADENOCARCINOMA OF BREAST METASTATIC TO LIVER, UNSPECIFIED LATERALITY: ICD-10-CM

## 2025-09-02 DIAGNOSIS — T45.1X5A IMMUNODEFICIENCY DUE TO CHEMOTHERAPY: ICD-10-CM

## 2025-09-02 DIAGNOSIS — C50.912 INVASIVE DUCTAL CARCINOMA OF LEFT BREAST: Primary | ICD-10-CM

## 2025-09-02 DIAGNOSIS — Z79.69 IMMUNODEFICIENCY DUE TO CHEMOTHERAPY: ICD-10-CM

## 2025-09-02 DIAGNOSIS — C50.919 ADENOCARCINOMA OF BREAST METASTATIC TO LIVER, UNSPECIFIED LATERALITY: ICD-10-CM

## 2025-09-02 DIAGNOSIS — C79.51 SECONDARY MALIGNANCY OF LUMBAR VERTEBRAL COLUMN: ICD-10-CM

## 2025-09-02 LAB
B-HCG UR QL: NEGATIVE
CTP QC/QA: YES

## 2025-09-02 PROCEDURE — 99214 OFFICE O/P EST MOD 30 MIN: CPT | Mod: PBBFAC

## 2025-09-02 PROCEDURE — 63600175 PHARM REV CODE 636 W HCPCS: Mod: TB

## 2025-09-02 PROCEDURE — 96413 CHEMO IV INFUSION 1 HR: CPT

## 2025-09-02 PROCEDURE — 25000003 PHARM REV CODE 250

## 2025-09-02 RX ORDER — HEPARIN 100 UNIT/ML
500 SYRINGE INTRAVENOUS
Status: DISCONTINUED | OUTPATIENT
Start: 2025-09-02 | End: 2025-09-02 | Stop reason: HOSPADM

## 2025-09-02 RX ORDER — DIPHENHYDRAMINE HYDROCHLORIDE 50 MG/ML
50 INJECTION, SOLUTION INTRAMUSCULAR; INTRAVENOUS ONCE AS NEEDED
Status: CANCELLED | OUTPATIENT
Start: 2025-09-02 | End: 2037-01-29

## 2025-09-02 RX ORDER — SODIUM CHLORIDE 0.9 % (FLUSH) 0.9 %
10 SYRINGE (ML) INJECTION
Status: DISCONTINUED | OUTPATIENT
Start: 2025-09-02 | End: 2025-09-02 | Stop reason: HOSPADM

## 2025-09-02 RX ORDER — PROCHLORPERAZINE EDISYLATE 5 MG/ML
10 INJECTION INTRAMUSCULAR; INTRAVENOUS ONCE AS NEEDED
Status: DISCONTINUED | OUTPATIENT
Start: 2025-09-02 | End: 2025-09-02 | Stop reason: HOSPADM

## 2025-09-02 RX ORDER — DIPHENHYDRAMINE HYDROCHLORIDE 50 MG/ML
50 INJECTION, SOLUTION INTRAMUSCULAR; INTRAVENOUS ONCE AS NEEDED
Status: DISCONTINUED | OUTPATIENT
Start: 2025-09-02 | End: 2025-09-02 | Stop reason: HOSPADM

## 2025-09-02 RX ORDER — EPINEPHRINE 0.3 MG/.3ML
0.3 INJECTION SUBCUTANEOUS ONCE AS NEEDED
Status: CANCELLED | OUTPATIENT
Start: 2025-09-02 | End: 2037-01-29

## 2025-09-02 RX ORDER — HEPARIN 100 UNIT/ML
500 SYRINGE INTRAVENOUS
Status: CANCELLED | OUTPATIENT
Start: 2025-09-02

## 2025-09-02 RX ORDER — SODIUM CHLORIDE 0.9 % (FLUSH) 0.9 %
10 SYRINGE (ML) INJECTION
Status: CANCELLED | OUTPATIENT
Start: 2025-09-02

## 2025-09-02 RX ORDER — EPINEPHRINE 1 MG/ML
0.3 INJECTION INTRAMUSCULAR; INTRAVENOUS; SUBCUTANEOUS ONCE AS NEEDED
Status: DISCONTINUED | OUTPATIENT
Start: 2025-09-02 | End: 2025-09-02 | Stop reason: HOSPADM

## 2025-09-02 RX ORDER — PROCHLORPERAZINE EDISYLATE 5 MG/ML
10 INJECTION INTRAMUSCULAR; INTRAVENOUS ONCE AS NEEDED
Status: CANCELLED | OUTPATIENT
Start: 2025-09-02

## 2025-09-02 RX ADMIN — SODIUM CHLORIDE: 9 INJECTION, SOLUTION INTRAVENOUS at 11:09

## 2025-09-02 RX ADMIN — HEPARIN 500 UNITS: 100 SYRINGE at 12:09

## 2025-09-02 RX ADMIN — TRASTUZUMAB-ANNS 653 MG: 420 INJECTION, POWDER, LYOPHILIZED, FOR SOLUTION INTRAVENOUS at 11:09

## 2025-09-05 DIAGNOSIS — C50.919 ADENOCARCINOMA OF BREAST METASTATIC TO LIVER, UNSPECIFIED LATERALITY: Primary | ICD-10-CM

## 2025-09-05 DIAGNOSIS — C78.7 ADENOCARCINOMA OF BREAST METASTATIC TO LIVER, UNSPECIFIED LATERALITY: Primary | ICD-10-CM

## (undated) DEVICE — GEL AQUASONIC 100 STERILE20GM

## (undated) DEVICE — APPLICATOR CHLORAPREP ORN 26ML

## (undated) DEVICE — NDL HYPO REG 25G X 1 1/2

## (undated) DEVICE — SYR DISP LL 5CC

## (undated) DEVICE — CONTAINER SPECIMEN OR STER 4OZ

## (undated) DEVICE — COVER SITE-RITE PROBE 96IN

## (undated) DEVICE — BLADE SURG STAINLESS STEEL #11

## (undated) DEVICE — GLOVE SENSICARE PI GRN 7.5

## (undated) DEVICE — SUT MCRYL PLUS 4-0 PS2 27IN

## (undated) DEVICE — Device

## (undated) DEVICE — GLOVE SIGNATURE ESSNTL LTX 7.5

## (undated) DEVICE — SYR 10CC LUER LOCK

## (undated) DEVICE — ADHESIVE DERMABOND ADVANCED

## (undated) DEVICE — GOWN POLY REINF BRTH SLV XL

## (undated) DEVICE — DRAPE C-ARM COVER EZ 36X28IN

## (undated) DEVICE — SUT 2/0 30IN PROLENE MONO

## (undated) DEVICE — KIT SURGICAL TURNOVER

## (undated) DEVICE — SUT VICRYL 3-0 27 SH

## (undated) DEVICE — DECANTER FLUID TRNSF WHITE 9IN